# Patient Record
Sex: FEMALE | Race: WHITE | Employment: OTHER | URBAN - METROPOLITAN AREA
[De-identification: names, ages, dates, MRNs, and addresses within clinical notes are randomized per-mention and may not be internally consistent; named-entity substitution may affect disease eponyms.]

---

## 2024-02-16 ENCOUNTER — ANESTHESIA EVENT (OUTPATIENT)
Dept: OPERATING ROOM | Age: 79
End: 2024-02-16
Payer: MEDICARE

## 2024-02-19 LAB
NON-UH HIE APTT PATIENT: 28.3 SECONDS (ref 27–38)
NON-UH HIE INR: 1.1
NON-UH HIE PLATELET FUNCTION (ASPIRIN): 631 ARU
NON-UH HIE PROTIME PATIENT: 12.7 SECONDS (ref 9.8–12.8)

## 2024-02-29 RX ORDER — ALBUTEROL SULFATE 90 UG/1
1 AEROSOL, METERED RESPIRATORY (INHALATION) EVERY 6 HOURS PRN
COMMUNITY

## 2024-02-29 RX ORDER — OMEPRAZOLE 20 MG/1
40 CAPSULE, DELAYED RELEASE ORAL DAILY
COMMUNITY

## 2024-02-29 RX ORDER — LISINOPRIL 20 MG/1
20 TABLET ORAL DAILY
COMMUNITY

## 2024-02-29 RX ORDER — TIZANIDINE 4 MG/1
4 TABLET ORAL 3 TIMES DAILY PRN
COMMUNITY
End: 2024-02-29

## 2024-02-29 RX ORDER — SPIRONOLACTONE 25 MG/1
25 TABLET ORAL DAILY
COMMUNITY

## 2024-02-29 RX ORDER — ALBUTEROL SULFATE 1.25 MG/3ML
1 SOLUTION RESPIRATORY (INHALATION) EVERY 6 HOURS PRN
COMMUNITY

## 2024-02-29 RX ORDER — FAMOTIDINE 40 MG/1
40 TABLET, FILM COATED ORAL EVERY 12 HOURS
COMMUNITY

## 2024-02-29 RX ORDER — BACLOFEN 10 MG/1
10 TABLET ORAL 3 TIMES DAILY
COMMUNITY

## 2024-02-29 RX ORDER — OMEPRAZOLE 20 MG/1
40 CAPSULE, DELAYED RELEASE ORAL DAILY
COMMUNITY
End: 2024-02-29

## 2024-02-29 RX ORDER — ALENDRONATE SODIUM 70 MG/1
70 TABLET ORAL
COMMUNITY
End: 2024-02-29

## 2024-02-29 RX ORDER — SUCRALFATE 1 G/1
1 TABLET ORAL 2 TIMES DAILY
COMMUNITY

## 2024-02-29 RX ORDER — CLONIDINE HYDROCHLORIDE 0.1 MG/1
0.1 TABLET ORAL 2 TIMES DAILY
COMMUNITY
End: 2024-02-29

## 2024-02-29 RX ORDER — FLUTICASONE FUROATE, UMECLIDINIUM BROMIDE AND VILANTEROL TRIFENATATE 100; 62.5; 25 UG/1; UG/1; UG/1
1 POWDER RESPIRATORY (INHALATION) DAILY
COMMUNITY

## 2024-02-29 RX ORDER — CELECOXIB 200 MG/1
200 CAPSULE ORAL DAILY
COMMUNITY
End: 2024-02-29

## 2024-02-29 RX ORDER — BISOPROLOL FUMARATE 10 MG/1
10 TABLET, FILM COATED ORAL DAILY
COMMUNITY

## 2024-02-29 RX ORDER — ERGOCALCIFEROL 1.25 MG/1
50000 CAPSULE ORAL WEEKLY
COMMUNITY
End: 2024-02-29

## 2024-02-29 RX ORDER — HYDROXYCHLOROQUINE SULFATE 200 MG/1
200 TABLET, FILM COATED ORAL EVERY 12 HOURS
COMMUNITY
End: 2024-02-29

## 2024-02-29 RX ORDER — PREGABALIN 75 MG/1
75 CAPSULE ORAL 3 TIMES DAILY
COMMUNITY

## 2024-02-29 RX ORDER — PREDNISONE 10 MG/1
5 TABLET ORAL DAILY
COMMUNITY

## 2024-02-29 NOTE — PROGRESS NOTES
Phone PAT completed, pt given pre-op instructions (per orange sheet) and instructed to take inhalers and bisoprolol AM of OR with SOW, pt verbalized understanding. T/S and EKG ordered for AM of OR. Aware of 0545 arrival time. Electronically signed by Apryl Carlos RN on 2/29/2024 at 3:24 PM

## 2024-03-01 ENCOUNTER — HOSPITAL ENCOUNTER (INPATIENT)
Age: 79
LOS: 4 days | Discharge: HOME HEALTH | DRG: 453 | End: 2024-03-05
Attending: NEUROLOGICAL SURGERY | Admitting: NEUROLOGICAL SURGERY
Payer: MEDICARE

## 2024-03-01 ENCOUNTER — APPOINTMENT (OUTPATIENT)
Dept: GENERAL RADIOLOGY | Age: 79
DRG: 453 | End: 2024-03-01
Attending: NEUROLOGICAL SURGERY
Payer: MEDICARE

## 2024-03-01 ENCOUNTER — ANESTHESIA (OUTPATIENT)
Dept: OPERATING ROOM | Age: 79
End: 2024-03-01
Payer: MEDICARE

## 2024-03-01 DIAGNOSIS — M43.16 SPONDYLOLISTHESIS AT L4-L5 LEVEL: Primary | ICD-10-CM

## 2024-03-01 PROBLEM — S22.082G: Status: ACTIVE | Noted: 2024-03-01

## 2024-03-01 LAB
ABO + RH BLD: NORMAL
BLD GP AB SCN SERPL QL: NORMAL
EKG ATRIAL RATE: 67 BPM
EKG P AXIS: 48 DEGREES
EKG P-R INTERVAL: 174 MS
EKG Q-T INTERVAL: 378 MS
EKG QRS DURATION: 90 MS
EKG QTC CALCULATION (BAZETT): 399 MS
EKG R AXIS: -26 DEGREES
EKG T AXIS: 14 DEGREES
EKG VENTRICULAR RATE: 67 BPM

## 2024-03-01 PROCEDURE — 86850 RBC ANTIBODY SCREEN: CPT

## 2024-03-01 PROCEDURE — 6360000002 HC RX W HCPCS: Performed by: NEUROLOGICAL SURGERY

## 2024-03-01 PROCEDURE — 86901 BLOOD TYPING SEROLOGIC RH(D): CPT

## 2024-03-01 PROCEDURE — 86900 BLOOD TYPING SEROLOGIC ABO: CPT

## 2024-03-01 PROCEDURE — 6360000002 HC RX W HCPCS: Performed by: NURSE ANESTHETIST, CERTIFIED REGISTERED

## 2024-03-01 PROCEDURE — 2709999900 HC NON-CHARGEABLE SUPPLY: Performed by: NEUROLOGICAL SURGERY

## 2024-03-01 PROCEDURE — 2580000003 HC RX 258: Performed by: NEUROLOGICAL SURGERY

## 2024-03-01 PROCEDURE — 7100000000 HC PACU RECOVERY - FIRST 15 MIN: Performed by: NEUROLOGICAL SURGERY

## 2024-03-01 PROCEDURE — 76942 ECHO GUIDE FOR BIOPSY: CPT | Performed by: STUDENT IN AN ORGANIZED HEALTH CARE EDUCATION/TRAINING PROGRAM

## 2024-03-01 PROCEDURE — 00NY0ZZ RELEASE LUMBAR SPINAL CORD, OPEN APPROACH: ICD-10-PCS | Performed by: NEUROLOGICAL SURGERY

## 2024-03-01 PROCEDURE — 6370000000 HC RX 637 (ALT 250 FOR IP)

## 2024-03-01 PROCEDURE — 2500000003 HC RX 250 WO HCPCS: Performed by: NEUROLOGICAL SURGERY

## 2024-03-01 PROCEDURE — 6370000000 HC RX 637 (ALT 250 FOR IP): Performed by: NEUROLOGICAL SURGERY

## 2024-03-01 PROCEDURE — 0ST20ZZ RESECTION OF LUMBAR VERTEBRAL DISC, OPEN APPROACH: ICD-10-PCS | Performed by: NEUROLOGICAL SURGERY

## 2024-03-01 PROCEDURE — 93005 ELECTROCARDIOGRAM TRACING: CPT | Performed by: NEUROLOGICAL SURGERY

## 2024-03-01 PROCEDURE — 3700000001 HC ADD 15 MINUTES (ANESTHESIA): Performed by: NEUROLOGICAL SURGERY

## 2024-03-01 PROCEDURE — 2720000010 HC SURG SUPPLY STERILE: Performed by: NEUROLOGICAL SURGERY

## 2024-03-01 PROCEDURE — 7100000001 HC PACU RECOVERY - ADDTL 15 MIN: Performed by: NEUROLOGICAL SURGERY

## 2024-03-01 PROCEDURE — C1821 INTERSPINOUS IMPLANT: HCPCS | Performed by: NEUROLOGICAL SURGERY

## 2024-03-01 PROCEDURE — 3600000014 HC SURGERY LEVEL 4 ADDTL 15MIN: Performed by: NEUROLOGICAL SURGERY

## 2024-03-01 PROCEDURE — 0SG0071 FUSION OF LUMBAR VERTEBRAL JOINT WITH AUTOLOGOUS TISSUE SUBSTITUTE, POSTERIOR APPROACH, POSTERIOR COLUMN, OPEN APPROACH: ICD-10-PCS | Performed by: NEUROLOGICAL SURGERY

## 2024-03-01 PROCEDURE — 0SG00AJ FUSION OF LUMBAR VERTEBRAL JOINT WITH INTERBODY FUSION DEVICE, POSTERIOR APPROACH, ANTERIOR COLUMN, OPEN APPROACH: ICD-10-PCS | Performed by: NEUROLOGICAL SURGERY

## 2024-03-01 PROCEDURE — A4217 STERILE WATER/SALINE, 500 ML: HCPCS | Performed by: NEUROLOGICAL SURGERY

## 2024-03-01 PROCEDURE — 1210000000 HC MED SURG R&B

## 2024-03-01 PROCEDURE — 2500000003 HC RX 250 WO HCPCS: Performed by: NURSE ANESTHETIST, CERTIFIED REGISTERED

## 2024-03-01 PROCEDURE — 3700000000 HC ANESTHESIA ATTENDED CARE: Performed by: NEUROLOGICAL SURGERY

## 2024-03-01 PROCEDURE — 2580000003 HC RX 258: Performed by: NURSE ANESTHETIST, CERTIFIED REGISTERED

## 2024-03-01 PROCEDURE — 6360000002 HC RX W HCPCS: Performed by: STUDENT IN AN ORGANIZED HEALTH CARE EDUCATION/TRAINING PROGRAM

## 2024-03-01 PROCEDURE — 2500000003 HC RX 250 WO HCPCS: Performed by: STUDENT IN AN ORGANIZED HEALTH CARE EDUCATION/TRAINING PROGRAM

## 2024-03-01 PROCEDURE — C1713 ANCHOR/SCREW BN/BN,TIS/BN: HCPCS | Performed by: NEUROLOGICAL SURGERY

## 2024-03-01 PROCEDURE — 3600000004 HC SURGERY LEVEL 4 BASE: Performed by: NEUROLOGICAL SURGERY

## 2024-03-01 DEVICE — GRAFT HUM TISS L 10ML BONE MTRX CELLULAR OSTEOCEL PRO: Type: IMPLANTABLE DEVICE | Site: SPINE LUMBAR | Status: FUNCTIONAL

## 2024-03-01 DEVICE — TRANSITION 8.5MM MIS HA POLYAXIAL PEDICLE SCREW, 45MM
Type: IMPLANTABLE DEVICE | Site: SPINE LUMBAR | Status: FUNCTIONAL
Brand: TRANSITION

## 2024-03-01 DEVICE — 5.5MM CURVED ROD, CONE TIP, 55MM
Type: IMPLANTABLE DEVICE | Site: SPINE LUMBAR | Status: FUNCTIONAL
Brand: REVERE

## 2024-03-01 DEVICE — CALIBER SPACER 10 X 22MM, 8-12MM
Type: IMPLANTABLE DEVICE | Site: SPINE LUMBAR | Status: FUNCTIONAL
Brand: CALIBER

## 2024-03-01 DEVICE — REVOLVE LOCKING CAP
Type: IMPLANTABLE DEVICE | Site: SPINE LUMBAR | Status: FUNCTIONAL
Brand: REVOLVE

## 2024-03-01 RX ORDER — SODIUM CHLORIDE 9 MG/ML
INJECTION, SOLUTION INTRAVENOUS PRN
Status: DISCONTINUED | OUTPATIENT
Start: 2024-03-01 | End: 2024-03-01 | Stop reason: HOSPADM

## 2024-03-01 RX ORDER — GINSENG 100 MG
CAPSULE ORAL PRN
Status: DISCONTINUED | OUTPATIENT
Start: 2024-03-01 | End: 2024-03-01 | Stop reason: ALTCHOICE

## 2024-03-01 RX ORDER — METOCLOPRAMIDE HYDROCHLORIDE 5 MG/ML
10 INJECTION INTRAMUSCULAR; INTRAVENOUS
Status: DISCONTINUED | OUTPATIENT
Start: 2024-03-01 | End: 2024-03-01 | Stop reason: HOSPADM

## 2024-03-01 RX ORDER — SODIUM CHLORIDE 0.9 % (FLUSH) 0.9 %
5-40 SYRINGE (ML) INJECTION PRN
Status: DISCONTINUED | OUTPATIENT
Start: 2024-03-01 | End: 2024-03-01 | Stop reason: HOSPADM

## 2024-03-01 RX ORDER — SODIUM CHLORIDE 0.9 % (FLUSH) 0.9 %
5-40 SYRINGE (ML) INJECTION PRN
Status: DISCONTINUED | OUTPATIENT
Start: 2024-03-01 | End: 2024-03-06 | Stop reason: HOSPADM

## 2024-03-01 RX ORDER — MEPERIDINE HYDROCHLORIDE 25 MG/ML
12.5 INJECTION INTRAMUSCULAR; INTRAVENOUS; SUBCUTANEOUS
Status: DISCONTINUED | OUTPATIENT
Start: 2024-03-01 | End: 2024-03-01 | Stop reason: HOSPADM

## 2024-03-01 RX ORDER — SODIUM CHLORIDE, SODIUM LACTATE, POTASSIUM CHLORIDE, CALCIUM CHLORIDE 600; 310; 30; 20 MG/100ML; MG/100ML; MG/100ML; MG/100ML
INJECTION, SOLUTION INTRAVENOUS CONTINUOUS PRN
Status: DISCONTINUED | OUTPATIENT
Start: 2024-03-01 | End: 2024-03-01 | Stop reason: SDUPTHER

## 2024-03-01 RX ORDER — ONDANSETRON 2 MG/ML
INJECTION INTRAMUSCULAR; INTRAVENOUS PRN
Status: DISCONTINUED | OUTPATIENT
Start: 2024-03-01 | End: 2024-03-01 | Stop reason: SDUPTHER

## 2024-03-01 RX ORDER — DIPHENHYDRAMINE HYDROCHLORIDE 50 MG/ML
12.5 INJECTION INTRAMUSCULAR; INTRAVENOUS
Status: DISCONTINUED | OUTPATIENT
Start: 2024-03-01 | End: 2024-03-01 | Stop reason: HOSPADM

## 2024-03-01 RX ORDER — LISINOPRIL 20 MG/1
20 TABLET ORAL DAILY
Status: DISCONTINUED | OUTPATIENT
Start: 2024-03-02 | End: 2024-03-06 | Stop reason: HOSPADM

## 2024-03-01 RX ORDER — MAGNESIUM HYDROXIDE 1200 MG/15ML
LIQUID ORAL CONTINUOUS PRN
Status: DISCONTINUED | OUTPATIENT
Start: 2024-03-01 | End: 2024-03-01 | Stop reason: HOSPADM

## 2024-03-01 RX ORDER — PROPOFOL 10 MG/ML
INJECTION, EMULSION INTRAVENOUS PRN
Status: DISCONTINUED | OUTPATIENT
Start: 2024-03-01 | End: 2024-03-01 | Stop reason: SDUPTHER

## 2024-03-01 RX ORDER — FENTANYL CITRATE 0.05 MG/ML
50 INJECTION, SOLUTION INTRAMUSCULAR; INTRAVENOUS EVERY 10 MIN PRN
Status: DISCONTINUED | OUTPATIENT
Start: 2024-03-01 | End: 2024-03-01 | Stop reason: HOSPADM

## 2024-03-01 RX ORDER — DEXMEDETOMIDINE HYDROCHLORIDE 100 UG/ML
INJECTION, SOLUTION INTRAVENOUS PRN
Status: DISCONTINUED | OUTPATIENT
Start: 2024-03-01 | End: 2024-03-01 | Stop reason: SDUPTHER

## 2024-03-01 RX ORDER — SODIUM CHLORIDE 0.9 % (FLUSH) 0.9 %
5-40 SYRINGE (ML) INJECTION EVERY 12 HOURS SCHEDULED
Status: DISCONTINUED | OUTPATIENT
Start: 2024-03-01 | End: 2024-03-01 | Stop reason: HOSPADM

## 2024-03-01 RX ORDER — SODIUM CHLORIDE 0.9 % (FLUSH) 0.9 %
5-40 SYRINGE (ML) INJECTION EVERY 12 HOURS SCHEDULED
Status: DISCONTINUED | OUTPATIENT
Start: 2024-03-01 | End: 2024-03-06 | Stop reason: HOSPADM

## 2024-03-01 RX ORDER — ONDANSETRON 2 MG/ML
4 INJECTION INTRAMUSCULAR; INTRAVENOUS
Status: DISCONTINUED | OUTPATIENT
Start: 2024-03-01 | End: 2024-03-01 | Stop reason: HOSPADM

## 2024-03-01 RX ORDER — NALOXONE HYDROCHLORIDE 0.4 MG/ML
INJECTION, SOLUTION INTRAMUSCULAR; INTRAVENOUS; SUBCUTANEOUS PRN
Status: DISCONTINUED | OUTPATIENT
Start: 2024-03-01 | End: 2024-03-02

## 2024-03-01 RX ORDER — METOPROLOL SUCCINATE 100 MG/1
100 TABLET, EXTENDED RELEASE ORAL DAILY
Status: DISCONTINUED | OUTPATIENT
Start: 2024-03-02 | End: 2024-03-06 | Stop reason: HOSPADM

## 2024-03-01 RX ORDER — DEXAMETHASONE SODIUM PHOSPHATE 10 MG/ML
INJECTION INTRAMUSCULAR; INTRAVENOUS PRN
Status: DISCONTINUED | OUTPATIENT
Start: 2024-03-01 | End: 2024-03-01 | Stop reason: SDUPTHER

## 2024-03-01 RX ORDER — LIDOCAINE HYDROCHLORIDE 10 MG/ML
INJECTION, SOLUTION EPIDURAL; INFILTRATION; INTRACAUDAL; PERINEURAL PRN
Status: DISCONTINUED | OUTPATIENT
Start: 2024-03-01 | End: 2024-03-01 | Stop reason: SDUPTHER

## 2024-03-01 RX ORDER — SODIUM CHLORIDE AND POTASSIUM CHLORIDE 150; 900 MG/100ML; MG/100ML
INJECTION, SOLUTION INTRAVENOUS CONTINUOUS
Status: DISCONTINUED | OUTPATIENT
Start: 2024-03-01 | End: 2024-03-05

## 2024-03-01 RX ORDER — BISACODYL 10 MG
10 SUPPOSITORY, RECTAL RECTAL DAILY PRN
Status: DISCONTINUED | OUTPATIENT
Start: 2024-03-01 | End: 2024-03-06 | Stop reason: HOSPADM

## 2024-03-01 RX ORDER — SPIRONOLACTONE 25 MG/1
25 TABLET ORAL DAILY
Status: DISCONTINUED | OUTPATIENT
Start: 2024-03-02 | End: 2024-03-06 | Stop reason: HOSPADM

## 2024-03-01 RX ORDER — OXYCODONE HYDROCHLORIDE 5 MG/1
5 TABLET ORAL
Status: DISCONTINUED | OUTPATIENT
Start: 2024-03-01 | End: 2024-03-01 | Stop reason: HOSPADM

## 2024-03-01 RX ORDER — PANTOPRAZOLE SODIUM 40 MG/1
40 TABLET, DELAYED RELEASE ORAL
Status: DISCONTINUED | OUTPATIENT
Start: 2024-03-02 | End: 2024-03-06 | Stop reason: HOSPADM

## 2024-03-01 RX ORDER — FENTANYL CITRATE 50 UG/ML
INJECTION, SOLUTION INTRAMUSCULAR; INTRAVENOUS PRN
Status: DISCONTINUED | OUTPATIENT
Start: 2024-03-01 | End: 2024-03-01 | Stop reason: SDUPTHER

## 2024-03-01 RX ORDER — POLYETHYLENE GLYCOL 3350 17 G/17G
17 POWDER, FOR SOLUTION ORAL DAILY PRN
Status: DISCONTINUED | OUTPATIENT
Start: 2024-03-01 | End: 2024-03-02

## 2024-03-01 RX ORDER — OXYCODONE HYDROCHLORIDE AND ACETAMINOPHEN 5; 325 MG/1; MG/1
1 TABLET ORAL EVERY 8 HOURS PRN
Qty: 21 TABLET | Refills: 0 | Status: SHIPPED | OUTPATIENT
Start: 2024-03-01 | End: 2024-03-08

## 2024-03-01 RX ORDER — EPHEDRINE SULFATE/0.9% NACL/PF 50 MG/5 ML
SYRINGE (ML) INTRAVENOUS PRN
Status: DISCONTINUED | OUTPATIENT
Start: 2024-03-01 | End: 2024-03-01 | Stop reason: SDUPTHER

## 2024-03-01 RX ORDER — SODIUM CHLORIDE 9 MG/ML
INJECTION, SOLUTION INTRAVENOUS PRN
Status: DISCONTINUED | OUTPATIENT
Start: 2024-03-01 | End: 2024-03-06 | Stop reason: HOSPADM

## 2024-03-01 RX ORDER — ROPIVACAINE HYDROCHLORIDE 5 MG/ML
INJECTION, SOLUTION EPIDURAL; INFILTRATION; PERINEURAL
Status: COMPLETED | OUTPATIENT
Start: 2024-03-01 | End: 2024-03-01

## 2024-03-01 RX ORDER — LIDOCAINE HYDROCHLORIDE 10 MG/ML
1 INJECTION, SOLUTION EPIDURAL; INFILTRATION; INTRACAUDAL; PERINEURAL
Status: DISCONTINUED | OUTPATIENT
Start: 2024-03-01 | End: 2024-03-01 | Stop reason: HOSPADM

## 2024-03-01 RX ORDER — PROCHLORPERAZINE MALEATE 10 MG
5 TABLET ORAL EVERY 6 HOURS PRN
Status: DISCONTINUED | OUTPATIENT
Start: 2024-03-01 | End: 2024-03-06 | Stop reason: HOSPADM

## 2024-03-01 RX ORDER — MIDAZOLAM HYDROCHLORIDE 1 MG/ML
INJECTION INTRAMUSCULAR; INTRAVENOUS
Status: COMPLETED | OUTPATIENT
Start: 2024-03-01 | End: 2024-03-01

## 2024-03-01 RX ORDER — SENNA AND DOCUSATE SODIUM 50; 8.6 MG/1; MG/1
1 TABLET, FILM COATED ORAL 2 TIMES DAILY
Status: DISCONTINUED | OUTPATIENT
Start: 2024-03-01 | End: 2024-03-06 | Stop reason: HOSPADM

## 2024-03-01 RX ORDER — VANCOMYCIN HYDROCHLORIDE 1 G/20ML
INJECTION, POWDER, LYOPHILIZED, FOR SOLUTION INTRAVENOUS PRN
Status: DISCONTINUED | OUTPATIENT
Start: 2024-03-01 | End: 2024-03-01 | Stop reason: ALTCHOICE

## 2024-03-01 RX ORDER — ACETAMINOPHEN 325 MG/1
650 TABLET ORAL EVERY 6 HOURS
Status: DISCONTINUED | OUTPATIENT
Start: 2024-03-01 | End: 2024-03-05

## 2024-03-01 RX ORDER — SUCCINYLCHOLINE/SOD CL,ISO/PF 100 MG/5ML
SYRINGE (ML) INTRAVENOUS PRN
Status: DISCONTINUED | OUTPATIENT
Start: 2024-03-01 | End: 2024-03-01 | Stop reason: SDUPTHER

## 2024-03-01 RX ORDER — ENOXAPARIN SODIUM 100 MG/ML
30 INJECTION SUBCUTANEOUS DAILY
Status: DISCONTINUED | OUTPATIENT
Start: 2024-03-02 | End: 2024-03-06 | Stop reason: HOSPADM

## 2024-03-01 RX ORDER — ONDANSETRON 2 MG/ML
4 INJECTION INTRAMUSCULAR; INTRAVENOUS EVERY 6 HOURS PRN
Status: DISCONTINUED | OUTPATIENT
Start: 2024-03-01 | End: 2024-03-06 | Stop reason: HOSPADM

## 2024-03-01 RX ORDER — MAGNESIUM HYDROXIDE 1200 MG/15ML
LIQUID ORAL CONTINUOUS PRN
Status: COMPLETED | OUTPATIENT
Start: 2024-03-01 | End: 2024-03-01

## 2024-03-01 RX ORDER — SODIUM CHLORIDE, SODIUM LACTATE, POTASSIUM CHLORIDE, CALCIUM CHLORIDE 600; 310; 30; 20 MG/100ML; MG/100ML; MG/100ML; MG/100ML
INJECTION, SOLUTION INTRAVENOUS CONTINUOUS
Status: DISCONTINUED | OUTPATIENT
Start: 2024-03-01 | End: 2024-03-01 | Stop reason: HOSPADM

## 2024-03-01 RX ORDER — KETAMINE HYDROCHLORIDE 50 MG/ML
50 INJECTION, SOLUTION INTRAMUSCULAR; INTRAVENOUS ONCE
Status: COMPLETED | OUTPATIENT
Start: 2024-03-01 | End: 2024-03-01

## 2024-03-01 RX ORDER — OXYCODONE HYDROCHLORIDE 5 MG/1
5 TABLET ORAL EVERY 4 HOURS PRN
Status: DISCONTINUED | OUTPATIENT
Start: 2024-03-01 | End: 2024-03-04

## 2024-03-01 RX ADMIN — ROPIVACAINE HYDROCHLORIDE 30 ML: 5 INJECTION, SOLUTION EPIDURAL; INFILTRATION; PERINEURAL at 07:04

## 2024-03-01 RX ADMIN — Medication 10 MG: at 10:27

## 2024-03-01 RX ADMIN — DEXAMETHASONE SODIUM PHOSPHATE 10 MG: 10 INJECTION INTRAMUSCULAR; INTRAVENOUS at 08:34

## 2024-03-01 RX ADMIN — ONDANSETRON 4 MG: 2 INJECTION INTRAMUSCULAR; INTRAVENOUS at 20:49

## 2024-03-01 RX ADMIN — Medication 100 MG: at 07:50

## 2024-03-01 RX ADMIN — FENTANYL CITRATE 50 MCG: 50 INJECTION, SOLUTION INTRAMUSCULAR; INTRAVENOUS at 07:47

## 2024-03-01 RX ADMIN — ONDANSETRON 4 MG: 2 INJECTION INTRAMUSCULAR; INTRAVENOUS at 14:54

## 2024-03-01 RX ADMIN — MIDAZOLAM HYDROCHLORIDE 2 MG: 1 INJECTION, SOLUTION INTRAMUSCULAR; INTRAVENOUS at 07:04

## 2024-03-01 RX ADMIN — Medication 20 MG: at 08:34

## 2024-03-01 RX ADMIN — DEXMEDETOMIDINE HCL 10 MCG: 100 INJECTION INTRAVENOUS at 08:45

## 2024-03-01 RX ADMIN — LIDOCAINE HYDROCHLORIDE 50 MG: 10 INJECTION, SOLUTION EPIDURAL; INFILTRATION; INTRACAUDAL; PERINEURAL at 07:50

## 2024-03-01 RX ADMIN — FENTANYL CITRATE 50 MCG: 50 INJECTION, SOLUTION INTRAMUSCULAR; INTRAVENOUS at 08:03

## 2024-03-01 RX ADMIN — SODIUM CHLORIDE: 900 INJECTION, SOLUTION INTRAVENOUS at 13:05

## 2024-03-01 RX ADMIN — CEFAZOLIN 2000 MG: 2 INJECTION, POWDER, FOR SOLUTION INTRAMUSCULAR; INTRAVENOUS at 11:46

## 2024-03-01 RX ADMIN — SODIUM CHLORIDE, POTASSIUM CHLORIDE, SODIUM LACTATE AND CALCIUM CHLORIDE: 600; 310; 30; 20 INJECTION, SOLUTION INTRAVENOUS at 09:28

## 2024-03-01 RX ADMIN — DEXMEDETOMIDINE HCL 10 MCG: 100 INJECTION INTRAVENOUS at 07:44

## 2024-03-01 RX ADMIN — SODIUM CHLORIDE, PRESERVATIVE FREE 10 ML: 5 INJECTION INTRAVENOUS at 20:53

## 2024-03-01 RX ADMIN — KETAMINE HYDROCHLORIDE 50 MG: 50 INJECTION INTRAMUSCULAR; INTRAVENOUS at 11:02

## 2024-03-01 RX ADMIN — POTASSIUM CHLORIDE AND SODIUM CHLORIDE: 900; 150 INJECTION, SOLUTION INTRAVENOUS at 15:50

## 2024-03-01 RX ADMIN — CEFAZOLIN 2000 MG: 2 INJECTION, POWDER, FOR SOLUTION INTRAMUSCULAR; INTRAVENOUS at 07:54

## 2024-03-01 RX ADMIN — PROPOFOL 130 MG: 10 INJECTION, EMULSION INTRAVENOUS at 07:50

## 2024-03-01 RX ADMIN — Medication 20 MG: at 08:36

## 2024-03-01 RX ADMIN — ONDANSETRON 4 MG: 2 INJECTION INTRAMUSCULAR; INTRAVENOUS at 08:34

## 2024-03-01 RX ADMIN — DEXMEDETOMIDINE HCL 20 MCG: 100 INJECTION INTRAVENOUS at 11:33

## 2024-03-01 RX ADMIN — SODIUM CHLORIDE, POTASSIUM CHLORIDE, SODIUM LACTATE AND CALCIUM CHLORIDE: 600; 310; 30; 20 INJECTION, SOLUTION INTRAVENOUS at 07:47

## 2024-03-01 ASSESSMENT — PAIN SCALES - GENERAL
PAINLEVEL_OUTOF10: 0
PAINLEVEL_OUTOF10: 0
PAINLEVEL_OUTOF10: 10
PAINLEVEL_OUTOF10: 0

## 2024-03-01 ASSESSMENT — PAIN DESCRIPTION - ORIENTATION: ORIENTATION: LOWER;MID

## 2024-03-01 ASSESSMENT — PAIN DESCRIPTION - LOCATION: LOCATION: BACK

## 2024-03-01 NOTE — OP NOTE
Operative Note      Patient: Lauren Elizondo  YOB: 1945  MRN: [id number]    Date of Procedure: 3/1/2024    Pre-Op Diagnosis Codes:     * Spondylolisthesis of lumbar region [M43.16]    Post-Op Diagnosis: Same       Procedure(s):  MINI OPEN NAVIGATED TRANSFORAMINAL LUMBAR INTERBODY FUSION  L4-5, PEDICLE SCREWS, RODS, INTERBODY ARTHRODESIS,    CAGE, SPINAL MONITORING, DECOMPRESSION LAMINECTOMY FACETECTOMY FORAMINOTOMY, MICRODISSECTION  AUTOGRAFT, ALLOGRAFT, FLUOROSCOPY, PREOPERATIVE STEROTACTIC PLANNING & WORK, AND INDICATED PROCEDURES    Surgeon(s):  Abhijit Goetz MD    Assistant:   Physician Assistant: Cassy Way PA-C    Anesthesia: General    Estimated Blood Loss (mL): less than 100     Complications: None    Specimens:   * No specimens in log *    Implants:  Implant Name Type Inv. Item Serial No.  Lot No. LRB No. Used Action   GRAFT HUM TISS L 10ML BONE MTRX CELLULAR OSTEOCEL PRO - [id number]  GRAFT HUM TISS L 10ML BONE MTRX CELLULAR OSTEOCEL PRO [id number] [employer]  N/A 1 Implanted   SCREW SPNL POLYAXL 8.5X45 MM PEDCL HA STRL TRANSITION MIS - [id number]  SCREW SPNL POLYAXL 8.5X45 MM PEDCL HA STRL TRANSITION MIS  [employer] [id number] N/A 1 Implanted   SCREW SPNL POLYAXL 8.5X45 MM PEDCL HA STRL TRANSITION MIS - [id number]  SCREW SPNL POLYAXL 8.5X45 MM PEDCL HA STRL TRANSITION MIS  [employer] [id number] N/A 2 Implanted   SCREW SPNL POLYAXL 8.5X45 MM PEDCL HA STRL TRANSITION MIS - [id number]  SCREW SPNL POLYAXL 8.5X45 MM PEDCL HA STRL TRANSITION MIS  [employer] [id number] N/A 1 Implanted         Drains:   Urinary Catheter 03/01/24 Mckenna-Temperature (Active)       Findings: spondylolisthesis l45 reduced intra-op spinal stenosis decompressed l45 central        Detailed Description of Procedure:   Given the inherent complexity of this surgery, a second surgeon or a surgically skilled physician assistant was necessary for the successful completion of this

## 2024-03-01 NOTE — ANESTHESIA POSTPROCEDURE EVALUATION
Department of Anesthesiology  Postprocedure Note    Patient: Lauren Elizondo  MRN: [id number]  YOB: 1945  Date of evaluation: 3/1/2024    Procedure Summary       Date: 03/01/24 Room / Location: [address]    Anesthesia Start: 0747 Anesthesia Stop: 1205    Procedure: MINI OPEN NAVIGATED TRANSFORAMINAL LUMBAR INTERBODY FUSION  L4-5, PEDICLE SCREWS, RODS, INTERBODY ARTHRODESIS,    CAGE, SPINAL MONITORING, DECOMPRESSION LAMINECTOMY FACETECTOMY FORAMINOTOMY, MICRODISSECTION  AUTOGRAFT, ALLOGRAFT, FLUOROSCOPY, PREOPERATIVE STEROTACTIC PLANNING & WORK, AND INDICATED PROCEDURES (Back) Diagnosis:       Spondylolisthesis of lumbar region      (Spondylolisthesis of lumbar region [M43.16])    Surgeons: Abhijit Goetz MD Responsible Provider: Berto Amezquita DO    Anesthesia Type: general, regional ASA Status: 3            Anesthesia Type: No value filed.    Andressa Phase I: Andressa Score: 10    Andressa Phase II:      Anesthesia Post Evaluation    Patient location during evaluation: PACU  Patient participation: waiting for patient participation  Level of consciousness: awake and responsive to light touch  Airway patency: patent  Nausea & Vomiting: no nausea and no vomiting  Cardiovascular status: blood pressure returned to baseline and hemodynamically stable  Respiratory status: acceptable  Hydration status: euvolemic  Pain management: adequate        No notable events documented.

## 2024-03-01 NOTE — ACP (ADVANCE CARE PLANNING)
Advance Care Planning   Healthcare Decision Maker:    Primary Decision Maker: Brendon Elizondo - [facility] - [phone]    Click here to complete Healthcare Decision Makers including selection of the Healthcare Decision Maker Relationship (ie \"Primary\").

## 2024-03-01 NOTE — ANESTHESIA PRE PROCEDURE
Department of Anesthesiology  Preprocedure Note       Name:  Lauren Elizondo   Age:  78 y.o.  :  1945                                          MRN:  [id number]         Date:  3/1/2024      Surgeon: Surgeon(s):  Abhijit Goetz MD    Procedure: Procedure(s):  MINI OPEN NAVIGATED TRANSFORAMINAL LUMBAR INTERBODY FUSION  L4-5, PEDICLE SCREWS, RODS, INTERBODY ARTHRODESIS,    CAGE, SPINAL MONITORING, DECOMPRESSION LAMINECTOMY FACETECTOMY FORAMINOTOMY, MICRODISSECTION  AUTOGRAFT, ALLOGRAFT, FLUOROSCOPY, PREOPERATIVE STEROTACTIC PLANNING & WORK, AND INDICATED PROCEDURES SCOPE LEFT SIDE, 1 C-ARM, O-ARM NAVIGATION STEALTH , MARIBEL POWDER, DEBORA GLOBUS SCREWS, RODS, CAGE, ISO BRACE ORDERED, VIACEL Debora Cancino confirmed & spinal monitoring conf# [id number]    Medications prior to admission:   Prior to Admission medications    Medication Sig Start Date End Date Taking? Authorizing Provider   pregabalin (LYRICA) 75 MG capsule Take 1 capsule by mouth 3 times daily.   Yes Jolly Cao MD   famotidine (PEPCID) 40 MG tablet Take 1 tablet by mouth every 12 hours   Yes Jolly Cao MD   albuterol sulfate HFA (VENTOLIN HFA) 108 (90 Base) MCG/ACT inhaler Inhale 1 puff into the lungs every 6 hours as needed for Wheezing   Yes ProviderJolly MD   spironolactone (ALDACTONE) 25 MG tablet Take 1 tablet by mouth daily   Yes Jolly Cao MD   albuterol (ACCUNEB) 1.25 MG/3ML nebulizer solution Inhale 3 mLs into the lungs every 6 hours as needed for Wheezing   Yes ProviderJolly MD   ipratropium (ATROVENT HFA) 17 MCG/ACT inhaler Inhale 2 puffs into the lungs 2 times daily   Yes Jolly Cao MD   bisoprolol (ZEBETA) 10 MG tablet Take 1 tablet by mouth daily   Yes Jolly Cao MD   fluticasone-umeclidin-vilant (TRELEGY ELLIPTA) 100-62.5-25 MCG/ACT AEPB inhaler Inhale 1 puff into the lungs daily   Yes Jolly Cao MD   lisinopril (PRINIVIL;ZESTRIL) 20 MG tablet Take 1 tablet by mouth

## 2024-03-01 NOTE — CARE COORDINATION
Case Management Assessment  Initial Evaluation    Date/Time of Evaluation: 3/1/2024 3:27 PM  Assessment Completed by: Nelly Gamboa RN    If patient is discharged prior to next notation, then this note serves as note for discharge by case management.    Patient Name: Lauren Elizondo                   YOB: 1945  Diagnosis: Spondylolisthesis of lumbar region [M43.16]  Spondylolisthesis at L4-L5 level [M43.16]  Unstable burst fracture of t11-T12 vertebra, subsequent encounter for fracture with delayed healing [S22.082G]                   Date / Time: 3/1/2024  5:48 AM    Patient Admission Status: Inpatient   Readmission Risk (Low < 19, Mod (19-27), High > 27): Readmission Risk Score: 7.5    Current PCP: Lyndsey Shepherd  PCP verified by CM? Yes    Chart Reviewed: Yes      History Provided by: Patient  Patient Orientation: Alert and Oriented, Person, Place, Situation, Self    Patient Cognition: Alert    Hospitalization in the last 30 days (Readmission):  No    If yes, Readmission Assessment in CM Navigator will be completed.    Advance Directives:      Code Status: Full Code   Patient's Primary Decision Maker is: Legal Next of Kin      Discharge Planning:    Patient lives with:   Type of Home:    Primary Care Giver: Self  Patient Support Systems include: Spouse/Significant Other   Current Financial resources:    Current community resources:    Current services prior to admission:              Current DME:              Type of Home Care services:       ADLS  Prior functional level: Independent in ADLs/IADLs  Current functional level: Independent in ADLs/IADLs    PT AM-PAC:   /24  OT AM-PAC:   /24    Family can provide assistance at DC: Yes  Would you like Case Management to discuss the discharge plan with any other family members/significant others, and if so, who? Yes (PT SPOUSE)  Plans to Return to Present Housing: Yes  Other Identified Issues/Barriers to RETURNING to current housing: NO  Potential

## 2024-03-01 NOTE — ANESTHESIA PROCEDURE NOTES
Peripheral Block    Patient location during procedure: OR  Reason for block: post-op pain management and at surgeon's request  Start time: 3/1/2024 7:04 AM  End time: 3/1/2024 7:14 AM  Staffing  Performed: anesthesiologist   Anesthesiologist: Berto Amezquita DO  Performed by: Berto Amezquita DO  Authorized by: Berto Amezquita DO    Preanesthetic Checklist  Completed: patient identified, IV checked, site marked, risks and benefits discussed, surgical/procedural consents, equipment checked, pre-op evaluation, timeout performed, anesthesia consent given, oxygen available and monitors applied/VS acknowledged  Peripheral Block   Patient position: supine  Prep: ChloraPrep  Provider prep: mask and sterile gloves  Patient monitoring: cardiac monitor, continuous pulse ox, frequent blood pressure checks and IV access  Block type: Erector spinae (Lumbar 4)  Laterality: bilateral  Injection technique: single-shot  Guidance: ultrasound guided  Local infiltration: ropivacaine  Infiltration strength: 0.5 %  Local infiltration: ropivacaine  Dose: 30 mL    Needle   Needle type: combined needle/nerve stimulator   Needle gauge: 22 G  Needle localization: anatomical landmarks and ultrasound guidance  Needle length: 10 cm  Assessment   Injection assessment: negative aspiration for heme, no paresthesia on injection and local visualized surrounding nerve on ultrasound  Paresthesia pain: immediately resolved  Slow fractionated injection: yes  Hemodynamics: stable    Additional Notes  Ultrasound image printed and saved in patient chart.    Sterile probe cover used    Ropivacaine 0.5% 30 ml + saline 20 ml    25 ml bilateral    Medications Administered  midazolam (VERSED) injection 2 mg/2mL - IntraVENous   2 mg - 3/1/2024 7:04:00 AM  ropivacaine (NAROPIN) injection 0.5% - Perineural   30 mL - 3/1/2024 7:04:00 AM

## 2024-03-01 NOTE — PROGRESS NOTES
Pt arrived to unit via bed. Vitals stable.  at bed side. O2 at 3 L via NC. Pt lethargic but able to open eyes when asked by this nurse. IV Dilaudid 1 mg/ml PCA pump bolus at 0.2 mg with 1 hour dose limit. SCDs in place. Mckenna patent draining clear yellow urine. In bed, alarm activated. Call light in reach. Electronically signed by Kari Hurt LPN on 3/1/2024 at 2:34 PM

## 2024-03-02 ENCOUNTER — APPOINTMENT (OUTPATIENT)
Dept: CT IMAGING | Age: 79
DRG: 453 | End: 2024-03-02
Attending: NEUROLOGICAL SURGERY
Payer: MEDICARE

## 2024-03-02 LAB
ANION GAP SERPL CALCULATED.3IONS-SCNC: 11 MEQ/L (ref 9–15)
BUN SERPL-MCNC: 14 MG/DL (ref 8–23)
CALCIUM SERPL-MCNC: 8.7 MG/DL (ref 8.5–9.9)
CHLORIDE SERPL-SCNC: 104 MEQ/L (ref 95–107)
CO2 SERPL-SCNC: 23 MEQ/L (ref 20–31)
CREAT SERPL-MCNC: 0.55 MG/DL (ref 0.5–0.9)
ERYTHROCYTE [DISTWIDTH] IN BLOOD BY AUTOMATED COUNT: 14.8 % (ref 11.5–14.5)
GLUCOSE SERPL-MCNC: 110 MG/DL (ref 70–99)
HCT VFR BLD AUTO: 34 % (ref 37–47)
HGB BLD-MCNC: 10.9 G/DL (ref 12–16)
MAGNESIUM SERPL-MCNC: 2 MG/DL (ref 1.7–2.4)
MCH RBC QN AUTO: 27.3 PG (ref 27–31.3)
MCHC RBC AUTO-ENTMCNC: 32.1 % (ref 33–37)
MCV RBC AUTO: 85 FL (ref 79.4–94.8)
PHOSPHATE SERPL-MCNC: 3.4 MG/DL (ref 2.3–4.8)
PLATELET # BLD AUTO: 247 K/UL (ref 130–400)
POTASSIUM SERPL-SCNC: 4.4 MEQ/L (ref 3.4–4.9)
RBC # BLD AUTO: 4 M/UL (ref 4.2–5.4)
SODIUM SERPL-SCNC: 138 MEQ/L (ref 135–144)
WBC # BLD AUTO: 15.1 K/UL (ref 4.8–10.8)

## 2024-03-02 PROCEDURE — 6370000000 HC RX 637 (ALT 250 FOR IP): Performed by: INTERNAL MEDICINE

## 2024-03-02 PROCEDURE — 6360000002 HC RX W HCPCS: Performed by: NEUROLOGICAL SURGERY

## 2024-03-02 PROCEDURE — 2700000000 HC OXYGEN THERAPY PER DAY

## 2024-03-02 PROCEDURE — 1210000000 HC MED SURG R&B

## 2024-03-02 PROCEDURE — 80048 BASIC METABOLIC PNL TOTAL CA: CPT

## 2024-03-02 PROCEDURE — 97166 OT EVAL MOD COMPLEX 45 MIN: CPT

## 2024-03-02 PROCEDURE — 36415 COLL VENOUS BLD VENIPUNCTURE: CPT

## 2024-03-02 PROCEDURE — 6370000000 HC RX 637 (ALT 250 FOR IP): Performed by: NEUROLOGICAL SURGERY

## 2024-03-02 PROCEDURE — 83735 ASSAY OF MAGNESIUM: CPT

## 2024-03-02 PROCEDURE — 97535 SELF CARE MNGMENT TRAINING: CPT

## 2024-03-02 PROCEDURE — 72131 CT LUMBAR SPINE W/O DYE: CPT

## 2024-03-02 PROCEDURE — 6370000000 HC RX 637 (ALT 250 FOR IP): Performed by: NURSE PRACTITIONER

## 2024-03-02 PROCEDURE — 94761 N-INVAS EAR/PLS OXIMETRY MLT: CPT

## 2024-03-02 PROCEDURE — 2580000003 HC RX 258: Performed by: NEUROLOGICAL SURGERY

## 2024-03-02 PROCEDURE — 84100 ASSAY OF PHOSPHORUS: CPT

## 2024-03-02 PROCEDURE — 97162 PT EVAL MOD COMPLEX 30 MIN: CPT

## 2024-03-02 PROCEDURE — 85027 COMPLETE CBC AUTOMATED: CPT

## 2024-03-02 PROCEDURE — 82330 ASSAY OF CALCIUM: CPT

## 2024-03-02 RX ORDER — POLYETHYLENE GLYCOL 3350 17 G/17G
17 POWDER, FOR SOLUTION ORAL ONCE
Status: COMPLETED | OUTPATIENT
Start: 2024-03-02 | End: 2024-03-02

## 2024-03-02 RX ADMIN — OXYCODONE 5 MG: 5 TABLET ORAL at 13:51

## 2024-03-02 RX ADMIN — HYDROMORPHONE HYDROCHLORIDE 0.5 MG: 1 INJECTION, SOLUTION INTRAMUSCULAR; INTRAVENOUS; SUBCUTANEOUS at 21:16

## 2024-03-02 RX ADMIN — ENOXAPARIN SODIUM 30 MG: 100 INJECTION SUBCUTANEOUS at 09:22

## 2024-03-02 RX ADMIN — SODIUM CHLORIDE, PRESERVATIVE FREE 10 ML: 5 INJECTION INTRAVENOUS at 09:23

## 2024-03-02 RX ADMIN — NALOXEGOL OXALATE 25 MG: 12.5 TABLET, FILM COATED ORAL at 13:44

## 2024-03-02 RX ADMIN — ACETAMINOPHEN 325MG 650 MG: 325 TABLET ORAL at 21:27

## 2024-03-02 RX ADMIN — DOCUSATE SODIUM 50 MG AND SENNOSIDES 8.6 MG 1 TABLET: 8.6; 5 TABLET, FILM COATED ORAL at 21:27

## 2024-03-02 RX ADMIN — ACETAMINOPHEN 325MG 650 MG: 325 TABLET ORAL at 09:21

## 2024-03-02 RX ADMIN — ONDANSETRON 4 MG: 2 INJECTION INTRAMUSCULAR; INTRAVENOUS at 05:39

## 2024-03-02 RX ADMIN — POTASSIUM CHLORIDE AND SODIUM CHLORIDE: 900; 150 INJECTION, SOLUTION INTRAVENOUS at 05:43

## 2024-03-02 RX ADMIN — SPIRONOLACTONE 25 MG: 25 TABLET ORAL at 09:21

## 2024-03-02 RX ADMIN — PROCHLORPERAZINE MALEATE 5 MG: 10 TABLET ORAL at 03:47

## 2024-03-02 RX ADMIN — HYDROMORPHONE HYDROCHLORIDE 0.5 MG: 1 INJECTION, SOLUTION INTRAMUSCULAR; INTRAVENOUS; SUBCUTANEOUS at 15:44

## 2024-03-02 RX ADMIN — PANTOPRAZOLE SODIUM 40 MG: 40 TABLET, DELAYED RELEASE ORAL at 05:39

## 2024-03-02 RX ADMIN — DOCUSATE SODIUM 50 MG AND SENNOSIDES 8.6 MG 1 TABLET: 8.6; 5 TABLET, FILM COATED ORAL at 09:21

## 2024-03-02 RX ADMIN — LISINOPRIL 20 MG: 20 TABLET ORAL at 09:21

## 2024-03-02 RX ADMIN — OXYCODONE 5 MG: 5 TABLET ORAL at 20:03

## 2024-03-02 RX ADMIN — POLYETHYLENE GLYCOL 3350 17 G: 17 POWDER, FOR SOLUTION ORAL at 11:35

## 2024-03-02 RX ADMIN — ACETAMINOPHEN 325MG 650 MG: 325 TABLET ORAL at 15:44

## 2024-03-02 RX ADMIN — MAGNESIUM CITRATE 296 ML: 1.75 LIQUID ORAL at 16:20

## 2024-03-02 RX ADMIN — METOPROLOL SUCCINATE 100 MG: 100 TABLET, EXTENDED RELEASE ORAL at 09:21

## 2024-03-02 RX ADMIN — ACETAMINOPHEN 325MG 650 MG: 325 TABLET ORAL at 03:08

## 2024-03-02 ASSESSMENT — PAIN DESCRIPTION - LOCATION
LOCATION: BACK

## 2024-03-02 ASSESSMENT — PAIN SCALES - GENERAL
PAINLEVEL_OUTOF10: 9
PAINLEVEL_OUTOF10: 8
PAINLEVEL_OUTOF10: 6
PAINLEVEL_OUTOF10: 9

## 2024-03-02 ASSESSMENT — PAIN DESCRIPTION - ORIENTATION
ORIENTATION: MID;LOWER
ORIENTATION: LOWER;MID
ORIENTATION: LOWER

## 2024-03-02 ASSESSMENT — PAIN DESCRIPTION - DESCRIPTORS
DESCRIPTORS: ACHING
DESCRIPTORS: ACHING

## 2024-03-02 ASSESSMENT — PAIN DESCRIPTION - PAIN TYPE: TYPE: ACUTE PAIN;SURGICAL PAIN

## 2024-03-02 NOTE — PLAN OF CARE
Problem: Discharge Planning  Goal: Discharge to home or other facility with appropriate resources  3/2/2024 1206 by Ann Thomas RN  Outcome: Progressing  3/1/2024 2350 by Joselito Coppola RN  Outcome: Progressing     Problem: Pain  Goal: Verbalizes/displays adequate comfort level or baseline comfort level  3/2/2024 1206 by Ann Thomas RN  Outcome: Progressing  Flowsheets (Taken 3/2/2024 0718)  Verbalizes/displays adequate comfort level or baseline comfort level:   Encourage patient to monitor pain and request assistance   Assess pain using appropriate pain scale   Administer analgesics based on type and severity of pain and evaluate response   Implement non-pharmacological measures as appropriate and evaluate response   Consider cultural and social influences on pain and pain management   Notify Licensed Independent Practitioner if interventions unsuccessful or patient reports new pain  3/1/2024 2350 by Joselito Coppola RN  Outcome: Progressing     Problem: Safety - Adult  Goal: Free from fall injury  3/2/2024 1206 by Ann Thomas RN  Outcome: Progressing  Flowsheets (Taken 3/2/2024 1205)  Free From Fall Injury:   Instruct family/caregiver on patient safety   Based on caregiver fall risk screen, instruct family/caregiver to ask for assistance with transferring infant if caregiver noted to have fall risk factors  3/1/2024 2350 by Joselito Coppola RN  Outcome: Progressing     Problem: Skin/Tissue Integrity  Goal: Absence of new skin breakdown  Description: 1.  Monitor for areas of redness and/or skin breakdown  2.  Assess vascular access sites hourly  3.  Every 4-6 hours minimum:  Change oxygen saturation probe site  4.  Every 4-6 hours:  If on nasal continuous positive airway pressure, respiratory therapy assess nares and determine need for appliance change or resting period.  3/2/2024 1206 by Ann Thomas RN  Outcome: Progressing  3/1/2024 2350 by Joselito Coppola RN  Outcome: Progressing     Problem: ABCDS Injury  Assessment  Goal: Absence of physical injury  3/2/2024 1206 by Ann Thomas RN  Outcome: Progressing  Flowsheets (Taken 3/2/2024 1205)  Absence of Physical Injury: Implement safety measures based on patient assessment  3/1/2024 1920 by Joselito Coppola, RN  Outcome: Progressing

## 2024-03-02 NOTE — CONSULTS
Consult Note            Date:3/1/2024        Patient Name:Lauren Elizondo     YOB: 1945     Age:78 y.o.    Reason for Consult: Medical management of hypertension    Chief Complaint   Low back pain    History Obtained From   patient, electronic medical record    History of Present Illness   Lauren Elizondo is a 78-year-old  female with significant past medical history of hypertension, hyperlipidemia, MI, COPD, asthma, arthritis, who was admitted for spondylolisthesis of lumbar region s/p L4-L5 decompression laminectomy and fusion under the service of Dr. Goetz.   hospitalist team was consulted for medical management of acute and chronic health conditions.  At the time of examination, patient with complaints of nausea and vomiting but denies chest pain, SOB, abdominal pain, dizziness, headache.     Past Medical History     Past Medical History:   Diagnosis Date    Arthritis     Asthma     COPD (chronic obstructive pulmonary disease) (HCC)     Hiatal hernia     Hyperlipidemia     Hypertension     MI (myocardial infarction) (HCC)       Past Surgical History     Past Surgical History:   Procedure Laterality Date    CATARACT EXTRACTION W/ INTRAOCULAR LENS IMPLANT Bilateral     CHOLECYSTECTOMY      COLONOSCOPY      ENDOSCOPY, COLON, DIAGNOSTIC      EYE SURGERY      FRACTURE SURGERY      HAND SURGERY Left     broken bone    HYSTERECTOMY (CERVIX STATUS UNKNOWN)      TONSILLECTOMY        Medications     Prior to Admission medications    Medication Sig Start Date End Date Taking? Authorizing Provider   oxyCODONE-acetaminophen (PERCOCET) 5-325 MG per tablet Take 1 tablet by mouth every 8 hours as needed for Pain for up to 7 days. Intended supply: 3 days. Take lowest dose possible to manage pain Max Daily Amount: 3 tablets 3/1/24 3/8/24 Yes Abhijit Goetz MD   pregabalin (LYRICA) 75 MG capsule Take 1 capsule by mouth 3 times daily.   Yes Jolly Cao MD   famotidine (PEPCID) 40 MG tablet Take 1

## 2024-03-02 NOTE — PROGRESS NOTES
See OT evaluation for all goals and OT POC. Electronically signed by Jg Hansen, OTR/L on 3/2/2024 at 3:32 PM

## 2024-03-02 NOTE — PROGRESS NOTES
Progress Note  Date:3/2/2024       Room:70W270-01  Patient Name:Lauren Elizondo     YOB: 1945     Age:78 y.o.        Subjective    Subjective:  Symptoms:  She reports malaise, cough and weakness.  No shortness of breath, chest pain, headache, chest pressure, anorexia, diarrhea or anxiety.    Diet:  No nausea or vomiting.       Review of Systems   Respiratory:  Positive for cough. Negative for shortness of breath.    Cardiovascular:  Negative for chest pain.   Gastrointestinal:  Negative for anorexia, diarrhea, nausea and vomiting.   Neurological:  Positive for weakness.     Objective         Vitals Last 24 Hours:  TEMPERATURE:  Temp  Av.3 °F (36.3 °C)  Min: 96.7 °F (35.9 °C)  Max: 97.7 °F (36.5 °C)  RESPIRATIONS RANGE: Resp  Av.4  Min: 10  Max: 29  PULSE OXIMETRY RANGE: SpO2  Av.7 %  Min: 94 %  Max: 100 %  PULSE RANGE: Pulse  Av.1  Min: 74  Max: 89  BLOOD PRESSURE RANGE: Systolic (24hrs), Av , Min:109 , Max:147   ; Diastolic (24hrs), Av, Min:53, Max:65    I/O (24Hr):    Intake/Output Summary (Last 24 hours) at 3/2/2024 1044  Last data filed at 3/2/2024 1011  Gross per 24 hour   Intake 1669.09 ml   Output 2250 ml   Net -580.91 ml     Objective:  General Appearance:  Comfortable, well-appearing and in no acute distress.    Vital signs: (most recent): Blood pressure 125/61, pulse 83, temperature 97.7 °F (36.5 °C), temperature source Oral, resp. rate 18, height 1.6 m (5' 3\"), weight 68.9 kg (152 lb), SpO2 95 %.    HEENT: Normal HEENT exam.    Lungs:  There are decreased breath sounds.    Heart: S1 normal and S2 normal.    Abdomen: Abdomen is soft.  Bowel sounds are normal.   There is no epigastric area or suprapubic area tenderness.     Pulses: Distal pulses are intact.    Neurological: Patient is alert.    Pupils:  Pupils are equal, round, and reactive to light.    Skin:  Dry.      Labs/Imaging/Diagnostics    Labs:  CBC:  Recent Labs     24  0510   WBC 15.1*   RBC  4.00*   HGB 10.9*   HCT 34.0*   MCV 85.0   RDW 14.8*        CHEMISTRIES:  Recent Labs     03/02/24  0510      K 4.4      CO2 23   BUN 14   CREATININE 0.55   GLUCOSE 110*   PHOS 3.4   MG 2.0     PT/INR:No results for input(s): \"PROTIME\", \"INR\" in the last 72 hours.  APTT:No results for input(s): \"APTT\" in the last 72 hours.  LIVER PROFILE:No results for input(s): \"AST\", \"ALT\", \"BILIDIR\", \"BILITOT\", \"ALKPHOS\" in the last 72 hours.    Imaging Last 24 Hours:  FLUORO FOR SURGICAL PROCEDURES    Result Date: 3/1/2024  EXAMINATION: SPOT FLUOROSCOPIC IMAGES 3/1/2024 6:43 am TECHNIQUE: Fluoroscopy was provided by the radiology department for procedure. Radiologist was not present during examination. FLUOROSCOPY DOSE AND TYPE: Radiation Exposure Index: Fluoroscopy time equals 1.7 minutes.  Total dose equals 112.98 mGy, COMPARISON: None HISTORY: ORDERING SYSTEM PROVIDED HISTORY: pain TECHNOLOGIST PROVIDED HISTORY: Reason for exam:->pain What reading provider will be dictating this exam?->CRC Intraprocedural imaging. FINDINGS: 3 adopt spot images were obtained.     Intraprocedural fluoroscopic spot images as above.  See separate procedure report for more information.     Assessment//Plan           Hospital Problems             Last Modified POA    * (Principal) Spondylolisthesis at L4-L5 level 3/1/2024 Yes    Unstable burst fracture of t11-T12 vertebra, subsequent encounter for fracture with delayed healing 3/1/2024 Yes   Encounter for post surgical care  Constipation  Copd/asthma  Assessment & Plan  3/2: add mirilax, c/w current care, c/w oxygen therapy,  at bedside, pain is controlled, on pain pump, will follow TCT 35 min  Electronically signed by Mauro Garcia MD on 3/2/24 at 10:44 AM EST

## 2024-03-02 NOTE — PROGRESS NOTES
[facility] OCCUPATIONAL THERAPY EVALUATION - ACUTE     NAME: Lauren Elizondo  : 1945 (78 y.o.)  MRN: [id number]  CODE STATUS: Full Code  Room: W270/W270-01    Date of Service: 3/2/2024    Patient Diagnosis(es): Spondylolisthesis of lumbar region [M43.16]  Spondylolisthesis at L4-L5 level [M43.16]  Unstable burst fracture of t11-T12 vertebra, subsequent encounter for fracture with delayed healing [S22.082G]   Patient Active Problem List    Diagnosis Date Noted    Spondylolisthesis at L4-L5 level 2024    Unstable burst fracture of t11-T12 vertebra, subsequent encounter for fracture with delayed healing 2024        Past Medical History:   Diagnosis Date    Arthritis     Asthma     COPD (chronic obstructive pulmonary disease) ([facility])     Hiatal hernia     Hyperlipidemia     Hypertension     MI (myocardial infarction) ([facility])      Past Surgical History:   Procedure Laterality Date    CATARACT EXTRACTION W/ INTRAOCULAR LENS IMPLANT Bilateral     CHOLECYSTECTOMY      COLONOSCOPY      ENDOSCOPY, COLON, DIAGNOSTIC      EYE SURGERY      FRACTURE SURGERY      HAND SURGERY Left     broken bone    HYSTERECTOMY (CERVIX STATUS UNKNOWN)      TONSILLECTOMY          Restrictions  Restrictions/Precautions: Fall Risk         Spinal Precautions:  (limit >10lbs lifting; avoid bending/twisting outside limits of pain.)    Safety Devices: Safety Devices  Type of Devices: All fall risk precautions in place     Patient's date of birth confirmed: Yes    General:  Chart Reviewed: Yes    Subjective          Pain at start of treatment: Yes: 8/10    Pain at end of treatment: Yes: 8/10    Location: lumbar spine   Description: sharp   Nursing notified: Yes  RN: Ann  Intervention: RN to administer pain medication     Prior Level of Function:  Social/Functional History  Lives With: Spouse  Type of Home: House  Home Layout: Two level, Bed/Bath upstairs (12 steps to second floor)  Home Access: Stairs to enter with rails  Entrance Stairs  - Number of Steps: 4  Entrance Stairs - Rails: Both  Bathroom Shower/Tub: Tub/Shower unit, Walk-in shower  Bathroom Toilet: Standard  Bathroom Equipment: Shower chair, Grab bars in shower, Hand-held shower  Home Equipment: Cane, Walker, rolling  ADL Assistance: Independent  Homemaking Assistance: Independent  Ambulation Assistance: Independent (no AD)  Transfer Assistance: Independent  Active : Yes  Occupation: Retired  Type of Occupation:     OBJECTIVE:     Orientation Status:  Orientation  Overall Orientation Status: Within Functional Limits    Observation:  Observation/Palpation  Observation: pt on RA, no acute distress noted    Cognition Status:  Cognition  Overall Cognitive Status: WFL    Perception Status:  Perception  Overall Perceptual Status: WFL    Vision and Hearing Status:  Vision  Vision Exceptions: Wears glasses at all times  Hearing  Hearing: Within functional limits        GROSS ASSESSMENT AROM/PROM:  AROM: Within functional limits       ROM:   LUE AROM (degrees)  LUE AROM : WFL  Left Hand AROM (degrees)  Left Hand AROM: WFL  RUE AROM (degrees)  RUE AROM : WFL  Right Hand AROM (degrees)  Right Hand AROM: WFL    UE STRENGTH:  Strength: Within functional limits (grossly assessed at least 4/5 BUE)    UE COORDINATION:  Coordination: Generally decreased, functional (mild tremor in B hands)    UE TONE:  Tone: Normal    UE SENSATION:  Sensation: Impaired (sometimes B feet tingle)    Hand Dominance:  Hand Dominance  Hand Dominance: Right    ADL Status:  ADL  Feeding: Independent  Grooming: Supervision  Grooming Skilled Clinical Factors: wash hands at sink  UE Bathing: Supervision  LE Bathing: Stand by assistance  UE Dressing: Supervision  LE Dressing: Minimal assistance  LE Dressing Skilled Clinical Factors: pt able to don/doff sock using figure 4 technique without report of increased pain, unable to simulate pants management  Toileting: Moderate assistance  Additional Comments: ADL's

## 2024-03-02 NOTE — CARE COORDINATION
MET WITH PATIENT TO DISCUSS DC PLAN WANTS [facility] REFERRAL SENT NEED ACCEPTANCE, HOME CARE ORDER IS IN

## 2024-03-02 NOTE — PROGRESS NOTES
Physical Therapy Med Surg Initial Assessment  Facility/Department: [address] ORTHO TELE  Room: [place] [id number]       NAME: Lauren Elizondo  : 1945 (78 y.o.)  MRN: [id number]  CODE STATUS: Full Code    Date of Service: 3/2/2024    Patient Diagnosis(es): Spondylolisthesis of lumbar region [M43.16]  Spondylolisthesis at L4-L5 level [M43.16]  Unstable burst fracture of t11-T12 vertebra, subsequent encounter for fracture with delayed healing [S22.552G]   No chief complaint on file.    Patient Active Problem List    Diagnosis Date Noted    Spondylolisthesis at L4-L5 level 2024    Unstable burst fracture of t11-T12 vertebra, subsequent encounter for fracture with delayed healing 2024        Past Medical History:   Diagnosis Date    Arthritis     Asthma     COPD (chronic obstructive pulmonary disease) ([facility])     Hiatal hernia     Hyperlipidemia     Hypertension     MI (myocardial infarction) ([facility])      Past Surgical History:   Procedure Laterality Date    CATARACT EXTRACTION W/ INTRAOCULAR LENS IMPLANT Bilateral     CHOLECYSTECTOMY      COLONOSCOPY      ENDOSCOPY, COLON, DIAGNOSTIC      EYE SURGERY      FRACTURE SURGERY      HAND SURGERY Left     broken bone    HYSTERECTOMY (CERVIX STATUS UNKNOWN)      TONSILLECTOMY         Chart Reviewed: Yes  Family / Caregiver Present: No  Diagnosis: Spondylolisthesis at L4-L5 level s/p lami/fusion 3/1/24  General Comment  Comments: Pt resting in bed - agreeable to PT evaluation    Restrictions:  Restrictions/Precautions: Fall Risk  Position Activity Restriction  Spinal Precautions:  (limit >10lbs lifting; avoid bending/twisting outside limits of pain.)     SUBJECTIVE:   Subjective: \"I'm supposed to get up.\"    Pain  Pain: 5/10 pre and post session pain in back. RN notified. Pt agreeable to PT evaluation.    Prior Level of Function:  Social/Functional History  Lives With: Spouse  Type of Home: House  Home Layout: Two level, Bed/Bath upstairs (12 steps to second floor)  Home

## 2024-03-02 NOTE — PROGRESS NOTES
03/01/2024    2320: Patient unable to tolerate SCDs saying she is unable to sleep with them on and requested to have them removed. RN educated patient on increased chance of developing a blood clot and patient verbalized understanding. RN educated patient while awake to pump ankles and flex quadriceps to help prevent blood clots.    Electronically signed by Joselito Coppola RN on 3/1/2024 at 11:56 PM

## 2024-03-02 NOTE — DISCHARGE INSTR - COC
Continuity of Care Form    Patient Name: Lauren Elizondo   :  1945  MRN:  [id number]    Admit date:  3/1/2024  Discharge date:  ***    Code Status Order: Full Code   Advance Directives:   Advance Care Flowsheet Documentation       Date/Time Healthcare Directive Type of Healthcare Directive Copy in Chart Healthcare Agent Appointed Healthcare Agent's Name Healthcare Agent's Phone Number    24 0615 No, patient does not have an advance directive for healthcare treatment -- -- -- -- --            Admitting Physician:  Abhijit Goetz MD  PCP: Lyndsey Shepherd    Discharging Nurse: ***  Discharging Hospital Unit/Room#: W270/W270-01  Discharging Unit Phone Number: ***    Emergency Contact:   Extended Emergency Contact Information  Primary Emergency Contact: Brendon Elizondo  Home Phone: [phone]  Relation: Spouse  Preferred language: English    Past Surgical History:  Past Surgical History:   Procedure Laterality Date    CATARACT EXTRACTION W/ INTRAOCULAR LENS IMPLANT Bilateral     CHOLECYSTECTOMY      COLONOSCOPY      ENDOSCOPY, COLON, DIAGNOSTIC      EYE SURGERY      FRACTURE SURGERY      HAND SURGERY Left     broken bone    HYSTERECTOMY (CERVIX STATUS UNKNOWN)      TONSILLECTOMY         Immunization History:   Immunization History   Administered Date(s) Administered    COVID-19, MODERNA Bivalent, (age 12y+), IM, 50 mcg/0.5 mL 2022       Active Problems:  Patient Active Problem List   Diagnosis Code    Spondylolisthesis at L4-L5 level M43.16    Unstable burst fracture of t11-T12 vertebra, subsequent encounter for fracture with delayed healing S22.082G       Isolation/Infection:   Isolation            No Isolation          Patient Infection Status       None to display            Nurse Assessment:  Last Vital Signs: /61   Pulse 83   Temp 97.7 °F (36.5 °C) (Oral)   Resp 18   Ht 1.6 m (5' 3\")   Wt 68.9 kg (152 lb)   SpO2 95%   BMI 26.93 kg/m²     Last documented pain score (0-10 scale): Pain  applicable)   Name:  Address:  Dialysis Schedule:  Phone:  Fax:    / signature: Electronically signed by Tiana Meredith RN on 3/2/24 at 9:25 AM EST    PHYSICIAN SECTION    Prognosis: {Prognosis:2838906671}    Condition at Discharge: { Patient Condition:225496443}    Rehab Potential (if transferring to Rehab): {Prognosis:1131446417}    Recommended Labs or Other Treatments After Discharge: ***    Physician Certification: I certify the above information and transfer of Lauren Elizondo  is necessary for the continuing treatment of the diagnosis listed and that she requires {Admit to Appropriate Level of Care:90541} for {GREATER/LESS:878423839} 30 days.     Update Admission H&P: {CHP DME Changes in HandP:041155039}    PHYSICIAN SIGNATURE:  {Esignature:210786496}

## 2024-03-03 PROCEDURE — 2580000003 HC RX 258: Performed by: NEUROLOGICAL SURGERY

## 2024-03-03 PROCEDURE — 97535 SELF CARE MNGMENT TRAINING: CPT

## 2024-03-03 PROCEDURE — 6360000002 HC RX W HCPCS: Performed by: NEUROLOGICAL SURGERY

## 2024-03-03 PROCEDURE — 6370000000 HC RX 637 (ALT 250 FOR IP): Performed by: NURSE PRACTITIONER

## 2024-03-03 PROCEDURE — 2700000000 HC OXYGEN THERAPY PER DAY

## 2024-03-03 PROCEDURE — 6370000000 HC RX 637 (ALT 250 FOR IP): Performed by: NEUROLOGICAL SURGERY

## 2024-03-03 PROCEDURE — 1210000000 HC MED SURG R&B

## 2024-03-03 PROCEDURE — 97116 GAIT TRAINING THERAPY: CPT

## 2024-03-03 RX ADMIN — OXYCODONE 5 MG: 5 TABLET ORAL at 04:04

## 2024-03-03 RX ADMIN — ACETAMINOPHEN 325MG 650 MG: 325 TABLET ORAL at 19:51

## 2024-03-03 RX ADMIN — ONDANSETRON 4 MG: 2 INJECTION INTRAMUSCULAR; INTRAVENOUS at 21:32

## 2024-03-03 RX ADMIN — SODIUM CHLORIDE, PRESERVATIVE FREE 10 ML: 5 INJECTION INTRAVENOUS at 19:51

## 2024-03-03 RX ADMIN — LISINOPRIL 20 MG: 20 TABLET ORAL at 08:40

## 2024-03-03 RX ADMIN — DOCUSATE SODIUM 50 MG AND SENNOSIDES 8.6 MG 1 TABLET: 8.6; 5 TABLET, FILM COATED ORAL at 08:39

## 2024-03-03 RX ADMIN — SODIUM CHLORIDE, PRESERVATIVE FREE 10 ML: 5 INJECTION INTRAVENOUS at 08:40

## 2024-03-03 RX ADMIN — HYDROMORPHONE HYDROCHLORIDE 0.5 MG: 1 INJECTION, SOLUTION INTRAMUSCULAR; INTRAVENOUS; SUBCUTANEOUS at 17:37

## 2024-03-03 RX ADMIN — ENOXAPARIN SODIUM 30 MG: 100 INJECTION SUBCUTANEOUS at 08:40

## 2024-03-03 RX ADMIN — OXYCODONE 5 MG: 5 TABLET ORAL at 23:48

## 2024-03-03 RX ADMIN — PANTOPRAZOLE SODIUM 40 MG: 40 TABLET, DELAYED RELEASE ORAL at 06:45

## 2024-03-03 RX ADMIN — ACETAMINOPHEN 325MG 650 MG: 325 TABLET ORAL at 03:28

## 2024-03-03 RX ADMIN — ACETAMINOPHEN 325MG 650 MG: 325 TABLET ORAL at 15:50

## 2024-03-03 RX ADMIN — OXYCODONE 5 MG: 5 TABLET ORAL at 08:40

## 2024-03-03 RX ADMIN — OXYCODONE 5 MG: 5 TABLET ORAL at 15:50

## 2024-03-03 RX ADMIN — OXYCODONE 5 MG: 5 TABLET ORAL at 00:08

## 2024-03-03 RX ADMIN — ONDANSETRON 4 MG: 2 INJECTION INTRAMUSCULAR; INTRAVENOUS at 03:28

## 2024-03-03 RX ADMIN — ONDANSETRON 4 MG: 2 INJECTION INTRAMUSCULAR; INTRAVENOUS at 09:20

## 2024-03-03 RX ADMIN — SPIRONOLACTONE 25 MG: 25 TABLET ORAL at 08:40

## 2024-03-03 RX ADMIN — OXYCODONE 5 MG: 5 TABLET ORAL at 19:51

## 2024-03-03 RX ADMIN — HYDROMORPHONE HYDROCHLORIDE 0.5 MG: 1 INJECTION, SOLUTION INTRAMUSCULAR; INTRAVENOUS; SUBCUTANEOUS at 02:43

## 2024-03-03 RX ADMIN — ACETAMINOPHEN 325MG 650 MG: 325 TABLET ORAL at 09:20

## 2024-03-03 RX ADMIN — HYDROMORPHONE HYDROCHLORIDE 0.5 MG: 1 INJECTION, SOLUTION INTRAMUSCULAR; INTRAVENOUS; SUBCUTANEOUS at 11:45

## 2024-03-03 RX ADMIN — METOPROLOL SUCCINATE 100 MG: 100 TABLET, EXTENDED RELEASE ORAL at 08:40

## 2024-03-03 RX ADMIN — HYDROMORPHONE HYDROCHLORIDE 0.5 MG: 1 INJECTION, SOLUTION INTRAMUSCULAR; INTRAVENOUS; SUBCUTANEOUS at 21:32

## 2024-03-03 RX ADMIN — PROCHLORPERAZINE MALEATE 5 MG: 10 TABLET ORAL at 23:54

## 2024-03-03 ASSESSMENT — PAIN SCALES - GENERAL
PAINLEVEL_OUTOF10: 10
PAINLEVEL_OUTOF10: 10
PAINLEVEL_OUTOF10: 8
PAINLEVEL_OUTOF10: 9
PAINLEVEL_OUTOF10: 10
PAINLEVEL_OUTOF10: 8
PAINLEVEL_OUTOF10: 8
PAINLEVEL_OUTOF10: 7
PAINLEVEL_OUTOF10: 8

## 2024-03-03 ASSESSMENT — PAIN DESCRIPTION - DESCRIPTORS
DESCRIPTORS: ACHING
DESCRIPTORS: ACHING;THROBBING
DESCRIPTORS: ACHING

## 2024-03-03 ASSESSMENT — PAIN DESCRIPTION - LOCATION
LOCATION: BACK
LOCATION: BACK
LOCATION: HIP;BACK
LOCATION: BACK
LOCATION: HIP
LOCATION: BACK

## 2024-03-03 ASSESSMENT — PAIN DESCRIPTION - ORIENTATION
ORIENTATION: MID;LOWER
ORIENTATION: LOWER;MID
ORIENTATION: LEFT;LOWER;MID
ORIENTATION: LOWER;MID
ORIENTATION: LEFT

## 2024-03-03 ASSESSMENT — PAIN DESCRIPTION - PAIN TYPE
TYPE: ACUTE PAIN;SURGICAL PAIN
TYPE: SURGICAL PAIN;ACUTE PAIN

## 2024-03-03 NOTE — PROGRESS NOTES
Pt medicated with Dilaudid for pain.  Pt ambulated to to bathroom and had large loose BM.  Dressing changed to back and cleansed with saline.  Scant amount of drainage noted.  Pt with steady gait with walker.  Able to move all extremities and denies any numbness or tingling to bilateral extremities.  Call light in reach. Bed alarm on.

## 2024-03-03 NOTE — PROGRESS NOTES
Physical Therapy Med Surg Daily Treatment Note  Facility/Department: [address] ORTHO TELE  Room: [place] [id number]       NAME: Lauren Elizondo  : 1945 (78 y.o.)  MRN: [id number]  CODE STATUS: Full Code    Date of Service: 3/3/2024    Patient Diagnosis(es): Spondylolisthesis of lumbar region [M43.16]  Spondylolisthesis at L4-L5 level [M43.16]  Unstable burst fracture of t11-T12 vertebra, subsequent encounter for fracture with delayed healing [S22.082G]   No chief complaint on file.    Patient Active Problem List    Diagnosis Date Noted    Spondylolisthesis at L4-L5 level 2024    Unstable burst fracture of t11-T12 vertebra, subsequent encounter for fracture with delayed healing 2024        Past Medical History:   Diagnosis Date    Arthritis     Asthma     COPD (chronic obstructive pulmonary disease) ([facility])     Hiatal hernia     Hyperlipidemia     Hypertension     MI (myocardial infarction) ([facility])      Past Surgical History:   Procedure Laterality Date    CATARACT EXTRACTION W/ INTRAOCULAR LENS IMPLANT Bilateral     CHOLECYSTECTOMY      COLONOSCOPY      ENDOSCOPY, COLON, DIAGNOSTIC      EYE SURGERY      FRACTURE SURGERY      HAND SURGERY Left     broken bone    HYSTERECTOMY (CERVIX STATUS UNKNOWN)      TONSILLECTOMY              Restrictions:  Restrictions/Precautions: Fall Risk  Position Activity Restriction  Spinal Precautions: spinal brace in patient's room    SUBJECTIVE: Patient supine, agreeable to treatment.      Pain   4/10 pre and 6/10 post session   Low back pain , RN aware    OBJECTIVE:        Bed mobility  Rolling to Left: Stand by assistance  Rolling to Right: Stand by assistance  Supine to Sit: Stand by assistance  Sit to Supine: Stand by assistance  Bed Mobility Comments: hob elevated, increased time and effort    Transfers  Sit to Stand: Stand by assistance  Stand to Sit: Stand by assistance  Bed to Chair: Stand by assistance  Comment: completed tf with vc for hand placement, no steadying assist

## 2024-03-03 NOTE — PROGRESS NOTES
Progress Note  Date:3/3/2024       Room:W270/W270-01  Patient Name:Lauern Elizondo     YOB: 1945     Age:78 y.o.        Subjective    Subjective:  Symptoms:  She reports malaise, cough and weakness.  No shortness of breath, chest pain, headache, chest pressure, anorexia, diarrhea or anxiety.    Diet:  No nausea or vomiting.       Review of Systems   Respiratory:  Positive for cough. Negative for shortness of breath.    Cardiovascular:  Negative for chest pain.   Gastrointestinal:  Negative for anorexia, diarrhea, nausea and vomiting.   Neurological:  Positive for weakness.     Objective         Vitals Last 24 Hours:  TEMPERATURE:  Temp  Av.9 °F (36.6 °C)  Min: 97.5 °F (36.4 °C)  Max: 98.3 °F (36.8 °C)  RESPIRATIONS RANGE: Resp  Av.3  Min: 16  Max: 24  PULSE OXIMETRY RANGE: SpO2  Av.8 %  Min: 93 %  Max: 97 %  PULSE RANGE: Pulse  Av.3  Min: 70  Max: 84  BLOOD PRESSURE RANGE: Systolic (24hrs), Av , Min:130 , Max:143   ; Diastolic (24hrs), Av, Min:60, Max:64    I/O (24Hr):    Intake/Output Summary (Last 24 hours) at 3/3/2024 1018  Last data filed at 3/2/2024 1200  Gross per 24 hour   Intake 720 ml   Output --   Net 720 ml       Objective:  General Appearance:  Comfortable, well-appearing and in no acute distress.    Vital signs: (most recent): Blood pressure 139/63, pulse 70, temperature 97.5 °F (36.4 °C), temperature source Oral, resp. rate 20, height 1.6 m (5' 3\"), weight 68.9 kg (152 lb), SpO2 96 %.    HEENT: Normal HEENT exam.    Lungs:  There are decreased breath sounds.    Heart: S1 normal and S2 normal.    Abdomen: Abdomen is soft.  Bowel sounds are normal.   There is no epigastric area or suprapubic area tenderness.     Pulses: Distal pulses are intact.    Neurological: Patient is alert.    Pupils:  Pupils are equal, round, and reactive to light.    Skin:  Dry.      Labs/Imaging/Diagnostics    Labs:  CBC:  Recent Labs     24  0510   WBC 15.1*   RBC 4.00*   HGB

## 2024-03-04 LAB
ALBUMIN SERPL-MCNC: 3.4 G/DL (ref 3.5–4.6)
ALP SERPL-CCNC: 140 U/L (ref 40–130)
ALT SERPL-CCNC: 46 U/L (ref 0–33)
ANION GAP SERPL CALCULATED.3IONS-SCNC: 10 MEQ/L (ref 9–15)
AST SERPL-CCNC: 37 U/L (ref 0–35)
BASE EXCESS MIXED: 6
BASOPHILS # BLD: 0 K/UL (ref 0–0.2)
BASOPHILS NFR BLD: 0.4 %
BILIRUB DIRECT SERPL-MCNC: 0.3 MG/DL (ref 0–0.4)
BILIRUB INDIRECT SERPL-MCNC: 0.9 MG/DL (ref 0–0.6)
BILIRUB SERPL-MCNC: 1.2 MG/DL (ref 0.2–0.7)
BUN SERPL-MCNC: 7 MG/DL (ref 8–23)
CA-I ADJ PH7.4 SERPL-SCNC: 1.25 MMOL/L (ref 1.09–1.3)
CA-I SERPL ISE-SCNC: 1.22 MMOL/L (ref 1.09–1.3)
CALCIUM IONIZED: 1.19 MMOL/L (ref 1.12–1.32)
CALCIUM SERPL-MCNC: 9.3 MG/DL (ref 8.5–9.9)
CHLORIDE SERPL-SCNC: 95 MEQ/L (ref 95–107)
CO2 SERPL-SCNC: 24 MEQ/L (ref 20–31)
CREAT SERPL-MCNC: 0.54 MG/DL (ref 0.5–0.9)
EOSINOPHIL # BLD: 0.1 K/UL (ref 0–0.7)
EOSINOPHIL NFR BLD: 0.8 %
ERYTHROCYTE [DISTWIDTH] IN BLOOD BY AUTOMATED COUNT: 14.6 % (ref 11.5–14.5)
GLUCOSE BLD-MCNC: 95 MG/DL (ref 70–99)
GLUCOSE SERPL-MCNC: 95 MG/DL (ref 70–99)
HCO3, MIXED: 29.7 MMOL/L
HCT VFR BLD AUTO: 35.8 % (ref 37–47)
HCT VFR BLD AUTO: 38 % (ref 36–48)
HGB BLD CALC-MCNC: 12.8 GM/DL (ref 12–16)
HGB BLD-MCNC: 11.8 G/DL (ref 12–16)
LACTATE: 1.43 MMOL/L (ref 0.4–2)
LYMPHOCYTES # BLD: 2.2 K/UL (ref 1–4.8)
LYMPHOCYTES NFR BLD: 19.7 %
MAGNESIUM SERPL-MCNC: 2.1 MG/DL (ref 1.7–2.4)
MCH RBC QN AUTO: 27.6 PG (ref 27–31.3)
MCHC RBC AUTO-ENTMCNC: 33 % (ref 33–37)
MCV RBC AUTO: 83.8 FL (ref 79.4–94.8)
MONOCYTES # BLD: 0.7 K/UL (ref 0.2–0.8)
MONOCYTES NFR BLD: 6 %
NEUTROPHILS # BLD: 8.1 K/UL (ref 1.4–6.5)
NEUTS SEG NFR BLD: 71.8 %
O2 SAT, MIXED: 79 %
PCO2 MIXED: 41.4 MM HG
PERFORMED ON: ABNORMAL
PH, MIXED: 7.46 (ref 7.35–7.45)
PLATELET # BLD AUTO: 310 K/UL (ref 130–400)
PO2 MIXED: 41 MMHG
POC CHLORIDE: 96 MEQ/L (ref 99–110)
POC CREATININE: 0.7 MG/DL (ref 0.6–1.2)
POC SAMPLE TYPE: ABNORMAL
POTASSIUM SERPL-SCNC: 4.6 MEQ/L (ref 3.5–5.1)
POTASSIUM SERPL-SCNC: 5.2 MEQ/L (ref 3.4–4.9)
PROT SERPL-MCNC: 6.7 G/DL (ref 6.3–8)
RBC # BLD AUTO: 4.27 M/UL (ref 4.2–5.4)
SODIUM BLD-SCNC: 133 MEQ/L (ref 136–145)
SODIUM SERPL-SCNC: 129 MEQ/L (ref 135–144)
TCO2 CALC MIXED: 31 MMOL/L
WBC # BLD AUTO: 11.3 K/UL (ref 4.8–10.8)

## 2024-03-04 PROCEDURE — 6360000002 HC RX W HCPCS: Performed by: NEUROLOGICAL SURGERY

## 2024-03-04 PROCEDURE — 6360000002 HC RX W HCPCS: Performed by: INTERNAL MEDICINE

## 2024-03-04 PROCEDURE — 36600 WITHDRAWAL OF ARTERIAL BLOOD: CPT

## 2024-03-04 PROCEDURE — 83735 ASSAY OF MAGNESIUM: CPT

## 2024-03-04 PROCEDURE — 6370000000 HC RX 637 (ALT 250 FOR IP): Performed by: NURSE PRACTITIONER

## 2024-03-04 PROCEDURE — 6370000000 HC RX 637 (ALT 250 FOR IP): Performed by: INTERNAL MEDICINE

## 2024-03-04 PROCEDURE — 81001 URINALYSIS AUTO W/SCOPE: CPT

## 2024-03-04 PROCEDURE — 6370000000 HC RX 637 (ALT 250 FOR IP): Performed by: NEUROLOGICAL SURGERY

## 2024-03-04 PROCEDURE — 97535 SELF CARE MNGMENT TRAINING: CPT

## 2024-03-04 PROCEDURE — 2580000003 HC RX 258: Performed by: NEUROLOGICAL SURGERY

## 2024-03-04 PROCEDURE — 80048 BASIC METABOLIC PNL TOTAL CA: CPT

## 2024-03-04 PROCEDURE — 97116 GAIT TRAINING THERAPY: CPT

## 2024-03-04 PROCEDURE — 36415 COLL VENOUS BLD VENIPUNCTURE: CPT

## 2024-03-04 PROCEDURE — 1210000000 HC MED SURG R&B

## 2024-03-04 PROCEDURE — 80076 HEPATIC FUNCTION PANEL: CPT

## 2024-03-04 PROCEDURE — 85025 COMPLETE CBC W/AUTO DIFF WBC: CPT

## 2024-03-04 RX ORDER — METOCLOPRAMIDE HYDROCHLORIDE 5 MG/ML
10 INJECTION INTRAMUSCULAR; INTRAVENOUS ONCE
Status: COMPLETED | OUTPATIENT
Start: 2024-03-04 | End: 2024-03-04

## 2024-03-04 RX ORDER — OXYCODONE HYDROCHLORIDE 5 MG/1
10 TABLET ORAL EVERY 4 HOURS PRN
Status: DISCONTINUED | OUTPATIENT
Start: 2024-03-04 | End: 2024-03-06 | Stop reason: HOSPADM

## 2024-03-04 RX ORDER — OXYCODONE HYDROCHLORIDE 5 MG/1
5 TABLET ORAL EVERY 4 HOURS PRN
Status: DISCONTINUED | OUTPATIENT
Start: 2024-03-04 | End: 2024-03-06 | Stop reason: HOSPADM

## 2024-03-04 RX ORDER — OXYBUTYNIN CHLORIDE 5 MG/1
5 TABLET, EXTENDED RELEASE ORAL NIGHTLY
Status: DISCONTINUED | OUTPATIENT
Start: 2024-03-04 | End: 2024-03-06 | Stop reason: HOSPADM

## 2024-03-04 RX ADMIN — HYDROMORPHONE HYDROCHLORIDE 0.5 MG: 1 INJECTION, SOLUTION INTRAMUSCULAR; INTRAVENOUS; SUBCUTANEOUS at 01:21

## 2024-03-04 RX ADMIN — OXYCODONE 10 MG: 5 TABLET ORAL at 22:28

## 2024-03-04 RX ADMIN — ACETAMINOPHEN 325MG 650 MG: 325 TABLET ORAL at 15:25

## 2024-03-04 RX ADMIN — BISACODYL 10 MG: 10 SUPPOSITORY RECTAL at 17:54

## 2024-03-04 RX ADMIN — DOCUSATE SODIUM 50 MG AND SENNOSIDES 8.6 MG 1 TABLET: 8.6; 5 TABLET, FILM COATED ORAL at 19:52

## 2024-03-04 RX ADMIN — SODIUM CHLORIDE, PRESERVATIVE FREE 10 ML: 5 INJECTION INTRAVENOUS at 08:32

## 2024-03-04 RX ADMIN — DOCUSATE SODIUM 50 MG AND SENNOSIDES 8.6 MG 1 TABLET: 8.6; 5 TABLET, FILM COATED ORAL at 08:31

## 2024-03-04 RX ADMIN — OXYCODONE 5 MG: 5 TABLET ORAL at 08:31

## 2024-03-04 RX ADMIN — PROCHLORPERAZINE MALEATE 5 MG: 10 TABLET ORAL at 10:12

## 2024-03-04 RX ADMIN — METOPROLOL SUCCINATE 100 MG: 100 TABLET, EXTENDED RELEASE ORAL at 08:31

## 2024-03-04 RX ADMIN — METOCLOPRAMIDE HYDROCHLORIDE 10 MG: 5 INJECTION INTRAMUSCULAR; INTRAVENOUS at 12:16

## 2024-03-04 RX ADMIN — HYDROMORPHONE HYDROCHLORIDE 0.5 MG: 1 INJECTION, SOLUTION INTRAMUSCULAR; INTRAVENOUS; SUBCUTANEOUS at 05:28

## 2024-03-04 RX ADMIN — ONDANSETRON 4 MG: 2 INJECTION INTRAMUSCULAR; INTRAVENOUS at 17:38

## 2024-03-04 RX ADMIN — OXYBUTYNIN CHLORIDE 5 MG: 5 TABLET, EXTENDED RELEASE ORAL at 19:52

## 2024-03-04 RX ADMIN — OXYCODONE 10 MG: 5 TABLET ORAL at 17:39

## 2024-03-04 RX ADMIN — ONDANSETRON 4 MG: 2 INJECTION INTRAMUSCULAR; INTRAVENOUS at 08:31

## 2024-03-04 RX ADMIN — OXYCODONE 5 MG: 5 TABLET ORAL at 03:38

## 2024-03-04 RX ADMIN — SPIRONOLACTONE 25 MG: 25 TABLET ORAL at 08:31

## 2024-03-04 RX ADMIN — ACETAMINOPHEN 325MG 650 MG: 325 TABLET ORAL at 19:52

## 2024-03-04 RX ADMIN — ACETAMINOPHEN 325MG 650 MG: 325 TABLET ORAL at 01:21

## 2024-03-04 RX ADMIN — LISINOPRIL 20 MG: 20 TABLET ORAL at 08:31

## 2024-03-04 RX ADMIN — ENOXAPARIN SODIUM 30 MG: 100 INJECTION SUBCUTANEOUS at 08:32

## 2024-03-04 RX ADMIN — OXYCODONE 5 MG: 5 TABLET ORAL at 13:12

## 2024-03-04 RX ADMIN — PANTOPRAZOLE SODIUM 40 MG: 40 TABLET, DELAYED RELEASE ORAL at 05:28

## 2024-03-04 RX ADMIN — SODIUM CHLORIDE, PRESERVATIVE FREE 10 ML: 5 INJECTION INTRAVENOUS at 19:53

## 2024-03-04 RX ADMIN — ACETAMINOPHEN 325MG 650 MG: 325 TABLET ORAL at 08:31

## 2024-03-04 ASSESSMENT — PAIN SCALES - GENERAL
PAINLEVEL_OUTOF10: 9
PAINLEVEL_OUTOF10: 8
PAINLEVEL_OUTOF10: 9
PAINLEVEL_OUTOF10: 8
PAINLEVEL_OUTOF10: 8
PAINLEVEL_OUTOF10: 7

## 2024-03-04 ASSESSMENT — PAIN DESCRIPTION - LOCATION: LOCATION: BACK

## 2024-03-04 NOTE — CARE COORDINATION
Met with pt and spouse at bedside. Confirmed that pt does in fact live in [place] therefore [facility] unable to accept. Referral faxed to [facility].   1358 Call from [facility] and they are not able to accept pt since no  PCP.   1400 Faxed referral to [facility].   1549 Call to Jazmyn with [facility] and confirmed referral was received, they are reviewing and will let us know if able to accept.   1558 Call from [facility] with [facility], they can accept the pt. Updated of likely dc on 3/5.

## 2024-03-04 NOTE — PROGRESS NOTES
Pod2 tlif l45 seen examined doing well d/w rn had bm ambulated w PT urinating, postop incis pain pt ot inc activ diet as jesus sw dispo possible dc am if pain controlled. Will follow

## 2024-03-04 NOTE — CARE COORDINATION
MET WITH PT AND SPOUSE AT BEDSIDE. DC PLAN REMAINS HOME W/[facility]. SPOKE WITH RACHEL AT [facility], PT ACCEPTED. PT DENIES FURTHER NEEDS

## 2024-03-04 NOTE — PROGRESS NOTES
Physical Therapy Med Surg Daily Treatment Note  Facility/Department: [address] ORTHO TELE  Room: [place] [id number]       NAME: Lauren Elizondo  : 1945 (78 y.o.)  MRN: [id number]  CODE STATUS: Full Code    Date of Service: 3/4/2024    Patient Diagnosis(es): Spondylolisthesis of lumbar region [M43.16]  Spondylolisthesis at L4-L5 level [M43.16]  Unstable burst fracture of t11-T12 vertebra, subsequent encounter for fracture with delayed healing [S22.082G]   No chief complaint on file.    Patient Active Problem List    Diagnosis Date Noted    Spondylolisthesis at L4-L5 level 2024    Unstable burst fracture of t11-T12 vertebra, subsequent encounter for fracture with delayed healing 2024        Past Medical History:   Diagnosis Date    Arthritis     Asthma     COPD (chronic obstructive pulmonary disease) ([facility])     Hiatal hernia     Hyperlipidemia     Hypertension     MI (myocardial infarction) ([facility])      Past Surgical History:   Procedure Laterality Date    CATARACT EXTRACTION W/ INTRAOCULAR LENS IMPLANT Bilateral     CHOLECYSTECTOMY      COLONOSCOPY      ENDOSCOPY, COLON, DIAGNOSTIC      EYE SURGERY      FRACTURE SURGERY      HAND SURGERY Left     broken bone    HYSTERECTOMY (CERVIX STATUS UNKNOWN)      TONSILLECTOMY         Chart Reviewed: Yes  Family / Caregiver Present: No  Diagnosis: Spondylolisthesis at L4-L5 level s/p lami/fusion 3/1/24  General Comment  Comments: Pt resting in bed - agreeable to PT evaluation    Restrictions:  Restrictions/Precautions: Fall Risk    SUBJECTIVE:   Subjective: Patient sitting up in chair. C/o nausea however is agreeable to tx.    Pain  6/10 low back pain. Patient medicated prior to tx.     OBJECTIVE:        Bed mobility  Rolling to Left: Stand by assistance  Rolling to Right: Stand by assistance  Sit to Supine: Stand by assistance  Bed Mobility Comments: HOB flat. Patient utilizes bed rail.    Transfers  Sit to Stand: Stand by assistance  Stand to Sit: Stand by  assistance  Bed to Chair: Stand by assistance  Comment: with ww. Requires verbal cues for hand placement to improve safety.    Ambulation  Surface: Level tile  Device: Rolling Walker  Assistance: Stand by assistance;Minimal assistance  Quality of Gait: antalgic gait, no LOB, flexed posture  Distance: 25 feet x2- distance limited by destination/ nausea  More Ambulation?: No    Stairs/Curb  Stairs?: No (attempted stair training however patient unable tolerate due to nausea)       ASSESSMENT   Body Structures, Functions, Activity Limitations Requiring Skilled Therapeutic Intervention: Decreased functional mobility ;Decreased ADL status;Decreased safe awareness;Decreased body mechanics;Decreased strength;Decreased endurance;Decreased balance;Increased pain;Decreased coordination;Decreased ROM  Assessment: Patient's functional mobility continues to be limited by pain/nausea. Attempted stair training however patient unable to tolerate. RN notified.  Requires PT Follow-Up: Yes     Discharge Recommendations:  Continue to assess pending progress, Patient would benefit from continued therapy after discharge         Goals  Long Term Goals  Long Term Goal 1: Pt to complete bed mobility with indep  Long Term Goal 2: Pt to complete transfers with indep  Long Term Goal 3: Pt to ambulate 50-150ft with LRD and indep  Long Term Goal 4: Pt to manage flight of steps with HR and SBA  Long Term Goal 5: Pt to complete HEP with indep    PLAN    General Plan: 1 time a day 3-6 times a week  Safety Devices  Type of Devices: All fall risk precautions in place, Call light within reach, Bed alarm in place, Left in bed     AMPAC (6 CLICK) BASIC MOBILITY  AM-PAC Inpatient Mobility Raw Score : 18     Therapy Time   Individual   Time In 0950   Time Out 1013   Minutes 23     Timed Code Treatment Minutes: 23 Minutes  Tr 13  Gt 10     Margo Ayoub PTA, 03/04/24 at 11:24 AM         Definitions for assistance levels  Independent = pt does not

## 2024-03-04 NOTE — PROGRESS NOTES
Lauren Elizondo is a 78 y.o. female patient.   1. Spondylolisthesis at L4-L5 level      Past Medical History:   Diagnosis Date    Arthritis     Asthma     COPD (chronic obstructive pulmonary disease) (HCC)     Hiatal hernia     Hyperlipidemia     Hypertension     MI (myocardial infarction) ([facility])      Past Surgical History Pertinent Negatives:   Procedure Date Noted    ABDOMEN SURGERY 02/29/2024    APPENDECTOMY 02/29/2024    BACK SURGERY 02/29/2024    BREAST SURGERY 02/29/2024    CARDIAC SURGERY 02/29/2024    COSMETIC SURGERY 02/29/2024    DILATATION, ESOPHAGUS 02/29/2024    HERNIA REPAIR 02/29/2024    JOINT REPLACEMENT 02/29/2024    KIDNEY TRANSPLANT 02/29/2024    PACEMAKER PLACEMENT 02/29/2024    SKIN BIOPSY 02/29/2024    VASCULAR SURGERY 02/29/2024     Scheduled Meds:   oxyBUTYnin  5 mg Oral Nightly    sodium chloride flush  5-40 mL IntraVENous 2 times per day    acetaminophen  650 mg Oral Q6H    sennosides-docusate sodium  1 tablet Oral BID    enoxaparin  30 mg SubCUTAneous Daily    metoprolol succinate  100 mg Oral Daily    lisinopril  20 mg Oral Daily    pantoprazole  40 mg Oral QAM AC    spironolactone  25 mg Oral Daily     Continuous Infusions:   sodium chloride      0.9% NaCl with KCl 20 mEq 62 mL/hr at 03/02/24 0543     PRN Meds:oxyCODONE **OR** oxyCODONE, sodium chloride flush, sodium chloride, bisacodyl, ondansetron, prochlorperazine    Allergies   Allergen Reactions    Zyrtec [Cetirizine] Hallucinations    Codeine Other (See Comments)     \"Makes me really loopy\"     Principal Problem:    Spondylolisthesis at L4-L5 level  Active Problems:    Unstable burst fracture of t11-T12 vertebra, subsequent encounter for fracture with delayed healing  Resolved Problems:    * No resolved hospital problems. *    Blood pressure 132/76, pulse 68, temperature 98.6 °F (37 °C), resp. rate 16, height 1.6 m (5' 3\"), weight 68.9 kg (152 lb), SpO2 98 %.    Subjectivepod3 tlif l45    Objectiveawake alert  in , frequent

## 2024-03-05 ENCOUNTER — HOSPITAL ENCOUNTER (INPATIENT)
Facility: HOSPITAL | Age: 79
LOS: 27 days | Discharge: NURSING, REHAB OR LONG-TERM CARE | DRG: 981 | End: 2024-04-01
Attending: NEUROLOGICAL SURGERY | Admitting: NEUROLOGICAL SURGERY
Payer: MEDICARE

## 2024-03-05 ENCOUNTER — APPOINTMENT (OUTPATIENT)
Dept: CT IMAGING | Age: 79
DRG: 453 | End: 2024-03-05
Attending: NEUROLOGICAL SURGERY
Payer: MEDICARE

## 2024-03-05 VITALS
TEMPERATURE: 99 F | OXYGEN SATURATION: 100 % | DIASTOLIC BLOOD PRESSURE: 75 MMHG | HEIGHT: 63 IN | BODY MASS INDEX: 27.5 KG/M2 | RESPIRATION RATE: 22 BRPM | HEART RATE: 99 BPM | WEIGHT: 155.2 LBS | SYSTOLIC BLOOD PRESSURE: 145 MMHG

## 2024-03-05 DIAGNOSIS — I34.89 OTHER NONRHEUMATIC MITRAL VALVE DISORDERS: ICD-10-CM

## 2024-03-05 DIAGNOSIS — F41.9 ANXIETY: ICD-10-CM

## 2024-03-05 DIAGNOSIS — G89.29 CHRONIC NECK PAIN: ICD-10-CM

## 2024-03-05 DIAGNOSIS — M54.41 CHRONIC MIDLINE LOW BACK PAIN WITH BILATERAL SCIATICA: ICD-10-CM

## 2024-03-05 DIAGNOSIS — G89.29 CHRONIC MIDLINE LOW BACK PAIN WITH BILATERAL SCIATICA: ICD-10-CM

## 2024-03-05 DIAGNOSIS — I38 ENDOCARDITIS, UNSPECIFIED CHRONICITY, UNSPECIFIED ENDOCARDITIS TYPE: ICD-10-CM

## 2024-03-05 DIAGNOSIS — I33.0 ACUTE AND SUBACUTE INFECTIVE ENDOCARDITIS (HHS-HCC): ICD-10-CM

## 2024-03-05 DIAGNOSIS — I79.8 OTHER DISORDERS OF ARTERIES, ARTERIOLES AND CAPILLARIES IN DISEASES CLASSIFIED ELSEWHERE (CMS-HCC): ICD-10-CM

## 2024-03-05 DIAGNOSIS — M54.2 CHRONIC NECK PAIN: ICD-10-CM

## 2024-03-05 DIAGNOSIS — R09.89 OTHER SPECIFIED SYMPTOMS AND SIGNS INVOLVING THE CIRCULATORY AND RESPIRATORY SYSTEMS: ICD-10-CM

## 2024-03-05 DIAGNOSIS — I34.0 NONRHEUMATIC MITRAL (VALVE) INSUFFICIENCY: ICD-10-CM

## 2024-03-05 DIAGNOSIS — M54.42 CHRONIC MIDLINE LOW BACK PAIN WITH BILATERAL SCIATICA: ICD-10-CM

## 2024-03-05 DIAGNOSIS — M51.9 LUMBAR DISC DISEASE: ICD-10-CM

## 2024-03-05 DIAGNOSIS — Z95.2 S/P MVR (MITRAL VALVE REPLACEMENT): ICD-10-CM

## 2024-03-05 DIAGNOSIS — N63.0: ICD-10-CM

## 2024-03-05 DIAGNOSIS — I48.0 EPISODIC ATRIAL FIBRILLATION (MULTI): ICD-10-CM

## 2024-03-05 DIAGNOSIS — I60.9 SAH (SUBARACHNOID HEMORRHAGE) (MULTI): Primary | ICD-10-CM

## 2024-03-05 DIAGNOSIS — B37.0 THRUSH, ORAL: ICD-10-CM

## 2024-03-05 DIAGNOSIS — I33.0 ACUTE BACTERIAL ENDOCARDITIS (HHS-HCC): ICD-10-CM

## 2024-03-05 PROBLEM — I67.848 VASOSPASM OF CEREBRAL ARTERY: Status: ACTIVE | Noted: 2024-03-05

## 2024-03-05 LAB
AMMONIA PLAS-SCNC: 12 UMOL/L (ref 11–51)
ANION GAP SERPL CALCULATED.3IONS-SCNC: 16 MEQ/L (ref 9–15)
BACTERIA URNS QL MICRO: NEGATIVE /HPF
BASE EXCESS ARTERIAL: 0 (ref -3–3)
BASOPHILS # BLD: 0 K/UL (ref 0–0.2)
BASOPHILS NFR BLD: 0.2 %
BILIRUB UR QL STRIP: NEGATIVE
BUN SERPL-MCNC: 9 MG/DL (ref 8–23)
CALCIUM IONIZED: 1.13 MMOL/L (ref 1.12–1.32)
CALCIUM SERPL-MCNC: 8.9 MG/DL (ref 8.5–9.9)
CHLORIDE SERPL-SCNC: 92 MEQ/L (ref 95–107)
CLARITY UR: CLEAR
CO2 SERPL-SCNC: 23 MEQ/L (ref 20–31)
COLOR UR: YELLOW
CREAT SERPL-MCNC: 0.6 MG/DL (ref 0.5–0.9)
EKG ATRIAL RATE: 75 BPM
EKG P AXIS: 55 DEGREES
EKG P-R INTERVAL: 172 MS
EKG Q-T INTERVAL: 384 MS
EKG QRS DURATION: 88 MS
EKG QTC CALCULATION (BAZETT): 428 MS
EKG R AXIS: -40 DEGREES
EKG T AXIS: 27 DEGREES
EKG VENTRICULAR RATE: 75 BPM
EOSINOPHIL # BLD: 0 K/UL (ref 0–0.7)
EOSINOPHIL NFR BLD: 0 %
ERYTHROCYTE [DISTWIDTH] IN BLOOD BY AUTOMATED COUNT: 14.7 % (ref 11.5–14.5)
GLUCOSE BLD-MCNC: 91 MG/DL (ref 70–99)
GLUCOSE SERPL-MCNC: 98 MG/DL (ref 70–99)
GLUCOSE UR STRIP-MCNC: NEGATIVE MG/DL
HCO3 ARTERIAL: 22.6 MMOL/L (ref 21–29)
HCT VFR BLD AUTO: 38.2 % (ref 37–47)
HCT VFR BLD AUTO: 39 % (ref 36–48)
HGB BLD CALC-MCNC: 13.3 GM/DL (ref 12–16)
HGB BLD-MCNC: 12.4 G/DL (ref 12–16)
HGB UR QL STRIP: NEGATIVE
INR PPP: 1.1
KETONES UR STRIP-MCNC: ABNORMAL MG/DL
LACTATE: 0.57 MMOL/L (ref 0.4–2)
LEUKOCYTE ESTERASE UR QL STRIP: ABNORMAL
LYMPHOCYTES # BLD: 1 K/UL (ref 1–4.8)
LYMPHOCYTES NFR BLD: 5.9 %
MCH RBC QN AUTO: 26.8 PG (ref 27–31.3)
MCHC RBC AUTO-ENTMCNC: 32.5 % (ref 33–37)
MCV RBC AUTO: 82.7 FL (ref 79.4–94.8)
MONOCYTES # BLD: 0.9 K/UL (ref 0.2–0.8)
MONOCYTES NFR BLD: 5.4 %
NEUTROPHILS # BLD: 14.8 K/UL (ref 1.4–6.5)
NEUTS SEG NFR BLD: 86.9 %
NITRITE UR QL STRIP: NEGATIVE
O2 SAT, ARTERIAL: 94 % (ref 93–100)
PCO2 ARTERIAL: 28 MM HG (ref 35–45)
PERFORMED ON: ABNORMAL
PH ARTERIAL: 7.51 (ref 7.35–7.45)
PH UR STRIP: 7.5 [PH] (ref 5–9)
PLATELET # BLD AUTO: 363 K/UL (ref 130–400)
PO2 ARTERIAL: 63 MM HG (ref 75–108)
POC CHLORIDE: 97 MEQ/L (ref 99–110)
POC CREATININE: 0.5 MG/DL (ref 0.6–1.2)
POC SAMPLE TYPE: ABNORMAL
POTASSIUM SERPL-SCNC: 4.2 MEQ/L (ref 3.5–5.1)
POTASSIUM SERPL-SCNC: 4.5 MEQ/L (ref 3.4–4.9)
PROT UR STRIP-MCNC: NEGATIVE MG/DL
PROTHROMBIN TIME: 14.9 SEC (ref 12.3–14.9)
RBC # BLD AUTO: 4.62 M/UL (ref 4.2–5.4)
RBC #/AREA URNS HPF: NORMAL /HPF (ref 0–2)
SODIUM BLD-SCNC: 129 MEQ/L (ref 136–145)
SODIUM SERPL-SCNC: 131 MEQ/L (ref 135–144)
SP GR UR STRIP: 1 (ref 1–1.03)
TCO2 ARTERIAL: 24 MMOL/L (ref 21–32)
TSH REFLEX: 0.38 UIU/ML (ref 0.44–3.86)
URINE REFLEX TO CULTURE: ABNORMAL
UROBILINOGEN UR STRIP-ACNC: 0.2 E.U./DL
WBC # BLD AUTO: 17 K/UL (ref 4.8–10.8)
WBC #/AREA URNS HPF: NORMAL /HPF (ref 0–5)

## 2024-03-05 PROCEDURE — 2580000003 HC RX 258: Performed by: INTERNAL MEDICINE

## 2024-03-05 PROCEDURE — 2580000003 HC RX 258: Performed by: PSYCHIATRY & NEUROLOGY

## 2024-03-05 PROCEDURE — 82435 ASSAY OF BLOOD CHLORIDE: CPT

## 2024-03-05 PROCEDURE — 82565 ASSAY OF CREATININE: CPT

## 2024-03-05 PROCEDURE — 99291 CRITICAL CARE FIRST HOUR: CPT | Performed by: INTERNAL MEDICINE

## 2024-03-05 PROCEDURE — 2580000003 HC RX 258: Performed by: NEUROLOGICAL SURGERY

## 2024-03-05 PROCEDURE — 36415 COLL VENOUS BLD VENIPUNCTURE: CPT

## 2024-03-05 PROCEDURE — 51701 INSERT BLADDER CATHETER: CPT

## 2024-03-05 PROCEDURE — 85025 COMPLETE CBC W/AUTO DIFF WBC: CPT

## 2024-03-05 PROCEDURE — APPSS45 APP SPLIT SHARED TIME 31-45 MINUTES: Performed by: NURSE PRACTITIONER

## 2024-03-05 PROCEDURE — 6360000002 HC RX W HCPCS: Performed by: NEUROLOGICAL SURGERY

## 2024-03-05 PROCEDURE — 82803 BLOOD GASES ANY COMBINATION: CPT

## 2024-03-05 PROCEDURE — 85610 PROTHROMBIN TIME: CPT

## 2024-03-05 PROCEDURE — 99222 1ST HOSP IP/OBS MODERATE 55: CPT | Performed by: PSYCHIATRY & NEUROLOGY

## 2024-03-05 PROCEDURE — 84132 ASSAY OF SERUM POTASSIUM: CPT

## 2024-03-05 PROCEDURE — 84443 ASSAY THYROID STIM HORMONE: CPT

## 2024-03-05 PROCEDURE — 83605 ASSAY OF LACTIC ACID: CPT

## 2024-03-05 PROCEDURE — 6370000000 HC RX 637 (ALT 250 FOR IP): Performed by: INTERNAL MEDICINE

## 2024-03-05 PROCEDURE — 84295 ASSAY OF SERUM SODIUM: CPT

## 2024-03-05 PROCEDURE — 36600 WITHDRAWAL OF ARTERIAL BLOOD: CPT

## 2024-03-05 PROCEDURE — 82746 ASSAY OF FOLIC ACID SERUM: CPT

## 2024-03-05 PROCEDURE — 82140 ASSAY OF AMMONIA: CPT

## 2024-03-05 PROCEDURE — 70496 CT ANGIOGRAPHY HEAD: CPT

## 2024-03-05 PROCEDURE — 2020000001 HC ICU ROOM DAILY

## 2024-03-05 PROCEDURE — 85014 HEMATOCRIT: CPT

## 2024-03-05 PROCEDURE — 6360000004 HC RX CONTRAST MEDICATION: Performed by: INTERNAL MEDICINE

## 2024-03-05 PROCEDURE — 82330 ASSAY OF CALCIUM: CPT

## 2024-03-05 PROCEDURE — 80048 BASIC METABOLIC PNL TOTAL CA: CPT

## 2024-03-05 PROCEDURE — 82607 VITAMIN B-12: CPT

## 2024-03-05 PROCEDURE — 99291 CRITICAL CARE FIRST HOUR: CPT | Performed by: REGISTERED NURSE

## 2024-03-05 RX ORDER — AMOXICILLIN 250 MG
2 CAPSULE ORAL 2 TIMES DAILY
Status: DISCONTINUED | OUTPATIENT
Start: 2024-03-05 | End: 2024-03-22

## 2024-03-05 RX ORDER — HYDRALAZINE HYDROCHLORIDE 20 MG/ML
10 INJECTION INTRAMUSCULAR; INTRAVENOUS
Status: DISCONTINUED | OUTPATIENT
Start: 2024-03-05 | End: 2024-03-08

## 2024-03-05 RX ORDER — ACETAMINOPHEN 325 MG/1
650 TABLET ORAL EVERY 6 HOURS PRN
Status: DISCONTINUED | OUTPATIENT
Start: 2024-03-05 | End: 2024-03-06 | Stop reason: HOSPADM

## 2024-03-05 RX ORDER — SODIUM CHLORIDE 9 MG/ML
INJECTION, SOLUTION INTRAVENOUS CONTINUOUS
Status: DISCONTINUED | OUTPATIENT
Start: 2024-03-05 | End: 2024-03-06 | Stop reason: HOSPADM

## 2024-03-05 RX ORDER — LEVETIRACETAM 5 MG/ML
500 INJECTION INTRAVASCULAR EVERY 12 HOURS
Status: DISCONTINUED | OUTPATIENT
Start: 2024-03-05 | End: 2024-03-06

## 2024-03-05 RX ORDER — NICARDIPINE HYDROCHLORIDE 0.2 MG/ML
1-15 INJECTION INTRAVENOUS CONTINUOUS PRN
Status: DISCONTINUED | OUTPATIENT
Start: 2024-03-05 | End: 2024-03-08

## 2024-03-05 RX ORDER — HEPARIN SODIUM 5000 [USP'U]/ML
5000 INJECTION, SOLUTION INTRAVENOUS; SUBCUTANEOUS EVERY 8 HOURS
Status: DISCONTINUED | OUTPATIENT
Start: 2024-03-07 | End: 2024-03-11

## 2024-03-05 RX ORDER — ACETAMINOPHEN 650 MG/1
650 SUPPOSITORY RECTAL EVERY 6 HOURS PRN
Status: DISCONTINUED | OUTPATIENT
Start: 2024-03-05 | End: 2024-03-06 | Stop reason: HOSPADM

## 2024-03-05 RX ORDER — DEXAMETHASONE SODIUM PHOSPHATE 4 MG/ML
2 INJECTION, SOLUTION INTRA-ARTICULAR; INTRALESIONAL; INTRAMUSCULAR; INTRAVENOUS; SOFT TISSUE EVERY 8 HOURS SCHEDULED
Status: DISCONTINUED | OUTPATIENT
Start: 2024-03-05 | End: 2024-03-06

## 2024-03-05 RX ORDER — NIMODIPINE 30 MG/1
60 CAPSULE, LIQUID FILLED ORAL EVERY 4 HOURS
Status: DISCONTINUED | OUTPATIENT
Start: 2024-03-05 | End: 2024-03-06

## 2024-03-05 RX ORDER — NIMODIPINE 30 MG/1
60 CAPSULE, LIQUID FILLED ORAL
Status: DISCONTINUED | OUTPATIENT
Start: 2024-03-05 | End: 2024-03-06 | Stop reason: HOSPADM

## 2024-03-05 RX ORDER — LABETALOL HYDROCHLORIDE 5 MG/ML
10 INJECTION, SOLUTION INTRAVENOUS EVERY 10 MIN PRN
Status: DISCONTINUED | OUTPATIENT
Start: 2024-03-05 | End: 2024-03-08

## 2024-03-05 RX ADMIN — IOPAMIDOL 75 ML: 755 INJECTION, SOLUTION INTRAVENOUS at 10:09

## 2024-03-05 RX ADMIN — SODIUM CHLORIDE: 9 INJECTION, SOLUTION INTRAVENOUS at 15:54

## 2024-03-05 RX ADMIN — ACETAMINOPHEN 650 MG: 650 SUPPOSITORY RECTAL at 15:54

## 2024-03-05 RX ADMIN — SODIUM CHLORIDE, PRESERVATIVE FREE 10 ML: 5 INJECTION INTRAVENOUS at 10:28

## 2024-03-05 RX ADMIN — SODIUM CHLORIDE: 9 INJECTION, SOLUTION INTRAVENOUS at 12:15

## 2024-03-05 RX ADMIN — ENOXAPARIN SODIUM 30 MG: 100 INJECTION SUBCUTANEOUS at 10:27

## 2024-03-05 ASSESSMENT — ENCOUNTER SYMPTOMS
WHEEZING: 0
COUGH: 0
TROUBLE SWALLOWING: 0
DIARRHEA: 1
COLOR CHANGE: 0
VOMITING: 0

## 2024-03-05 ASSESSMENT — PAIN SCALES - GENERAL
PAINLEVEL_OUTOF10: 0
PAINLEVEL_OUTOF10: 0 - NO PAIN
PAINLEVEL_OUTOF10: 0

## 2024-03-05 ASSESSMENT — PAIN - FUNCTIONAL ASSESSMENT: PAIN_FUNCTIONAL_ASSESSMENT: 0-10

## 2024-03-05 NOTE — PROGRESS NOTES
15:40pm Received from [facility] via bed t CCU 3. Not following commands. Responds to painful stimuli. Temp 38.9 via rectal probe. Skin check with Laura RN. No breakdown noted. Tatoos.  Back surgery sites without bleeding or drainage. [place]. Ns @ 125 via peripheral line  1555pm Sahra HENSON in to see patient. Orders.    16:15pm Dr Larry in to see patient and   16:22pm Dr Devlin in and updated on status. Spoke with  @ bedside  16:30pm Dr Devlin in and informed  of patient to be [place]  17;20pm Dr Garcia updated on patient status  18:45pm DR Goetz messaged to check status of  transfer

## 2024-03-05 NOTE — FLOWSHEET NOTE
Spoke with Dr. Goetz on telephone and notified of patients increased nausea and pain. Reviewed MAR and pain medications     Response: Discontinued Dilaudid and increased Oxycodone to be able to take 5 or 10 mg every 4 hours as needed.

## 2024-03-05 NOTE — CONSULTS
Inpatient consult to Critical Care  Consult performed by: Moises Larry MD  Consult ordered by: Mauro Garcia MD          Admit Date: 3/1/2024    PCP:  Lyndsey Shepherd         CHIEF COMPLAINT / HPI:                The patient is a 78 y.o. female with significant past medical history of asthma, COPD, coronary artery disease, hypertension and hyperlipidemia who was transferred to the ICU due to encephalopathy.  Initially admitted due to back pain status post L4-L5 decompression laminectomy and fusion 4 days ago.  Had pain and nausea postoperatively and was getting Dilaudid.  He was becoming more somnolent and confused and a bit impulsive initially and mental status continues to decline.  Stat CT head was ordered and she was transferred to the ICU.  He was still able to protect airways withdraws to pain.  She was found to have a fever of 102.  Stat CT head was reviewed by neurology.  Neurologist believes she has a subarachnoid blood in the right insular region with evidence of vasospasm.  She was started on Nimotop Saphris in ICU.            Past Medical History:      Diagnosis Date    Arthritis     Asthma     COPD (chronic obstructive pulmonary disease) (HCC)     Hiatal hernia     Hyperlipidemia     Hypertension     MI (myocardial infarction) (HCC)         Past Surgical History:        Procedure Laterality Date    CATARACT EXTRACTION W/ INTRAOCULAR LENS IMPLANT Bilateral     CHOLECYSTECTOMY      COLONOSCOPY      ENDOSCOPY, COLON, DIAGNOSTIC      EYE SURGERY      FRACTURE SURGERY      HAND SURGERY Left     broken bone    HYSTERECTOMY (CERVIX STATUS UNKNOWN)      TONSILLECTOMY         Current Medications:     niMODipine  60 mg Oral 6 times per day    oxyBUTYnin  5 mg Oral Nightly    sodium chloride flush  5-40 mL IntraVENous 2 times per day    sennosides-docusate sodium  1 tablet Oral BID    [Held by provider] enoxaparin  30 mg SubCUTAneous Daily    [Held by provider] metoprolol succinate  100 mg Oral Daily    [Held

## 2024-03-05 NOTE — FLOWSHEET NOTE
Patient having consistent nausea. Taken compazine and zofran per MAR. No emesis.     Perfect serve message sent to Dr. Garcia to see if anything additional can be given?     Response: One time dose of reglan ordered.

## 2024-03-05 NOTE — PROGRESS NOTES
Progress Note  Date:3/5/2024       [facility]  Patient Name:Lauren Elizondo     YOB: 1945     Age:78 y.o.        Subjective    ROS: 12 ROS could not be obtained due to acuity of medical conditon  Objective         Vitals Last 24 Hours:  TEMPERATURE:  Temp  Av.2 °F (37.3 °C)  Min: 97.7 °F (36.5 °C)  Max: 101.9 °F (38.8 °C)  RESPIRATIONS RANGE: Resp  Av  Min: 16  Max: 20  PULSE OXIMETRY RANGE: SpO2  Av.4 %  Min: 96 %  Max: 99 %  PULSE RANGE: Pulse  Av.8  Min: 64  Max: 89  BLOOD PRESSURE RANGE: Systolic (24hrs), Av , Min:130 , Max:148   ; Diastolic (24hrs), Av, Min:50, Max:76    I/O (24Hr):    Intake/Output Summary (Last 24 hours) at 3/5/2024 1215  Last data filed at 3/5/2024 1028  Gross per 24 hour   Intake 5 ml   Output --   Net 5 ml       Objective:  General Appearance:  Comfortable, well-appearing and in no acute distress.    Vital signs: (most recent): Blood pressure (!) 130/50, pulse 89, temperature (!) 101.9 °F (38.8 °C), temperature source Axillary, resp. rate 20, height 1.6 m (5' 3\"), weight 68.9 kg (152 lb), SpO2 96 %.    HEENT: Normal HEENT exam.    Lungs:  There are decreased breath sounds.    Heart: S1 normal and S2 normal.    Abdomen: Abdomen is soft.  Bowel sounds are normal.   There is no epigastric area or suprapubic area tenderness.     Pulses: Distal pulses are intact.    Neurological: Patient is alert.    Pupils:  Pupils are equal, round, and reactive to light.    Skin:  Dry.      Labs/Imaging/Diagnostics    Labs:  CBC:  Recent Labs     24  2155 24  2205 24  1113   WBC 11.3*  --  17.0*   RBC 4.27  --  4.62   HGB 11.8* 12.8 12.4   HCT 35.8*  --  38.2   MCV 83.8  --  82.7   RDW 14.6*  --  14.7*     --  363       CHEMISTRIES:  Recent Labs     245 24  1113   *  --  131*   K 5.2*  --  4.5   CL 95  --  92*   CO2 24  --  23   BUN 7*  --  9   CREATININE 0.54 0.7 0.60   GLUCOSE 95  --  98   MG 2.1

## 2024-03-05 NOTE — CARE COORDINATION
0800 Spoke with Eileen from [facility] and updated of possible dc today.   1205 Call to [facility] to update of no discharge today.

## 2024-03-05 NOTE — Clinical Note
Diagnostic cerebral angiogram performed. Access via R femoral artery. R femoral closure with 5fr Mynx. Hemostasis achieved. No visible bleeding or hematoma at R groin site. Gauze and tegaderm applied. R leg immobilizer applied. Patient received 2mg versed, 50mcg fentanyl IVP for sedation during case. Mynx deployment @1630 and 3 hour ambulation @1930. Neurovascular flowsheet started at 1630 and handed off to JAVAN Brower RN. Bedside repor t given to JAVAN Brower RN. VSS throughout procedure.

## 2024-03-05 NOTE — CONSULTS
[facility] Consult Note  Name: Lauren Elizondo  Age: 78 y.o.  Gender: female  CodeStatus: Full Code  Allergies: Zyrtec [Cetirizine]  Codeine    Chief Complaint:No chief complaint on file.    Primary Care Provider: Lyndsey Shepherd  InpatientTreatment Team: Treatment Team: Attending Provider: Abhijit Goetz MD; Surgeon: Abhijit Goetz MD; Consulting Physician: Mauro Garcia MD; Patient Care Tech: Jrerica Clarke; Utilization Reviewer: Yari Byrnes RN; : Nelly Gamboa RN; Utilization Reviewer: Yari Zambrano RN; Respiratory Therapist (Day): Lucila Zheng RCP; Registered Nurse: Iraida Kim RN; Consulting Physician: Jakob Devlin MD; Consulting Physician: Abhijit Goetz MD  Admission Date: 3/1/2024      HPI   Consulting provider: Dr Mauro Garcia for change in MS  Pt seen and examined for neurology consult.  Patient is a 78-year-old female with past medical history of CAD with MI, hypertension, hyperlipidemia, COPD who was admitted to [facility] on 3/1/2024 for spondylolisthesis of lumbar lesion and status post L4-L5 decompression laminectomy and fusion on 3/1/2024.   Postoperatively patient has had significant pain and nausea.  Has been getting IV Dilaudid several times daily as well as oral oxycodone multiple times daily.  Current laboratory testing remarkable for alk phos of 140, ALT 46, AST 37, total bili of 1.2, sodium 129, potassium 5.2, WBCs 11.3, hemoglobin 11.8.  Urinalysis negative for UTI.  CTA of the head was ordered by attending provider and pending.    Patient confused and somnolent.  Has difficulty following directions.  Impulsive.   is at bedside.   is very concerned regarding patient's current mental status changes and states he thinks that she got \"too much pain medicine\".  He reports that at baseline she is normally alert and oriented x 4 and cares for herself.  No previous history of dementia, CVA, TIA, seizures, TBI.  Neuroexam is limited as  found for this or any previous visit.  No results found for this or any previous visit.  No results found for this or any previous visit.      Carotid duplex: No results found for this or any previous visit.  No results found for this or any previous visit.  No results found for this or any previous visit.      Echo No results found for this or any previous visit.            Assessment/Plan:  Patient is a 78-year-old female with past medical history of CAD with MI, hypertension, hyperlipidemia, COPD who was admitted to [facility] on 3/1/2024 for spondylolisthesis of lumbar lesion and status post L4-L5 decompression laminectomy and fusion on 3/1/2024.   Postoperatively patient has had significant pain and nausea.  Has been getting IV Dilaudid several times daily as well as oral oxycodone multiple times daily.  Current laboratory testing remarkable for alk phos of 140, ALT 46, AST 37, total bili of 1.2, sodium 129, potassium 5.2, WBCs 11.3, hemoglobin 11.8.  Urinalysis negative for UTI.  CTA of the head was ordered by attending provider and pending.    CTA of the head obtained that showed hyperdense material in the right insula and operculum consistent with small amount of acute subdural hemorrhage.  CTA negative for significant stenosis or occlusion of arteries.  Lovenox has been placed on hold.  Neurosurgery has been consulted.  Will need to consider low cerebral spinal fluid pressure/intracranial hypotension with CSF leak as cause of SDH in the setting of recent lumbar spinal surgery.  Will await neurosurgery's evaluation and recommendations.    Recommend significantly decreasing opioid use and correcting electrolyte abnormalities.  Will check TSH ammonia, B12, folate.  Will await neurosurgery recommendations.  Will require serial CT of the head.    Will follow.      Collaborating physicians: Dr Devlin      Agree with above patient is a 78-year-old female who presented for routine back surgery and did

## 2024-03-05 NOTE — PROGRESS NOTES
Progress Note  Date:3/5/2024       [facility]  Patient Name:Lauren Elizondo     YOB: 1945     Age:78 y.o.        Subjective    ROS: 12 ROS system is negative except for urge incontinence  Objective         Vitals Last 24 Hours:  TEMPERATURE:  Temp  Av.2 °F (37.3 °C)  Min: 97.7 °F (36.5 °C)  Max: 101.9 °F (38.8 °C)  RESPIRATIONS RANGE: Resp  Av  Min: 16  Max: 20  PULSE OXIMETRY RANGE: SpO2  Av.4 %  Min: 96 %  Max: 99 %  PULSE RANGE: Pulse  Av.8  Min: 64  Max: 89  BLOOD PRESSURE RANGE: Systolic (24hrs), Av , Min:130 , Max:148   ; Diastolic (24hrs), Av, Min:50, Max:76    I/O (24Hr):    Intake/Output Summary (Last 24 hours) at 3/5/2024 1213  Last data filed at 3/5/2024 1028  Gross per 24 hour   Intake 5 ml   Output --   Net 5 ml       Objective:  General Appearance:  Comfortable, well-appearing and in no acute distress.    Vital signs: (most recent): Blood pressure (!) 130/50, pulse 89, temperature (!) 101.9 °F (38.8 °C), temperature source Axillary, resp. rate 20, height 1.6 m (5' 3\"), weight 68.9 kg (152 lb), SpO2 96 %.    HEENT: Normal HEENT exam.    Lungs:  There are decreased breath sounds.    Heart: S1 normal and S2 normal.    Abdomen: Abdomen is soft.  Bowel sounds are normal.   There is no epigastric area or suprapubic area tenderness.     Pulses: Distal pulses are intact.    Neurological: Patient is alert.    Pupils:  Pupils are equal, round, and reactive to light.    Skin:  Dry.      Labs/Imaging/Diagnostics    Labs:  CBC:  Recent Labs     24  2155 24  2205 24  1113   WBC 11.3*  --  17.0*   RBC 4.27  --  4.62   HGB 11.8* 12.8 12.4   HCT 35.8*  --  38.2   MCV 83.8  --  82.7   RDW 14.6*  --  14.7*     --  363       CHEMISTRIES:  Recent Labs     24  1113   *  --  131*   K 5.2*  --  4.5   CL 95  --  92*   CO2 24  --  23   BUN 7*  --  9   CREATININE 0.54 0.7 0.60   GLUCOSE 95  --  98   MG 2.1  --   --

## 2024-03-05 NOTE — CONSULTS
[facility] Consult Note  Name: Lauren Elizondo  Age: 78 y.o.  Gender: female  CodeStatus: Full Code  Allergies: Zyrtec [Cetirizine]  Codeine    Chief Complaint:No chief complaint on file.    Primary Care Provider: Lyndsey Shepherd  InpatientTreatment Team: Treatment Team: Attending Provider: Abhijit Goetz MD; Surgeon: Abhijit Goetz MD; Consulting Physician: Mauro Garcia MD; Patient Care Tech: Jerrica Clarke; Utilization Reviewer: Yari Byrnes RN; : Nelly Gamboa RN; Utilization Reviewer: Yari Zambrano RN; Respiratory Therapist (Day): Lucila Zheng RCP; Registered Nurse: Iraida Kim RN; Consulting Physician: Jakob Devlin MD  Admission Date: 3/1/2024      HPI   Consulting provider: Dr Mauro Garcia for change in MS  Pt seen and examined for neurology consult.  Patient is a 78-year-old female with past medical history of CAD with MI, hypertension, hyperlipidemia, COPD who was admitted to [facility] on 3/1/2024 for spondylolisthesis of lumbar lesion and status post L4-L5 decompression laminectomy and fusion on 3/1/2024.   Postoperatively patient has had significant pain and nausea.  Has been getting IV Dilaudid several times daily as well as oral oxycodone multiple times daily.  Current laboratory testing remarkable for alk phos of 140, ALT 46, AST 37, total bili of 1.2, sodium 129, potassium 5.2, WBCs 11.3, hemoglobin 11.8.  Urinalysis negative for UTI.  CTA of the head was ordered by attending provider and pending.    Patient confused and somnolent.  Has difficulty following directions.  Impulsive.   is at bedside.   is very concerned regarding patient's current mental status changes and states he thinks that she got \"too much pain medicine\".  He reports that at baseline she is normally alert and oriented x 4 and cares for herself.  No previous history of dementia, CVA, TIA, seizures, TBI.  Neuroexam is limited as patient is unable to follow commands  flush, sodium chloride, bisacodyl, ondansetron, prochlorperazine    Labs:   Recent Labs     03/04/24 2155 03/04/24 2205   WBC 11.3*  --    HGB 11.8* 12.8   HCT 35.8*  --      --      Recent Labs     03/04/24 2155 03/04/24 2205   *  --    K 5.2*  --    CL 95  --    CO2 24  --    BUN 7*  --    CREATININE 0.54 0.7   CALCIUM 9.3  --      Recent Labs     03/04/24 2155   AST 37*   ALT 46*   BILIDIR 0.3   BILITOT 1.2*   ALKPHOS 140*     No results for input(s): \"INR\" in the last 72 hours.  No results for input(s): \"CKTOTAL\", \"TROPONINI\" in the last 72 hours.    Urinalysis:   Lab Results   Component Value Date/Time    NITRU Negative 03/04/2024 10:41 PM    WBCUA 3-5 03/04/2024 10:41 PM    BACTERIA Negative 03/04/2024 10:41 PM    RBCUA 0-2 03/04/2024 10:41 PM    BLOODU Negative 03/04/2024 10:41 PM    SPECGRAV 1.005 03/04/2024 10:41 PM    GLUCOSEU Negative 03/04/2024 10:41 PM       Radiology:   Most recent    EEG No valid procedures specified.    MRI of Brain No results found for this or any previous visit.  No results found for this or any previous visit.                            MRA of the Head and Neck: No results found for this or any previous visit.  No results found for this or any previous visit.  No results found for this or any previous visit.                            CT of the Head: No results found for this or any previous visit.  No results found for this or any previous visit.  No results found for this or any previous visit.      Carotid duplex: No results found for this or any previous visit.  No results found for this or any previous visit.  No results found for this or any previous visit.      Echo No results found for this or any previous visit.            Assessment/Plan:  Patient is a 78-year-old female with past medical history of CAD with MI, hypertension, hyperlipidemia, COPD who was admitted to [facility] on 3/1/2024 for spondylolisthesis of lumbar lesion and status post

## 2024-03-05 NOTE — FLOWSHEET NOTE
Perfect serve message sent to Dr. Garcia to notify of patients constant need to urinate every 5-10 minutes.   Bladder scan 151ml  Last BM 3/3/24.     Response: Ditropan ordered.     Dr. Goetz at bedside and update given on all events today with patient.   Dr. Goetz would like urinalysis ordered and for patient to use BSC as much as possible so she is not continuously getting out of bed.   Education given to patient and  at bedside.

## 2024-03-05 NOTE — PROGRESS NOTES
[facility]  Change in Status Communication Form      To the referring physician:    Due to an acute change in this patient's medical status, new Occupational Therapy Eval and Treatment orders are required. Pt has transferred to ICU for change in acute medical status. Please reorder when pt. is medically stable to resume OOB activity, as appropriate.    We thank you for your referral.     Regards,   [facility] OT Department.     Electronically signed by MERLENE Arnold/L on 3/5/24 at 3:48 PM EST

## 2024-03-06 ENCOUNTER — APPOINTMENT (OUTPATIENT)
Dept: RADIOLOGY | Facility: HOSPITAL | Age: 79
DRG: 981 | End: 2024-03-06
Payer: MEDICARE

## 2024-03-06 ENCOUNTER — APPOINTMENT (OUTPATIENT)
Dept: NEUROLOGY | Facility: HOSPITAL | Age: 79
DRG: 981 | End: 2024-03-06
Payer: MEDICARE

## 2024-03-06 LAB
ALBUMIN SERPL BCP-MCNC: 3.1 G/DL (ref 3.4–5)
ALBUMIN SERPL BCP-MCNC: 3.2 G/DL (ref 3.4–5)
ALBUMIN SERPL BCP-MCNC: 3.2 G/DL (ref 3.4–5)
ALBUMIN SERPL BCP-MCNC: 3.5 G/DL (ref 3.4–5)
ALP SERPL-CCNC: 121 U/L (ref 33–136)
ALT SERPL W P-5'-P-CCNC: 84 U/L (ref 7–45)
AMMONIA PLAS-SCNC: 37 UMOL/L (ref 16–53)
ANION GAP SERPL CALC-SCNC: 17 MMOL/L (ref 10–20)
ANION GAP SERPL CALC-SCNC: 19 MMOL/L (ref 10–20)
ANION GAP SERPL CALC-SCNC: 20 MMOL/L (ref 10–20)
APPEARANCE UR: CLEAR
APTT PPP: 20 SECONDS (ref 27–38)
APTT PPP: 29 SECONDS (ref 27–38)
AST SERPL W P-5'-P-CCNC: 68 U/L (ref 9–39)
BILIRUB DIRECT SERPL-MCNC: 0.3 MG/DL (ref 0–0.3)
BILIRUB SERPL-MCNC: 1.3 MG/DL (ref 0–1.2)
BILIRUB UR STRIP.AUTO-MCNC: NEGATIVE MG/DL
BNP SERPL-MCNC: 74 PG/ML (ref 0–99)
BUN SERPL-MCNC: 12 MG/DL (ref 6–23)
BUN SERPL-MCNC: 12 MG/DL (ref 6–23)
BUN SERPL-MCNC: 13 MG/DL (ref 6–23)
CALCIUM SERPL-MCNC: 8.5 MG/DL (ref 8.6–10.6)
CALCIUM SERPL-MCNC: 8.8 MG/DL (ref 8.6–10.6)
CALCIUM SERPL-MCNC: 9 MG/DL (ref 8.6–10.6)
CARDIAC TROPONIN I PNL SERPL HS: 17 NG/L (ref 0–34)
CHLORIDE SERPL-SCNC: 100 MMOL/L (ref 98–107)
CHLORIDE SERPL-SCNC: 100 MMOL/L (ref 98–107)
CHLORIDE SERPL-SCNC: 94 MMOL/L (ref 98–107)
CHOLEST SERPL-MCNC: 151 MG/DL (ref 0–199)
CHOLESTEROL/HDL RATIO: 4
CO2 SERPL-SCNC: 19 MMOL/L (ref 21–32)
CO2 SERPL-SCNC: 19 MMOL/L (ref 21–32)
CO2 SERPL-SCNC: 23 MMOL/L (ref 21–32)
COLOR UR: ABNORMAL
CREAT SERPL-MCNC: 0.45 MG/DL (ref 0.5–1.05)
CREAT SERPL-MCNC: 0.46 MG/DL (ref 0.5–1.05)
CREAT SERPL-MCNC: 0.55 MG/DL (ref 0.5–1.05)
EGFRCR SERPLBLD CKD-EPI 2021: >90 ML/MIN/1.73M*2
ERYTHROCYTE [DISTWIDTH] IN BLOOD BY AUTOMATED COUNT: 14.7 % (ref 11.5–14.5)
EST. AVERAGE GLUCOSE BLD GHB EST-MCNC: 111 MG/DL
FOLATE: 17.1 NG/ML (ref 4.8–24.2)
GLUCOSE BLD MANUAL STRIP-MCNC: 84 MG/DL (ref 74–99)
GLUCOSE SERPL-MCNC: 83 MG/DL (ref 74–99)
GLUCOSE SERPL-MCNC: 83 MG/DL (ref 74–99)
GLUCOSE SERPL-MCNC: 85 MG/DL (ref 74–99)
GLUCOSE UR STRIP.AUTO-MCNC: NORMAL MG/DL
HBA1C MFR BLD: 5.5 %
HCT VFR BLD AUTO: 35.6 % (ref 36–46)
HDLC SERPL-MCNC: 38 MG/DL
HGB BLD-MCNC: 11.3 G/DL (ref 12–16)
INR PPP: 1.2 (ref 0.9–1.1)
INR PPP: 1.3 (ref 0.9–1.1)
KETONES UR STRIP.AUTO-MCNC: ABNORMAL MG/DL
LDLC SERPL CALC-MCNC: 92 MG/DL
LEUKOCYTE ESTERASE UR QL STRIP.AUTO: NEGATIVE
MAGNESIUM SERPL-MCNC: 2.1 MG/DL (ref 1.6–2.4)
MCH RBC QN AUTO: 27.1 PG (ref 26–34)
MCHC RBC AUTO-ENTMCNC: 31.7 G/DL (ref 32–36)
MCV RBC AUTO: 85 FL (ref 80–100)
NITRITE UR QL STRIP.AUTO: NEGATIVE
NON HDL CHOLESTEROL: 113 MG/DL (ref 0–149)
NRBC BLD-RTO: 0.1 /100 WBCS (ref 0–0)
PH UR STRIP.AUTO: 6 [PH]
PHOSPHATE SERPL-MCNC: 3.4 MG/DL (ref 2.5–4.9)
PHOSPHATE SERPL-MCNC: 3.6 MG/DL (ref 2.5–4.9)
PHOSPHATE SERPL-MCNC: 3.7 MG/DL (ref 2.5–4.9)
PLATELET # BLD AUTO: 319 X10*3/UL (ref 150–450)
POTASSIUM SERPL-SCNC: 4 MMOL/L (ref 3.5–5.3)
POTASSIUM SERPL-SCNC: 4.7 MMOL/L (ref 3.5–5.3)
POTASSIUM SERPL-SCNC: 4.8 MMOL/L (ref 3.5–5.3)
PROT SERPL-MCNC: 6 G/DL (ref 6.4–8.2)
PROT UR STRIP.AUTO-MCNC: NEGATIVE MG/DL
PROTHROMBIN TIME: 13.9 SECONDS (ref 9.8–12.8)
PROTHROMBIN TIME: 14.4 SECONDS (ref 9.8–12.8)
RBC # BLD AUTO: 4.17 X10*6/UL (ref 4–5.2)
RBC # UR STRIP.AUTO: NEGATIVE /UL
SODIUM SERPL-SCNC: 130 MMOL/L (ref 136–145)
SODIUM SERPL-SCNC: 133 MMOL/L (ref 136–145)
SODIUM SERPL-SCNC: 134 MMOL/L (ref 136–145)
SP GR UR STRIP.AUTO: 1.03
T4 FREE SERPL-MCNC: 1.3 NG/DL (ref 0.78–1.48)
TRIGL SERPL-MCNC: 107 MG/DL (ref 0–149)
TSH SERPL-ACNC: 0.26 MIU/L (ref 0.44–3.98)
UROBILINOGEN UR STRIP.AUTO-MCNC: NORMAL MG/DL
VIT B12 SERPL-MCNC: 533 PG/ML (ref 211–911)
VITAMIN B-12: 587 PG/ML (ref 232–1245)
VLDL: 21 MG/DL (ref 0–40)
WBC # BLD AUTO: 16.6 X10*3/UL (ref 4.4–11.3)

## 2024-03-06 PROCEDURE — 82140 ASSAY OF AMMONIA: CPT | Performed by: STUDENT IN AN ORGANIZED HEALTH CARE EDUCATION/TRAINING PROGRAM

## 2024-03-06 PROCEDURE — 83880 ASSAY OF NATRIURETIC PEPTIDE: CPT | Performed by: STUDENT IN AN ORGANIZED HEALTH CARE EDUCATION/TRAINING PROGRAM

## 2024-03-06 PROCEDURE — 85027 COMPLETE CBC AUTOMATED: CPT | Performed by: STUDENT IN AN ORGANIZED HEALTH CARE EDUCATION/TRAINING PROGRAM

## 2024-03-06 PROCEDURE — 80069 RENAL FUNCTION PANEL: CPT | Mod: CCI,MUE | Performed by: STUDENT IN AN ORGANIZED HEALTH CARE EDUCATION/TRAINING PROGRAM

## 2024-03-06 PROCEDURE — 2500000004 HC RX 250 GENERAL PHARMACY W/ HCPCS (ALT 636 FOR OP/ED): Performed by: NURSE PRACTITIONER

## 2024-03-06 PROCEDURE — 82040 ASSAY OF SERUM ALBUMIN: CPT | Performed by: STUDENT IN AN ORGANIZED HEALTH CARE EDUCATION/TRAINING PROGRAM

## 2024-03-06 PROCEDURE — 2020000001 HC ICU ROOM DAILY

## 2024-03-06 PROCEDURE — 72148 MRI LUMBAR SPINE W/O DYE: CPT

## 2024-03-06 PROCEDURE — 80061 LIPID PANEL: CPT | Performed by: STUDENT IN AN ORGANIZED HEALTH CARE EDUCATION/TRAINING PROGRAM

## 2024-03-06 PROCEDURE — 2500000004 HC RX 250 GENERAL PHARMACY W/ HCPCS (ALT 636 FOR OP/ED): Performed by: STUDENT IN AN ORGANIZED HEALTH CARE EDUCATION/TRAINING PROGRAM

## 2024-03-06 PROCEDURE — 2500000004 HC RX 250 GENERAL PHARMACY W/ HCPCS (ALT 636 FOR OP/ED): Performed by: REGISTERED NURSE

## 2024-03-06 PROCEDURE — 51701 INSERT BLADDER CATHETER: CPT

## 2024-03-06 PROCEDURE — 82040 ASSAY OF SERUM ALBUMIN: CPT | Performed by: NURSE PRACTITIONER

## 2024-03-06 PROCEDURE — 70498 CT ANGIOGRAPHY NECK: CPT

## 2024-03-06 PROCEDURE — 70498 CT ANGIOGRAPHY NECK: CPT | Performed by: RADIOLOGY

## 2024-03-06 PROCEDURE — 36415 COLL VENOUS BLD VENIPUNCTURE: CPT | Performed by: STUDENT IN AN ORGANIZED HEALTH CARE EDUCATION/TRAINING PROGRAM

## 2024-03-06 PROCEDURE — 84484 ASSAY OF TROPONIN QUANT: CPT | Performed by: STUDENT IN AN ORGANIZED HEALTH CARE EDUCATION/TRAINING PROGRAM

## 2024-03-06 PROCEDURE — 82607 VITAMIN B-12: CPT | Performed by: STUDENT IN AN ORGANIZED HEALTH CARE EDUCATION/TRAINING PROGRAM

## 2024-03-06 PROCEDURE — 84100 ASSAY OF PHOSPHORUS: CPT | Performed by: STUDENT IN AN ORGANIZED HEALTH CARE EDUCATION/TRAINING PROGRAM

## 2024-03-06 PROCEDURE — 85610 PROTHROMBIN TIME: CPT | Performed by: STUDENT IN AN ORGANIZED HEALTH CARE EDUCATION/TRAINING PROGRAM

## 2024-03-06 PROCEDURE — 83036 HEMOGLOBIN GLYCOSYLATED A1C: CPT | Performed by: STUDENT IN AN ORGANIZED HEALTH CARE EDUCATION/TRAINING PROGRAM

## 2024-03-06 PROCEDURE — 99291 CRITICAL CARE FIRST HOUR: CPT | Performed by: NURSE PRACTITIONER

## 2024-03-06 PROCEDURE — 82947 ASSAY GLUCOSE BLOOD QUANT: CPT

## 2024-03-06 PROCEDURE — 70551 MRI BRAIN STEM W/O DYE: CPT | Performed by: RADIOLOGY

## 2024-03-06 PROCEDURE — 2500000004 HC RX 250 GENERAL PHARMACY W/ HCPCS (ALT 636 FOR OP/ED)

## 2024-03-06 PROCEDURE — 84207 ASSAY OF VITAMIN B-6: CPT | Performed by: STUDENT IN AN ORGANIZED HEALTH CARE EDUCATION/TRAINING PROGRAM

## 2024-03-06 PROCEDURE — 92526 ORAL FUNCTION THERAPY: CPT | Mod: GN

## 2024-03-06 PROCEDURE — 83735 ASSAY OF MAGNESIUM: CPT | Performed by: STUDENT IN AN ORGANIZED HEALTH CARE EDUCATION/TRAINING PROGRAM

## 2024-03-06 PROCEDURE — 84443 ASSAY THYROID STIM HORMONE: CPT | Performed by: STUDENT IN AN ORGANIZED HEALTH CARE EDUCATION/TRAINING PROGRAM

## 2024-03-06 PROCEDURE — 81003 URINALYSIS AUTO W/O SCOPE: CPT

## 2024-03-06 PROCEDURE — 70496 CT ANGIOGRAPHY HEAD: CPT | Performed by: RADIOLOGY

## 2024-03-06 PROCEDURE — 2550000001 HC RX 255 CONTRASTS: Performed by: NEUROLOGICAL SURGERY

## 2024-03-06 PROCEDURE — 92610 EVALUATE SWALLOWING FUNCTION: CPT | Mod: GN

## 2024-03-06 PROCEDURE — 70551 MRI BRAIN STEM W/O DYE: CPT

## 2024-03-06 PROCEDURE — 76937 US GUIDE VASCULAR ACCESS: CPT

## 2024-03-06 PROCEDURE — 84439 ASSAY OF FREE THYROXINE: CPT

## 2024-03-06 PROCEDURE — 72148 MRI LUMBAR SPINE W/O DYE: CPT | Performed by: RADIOLOGY

## 2024-03-06 RX ORDER — SODIUM CHLORIDE 9 MG/ML
75 INJECTION, SOLUTION INTRAVENOUS CONTINUOUS
Status: DISCONTINUED | OUTPATIENT
Start: 2024-03-06 | End: 2024-03-07

## 2024-03-06 RX ORDER — LORAZEPAM 2 MG/ML
INJECTION INTRAMUSCULAR
Status: DISPENSED
Start: 2024-03-06 | End: 2024-03-07

## 2024-03-06 RX ORDER — LORAZEPAM 2 MG/ML
1 INJECTION INTRAMUSCULAR ONCE
Status: COMPLETED | OUTPATIENT
Start: 2024-03-06 | End: 2024-03-06

## 2024-03-06 RX ORDER — HYDROMORPHONE HYDROCHLORIDE 1 MG/ML
0.2 INJECTION, SOLUTION INTRAMUSCULAR; INTRAVENOUS; SUBCUTANEOUS
Status: DISCONTINUED | OUTPATIENT
Start: 2024-03-06 | End: 2024-03-22

## 2024-03-06 RX ORDER — OXYCODONE HYDROCHLORIDE 5 MG/1
5 TABLET ORAL EVERY 6 HOURS PRN
Status: DISCONTINUED | OUTPATIENT
Start: 2024-03-06 | End: 2024-03-22

## 2024-03-06 RX ORDER — LIDOCAINE HYDROCHLORIDE 20 MG/ML
1 JELLY TOPICAL ONCE
Status: DISCONTINUED | OUTPATIENT
Start: 2024-03-06 | End: 2024-03-06

## 2024-03-06 RX ORDER — ACETAMINOPHEN 325 MG/1
650 TABLET ORAL EVERY 4 HOURS PRN
Status: DISCONTINUED | OUTPATIENT
Start: 2024-03-06 | End: 2024-03-22

## 2024-03-06 RX ORDER — LORAZEPAM 2 MG/ML
1 INJECTION INTRAMUSCULAR ONCE
Status: DISCONTINUED | OUTPATIENT
Start: 2024-03-06 | End: 2024-03-07

## 2024-03-06 RX ORDER — OXYCODONE HYDROCHLORIDE 5 MG/1
10 TABLET ORAL EVERY 6 HOURS PRN
Status: DISCONTINUED | OUTPATIENT
Start: 2024-03-06 | End: 2024-03-22

## 2024-03-06 RX ADMIN — SODIUM CHLORIDE 75 ML/HR: 9 INJECTION, SOLUTION INTRAVENOUS at 21:20

## 2024-03-06 RX ADMIN — IOHEXOL 80 ML: 350 INJECTION, SOLUTION INTRAVENOUS at 00:51

## 2024-03-06 RX ADMIN — DEXAMETHASONE SODIUM PHOSPHATE 2 MG: 4 INJECTION INTRA-ARTICULAR; INTRALESIONAL; INTRAMUSCULAR; INTRAVENOUS; SOFT TISSUE at 00:23

## 2024-03-06 RX ADMIN — LEVETIRACETAM 500 MG: 5 INJECTION INTRAVENOUS at 11:39

## 2024-03-06 RX ADMIN — SODIUM CHLORIDE 500 ML: 9 INJECTION, SOLUTION INTRAVENOUS at 16:00

## 2024-03-06 RX ADMIN — LEVETIRACETAM 500 MG: 5 INJECTION INTRAVENOUS at 00:19

## 2024-03-06 RX ADMIN — LORAZEPAM 1 MG: 2 INJECTION INTRAMUSCULAR; INTRAVENOUS at 13:12

## 2024-03-06 RX ADMIN — SODIUM CHLORIDE 1000 ML: 9 INJECTION, SOLUTION INTRAVENOUS at 03:36

## 2024-03-06 RX ADMIN — DEXAMETHASONE SODIUM PHOSPHATE 2 MG: 4 INJECTION INTRA-ARTICULAR; INTRALESIONAL; INTRAMUSCULAR; INTRAVENOUS; SOFT TISSUE at 05:26

## 2024-03-06 RX ADMIN — SODIUM CHLORIDE 75 ML/HR: 9 INJECTION, SOLUTION INTRAVENOUS at 01:46

## 2024-03-06 ASSESSMENT — ACTIVITIES OF DAILY LIVING (ADL)
WALKS IN HOME: UNABLE TO ASSESS
JUDGMENT_ADEQUATE_SAFELY_COMPLETE_DAILY_ACTIVITIES: UNABLE TO ASSESS
FEEDING YOURSELF: UNABLE TO ASSESS
BATHING: UNABLE TO ASSESS
TOILETING: UNABLE TO ASSESS
HEARING - LEFT EAR: UNABLE TO ASSESS
PATIENT'S MEMORY ADEQUATE TO SAFELY COMPLETE DAILY ACTIVITIES?: UNABLE TO ASSESS
DRESSING YOURSELF: UNABLE TO ASSESS
HEARING - RIGHT EAR: UNABLE TO ASSESS
GROOMING: UNABLE TO ASSESS
ADEQUATE_TO_COMPLETE_ADL: UNABLE TO ASSESS

## 2024-03-06 ASSESSMENT — PAIN SCALES - GENERAL
PAINLEVEL_OUTOF10: 0 - NO PAIN
PAINLEVEL_OUTOF10: 0 - NO PAIN

## 2024-03-06 ASSESSMENT — PAIN - FUNCTIONAL ASSESSMENT
PAIN_FUNCTIONAL_ASSESSMENT: 0-10
PAIN_FUNCTIONAL_ASSESSMENT: 0-10

## 2024-03-06 NOTE — H&P
History Of Present Illness  Fannie Watson is a 78 y.o. female presenting with h/o HTN, HLD, COPD, 3/1 s/p MIS L4-5 TLIF, p/w AMS, 3/5 CTH trace R sylvian SAH. Patient unable to provide further history.     Past Medical History  No past medical history on file.    Surgical History  No past surgical history on file.     Social History  She has no history on file for tobacco use, alcohol use, and drug use.    Family History  No family history on file.     Allergies  Patient has no allergy information on record.    Review of Systems   All other systems reviewed and are negative.       Physical Exam  HENT:      Head: Normocephalic.      Nose: Nose normal.      Mouth/Throat:      Mouth: Mucous membranes are moist.   Eyes:      Pupils: Pupils are equal, round, and reactive to light.   Musculoskeletal:         General: Normal range of motion.      Cervical back: Normal range of motion.   Skin:     General: Skin is warm.   Neurological:      Mental Status: She is alert.      Comments: EONS sustained  Ox3  FCx4 AG   Psychiatric:         Mood and Affect: Mood normal.          Last Recorded Vitals  There were no vitals taken for this visit.    Relevant Results        CT angio head w and wo IV contrast    Result Date: 3/5/2024  EXAMINATION: CTA OF THE HEAD WITHOUT AND WITH CONTRAST 3/5/2024 10:01 am TECHNIQUE: CTA of the head/brain was performed without and with the administration of intravenous contrast. Multiplanar reformatted images are provided for review. MIP images are provided for review. Automated exposure control, iterative reconstruction, and/or weight based adjustment of the mA/kV was utilized to reduce the radiation dose to as low as reasonably achievable. COMPARISON: None HISTORY: ORDERING SYSTEM PROVIDED HISTORY: r/o CVA TECHNOLOGIST PROVIDED HISTORY: Reason for exam:->r/o CVA Additional Contrast?->Radiologist Recommendation What reading provider will be dictating this exam?->CRC FINDINGS: CT HEAD:  BRAIN/VENTRICLES: There is hyperdense material in the arm region of the right operculum and the right insula consistent with acute subdural hemorrhage.  There is no significant mass effect. Grey-white differentiation is maintained.  No evidence of mass, mass effect or midline shift. No evidence of hydrocephalus. ORBITS: The visualized portion of the orbits demonstrate no acute abnormality. SINUSES:  The visualized paranasal sinuses and mastoid air cells demonstrate no acute abnormality. SOFT TISSUES/SKULL: No acute abnormality of the visualized skull or soft tissues. CTA HEAD: ANTERIOR CIRCULATION: No significant stenosis of the intracranial internal carotid, anterior cerebral, or middle cerebral arteries. No aneurysm. POSTERIOR CIRCULATION: No significant stenosis of the vertebral, basilar, or posterior cerebral arteries. No aneurysm. OTHER: No dural venous sinus thrombosis on this non-dedicated study.    1. There is hyperdense material in the right insula and operculum consistent with a small amount of acute subdural hemorrhage. 2. Unremarkable CTA of the head.    CT lumbar spine wo IV contrast    Result Date: 3/2/2024  EXAMINATION: CT OF THE LUMBAR SPINE WITHOUT CONTRAST  3/2/2024 TECHNIQUE: CT of the lumbar spine was performed without the administration of intravenous contrast. Multiplanar reformatted images are provided for review. Adjustment of mA and/or kV according to patient size was utilized.  Automated exposure control, iterative reconstruction, and/or weight based adjustment of the mA/kV was utilized to reduce the radiation dose to as low as reasonably achievable. COMPARISON: None HISTORY: ORDERING SYSTEM PROVIDED HISTORY: pain TECHNOLOGIST PROVIDED HISTORY: Reason for exam:->pain What reading provider will be dictating this exam?->CRC FINDINGS: BONES/ALIGNMENT: There findings of recent posterior metallic fusion at L4-5. Interbody disc spacer/fusion device at L4-5 appears to be in appropriate position.   There is evidence of a partial facetectomy on the left at L4.  No acute compression fractures detected.  No osseous narrowing of the canal. Significant streak artifact is caused by the hardware. DEGENERATIVE CHANGES: Minimal facet arthropathy at L3-4 bilaterally and L5-S1 on the right. SOFT TISSUES/RETROPERITONEUM: No paraspinal mass is seen.  No abnormal paraspinous or posterior soft tissue fluid collections.    1. Findings of recent posterior metallic fusion at L4-5.  No CT evidence of acute complication. 2. No acute compression fractures detected. 3. No osseous narrowing of the canal. 4. Minimal degenerative changes.    FLUORO FOR SURGICAL PROCEDURES    Result Date: 3/1/2024  EXAMINATION: SPOT FLUOROSCOPIC IMAGES 3/1/2024 6:43 am TECHNIQUE: Fluoroscopy was provided by the radiology department for procedure. Radiologist was not present during examination. FLUOROSCOPY DOSE AND TYPE: Radiation Exposure Index: Fluoroscopy time equals 1.7 minutes.  Total dose equals 112.98 mGy, COMPARISON: None HISTORY: ORDERING SYSTEM PROVIDED HISTORY: pain TECHNOLOGIST PROVIDED HISTORY: Reason for exam:->pain What reading provider will be dictating this exam?->CRC Intraprocedural imaging. FINDINGS: 3 adopt spot images were obtained.    Intraprocedural fluoroscopic spot images as above.  See separate procedure report for more information.        Assessment/Plan   Principal Problem:    SAH (subarachnoid hemorrhage) (CMS/HCC)      h/o HTN, HLD, COPD, 3/1 s/p MIS L4-5 TLIF, p/w AMS, 3/5 CTH trace R sylvian SAH    PLAN  NSU  SBP<140  CTA head and neck  Home meds  CAITYS           Darren Ely MD

## 2024-03-06 NOTE — PROGRESS NOTES
Subjective   78 y.o. female with PMH HTN, HLD, COPD. 3/1 s/p MIS L4-L5 TLIF. Admitted 3/5/2024 with SAH (subarachnoid hemorrhage) (CMS/HCC) after presenting with AMS, CTH trace R sylvian SAH. Patient admitted to NSU for further neurosurgical management.      Objective   Vitals 24 hour ranges:  Heart Rate:  [114]   Resp:  [23]   BP: (130)/(52)   SpO2:  [98 %]      Physical Exam:  NEURO:  Drowsy, eyes open to light sternal rub. AOx2-3  Grossly tracks  +FC x4 with persistence, AG   Posterior spinal surgical site c/d/i with sutures  CV:  RRR on telemetry, NSR  RESP:  Regular, unlabored  On RA  :  Incontinent   GI:  Abdomen NT/ND, soft  SKIN:  Intact    Medications  Scheduled: PRN: Continuous:   dexAMETHasone, 2 mg, intravenous, q8h HORTENSIA  [START ON 3/7/2024] heparin (porcine), 5,000 Units, subcutaneous, q8h  levETIRAcetam, 500 mg, intravenous, q12h  niMODipine, 60 mg, oral, q4h   Or  niMODipine, 60 mg, oral, q4h  sennosides-docusate sodium, 2 tablet, oral, BID     PRN medications: hydrALAZINE, labetaloL, niCARdipine niCARdipine, 1-15 mg/hr         Lab Results       Results from last 72 hours   Lab Units 03/06/24  0014   WBC AUTO x10*3/uL 16.6*   NRBC AUTO /100 WBCs 0.1*   RBC AUTO x10*6/uL 4.17   HEMOGLOBIN g/dL 11.3*   HEMATOCRIT % 35.6*   MCV fL 85   MCH pg 27.1   MCHC g/dL 31.7*   RDW % 14.7*   PLATELETS AUTO x10*3/uL 319      Results from last 72 hours   Lab Units 03/06/24  0014   WBC AUTO x10*3/uL 16.6*   NRBC AUTO /100 WBCs 0.1*   RBC AUTO x10*6/uL 4.17   HEMOGLOBIN g/dL 11.3*   HEMATOCRIT % 35.6*   MCV fL 85   MCH pg 27.1   MCHC g/dL 31.7*   RDW % 14.7*   PLATELETS AUTO x10*3/uL 319      Results from last 72 hours   Lab Units 03/06/24  0014   PROTIME seconds 14.4*   INR  1.3*   APTT seconds 29        Imaging Results  CT angio head w and wo IV contrast    Result Date: 3/5/2024  EXAMINATION: CTA OF THE HEAD WITHOUT AND WITH CONTRAST 3/5/2024 10:01 am TECHNIQUE: CTA of the head/brain was performed without and with  the administration of intravenous contrast. Multiplanar reformatted images are provided for review. MIP images are provided for review. Automated exposure control, iterative reconstruction, and/or weight based adjustment of the mA/kV was utilized to reduce the radiation dose to as low as reasonably achievable. COMPARISON: None HISTORY: ORDERING SYSTEM PROVIDED HISTORY: r/o CVA TECHNOLOGIST PROVIDED HISTORY: Reason for exam:->r/o CVA Additional Contrast?->Radiologist Recommendation What reading provider will be dictating this exam?->CRC FINDINGS: CT HEAD: BRAIN/VENTRICLES: There is hyperdense material in the arm region of the right operculum and the right insula consistent with acute subdural hemorrhage.  There is no significant mass effect. Grey-white differentiation is maintained.  No evidence of mass, mass effect or midline shift. No evidence of hydrocephalus. ORBITS: The visualized portion of the orbits demonstrate no acute abnormality. SINUSES:  The visualized paranasal sinuses and mastoid air cells demonstrate no acute abnormality. SOFT TISSUES/SKULL: No acute abnormality of the visualized skull or soft tissues. CTA HEAD: ANTERIOR CIRCULATION: No significant stenosis of the intracranial internal carotid, anterior cerebral, or middle cerebral arteries. No aneurysm. POSTERIOR CIRCULATION: No significant stenosis of the vertebral, basilar, or posterior cerebral arteries. No aneurysm. OTHER: No dural venous sinus thrombosis on this non-dedicated study.    1. There is hyperdense material in the right insula and operculum consistent with a small amount of acute subdural hemorrhage. 2. Unremarkable CTA of the head.    CT lumbar spine wo IV contrast    Result Date: 3/2/2024  EXAMINATION: CT OF THE LUMBAR SPINE WITHOUT CONTRAST  3/2/2024 TECHNIQUE: CT of the lumbar spine was performed without the administration of intravenous contrast. Multiplanar reformatted images are provided for review. Adjustment of mA and/or kV  according to patient size was utilized.  Automated exposure control, iterative reconstruction, and/or weight based adjustment of the mA/kV was utilized to reduce the radiation dose to as low as reasonably achievable. COMPARISON: None HISTORY: ORDERING SYSTEM PROVIDED HISTORY: pain TECHNOLOGIST PROVIDED HISTORY: Reason for exam:->pain What reading provider will be dictating this exam?->CRC FINDINGS: BONES/ALIGNMENT: There findings of recent posterior metallic fusion at L4-5. Interbody disc spacer/fusion device at L4-5 appears to be in appropriate position.  There is evidence of a partial facetectomy on the left at L4.  No acute compression fractures detected.  No osseous narrowing of the canal. Significant streak artifact is caused by the hardware. DEGENERATIVE CHANGES: Minimal facet arthropathy at L3-4 bilaterally and L5-S1 on the right. SOFT TISSUES/RETROPERITONEUM: No paraspinal mass is seen.  No abnormal paraspinous or posterior soft tissue fluid collections.    1. Findings of recent posterior metallic fusion at L4-5.  No CT evidence of acute complication. 2. No acute compression fractures detected. 3. No osseous narrowing of the canal. 4. Minimal degenerative changes.    FLUORO FOR SURGICAL PROCEDURES    Result Date: 3/1/2024  EXAMINATION: SPOT FLUOROSCOPIC IMAGES 3/1/2024 6:43 am TECHNIQUE: Fluoroscopy was provided by the radiology department for procedure. Radiologist was not present during examination. FLUOROSCOPY DOSE AND TYPE: Radiation Exposure Index: Fluoroscopy time equals 1.7 minutes.  Total dose equals 112.98 mGy, COMPARISON: None HISTORY: ORDERING SYSTEM PROVIDED HISTORY: pain TECHNOLOGIST PROVIDED HISTORY: Reason for exam:->pain What reading provider will be dictating this exam?->CRC Intraprocedural imaging. FINDINGS: 3 adopt spot images were obtained.    Intraprocedural fluoroscopic spot images as above.  See separate procedure report for more information.     Assessment/Plan   78 y.o. female with  PMH HTN, HLD, COPD. 3/1 s/p MIS L4-L5 TLIF. Admitted 3/5/2024 with SAH (subarachnoid hemorrhage) (CMS/HCC) after presenting with AMS, CTH trace R sylvian SAH. Patient admitted to NSU for further neurosurgical management.     NEURO:  #AMS, trace R sylvian SAH   #s/p MIS L4-L5 TLIF (3/1/24)  Assessment:  Neurologically:   Drowsy, eyes open to light sternal rub. AOx2-3  Grossly tracks  +FC x4 with persistence, AG   Posterior spinal surgical site c/d/i with sutures  Plan:  NSU  Q1H neuro checks  Dex 2q8  Keppra 500mg BID  Nimodipine 60q4  Pain: acetaminophen, oxycodone, hydromorphone PRN  Nausea: ondansetron PRN  PT/OT/SLP  rCTH for stability   CTA H/N w/wo    CARDIOVASCULAR:  #HTN, HLD  Assessment:  SR on tele   Plan:  Continue to monitor on telemetry  Goal SBP < 140 - Nicardipine, Hydralazine and Labetalol PRN  CROW in AM     RESPIRATORY:  #COPD  Assessment:  On RA  Plan:  Continuous pulse oximetry   O2 PRN to maintain SpO2 > 94%, wean as tolerated  Incentive spirometry while awake     RENAL/:  Assessment:  BUN/Cr at OSH: 9/0.60  Plan:  Monitor with daily RFP  Bladder scan and straight cath per ICU protocol     FEN/GI:  #Hyponatremia   Assessment:  Last BM: PTA   Na at OSH: 131  Plan:  Monitor and replace electrolytes per protocol  Diet: NPO  Bowel Regimen: Docusate-Senna    ENDOCRINE:  Assessment:  BG: <150  Plan:  Add Accuchecks & ISS Q6H if BG>150  Hypoglycemia protocol     HEMATOLOGY:  Assessment:  H/H: 12.4/38.2  PLT: 363  Plan:  Continue to monitor with daily CBC and Coag panel    INFECTIOUS DISEASE:  #Leukocytosis, likely reactive   Assessment:  WBC: 17.0  Afebrile   Plan:  Continue to monitor for s/sx of infection  Pan culture for temperature > 38.4 C    MUSCULOSKELETAL:  No acute issues    SKIN:  No acute issues  Turns and skin care per NSU protocol    ACCESS:  PIVs     PROPHYLAXIS:  DVT/VTE: SCDs, chem ppx on hold 2/2 SAH  GI: not indicated     RESTRAINTS:  Not indicated/Patient does not meet criteria for  flora León, APRN-CNP  Neuroscience Intensive Care       Total critical care time of 60 minutes, with > 50% of time spent in direct contact with patient/family for education, counseling and coordination of care.

## 2024-03-06 NOTE — SIGNIFICANT EVENT
"Preliminary EEG Report of baseline (first 25 minutes of EEG)    This vEEG is indicative of mild diffuse encephalopathy and evidence of a right hemispheric structural lesion. No seizures are  seen.      This EEG was read up until 1740 on 03/06/24.  This is only a preliminary read of the first 25 minutes (baseline) of the EEG.   Please see the full report in the EEG database and \"Media\" tab tomorrow.    When ready, to discontinue the vEEG, please place an order to \"discontinue continuous video EEG.\"     Sigrid Patel, DO  Epilepsy Fellow    "

## 2024-03-06 NOTE — PROGRESS NOTES
Shift Summary [id number] 1900: Pt lying in bed awake, eyes open, unable to follow commands, non-verbal, positive non-purposeful movement, responsive to painful stimuli. Sinus Tachycardia on cardiac monitor, NS IV infusing. Skin assessed with Denice RN, head-to-toe assessment completed. PO meds held due to pt's NPO status/ mental status.  remains at bedside.     1935: Pt accepted at , Dr. Leslie Rose will be accepting physician. Demographics faxed to , awaiting bed assignment.     1940: Dr. Goetz contacted, updated on pt status, informed that pt was accepted at  and will be transported to  Neuro ICU.     1950: Pt assigned to  Neuro ICU [place] [facility][id number]    1955: Nursing supervisor contacted. Supervisor advises to utilize ambulance company that will transfer pt to  the soonest.    2000: Physician's ambulance contacted, states they will not have a squad available to transport pt downtown until tomorrow morning.     2010: [facility] contacted, gave 45min ETA for available squad.     2105: Face-to-face pt report given to [facility] Paramedic.     2110:  called, pt report given to Melba BALLARD at  Neuro ICU.

## 2024-03-06 NOTE — PROGRESS NOTES
Communication Note    Patient Name: Fannie Watson  MRN: [id number]  Today's Date: 3/6/2024     Discipline: Physical Therapy      Missed Visit: Yes  Missed Visit Reason:  (PT evaluation received and reviewed, possible CSF leak pending MRI and further stability.  Will monitor and follow up as appropriate.)      03/06/24 at 8:56 AM   Tigre Gee, PT   Rehab Office: [phone]

## 2024-03-06 NOTE — PROGRESS NOTES
Speech-Language Pathology    Inpatient SLP Swallow Treatment     Patient Name: Fannie Watson  MRN: [id number]  Today's Date: 3/6/2024  Time Calculation  Start Time: 1510  Stop Time: 1527  Time Calculation (min): 17 min         Recommendations:   Puree Solids & Thin Liquids  Only when mentation appropriate (alert, following commands, talking)  1:1 Direct Feed Assist  Slow feed rate  Meds crushed in puree      SLP Assessment:   Swallow re-eval requested per team as pt reportedly more alert/participative. During session pt with improved participation, talking and following commands (still delayed). Noted oral deficits including significantly slow/prolonged bolus formation and lingual propulsion however no s/s aspiration observed. Recommend cautiously initiating diet of puree and thins when mentation appropriate (pt alert, following commands, and talking). Meds crushed in puree. Plan to continue to target readiness for advancement as clinical/mental status continues to improve.        Plan:  SLP Frequency: 1x per week  SLP Discharge Recommendations:  (Unclear, defer to later notes)  Discussed POC: Patient, Nursing  Discussed Risks/Benefits: Yes  Patient/Caregiver Agreeable: Yes  SLP - OK to Discharge: Yes    Goals:   Pt will tolerate least restrictive diet without s/s aspiration, respiratory compromise, or overt difficulty throughout admission.  Start: 03/06/24, End: 03/20/24     Subjective   Current Presentation: Pt upright, on room air. Alert and more participative earlier this date including more consistent basic command following and delayed verbal responses to questions. Oriented x2. Family at bedside. Pt became tearful throughout exam, asking to go home.     Objective       Therapeutic Swallow Therapy:   Pt consumed straw sips of water without anterior spillage and no s/s aspiration. Proceed to 3oz water assessment via straw sips which pt successfully completed without s/s aspiration, no change in vitals, and  maintaining clear vocal quality. Trials then presented of puree and minced-moist solids. Pt with significantly prolonged bolus manipulation/formation and oral phase with puree and minced-moist solid trials. Little-no oral residues observed given extra time. No s/s aspiration observed across all trials.         Inpatient Education:  Pt and family educated on result and recommendations. They indicated understanding

## 2024-03-06 NOTE — SIGNIFICANT EVENT
Attending Attestation:     Neuro - prior TLIF at OSH p/w right sylvian fissure blood (sx: agitation)   - MRI pending   Cardiac - SBP < 140   Respiratory - RA  Renal - no acute issues   GI - SLP NPO   Infectious - elevated WBC     Code status - full code      Gavin Covington MD    of Neurosurgery   [facility] Neuroscience ICU   Office: [phone]  Fax: [phone]

## 2024-03-06 NOTE — HOSPITAL COURSE
Fannie Watson is a 78 year old F with PMH significant for HTN, HLD, COPD, spondylolisthesis of lumbar lesion s/p L4-L5 decompression laminectomy and fusion 03/01/2024 who presented to [facility] to Neuro ICU on 3/5/2024 for subarachnoid hemorrhage management.      Patient initially presented to [facility] (OSH) with altered mental status, for which a CT was done and showed a trace R sylvian subarachnoid hemorrhage and transferred to [facility] Neuro ICU for further management. While in NSU at [facility], patient went into paroxysmal A fib with RVR. TTE done on 03/07 showed an anterior mobile mass on mitral valve leaflet with moderate mitral valve regurgitation. Follow up CROW showed a 2.5 cm mobile vegetation on the mitral valve with moderate mitral regurgitation. Blood cultures drawn on 03/07 were positive for E. Faecium, for which infectious disease and cardiac surgery was consulted.      She had an MRI of her spine on 03/14 that showed an enlarging fluid collection that was drained by IR due to concern for abscess as source of infection vs possible discitis/osteomyelitis in the spine post-operatively.     Of note, per chart review patient has had multiple presentations since December 2023 for various symptoms including chest pain, chest tightness, shortness of breath and worsening back pain. She also had some dysphagia and decreased appetite for upwards of a month, as well as a cough that . 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 She had a L4-L5 TLIF on 03/01 at OSH.    3/22/2024 OPERATION/PROCEDURE: Dr. Deangelo Chaudhari   1. Minimally invasive mitral valve replacement (31mm Epic bioprosthesis)  2. Exploration of the right femoral vein and artery for cannulation for cardiopulmonary bypass.     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   - Infectious Disease Following, appreciate recs:   ---Continue ampicillin 2 g IV  q 4  ---Continue ceftriaxone 2 g IV q 12  ---Both ABXs for 6 weeks from date of surgery, EOT 5/03/2024  ---Weekly CBC with diff and CMP as outpatient while on IV antibiotics and fax reports to Dr Cruz  ---ID will arrange for follow up as an outpatient   - 3/25: Right Upper Arm single lumen PICC placed   - monitor for s/s infection: currently afebrile without any s/s of infection present   ==========================  - Acute postop pulmonary insufficiency/Hx of COPD (no home O2): discharge to SNF with home spiriva and breo ellipta; on RA at discharge  --discharging patient on Nystatin for mild s/s of thrush d/t breathing treatments    - Hx of HTN; discharging patient to SNF with lisinopril 5mg and metoprolol tartrate 50mg BID   - Chronic back/neck pain/arthritis s/p TLIF of L4-L5 on 2/26/2024: OARRS reviewed; discharged to SNF with printed tramadol Rx   --continued home plaquenil, lyrica, and zanfkex   --NO NSAIDs for 3 months s/p cardiac surgery   ============================  - postop ileus resolved at discharge   - 2v CXR completed on 3/25/2024.  - Postop echo completed 3/27 and showed an EF 60-65%, MVR in place with 18mmHg peak gradient and 7.4mmHg mean gradient with mod elevated RV systolic pressure 48 mmHg   - Cardiac rehab referral was placed  - PT recs low intensity therapy  - Anticipate discharge to skilled nursing facility for IV antibiotic infusion     On day of discharge, vital signs were stable and no acute distress was  noted. All questions were answered. After VS and labs were reviewed it was determined the patient was stable for discharge.     Discharged to SNF on 24  ===========================  Hospital day of discharge management- spent >30 minutes coordinating the discharge and counseling/educating patient and family regarding discharge instructions.     Past Medical History:  - COPD (chronic obstructive pulmonary disease) (CMS/[facility])    - GERD (gastroesophageal reflux disease)    - Hypertension   - HLD   - Arthritis       Past Surgical History:  - CARDIAC CATHETERIZATION: Left Heart Cath;  Surgeon: David Hernandes MD;  Location: [place] [address] Cardiac Cath Lab (3/14/2024)  - L4 and L5 decompression laminectomy and fusion (3/01/2024)    Prescriptions Prior to Admission  - bisoprilil (zebeta) 10mg daily   - famotidine (pepcid) 40mg daily   - hydroxychloroquine (plaquenil) 200mg daily   - prednisone (deltasone) 5mg daily   - pregabalin (yrica) 75mg daily   - spironolactone (aldactone) 25mg daily   - tizanidine (zanaflex) 4mg PRN TID for back pain  - lisinopril 20mg daily   -albuterol 90mcg/actuation inhaler: 1 puf every 6 hours as needed for wheezing/SOB   - ipratropium (Atrovent) 17mcg/actuation inhaler: 2 puffs BID   - Trelegy Ellipta 100-62.5-25mcg blister with device: 1 puf daily     Allergy: Coconut, Codeine, and Penicillins    Social History  Tobacco Use  · Smoking status: Former      Types: Cigarettes      Quit date:       Years since quittin.2  Substance Use Topics  · Alcohol use: Never  · Drug use: Never     Family History  No family history on file.    Postop Imaging:   Transthoracic Echo (TTE) Complete 3/27/2024  PHYSICIAN INTERPRETATION:  Left Ventricle: The left ventricular systolic function is normal, with an estimated ejection fraction of 60-65%. The patient is in atrial fibrillation which may influence the estimate of left ventricular function and transvalvular flows. There are no regional wall  motion abnormalities. The left ventricular cavity size is normal. Abnormal (paradoxical) septal motion consistent with post-operative status. Left ventricular diastolic filling was indeterminate.  Left Atrium: The left atrium is normal in size.  Right Ventricle: The right ventricle is normal in size. There is normal right ventricular global systolic function.  Right Atrium: The right atrium is normal in size.  Aortic Valve: The aortic valve is trileaflet. There is mild aortic valve cusp calcification. There is evidence of mildly elevated transaortic gradients consistent with sclerosis of the aortic valve.  The peak aortic velocity was obtained from the apical view. There is no evidence of aortic valve regurgitation. The peak instantaneous gradient of the aortic valve is 20.6 mmHg. The mean gradient of the aortic valve is 9.8 mmHg.  Mitral Valve: There is a prosthetic mitral valve present. There is no evidence of mitral valve regurgitation. There is a 31 mm Epic bioprosthetic MVR (3/22/24). The peak gradient is 18 mmHg and mean gradient is 7.4 mmHg at a HR of 83 bpm.  Tricuspid Valve: The tricuspid valve is structurally normal. There is mild tricuspid regurgitation. The Doppler estimated RVSP is moderately elevated at 48.2 mmHg.  Pulmonic Valve: The pulmonic valve is structurally normal. There is mild pulmonic valve regurgitation.  Pericardium: There is no pericardial effusion noted.  Aorta: The aortic root is normal. The aortic root appears normal in size and measures 2.90 cm. There is no dilatation of the ascending aorta.  Systemic Veins: The inferior vena cava appears to be of normal size. There is IVC inspiratory collapse greater than 50%.  In comparison to the previous echocardiogram(s): Compared with study from 3/22/2024, the prior study was an intraoperative CROW and the patient is now s/p MVR.     CONCLUSIONS:   1. Left ventricular systolic function is normal with a 60-65% estimated ejection fraction.   2.  Abnormal septal motion consistent with post-operative status.   3. The patient is in atrial fibrillation which may influence the estimate of left ventricular function and transvalvular flows.   4. There is a 31 mm Epic bioprosthetic MVR (3/22/24). The peak gradient is 18 mmHg and mean gradient is 7.4 mmHg at a HR of 83 bpm.   5. Moderately elevated right ventricular systolic pressure.   6. Aortic valve sclerosis.   7. Normal aortic root.    [id number] Wesly Rod MD  Electronically signed on 3/27/2024 at 7:14:23 PM  ** Final **    XR chest 2 Views 3/25/2024  CARDIOMEDIASTINAL SILHOUETTE:  Cardiomediastinal silhouette is stable in size and configuration.   LUNGS:  Bibasilar consolidations with minimal interval clearing. There are  diffuse increased interstitial markings. Blunting of the left  costophrenic angle  ABDOMEN:  No remarkable upper abdominal findings.   BONES:  No acute osseous changes.      IMPRESSION:  1. Mild interstitial edema. Minimal interval clearing bibasilar  consolidation/atelectasis. Small left pleural effusion    Signed by: Sarah Tse 3/25/2024 9:42 AM  Dictation workstation:   [id number]

## 2024-03-06 NOTE — CARE PLAN
The patient's goals for the shift include ELDA    The clinical goals for the shift include ELDA    Over the shift, the patient did not make progress toward the following goals. Barriers to progression include patient neurological status. Recommendations to address these barriers include continue plan of care.     No

## 2024-03-06 NOTE — PROGRESS NOTES
"Fannie Watson is a 78 y.o. female on day 1 of admission presenting with SAH (subarachnoid hemorrhage) (CMS/HCC).    Subjective     No acute events overnight.       Objective     Physical Exam    EONS (sustained)  Ox3  FCx4    Last Recorded Vitals  Blood pressure 127/58, pulse 87, temperature 37 °C (98.6 °F), temperature source Temporal, resp. rate 21, height 1.57 m (5' 1.81\"), weight 69.6 kg (153 lb 7 oz), SpO2 98 %.  Intake/Output last 3 Shifts:  I/O last 3 completed shifts:  In: 1417.5 (20.4 mL/kg) [I.V.:317.5 (4.6 mL/kg); IV Piggyback:1100]  Out: 1900 (27.3 mL/kg) [Urine:1900 (0.8 mL/kg/hr)]  Weight: 69.6 kg     Relevant Results              Assessment/Plan   Principal Problem:    SAH (subarachnoid hemorrhage) (CMS/HCC)    Patient is a 78 year old female with h/o HTN, HLD, COPD, 3/1 s/p MIS L4-5 TLIF, p/w AMS, 3/5 CTH trace R sylvian SAH, 3/6 CTA negative     Plan:    NSU  MRI Lspine to rule out CSF leak  PTOT  SCDS           Wilfrido Dukes MD      "

## 2024-03-06 NOTE — CARE PLAN
Interprofessional Rounds    Summary:  SAH, non-aneurysmal.  Failed swallow.  Plan for MRI.  Sleepy, oriented x2.  Family at bedside.    Participants: Advance Practice Provider, Ethicist, Physician, RN, and     Care Plan Reviewed with:  Interdisciplinary Team

## 2024-03-06 NOTE — PROGRESS NOTES
Communication Note    Patient Name: Fannie Watson  MRN: [id number]  Today's Date: 3/6/2024     Discipline: Occupational Therapy      Missed Visit: Yes  Missed Visit Reason:  (concern for CSF leak, planning on MRI. Will defer OT at this time.)      03/06/24 at 8:42 AM   Gina Alvarado OT   Rehab Office: [phone]

## 2024-03-06 NOTE — PROGRESS NOTES
Physical Therapy  Facility/Department: [facility] MED SURG IC03/IC03-01  Physical Therapy Discharge      NAME: Lauren Elizondo    : 1945 (78 y.o.)  MRN: [id number]    Account: [id number]  Gender: female      Patient has been discharged from acute care hospital. DC patient from current PT program.      Electronically signed by Yolie Youngblood PT on 3/6/24 at 8:29 AM EST

## 2024-03-06 NOTE — PROGRESS NOTES
Speech-Language Pathology        SLP Adult Inpatient Speech-Language Pathology Clinical Swallow Evaluation    Patient Name: Fannie Watson  MRN: [id number]  Today's Date: 3/6/2024   Time Calculation  Start Time: 0940  Stop Time: 1000  Time Calculation (min): 20 min       Recommendations:   NPO with alternative means nutrition/hydration  Frequent oral care  May allow ice chips x3-5/hour with nursing  Repeat eval as mentation/participation improves    Assessment:   Clinical swallow evaluation limited by AMS resulting in inability to follow instructions for tasks to rule-out aspiration. Pt did not demonstrate any s/s aspiration with ice chips and sips of water, however given slow/delayed responses to commands and limited participation, pt at increased risk for aspiration and choking; would also suspect poor level of intake given AMS, thus may consider alternative means nutrition/hydration at this time.     Plan:  SLP Plan: Skilled SLP  SLP Frequency: 1x per week  SLP Discharge Recommendations:  (Unclear, defer to later notes)  Discussed POC: Patient, Nursing  Discussed Risks/Benefits: Yes  Patient/Caregiver Agreeable: Yes  SLP - OK to Discharge: Yes      Goals:   Pt will tolerate least restrictive diet without s/s aspiration, respiratory compromise, or overt difficulty throughout admission.  Start: 03/06/24, End: 03/20/24      Subjective     History and Physical:    Patient is a 78 year old female with h/o HTN, HLD, COPD, 3/1 s/p MIS L4-5 TLIF, p/w AMS, 3/5 CTH trace R sylvian SAH, 3/6 CTA negative. Plan for MRI Lspine to rule out CSF leak.     Relevant SLP Hx:  None on file     Baseline Assessment:  Respiratory Status: Room air  History of Intubation: No  Behavior/Cognition: Alert (Minimal interaction, requires max cues for commands, significantly delayed responses via head nod/shake, no verbalizations)  Patient Positioning: Upright in Bed    Family reports independent at baseline with ADLs and eating/drinking, on  regular diet at home.     Objective     Oral/Motor Assessment:  Oral Hygiene: WFL  Dentition: Adequate/Natural  Oral Motor:  (Mildly reduced mandibular, labial, and lingual movement)  Vocal Quality:  (Unable to assess given absence of speech)  Intelligibility: Unable to assess      Clinical Observations:  Consistencies Trialed: Yes  Consistencies Trialed: Ice Chips, Thin (IDDSI Level 0) - Straw  3oz Water Protocol Utilized: Attempted x3    Given cues and extra time pt consumed x2 trials of ice chips, x3 tsp water, and multiple straw sips of water. Pt did not demonstrate any coughing/SOB however she had delayed responses to presentation of PO and could not follow commands to complete 3oz water test to rule-out aspiration.     Inpatient Education:  Pt educated on result and recommendations. Pt did not indicate understanding, but family did.

## 2024-03-06 NOTE — PROGRESS NOTES
D/ w rn and hospitalist and neurologist. Mental status change confusion had ct head right sylvian fissure subarachnoid hemorrhage. I reviewed the images. No aneurysm noted. Was transferred to icu. SAH possible vasospasm for transfer for higher level of care to tertiary care center. Moving all extremities, not speaking. S/p tlif pod4. Was ambulating yesterday.

## 2024-03-07 ENCOUNTER — APPOINTMENT (OUTPATIENT)
Dept: CARDIOLOGY | Facility: HOSPITAL | Age: 79
DRG: 981 | End: 2024-03-07
Payer: MEDICARE

## 2024-03-07 LAB
ALBUMIN SERPL BCP-MCNC: 3 G/DL (ref 3.4–5)
ANION GAP SERPL CALC-SCNC: 17 MMOL/L (ref 10–20)
AORTIC VALVE PEAK VELOCITY: 1.68 M/S
APTT PPP: 27 SECONDS (ref 27–38)
AV PEAK GRADIENT: 11.3 MMHG
AVA (PEAK VEL): 2.54 CM2
BUN SERPL-MCNC: 14 MG/DL (ref 6–23)
CA-I BLD-SCNC: 1.1 MMOL/L (ref 1.1–1.33)
CALCIUM SERPL-MCNC: 8.6 MG/DL (ref 8.6–10.6)
CARDIAC TROPONIN I PNL SERPL HS: 9 NG/L (ref 0–34)
CHLORIDE SERPL-SCNC: 101 MMOL/L (ref 98–107)
CO2 SERPL-SCNC: 18 MMOL/L (ref 21–32)
CREAT SERPL-MCNC: 0.46 MG/DL (ref 0.5–1.05)
EGFRCR SERPLBLD CKD-EPI 2021: >90 ML/MIN/1.73M*2
EJECTION FRACTION APICAL 4 CHAMBER: 74.3
EJECTION FRACTION: 74 %
ERYTHROCYTE [DISTWIDTH] IN BLOOD BY AUTOMATED COUNT: 14.8 % (ref 11.5–14.5)
GLUCOSE SERPL-MCNC: 89 MG/DL (ref 74–99)
HCT VFR BLD AUTO: 34.6 % (ref 36–46)
HGB BLD-MCNC: 10.8 G/DL (ref 12–16)
INR PPP: 1.3 (ref 0.9–1.1)
LEFT VENTRICULAR OUTFLOW TRACT DIAMETER: 2 CM
MAGNESIUM SERPL-MCNC: 2.09 MG/DL (ref 1.6–2.4)
MCH RBC QN AUTO: 27.3 PG (ref 26–34)
MCHC RBC AUTO-ENTMCNC: 31.2 G/DL (ref 32–36)
MCV RBC AUTO: 87 FL (ref 80–100)
MITRAL VALVE E/A RATIO: 0.69
NRBC BLD-RTO: 0 /100 WBCS (ref 0–0)
PHOSPHATE SERPL-MCNC: 2.8 MG/DL (ref 2.5–4.9)
PLATELET # BLD AUTO: 319 X10*3/UL (ref 150–450)
POTASSIUM SERPL-SCNC: 5.2 MMOL/L (ref 3.5–5.3)
PROTHROMBIN TIME: 14.2 SECONDS (ref 9.8–12.8)
RBC # BLD AUTO: 3.96 X10*6/UL (ref 4–5.2)
RIGHT VENTRICLE FREE WALL PEAK S': 15.1 CM/S
RIGHT VENTRICLE PEAK SYSTOLIC PRESSURE: 31.5 MMHG
SODIUM SERPL-SCNC: 131 MMOL/L (ref 136–145)
TRICUSPID ANNULAR PLANE SYSTOLIC EXCURSION: 3 CM
WBC # BLD AUTO: 13.3 X10*3/UL (ref 4.4–11.3)

## 2024-03-07 PROCEDURE — 95720 EEG PHY/QHP EA INCR W/VEEG: CPT | Performed by: STUDENT IN AN ORGANIZED HEALTH CARE EDUCATION/TRAINING PROGRAM

## 2024-03-07 PROCEDURE — 85027 COMPLETE CBC AUTOMATED: CPT | Performed by: NURSE PRACTITIONER

## 2024-03-07 PROCEDURE — 93005 ELECTROCARDIOGRAM TRACING: CPT

## 2024-03-07 PROCEDURE — 2500000001 HC RX 250 WO HCPCS SELF ADMINISTERED DRUGS (ALT 637 FOR MEDICARE OP)

## 2024-03-07 PROCEDURE — 97166 OT EVAL MOD COMPLEX 45 MIN: CPT | Mod: GO

## 2024-03-07 PROCEDURE — 2060000001 HC INTERMEDIATE ICU ROOM DAILY

## 2024-03-07 PROCEDURE — 97530 THERAPEUTIC ACTIVITIES: CPT | Mod: GO

## 2024-03-07 PROCEDURE — 51701 INSERT BLADDER CATHETER: CPT

## 2024-03-07 PROCEDURE — 2500000005 HC RX 250 GENERAL PHARMACY W/O HCPCS: Performed by: REGISTERED NURSE

## 2024-03-07 PROCEDURE — 80069 RENAL FUNCTION PANEL: CPT | Performed by: NURSE PRACTITIONER

## 2024-03-07 PROCEDURE — 93010 ELECTROCARDIOGRAM REPORT: CPT | Performed by: INTERNAL MEDICINE

## 2024-03-07 PROCEDURE — 97116 GAIT TRAINING THERAPY: CPT | Mod: GP | Performed by: STUDENT IN AN ORGANIZED HEALTH CARE EDUCATION/TRAINING PROGRAM

## 2024-03-07 PROCEDURE — 2500000004 HC RX 250 GENERAL PHARMACY W/ HCPCS (ALT 636 FOR OP/ED): Performed by: STUDENT IN AN ORGANIZED HEALTH CARE EDUCATION/TRAINING PROGRAM

## 2024-03-07 PROCEDURE — 83735 ASSAY OF MAGNESIUM: CPT | Performed by: NURSE PRACTITIONER

## 2024-03-07 PROCEDURE — 36415 COLL VENOUS BLD VENIPUNCTURE: CPT

## 2024-03-07 PROCEDURE — 87040 BLOOD CULTURE FOR BACTERIA: CPT

## 2024-03-07 PROCEDURE — 85610 PROTHROMBIN TIME: CPT | Performed by: NURSE PRACTITIONER

## 2024-03-07 PROCEDURE — 97161 PT EVAL LOW COMPLEX 20 MIN: CPT | Mod: GP | Performed by: STUDENT IN AN ORGANIZED HEALTH CARE EDUCATION/TRAINING PROGRAM

## 2024-03-07 PROCEDURE — 97535 SELF CARE MNGMENT TRAINING: CPT | Mod: GO

## 2024-03-07 PROCEDURE — 92526 ORAL FUNCTION THERAPY: CPT | Mod: GN

## 2024-03-07 PROCEDURE — 2500000001 HC RX 250 WO HCPCS SELF ADMINISTERED DRUGS (ALT 637 FOR MEDICARE OP): Performed by: STUDENT IN AN ORGANIZED HEALTH CARE EDUCATION/TRAINING PROGRAM

## 2024-03-07 PROCEDURE — 84484 ASSAY OF TROPONIN QUANT: CPT | Performed by: REGISTERED NURSE

## 2024-03-07 PROCEDURE — 93306 TTE W/DOPPLER COMPLETE: CPT | Performed by: INTERNAL MEDICINE

## 2024-03-07 PROCEDURE — 95716 VEEG EA 12-26HR CONT MNTR: CPT

## 2024-03-07 PROCEDURE — 82330 ASSAY OF CALCIUM: CPT | Performed by: NURSE PRACTITIONER

## 2024-03-07 PROCEDURE — 2020000001 HC ICU ROOM DAILY

## 2024-03-07 PROCEDURE — 2500000004 HC RX 250 GENERAL PHARMACY W/ HCPCS (ALT 636 FOR OP/ED): Performed by: REGISTERED NURSE

## 2024-03-07 PROCEDURE — 93306 TTE W/DOPPLER COMPLETE: CPT

## 2024-03-07 PROCEDURE — 2500000001 HC RX 250 WO HCPCS SELF ADMINISTERED DRUGS (ALT 637 FOR MEDICARE OP): Performed by: NURSE PRACTITIONER

## 2024-03-07 PROCEDURE — 36415 COLL VENOUS BLD VENIPUNCTURE: CPT | Performed by: NURSE PRACTITIONER

## 2024-03-07 PROCEDURE — 99223 1ST HOSP IP/OBS HIGH 75: CPT | Performed by: STUDENT IN AN ORGANIZED HEALTH CARE EDUCATION/TRAINING PROGRAM

## 2024-03-07 PROCEDURE — 2500000004 HC RX 250 GENERAL PHARMACY W/ HCPCS (ALT 636 FOR OP/ED): Mod: JZ

## 2024-03-07 PROCEDURE — 95700 EEG CONT REC W/VID EEG TECH: CPT

## 2024-03-07 RX ORDER — SODIUM CHLORIDE 9 MG/ML
100 INJECTION, SOLUTION INTRAVENOUS CONTINUOUS
Status: DISCONTINUED | OUTPATIENT
Start: 2024-03-08 | End: 2024-03-09

## 2024-03-07 RX ORDER — METOPROLOL TARTRATE 1 MG/ML
5 INJECTION, SOLUTION INTRAVENOUS ONCE
Status: COMPLETED | OUTPATIENT
Start: 2024-03-07 | End: 2024-03-07

## 2024-03-07 RX ORDER — VANCOMYCIN HYDROCHLORIDE 1 G/20ML
INJECTION, POWDER, LYOPHILIZED, FOR SOLUTION INTRAVENOUS DAILY PRN
Status: DISCONTINUED | OUTPATIENT
Start: 2024-03-07 | End: 2024-03-08

## 2024-03-07 RX ORDER — METOPROLOL TARTRATE 25 MG/1
12.5 TABLET, FILM COATED ORAL 2 TIMES DAILY
Status: DISCONTINUED | OUTPATIENT
Start: 2024-03-07 | End: 2024-03-22

## 2024-03-07 RX ORDER — CEFEPIME 1 G/50ML
2 INJECTION, SOLUTION INTRAVENOUS EVERY 8 HOURS
Status: DISCONTINUED | OUTPATIENT
Start: 2024-03-07 | End: 2024-03-08

## 2024-03-07 RX ADMIN — SENNOSIDES AND DOCUSATE SODIUM 2 TABLET: 8.6; 5 TABLET ORAL at 09:06

## 2024-03-07 RX ADMIN — SODIUM CHLORIDE 100 ML/HR: 9 INJECTION, SOLUTION INTRAVENOUS at 23:50

## 2024-03-07 RX ADMIN — CEFEPIME 2 G: 1 INJECTION, SOLUTION INTRAVENOUS at 20:00

## 2024-03-07 RX ADMIN — VANCOMYCIN HYDROCHLORIDE 1.25 G: 1.25 INJECTION, POWDER, LYOPHILIZED, FOR SOLUTION INTRAVENOUS at 23:50

## 2024-03-07 RX ADMIN — HEPARIN SODIUM 5000 UNITS: 5000 INJECTION INTRAVENOUS; SUBCUTANEOUS at 08:40

## 2024-03-07 RX ADMIN — METOPROLOL TARTRATE 12.5 MG: 25 TABLET, FILM COATED ORAL at 20:10

## 2024-03-07 RX ADMIN — HEPARIN SODIUM 5000 UNITS: 5000 INJECTION INTRAVENOUS; SUBCUTANEOUS at 01:15

## 2024-03-07 RX ADMIN — PERFLUTREN 10 ML OF DILUTION: 6.52 INJECTION, SUSPENSION INTRAVENOUS at 15:39

## 2024-03-07 RX ADMIN — HEPARIN SODIUM 5000 UNITS: 5000 INJECTION INTRAVENOUS; SUBCUTANEOUS at 15:39

## 2024-03-07 RX ADMIN — SODIUM CHLORIDE 500 ML: 9 INJECTION, SOLUTION INTRAVENOUS at 02:24

## 2024-03-07 RX ADMIN — HEPARIN SODIUM 5000 UNITS: 5000 INJECTION INTRAVENOUS; SUBCUTANEOUS at 23:50

## 2024-03-07 RX ADMIN — METOPROLOL TARTRATE 5 MG: 1 INJECTION, SOLUTION INTRAVENOUS at 03:08

## 2024-03-07 RX ADMIN — OXYCODONE HYDROCHLORIDE 5 MG: 5 TABLET ORAL at 20:10

## 2024-03-07 SDOH — ECONOMIC STABILITY: FOOD INSECURITY: WITHIN THE PAST 12 MONTHS, THE FOOD YOU BOUGHT JUST DIDN'T LAST AND YOU DIDN'T HAVE MONEY TO GET MORE.: NEVER TRUE

## 2024-03-07 SDOH — ECONOMIC STABILITY: HOUSING INSECURITY
IN THE LAST 12 MONTHS, WAS THERE A TIME WHEN YOU DID NOT HAVE A STEADY PLACE TO SLEEP OR SLEPT IN A SHELTER (INCLUDING NOW)?: NO

## 2024-03-07 SDOH — ECONOMIC STABILITY: TRANSPORTATION INSECURITY
IN THE PAST 12 MONTHS, HAS THE LACK OF TRANSPORTATION KEPT YOU FROM MEDICAL APPOINTMENTS OR FROM GETTING MEDICATIONS?: NO

## 2024-03-07 SDOH — ECONOMIC STABILITY: HOUSING INSECURITY: IN THE LAST 12 MONTHS, HOW MANY PLACES HAVE YOU LIVED?: 1

## 2024-03-07 SDOH — SOCIAL STABILITY: SOCIAL INSECURITY
WITHIN THE LAST YEAR, HAVE YOU BEEN KICKED, HIT, SLAPPED, OR OTHERWISE PHYSICALLY HURT BY YOUR PARTNER OR EX-PARTNER?: NO

## 2024-03-07 SDOH — SOCIAL STABILITY: SOCIAL INSECURITY
WITHIN THE LAST YEAR, HAVE TO BEEN RAPED OR FORCED TO HAVE ANY KIND OF SEXUAL ACTIVITY BY YOUR PARTNER OR EX-PARTNER?: NO

## 2024-03-07 SDOH — SOCIAL STABILITY: SOCIAL INSECURITY: WITHIN THE LAST YEAR, HAVE YOU BEEN HUMILIATED OR EMOTIONALLY ABUSED IN OTHER WAYS BY YOUR PARTNER OR EX-PARTNER?: NO

## 2024-03-07 SDOH — SOCIAL STABILITY: SOCIAL INSECURITY: WITHIN THE LAST YEAR, HAVE YOU BEEN AFRAID OF YOUR PARTNER OR EX-PARTNER?: NO

## 2024-03-07 SDOH — ECONOMIC STABILITY: INCOME INSECURITY: IN THE PAST 12 MONTHS, HAS THE ELECTRIC, GAS, OIL, OR WATER COMPANY THREATENED TO SHUT OFF SERVICE IN YOUR HOME?: NO

## 2024-03-07 SDOH — ECONOMIC STABILITY: TRANSPORTATION INSECURITY
IN THE PAST 12 MONTHS, HAS LACK OF TRANSPORTATION KEPT YOU FROM MEETINGS, WORK, OR FROM GETTING THINGS NEEDED FOR DAILY LIVING?: NO

## 2024-03-07 SDOH — ECONOMIC STABILITY: INCOME INSECURITY: IN THE LAST 12 MONTHS, WAS THERE A TIME WHEN YOU WERE NOT ABLE TO PAY THE MORTGAGE OR RENT ON TIME?: NO

## 2024-03-07 ASSESSMENT — PAIN - FUNCTIONAL ASSESSMENT
PAIN_FUNCTIONAL_ASSESSMENT: 0-10

## 2024-03-07 ASSESSMENT — COGNITIVE AND FUNCTIONAL STATUS - GENERAL
DRESSING REGULAR UPPER BODY CLOTHING: A LITTLE
MOBILITY SCORE: 17
MOVING TO AND FROM BED TO CHAIR: A LITTLE
WALKING IN HOSPITAL ROOM: A LITTLE
DAILY ACTIVITIY SCORE: 20
HELP NEEDED FOR BATHING: A LITTLE
TURNING FROM BACK TO SIDE WHILE IN FLAT BAD: A LITTLE
STANDING UP FROM CHAIR USING ARMS: A LITTLE
MOVING FROM LYING ON BACK TO SITTING ON SIDE OF FLAT BED WITH BEDRAILS: A LITTLE
TOILETING: A LITTLE
DRESSING REGULAR LOWER BODY CLOTHING: A LITTLE
CLIMB 3 TO 5 STEPS WITH RAILING: A LOT

## 2024-03-07 ASSESSMENT — PAIN SCALES - GENERAL
PAINLEVEL_OUTOF10: 0 - NO PAIN
PAINLEVEL_OUTOF10: 6
PAINLEVEL_OUTOF10: 0 - NO PAIN

## 2024-03-07 ASSESSMENT — ACTIVITIES OF DAILY LIVING (ADL)
HOME_MANAGEMENT_TIME_ENTRY: 15
ADL_ASSISTANCE: INDEPENDENT
BATHING_ASSISTANCE: MINIMAL

## 2024-03-07 NOTE — PROGRESS NOTES
"Fannie Watson is a 78 y.o. female on day 2 of admission presenting with SAH (subarachnoid hemorrhage) (CMS/HCC).    Subjective   NAEON       Objective     Physical Exam  A&Ox3  Face symmetric  Tongue midline  Follows command x4 5/5  SILT    Last Recorded Vitals  Blood pressure (!) 131/41, pulse (!) 145, temperature 36.2 °C (97.2 °F), temperature source Temporal, resp. rate 22, height 1.57 m (5' 1.81\"), weight 73.4 kg (161 lb 13.1 oz), SpO2 95 %.  Intake/Output last 3 Shifts:  I/O last 3 completed shifts:  In: 2667.5 (38.3 mL/kg) [I.V.:1067.5 (15.3 mL/kg); IV Piggyback:1600]  Out: 2775 (39.9 mL/kg) [Urine:2775 (1.1 mL/kg/hr)]  Weight: 69.6 kg     Relevant Results                         Assessment/Plan   Principal Problem:    SAH (subarachnoid hemorrhage) (CMS/HCC)    Patient is a 78 year old female with h/o HTN, HLD, COPD, 3/1 s/p MIS L4-5 TLIF, p/w AMS, 3/5 CTH trace R sylvian SAH    3/6 CTA negative, MRI w/ SAH otherwise negative, MRI L-spine neg for      Plan:     NSU  CTA On Monday  Home meds  SLP eval  PTOT  SCDS, SQH           Mara Coughlin MD      "

## 2024-03-07 NOTE — PROGRESS NOTES
Occupational Therapy    Evaluation/Treatment    Patient Name: Fannie Watson  MRN: [id number]  : 1945  Today's Date: 24  Time Calculation  Start Time: 1316  Stop Time: 1358  Time Calculation (min): 42 min       Assessment:  OT Assessment: Fannie is a 79yo female presenting with decreased ADLs, IADLs, transfers, cognition, functional activity tolerance and BUE strength. Pt would benefit from skilled OT Intervention during acute stay  Prognosis: Good  Barriers to Discharge: None  Evaluation/Treatment Tolerance: Patient tolerated treatment well  Medical Staff Made Aware: Yes  End of Session Communication: Bedside nurse  End of Session Patient Position: Up in chair, Alarm off, not on at start of session, Alarm off, caregiver present  OT Assessment Results: Decreased ADL status, Decreased upper extremity strength, Decreased safe judgment during ADL, Decreased cognition, Decreased endurance, Decreased functional mobility, Decreased gross motor control, Decreased IADLs  Prognosis: Good  Barriers to Discharge: None  Evaluation/Treatment Tolerance: Patient tolerated treatment well  Medical Staff Made Aware: Yes  Strengths: Ability to acquire knowledge, Access to adaptive/assistive products, Capable of completing ADLs semi/independent, Living arrangement secure, Support of Caregivers, Premorbid level of function  Barriers to Participation: Insight into problems, Housing layout  Plan:  Treatment Interventions: ADL retraining, Functional transfer training, UE strengthening/ROM, Cognitive reorientation, Patient/family training, Compensatory technique education, Equipment evaluation/education, Neuromuscular reeducation  OT Frequency: 3 times per week  OT Discharge Recommendations: Low intensity level of continued care  OT Recommended Transfer Status: Assist of 1  OT - OK to Discharge: Yes  Treatment Interventions: ADL retraining, Functional transfer training, UE strengthening/ROM, Cognitive reorientation,  "Patient/family training, Compensatory technique education, Equipment evaluation/education, Neuromuscular reeducation    Subjective   Current Problem:  1. SAH (subarachnoid hemorrhage) (CMS/HCC)  EEG    Transthoracic Echo (TTE) Complete    Transthoracic Echo (TTE) Complete      2. Episodic atrial fibrillation (CMS/HCC)  Transthoracic Echo (TTE) Complete    Transthoracic Echo (TTE) Complete        General:   OT Received On: 03/07/24  General  Reason for Referral: 3/1 s/p MIS L4-5 TLIF, p/w AMS, 3/5 CTH trace R sylvian SAH     3/6 CTA negative, MRI w/ SAH otherwise negative, MRI L-spine neg  Past Medical History Relevant to Rehab: HTN, HLD, COPD  Family/Caregiver Present: Yes  Caregiver Feedback: spouse entering room at end of session  Prior to Session Communication: Bedside nurse  Patient Position Received: Bed, 3 rail up, Alarm off, not on at start of session  Preferred Learning Style: auditory, verbal, visual  General Comment: Pt supine in bed upon entry to room. Pt on vEEG. Demos difficulty with occasional word finding and slurring of speech. Pt reports strong desire to go home.  Precautions:  Medical Precautions: Fall precautions, Spinal precautions  Precautions Comment: SBP < 140  Vital Signs:  Heart Rate: 95 (99 post)  Resp: 25 (20 post)  SpO2: 100 % (100 post)  BP: (!) 118/47 (116/40 post)  MAP (mmHg): 63  Pain:  Pain Assessment  Pain Assessment: 0-10  Pain Score: 0 - No pain    Objective   Cognition:  Overall Cognitive Status: Impaired  Orientation Level: Oriented X4  Processing Speed: Delayed  Cognition Test Scores  Cognition Tests: Cognition Test Performed  SBT Test Score: 16/28 (results correlate to \"impairment consistent with dementia\". Pt with x2 errors with counting backwards, x2 errors with months backwards and recalls only the city 4/5 errors for name and address. Pt reports corrent month, year and time.)  Confusion Assessment Method (CAM)  Acute Onset and Fluctuating Course (1A): Yes  Acute Onset and " Fluctuating Course (1B): No  Inattention (2): Yes  Disorganized Thinking (3): No  Rate Patient's Level of Consciousness (4): Alert (Normal), No  Delirium Present: No     Home Living:  Type of Home: House  Lives With: Spouse  Home Adaptive Equipment: Walker rolling or standard, Cane  Home Layout: Two level, Laundry in basement, Bed/bath upstairs, Stairs to alternate level with rails  Alternate Level Stairs-Rails: Right  Alternate Level Stairs-Number of Steps: 12  Home Access: Stairs to enter with rails  Entrance Stairs-Rails: Both  Entrance Stairs-Number of Steps: 4  Bathroom Shower/Tub: Tub/shower unit  Bathroom Toilet: Standard  Bathroom Equipment: Shower chair with back  Home Living Comments: laundry in basement, reports can stay on a couch on first floor  Prior Function:  Level of [place]: Independent with ADLs and functional transfers, Independent with homemaking with ambulation  ADL Assistance: Independent  Homemaking Assistance: Independent  Ambulatory Assistance: Independent  Vocational: Retired  Leisure: going to the [employer]  Hand Dominance: Right  Prior Function Comments: denies falls, drives. no AD use  IADL History:     ADL:  Eating Assistance: Independent  Grooming Assistance: Independent (anticipate)  Bathing Assistance: Minimal (anticipate)  UE Dressing Assistance: Minimal  LE Dressing Assistance:  (CGA)  Toileting Assistance with Device:  (CGA)  Activities of Daily Living:           UE Dressing  UE Dressing Level of Assistance: Minimum assistance  UE Dressing Where Assessed: Chair  UE Dressing Comments: doffing/donning new gown, assist with pulling off    LE Dressing  LE Dressing: Yes  Pants Level of Assistance: Contact guard  Sock Level of Assistance: Contact guard  LE Dressing Where Assessed: Chair  LE Dressing Comments: following education ashish figure 4 technique for LB dressing    Toileting  Toileting Level of Assistance: Minimum assistance  Where Assessed: Toilet  Toileting Comments: assist  for balance with felton care, assist with gown management  Activity Tolerance:  Endurance: Endurance does not limit participation in activity  Early Mobility/Exercise Safety Screen: Proceed with mobilization - No exclusion criteria met  Functional Standing Tolerance:     Bed Mobility/Transfers: Bed Mobility  Bed Mobility: Yes  Bed Mobility 1  Bed Mobility 1: Supine to sitting  Level of Assistance 1: Moderate assistance, Moderate verbal cues  Bed Mobility Comments 1: cues for log roll,assist for trunk and LE    Transfers  Transfer: Yes  Transfer 1  Transfer From 1: Bed to  Transfer to 1: Stand  Technique 1: Sit to stand  Transfer Device 1: Walker  Transfer Level of Assistance 1: Minimum assistance  Trials/Comments 1: cues for hand placement  Transfers 2  Transfer From 2: Stand to  Transfer to 2: Toilet  Technique 2: Stand to sit  Transfer Device 2: Walker  Transfer Level of Assistance 2: Contact guard  Transfers 3  Transfer From 3: Toilet to  Transfer to 3: Stand  Technique 3: Sit to stand  Transfer Device 3: Walker  Transfer Level of Assistance 3: Minimum assistance  Trials/Comments 3: cues for hand placement  Transfers 4  Transfer From 4: Stand to  Transfer to 4: Chair with arms  Technique 4: Stand to sit  Transfer Device 4: Walker  Transfer Level of Assistance 4: Contact guard         Therapy/Activity: Therapeutic Activity  Therapeutic Activity Performed: Yes  Therapeutic Activity 1: Education on spinal precautions, education on transfer hand placement and safety.  Therapeutic Activity 2: Bed mobility, functional transfers with in room. Education on therapy benefits and safety.    Vision:Vision - Basic Assessment  Current Vision: No visual deficits   and    Sensation:  Light Touch: No apparent deficits     Coordination:  Movements are Fluid and Coordinated: Yes  Rapid Alternating Movements: Intact  Finger to Target: Intact   Hand Function:     Extremities: RUE   RUE : Within Functional Limits  RUE AROM  (degrees)  RUE AROM Comment: WFL AROM  RUE Strength  RUE Overall Strength: Within Functional Limits - able to perform ADL tasks with strength (>4-/5), LUE   LUE: Within Functional Limits  LUE AROM (degrees)  LUE AROM Comment: LUE AROM  LUE Strength  LUE Overall Strength: Within Functional Limits - able to perform ADL tasks with strength (>4-/5), RLE   RLE : Within Functional Limits, and LLE   LLE : Within Functional Limits      Outcome Measures: [facility] Daily Activity  Putting on and taking off regular lower body clothing: A little  Bathing (including washing, rinsing, drying): A little  Putting on and taking off regular upper body clothing: A little  Toileting, which includes using toilet, bedpan or urinal: A little  Taking care of personal grooming such as brushing teeth: None  Eating Meals: None  Daily Activity - Total Score: 20        Education Documentation  Body Mechanics, taught by Sirena Lopez OT at 3/7/2024  3:37 PM.  Learner: Patient  Readiness: Acceptance  Method: Explanation  Response: Needs Reinforcement    Precautions, taught by Sirena Lopez OT at 3/7/2024  3:37 PM.  Learner: Patient  Readiness: Acceptance  Method: Explanation  Response: Needs Reinforcement    ADL Training, taught by Sirena Lopez OT at 3/7/2024  3:37 PM.  Learner: Patient  Readiness: Acceptance  Method: Explanation  Response: Needs Reinforcement    Education Comments  No comments found.        OP EDUCATION:       Goals:  Encounter Problems       Encounter Problems (Active)       ADLs       Patient with complete lower body dressing with independent level of assistance donning and doffing all LE clothes  with PRN adaptive equipment while edge of bed  (Progressing)       Start:  03/07/24    Expected End:  03/28/24            Patient will complete toileting including hygiene clothing management/hygiene with independent level of assistance and grab bars. (Progressing)       Start:  03/07/24    Expected End:  03/28/24                BALANCE       Pt will maintain dynamic standing balance during ADL task with contact guard assist level of assistance in order to demonstrate decreased risk of falling and improved postural control. (Progressing)       Start:  03/07/24    Expected End:  03/28/24               COGNITION/SAFETY       Patient will score WFL on standardized cognitive assessment with no cues and within reasonable time frame (Progressing)       Start:  03/07/24    Expected End:  03/28/24               TRANSFERS       Patient will perform bed mobility independent level of assistance in order to improve safety and independence with mobility (Progressing)       Start:  03/07/24    Expected End:  03/28/24            Patient will complete functional transfer to toilet with least restrictive device with stand by assist level of assistance. (Progressing)       Start:  03/07/24    Expected End:  03/28/24 03/07/24 at 3:40 PM - Sirena Lopez OT

## 2024-03-07 NOTE — PROGRESS NOTES
Speech-Language Pathology  Inpatient SLP Swallow Treatment     Patient Name: Fannie Watson  MRN: [id number]  Today's Date: 3/7/2024  Time Calculation  Start Time: 0950  Stop Time: 1010  Time Calculation (min): 20 min         Recommendations:   Regular Solids & Thin Liquids  Only when mentation appropriate (alert, following commands, talking)  Supervision with PO intake  Slow feed rate  Meds administered per pt preference    SLP Assessment:   Pt presenting with significant improvement in mentation and subsequently also with resolution of oral deficits. Rec pt resume baseline diet but have supervision to ensure appropriate mentation with all PO intake. SLP will follow-up x1 to ensure tolerance of diet given fluctuating mentation thus far during admission.        Plan:  SLP Frequency: 1x per week  SLP Discharge Recommendations:  (Unclear, defer to later notes)  Discussed POC: Patient, Nursing  Discussed Risks/Benefits: Yes  Patient/Caregiver Agreeable: Yes  SLP - OK to Discharge: Yes    Goals:   Pt will tolerate least restrictive diet without s/s aspiration, respiratory compromise, or overt difficulty throughout admission.  Start: 03/06/24, End: 03/20/24    PROGRESSING      Subjective   Current Presentation: Pt received upright and alert, oriented x4. More interactive this date, with improving affect. Family at bedside.         Objective       Therapeutic Swallow Therapy:     Pt self-fed trials of softened shoaib cracker x1 and regular shoaib cracker x6 with thin liquid wash between every couple bites. Noted timely/efficient mastication this date, significantly improved from previous date. Little-no oral residues post swallow. No s/s aspiration across trials, pt denied perceived difficulty.     Overviewed the kitchen menu with pt and she was able to identify items that would be appropriate for transitioning back to regular solids this date (I.e., slightly softer items). Educated pt and family on recommended safe swallow  guidelines and they indicated understanding.

## 2024-03-07 NOTE — PROGRESS NOTES
Physical Therapy    Physical Therapy Evaluation    Patient Name: Fannie Watson  MRN: [id number]  Today's Date: 3/7/2024   Time Calculation  Start Time: 1430  Stop Time: 1501  Time Calculation (min): 31 min    Assessment/Plan   PT Assessment  PT Assessment Results: Decreased strength, Impaired balance, Decreased endurance, Decreased mobility, Decreased coordination, Impaired judgement  Rehab Prognosis: Excellent  Barriers to Discharge: medical acuity  Evaluation/Treatment Tolerance: Patient tolerated treatment well  End of Session Communication: Bedside nurse  Assessment Comment: Pt. presents s/p SAH with MIS L4-5 TLIF on 3/1/24. Pt. required minAx1 for bed mobility and transfers and was able to ambulate with close SUP and a walker. Pt. will continue to benefit from low-intensity skilled PT services in order to incrase functional independence prior to D/C.  End of Session Patient Position: Bed, 3 rail up  IP OR SWING BED PT PLAN  Inpatient or Swing Bed: Inpatient  PT Plan  Treatment/Interventions: Bed mobility, Transfer training, Gait training, Stair training, Balance training, Neuromuscular re-education, Strengthening, Endurance training, Range of motion, Therapeutic exercise, Therapeutic activity, Home exercise program, Postural re-education  PT Plan: Skilled PT  PT Frequency: 3 times per week  PT Discharge Recommendations: Low intensity level of continued care  Equipment Recommended upon Discharge: Wheeled walker  PT Recommended Transfer Status: Assist x1, Assistive device, Stand by assist  PT - OK to Discharge: Yes      Subjective   General Visit Information:  General  Reason for Referral: AMS and R sylvian SAH  Past Medical History Relevant to Rehab: HTN, HLD, COPD, 3/1 s/p MIS L4-5 TLIF  Missed Visit: No  Family/Caregiver Present: Yes  Prior to Session Communication: Bedside nurse  Patient Position Received: Up in chair  General Comment: Pt. pleasant and agreeable to PT  Home Living:  Home Living  Type of Home:  House  Home Adaptive Equipment: Cane, Walker rolling or standard  Home Layout: Two level, Laundry in basement, Bed/bath upstairs, Able to live on main level with bedroom/bathroom  Home Access: Stairs to enter with rails  Entrance Stairs-Rails: Both  Entrance Stairs-Number of Steps: 4  Bathroom Shower/Tub: Tub/shower unit  Bathroom Equipment: Shower chair with back  Prior Level of Function:  Prior Function Per Pt/Caregiver Report  Level of [place]: Independent with ADLs and functional transfers, Independent with homemaking with ambulation  Receives Help From: Family  Vocational: Retired  Leisure: plays with cat  Hand Dominance: Right  Prior Function Comments: drives (+), falls (-)  Precautions:  Precautions  Medical Precautions: Fall precautions, Spinal precautions  Precautions Comment: SBP < 140  Vital Signs:  Vital Signs  Heart Rate: 97 (end of session: 94)  Heart Rate Source: Monitor  Resp: (!) 29 (29-35 throughout session)  SpO2: 97 % ()  BP: 130/50 (durin/50 end of session: 129/47)  MAP (mmHg): 66 (durin end of session: 66)  BP Location: Left arm  BP Method: Automatic  Patient Position: Lying    Objective   Pain:  Pain Assessment  Pain Assessment: 0-10  Pain Score: 0 - No pain  Cognition:  Cognition  Overall Cognitive Status: Impaired  Arousal/Alertness: Delayed responses to stimuli  Orientation Level: Oriented X4  Following Commands: Follows all commands and directions without difficulty  Insight: Mild  Impulsive: Mildly  Processing Speed: Delayed    General Assessments:     Activity Tolerance  Endurance: Endurance does not limit participation in activity  Early Mobility/Exercise Safety Screen: Proceed with mobilization - No exclusion criteria met    Sensation  Light Touch: No apparent deficits    Coordination  Movements are Fluid and Coordinated: Yes  Finger to Nose: Intact  Heel to Shin: Intact  Coordination Comment: Bradykinetic    Postural Control  Postural Control: Within Functional  Limits    Static Sitting Balance  Static Sitting-Balance Support: Bilateral upper extremity supported, Feet supported  Static Sitting-Level of Assistance: Distant supervision  Dynamic Sitting Balance  Dynamic Sitting-Balance Support: Bilateral upper extremity supported, Feet supported  Dynamic Sitting-Balance: Forward lean  Dynamic Sitting-Comments: distant SUP    Static Standing Balance  Static Standing-Balance Support: No upper extremity supported  Static Standing-Level of Assistance: Contact guard  Dynamic Standing Balance  Dynamic Standing-Balance Support: No upper extremity supported  Dynamic Standing-Balance: Turning  Dynamic Standing-Comments: CGA  Functional Assessments:  Bed Mobility  Bed Mobility: Yes  Bed Mobility 1  Bed Mobility 1: Sitting to supine  Level of Assistance 1: Minimum assistance, Minimal verbal cues    Transfers  Transfer: Yes  Transfer 1  Transfer From 1: Chair with arms to  Transfer to 1: Stand  Technique 1: Sit to stand  Transfer Level of Assistance 1: Minimum assistance (x1)  Trials/Comments 1: x1  Transfers 2  Transfer From 2: Stand to  Transfer to 2: Bed  Technique 2: Stand to sit  Transfer Device 2: Walker  Transfer Level of Assistance 2: Contact guard (x1)  Trials/Comments 2: x1    Ambulation/Gait Training  Ambulation/Gait Training Performed: Yes  Ambulation/Gait Training 1  Surface 1: Level tile  Device 1: Rolling walker  Assistance 1: Close supervision  Quality of Gait 1: Wide base of support, Diminished heel strike, Decreased step length, Forward flexed posture  Comments/Distance (ft) 1: 150 ft  Extremity/Trunk Assessments:  RUE   RUE : Within Functional Limits  LUE   LUE: Within Functional Limits  RLE   RLE : Exceptions to WFL  AROM RLE (degrees)  RLE AROM Comment: WFL  Strength RLE  R Hip Flexion: 4-/5  R Hip ABduction: 4+/5  R Hip ADduction: 4+/5  R Knee Flexion: 4/5  R Knee Extension: 4/5  R Ankle Dorsiflexion: 5/5  R Ankle Plantar Flexion: 5/5  LLE   LLE : Exceptions to  WFL  AROM LLE (degrees)  LLE AROM Comment: WFL  Strength LLE  L Hip Flexion: 4-/5  L Hip ABduction: 4+/5  L Hip ADduction: 4+/5  L Knee Flexion: 4/5  L Knee Extension: 4/5  L Ankle Dorsiflexion: 5/5  L Ankle Plantar Flexion: 5/5  Outcome Measures:  [facility] Basic Mobility  Turning from your back to your side while in a flat bed without using bedrails: A little  Moving from lying on your back to sitting on the side of a flat bed without using bedrails: A little  Moving to and from bed to chair (including a wheelchair): A little  Standing up from a chair using your arms (e.g. wheelchair or bedside chair): A little  To walk in hospital room: A little  Climbing 3-5 steps with railing: A lot  Basic Mobility - Total Score: 17    ICU Mobility Screen  Early Mobility/Exercise Safety Screen: Proceed with mobilization - No exclusion criteria met  E = Exercise and Early Mobility  Early Mobility/Exercise Safety Screen: Proceed with mobilization - No exclusion criteria met  Current Activity: Ambulating in [place]  Documentation of an Acceptable Level of Exercise/Mobilization Performed: Walk in [place]  Tinetti  Sitting Balance: Steady, safe  Arises: Able, uses arms to help  Attempts to Arise: Able to arise, one attempt  Immediate Standing Balance (First 5 Seconds): Steady without walker or other support  Standing Balance: Steady but wide stance, uses cane or other support  Nudged: Staggers, grabs, catches self  Eyes Closed: Steady  Turned 360 Degrees: Steadiness: Steady  Turned 360 Degrees: Continuity of Steps: Discontinuous steps  Sitting Down: Uses arms or not a smooth motion  Balance Score: 11  Initiation of Gait: No hesitancy  Step Height: R Swing Foot: Right foot complete clears floor  Step Length: R Swing Foot: Passes left stance foot  Step Height: L Swing Foot: Left foot complete clears floor  Step Length: L Swing Foot: Passes right stance foot  Step Symmetry: Right and left step appear equal  Step Continuity: Steps appear  continuous  Path: Mild/moderate deviation or uses walking aid  Trunk: No sway, no flexion, no use of arms, no walking aid  Walking Time: Heels almost touching while walking  Gait Score: 11  Total Score: 22    Encounter Problems       Encounter Problems (Active)       PT Problem       Pt. will score 24/28 on Tinetti balance test.        Start:  03/07/24            Patient will complete supine to sit and sit to supine Supervision        Start:  03/07/24            Patient will complete stand to sit and sit to stand with Supervision with wheeled walker.        Start:  03/07/24            Patient will ambulate >400' with Rolling Walker and Supervision        Start:  03/07/24            Patient will ascend and descend 4 steps with 2 UE Support and Supervision        Start:  03/07/24                   Education Documentation  Precautions, taught by LAVERN Leyva at 3/7/2024  3:30 PM.  Learner: Significant Other, Patient  Readiness: Acceptance  Method: Explanation  Response: Verbalizes Understanding, Demonstrated Understanding    Body Mechanics, taught by LAVERN Leyva at 3/7/2024  3:30 PM.  Learner: Significant Other, Patient  Readiness: Acceptance  Method: Explanation  Response: Verbalizes Understanding, Demonstrated Understanding    Mobility Training, taught by LAVERN Leyva at 3/7/2024  3:30 PM.  Learner: Significant Other, Patient  Readiness: Acceptance  Method: Explanation  Response: Verbalizes Understanding, Demonstrated Understanding    Education Comments  No comments found.

## 2024-03-07 NOTE — PROGRESS NOTES
Patient is discussed during interdisciplinary round today.     Plan per medical team : Pt is not medically ready to discharge currently, and failed swallow test today.   Planned disposition : TBD  Discharge destination : TBD  Payor : Medicare    ALEXX met with pt, pt's  Xander, and pt's son Gene at bedside to complete initial assessment for offering support and obtaining information for pt's discharge plan.  Pt is alert and oriented x 2 or 3. ( Pt is aware that she is in hospital, but didn't know which hospital she is in, and a bit confused with time and date.)  Pt and  participated in and complete initial assessment.   Pt lives in a house with her , and pt is independent with her ADLs per pt's . , Xander reported he is pt's medical POA and have completed paperwork when they did living will. During assessment, medical team visited pt, so SW and  agreed to discuss more later.   SW and PCN will continue to follow and assist with discharge plan.            03/07/24 1201   Discharge Planning   Living Arrangements Spouse/significant other   Support Systems Spouse/significant other;Children   Assistance Needed independent prior to this admission   Type of Residence Private residence

## 2024-03-07 NOTE — CONSULTS
Inpatient consult to Electrophysiology  Consult performed by: Rafi Cárdenas MD  Consult ordered by: Nissa Samaniego MD  Reason for consult: New Afib        History Of Present Illness:    Fannie Watson is a 78 y.o. woman with a PMH of hypertension, hyperlipidemia, COPD, spondylolisthesis of lumbar lesion and status post L4-L5 decompression laminectomy and fusion on 3/1/2024 (at [facility]) who was transferred from OSH with altered mentation and is found to have trace R sylvian SAH on CT head on presentation. EP is consulted for new Afib. Patient was in normal sinus rhythm when seen and appears to have been in Afib for <12 hours. This is reportedly patient's first episode of Afib in the context of recent surgical intervention. When seen, patient reports no chest pain, palpitations, SOB, dizziness/lightheadedness, fevers. Per chart review, patient has reportedly had MI in the past but appears to have been likely Takotsubo's cardiomyopathy. Etiology of her presenting SAH is unclear but it appears that a vasospastic event is considered.     Since presentation, patient's CBC has demonstrated leukocytosis and CMP shows mild transaminitis. Most recent ECG shows NSR.     Cardiac History  TTE (12/18/19 -[facility])  CONCLUSIONS:   - Exam indication: Routine surveillance (>1yr) of known cardiomyopathy without a change in clinical status   - The left ventricle is normal in size. Left ventricular systolic function is   normal. EF = 62 ± 5% (2D biplane) Grade I left ventricular diastolic dysfunction.   Normal global strain -23.0%.   - The right ventricle is normal in size. Right ventricular systolic function is   normal.   - There are no significant valvular abnormalities.   - Exam was compared with the prior  echocardiographic exam performed on   1/30/2019. No significant change.     [facility] (12/27/2018)  In summary:   1. Normal coronary arteries.   2. Mild  dilated  hypokinetic left ventricle with ejection fraction   estimated  at       40%. Consider stress cardiomyopathy/takotsubo variant.     PMH   As reported above    Social History  Used to smoke 2 pks/day but stopped smoking over 20 years ago. No significant alcohol consumption     Last Recorded Vitals:  Vitals:    03/07/24 1400 03/07/24 1430 03/07/24 1552 03/07/24 1600   BP: 114/62 130/50  (!) 120/46   BP Location:  Left arm     Patient Position:  Lying     Pulse: 100 97  89   Resp: (!) 28 (!) 29  (!) 27   Temp:   36.2 °C (97.2 °F)    TempSrc:       SpO2: 99% 97%  100%   Weight:       Height:           Last Labs:  CBC - 3/7/2024:  2:26 AM  13.3 10.8 319    34.6      CMP - 3/7/2024:  2:26 AM  8.6 6.0 68 --- 1.3   2.8 3.0 84 121      PTT - 3/7/2024:  2:26 AM  1.3   14.2 27     Troponin I, High Sensitivity   Date/Time Value Ref Range Status   03/07/2024 02:28 AM 9 0 - 34 ng/L Final   03/06/2024 12:14 AM 17 0 - 34 ng/L Final     BNP   Date/Time Value Ref Range Status   03/06/2024 12:14 AM 74 0 - 99 pg/mL Final     Hemoglobin A1C   Date/Time Value Ref Range Status   03/06/2024 12:14 AM 5.5 see below % Final     LDL Calculated   Date/Time Value Ref Range Status   03/06/2024 12:14 AM 92 <=99 mg/dL Final     Comment:                                 Near   Borderline      AGE      Desirable  Optimal    High     High     Very High     0-19 Y     0 - 109     ---    110-129   >/= 130     ----    20-24 Y     0 - 119     ---    120-159   >/= 160     ----      >24 Y     0 -  99   100-129  130-159   160-189     >/=190       VLDL   Date/Time Value Ref Range Status   03/06/2024 12:14 AM 21 0 - 40 mg/dL Final      Last I/O:  I/O last 3 completed shifts:  In: 3542.5 (48.3 mL/kg) [I.V.:1442.5 (19.7 mL/kg); IV Piggyback:2100]  Out: 3900 (53.1 mL/kg) [Urine:3900 (1.5 mL/kg/hr)]  Weight: 73.4 kg     Past Cardiology Tests (Last 3 Years):  EKG:  ECG 12 Lead 03/07/2024 (Preliminary)      Electrocardiogram, 12-lead PRN ACS symptoms 03/07/2024 (Preliminary)    Echo:  Transthoracic Echo (TTE) Complete  03/07/2024    Ejection Fractions:  EF   Date/Time Value Ref Range Status   03/07/2024 03:53 PM 74 %      Cath:  No results found for this or any previous visit from the past 1095 days.    Stress Test:  No results found for this or any previous visit from the past 1095 days.    Cardiac Imaging:  No results found for this or any previous visit from the past 1095 days.      Past Medical History:  She has a past medical history of COPD (chronic obstructive pulmonary disease) (CMS/HCC) and Hypertension.    Past Surgical History:  She has no past surgical history on file.      Social History:  She has no history on file for tobacco use, alcohol use, and drug use.    Family History:  No family history on file.     Allergies:  Coconut and Codeine    Inpatient Medications:  Scheduled medications   Medication Dose Route Frequency    heparin (porcine)  5,000 Units subcutaneous q8h    perflutren protein A microsphere  0.5 mL intravenous Once in imaging    sennosides-docusate sodium  2 tablet oral BID    sulfur hexafluoride microsphr  2 mL intravenous Once in imaging     PRN medications   Medication    acetaminophen    hydrALAZINE    HYDROmorphone    labetaloL    niCARdipine    oxyCODONE    oxyCODONE     Continuous Medications   Medication Dose Last Rate    niCARdipine  1-15 mg/hr      [START ON 3/8/2024] sodium chloride 0.9%  100 mL/hr       Outpatient Medications:  No current outpatient medications    Physical Exam:  Gen: eyes closed but appropriately responsive to commands  HEENT: normocephalic. Trachea midline  CV: RRR. Systolic murmur appreciated, no gallops, rubs  Pulm: clear to auscultation bilaterally. No coarse lung sounds  Abd: soft, non-distended. Normal active bowel sounds  Ext: warm and well-perfused  Psych: appropriate affect  Neuro: CN II-XII grossly intact     Assessment/Plan   Ms Watson is a 79 yo woman with a PMH of hypertension, hyperlipidemia, COPD, spondylolisthesis of lumbar lesion and status post L4-L5  decompression laminectomy and fusion on 3/1/2024 (at [facility]) who was transferred from [facility] with altered mentation and is found to have trace R sylvian SAH on CT head on presentation. EP is consulted for new Afib. Patient's CHADSVASc is 4 and thus anticoagulation would be recommended. In the context of her SAH of unclear etiology, it would most likely be prudent to hold off on anticoagulation at this time and to initiate when patient is more stable (and when etiology of her SAH has been better defined). Consideration for a potential Watchman device on the outpatient basis can also be considered odin if anticoagulating would be a challenge. Note that with patient getting a formal TTE this afternoon, she has a mobile echodensity on patient's posterior leaflet of her MV concerning for vegetation.    # SAH  # Afib, CHADSVASc 4  # Mitral valve posterior leaflet vegetation likely from recent instrumentation     Recommendations  -Please obtain blood cultures and consider ID consult for possible IE. May consider empirically starting Vanc/Zosyn  -If further assistance with ?endocarditis management is needed, can consult general cardiology   -Would hold off on AC for now and likely initiate on the outpatient basis when it's more safe to initiate   -Please give us an estimation of when AC can be safely started  -Please start Metop tartrate 12.5 mg BiD  -Will need follow-up with General Cardiology on discharge for Afib management   -Optimal lytes: K>4, Mag>2  -Please continue to monitor on tele    Thank you for the consult. Please page EP with any additional concerns. Will signoff at this time.    EP consult pager: [phone] (MON-FRI 7a-6p; SAT-SUN 7a-2p)  EP device nurse pager: [phone] (MON-FRI 7a-6p; SAT-SUN 7a-2p)  Gen Cards consult pager: [phone] (MON-FRI 7a-6p; SAT-SUN 7a-2p)  CICU fellow pager: [phone] (covers EP and General Cardiology Consults after hours)  Advanced Heart Failure Consult Pager: [phone] anytime  CICU  Fellow Pager: [phone] anytime  Endovascular /Limb Salvage Team Pager: [phone] for day coverage (8am-5pm)  Interventional Cardiology Fellow Pager: [phone] for night and weekend coverage  Structural Heart Team Pager: [phone] anytime  Night coverage: [id number]    Peripheral IV 03/03/24 20 G Proximal;Right;Anterior Forearm (Active)   Site Assessment Clean;Dry;Intact 03/07/24 0800   Dressing Type Transparent 03/07/24 0800   Line Status Flushed;Saline locked 03/07/24 0800   Dressing Status Clean;Dry;Occlusive 03/07/24 0800   Number of days: 4       Peripheral IV 03/07/24 20 G Left;Anterior Forearm (Active)   Site Assessment Clean;Dry;Intact 03/07/24 0800   Dressing Type Transparent 03/07/24 0800   Line Status Flushed;Saline locked 03/07/24 0800   Dressing Status Clean;Dry;Occlusive 03/07/24 0800   Number of days: 0       Code Status:  Full Code    I spent 60 minutes in the professional and overall care of this patient.        Rafi Cárdenas MD

## 2024-03-07 NOTE — CARE PLAN
The   Problem: Pain  Goal: My pain/discomfort is manageable  Outcome: Progressing     Problem: Safety  Goal: Patient will be injury free during hospitalization  Outcome: Progressing  Goal: I will remain free of falls  Outcome: Progressing     Problem: Daily Care  Goal: Daily care needs are met  Outcome: Progressing     Problem: Psychosocial Needs  Goal: Demonstrates ability to cope with hospitalization/illness  Outcome: Progressing     Problem: Discharge Barriers  Goal: My discharge needs are met  Outcome: Progressing

## 2024-03-07 NOTE — CONSULTS
"Vancomycin Dosing by Pharmacy- INITIAL    Fannie Watson is a 78 y.o. year old female who Pharmacy has been consulted for vancomycin dosing for endocarditis/endovascular infection. Based on the patient's indication and renal status this patient will be dosed based on a goal AUC of 500-600.     Renal function is currently stable.    Visit Vitals  BP (!) 120/46   Pulse 89   Temp 36.2 °C (97.2 °F)   Resp (!) 27        Lab Results   Component Value Date    CREATININE 0.46 (L) 03/07/2024    CREATININE 0.46 (L) 03/06/2024    CREATININE 0.45 (L) 03/06/2024    CREATININE 0.55 03/06/2024        Patient weight is No results found for: \"PTWEIGHT\"    No results found for: \"CULTURE\"     I/O last 3 completed shifts:  In: 3542.5 (48.3 mL/kg) [I.V.:1442.5 (19.7 mL/kg); IV Piggyback:2100]  Out: 3900 (53.1 mL/kg) [Urine:3900 (1.5 mL/kg/hr)]  Weight: 73.4 kg   [unfilled]    No results found for: \"PATIENTTEMP\"       Assessment/Plan     Patient will not be given a loading dose.  Will initiate vancomycin maintenance,  1250 mg every 12 hours.    This dosing regimen is predicted by [employer] to result in the following pharmacokinetic parameters:  Loading dose: N/A  Regimen: 1250 mg IV every 12 hours.  Start time: 18:02 on 03/07/2024  Exposure target: AUC24 (range)400-600 mg/L.hr   AUC24,ss: 547 mg/L.hr  Probability of AUC24 > 400: 81 %  Ctrough,ss: 16.3 mg/L  Probability of Ctrough,ss > 20: 34 %  Probability of nephrotoxicity (Lodise UNA 2009): 12 %      Follow-up level will be ordered on am labs at 3/8 unless clinically indicated sooner.  Will continue to monitor renal function daily while on vancomycin and order serum creatinine at least every 48 hours if not already ordered.  Follow for continued vancomycin needs, clinical response, and signs/symptoms of toxicity.       Itzel Arora RPh       "

## 2024-03-07 NOTE — CARE PLAN
The patient's goals for the shift include ELDA    The clinical goals for the shift include ELDA    Over the shift, the patient did not make progress toward the following goals. Barriers to progression include none. Recommendations to address these barriers include continue plan of care.

## 2024-03-08 ENCOUNTER — APPOINTMENT (OUTPATIENT)
Dept: CARDIOLOGY | Facility: HOSPITAL | Age: 79
DRG: 981 | End: 2024-03-08
Payer: MEDICARE

## 2024-03-08 ENCOUNTER — APPOINTMENT (OUTPATIENT)
Dept: RADIOLOGY | Facility: HOSPITAL | Age: 79
DRG: 981 | End: 2024-03-08
Payer: MEDICARE

## 2024-03-08 LAB
ALBUMIN SERPL BCP-MCNC: 2.8 G/DL (ref 3.4–5)
ALBUMIN SERPL BCP-MCNC: 3 G/DL (ref 3.4–5)
ANION GAP SERPL CALC-SCNC: 8 MMOL/L (ref 10–20)
ANION GAP SERPL CALC-SCNC: 9 MMOL/L (ref 10–20)
APTT PPP: 27 SECONDS (ref 27–38)
ATRIAL RATE: 170 BPM
ATRIAL RATE: 86 BPM
BUN SERPL-MCNC: 10 MG/DL (ref 6–23)
BUN SERPL-MCNC: 8 MG/DL (ref 6–23)
CA-I BLD-SCNC: 1.11 MMOL/L (ref 1.1–1.33)
CALCIUM SERPL-MCNC: 8.4 MG/DL (ref 8.6–10.6)
CALCIUM SERPL-MCNC: 8.5 MG/DL (ref 8.6–10.6)
CHLORIDE SERPL-SCNC: 101 MMOL/L (ref 98–107)
CHLORIDE SERPL-SCNC: 97 MMOL/L (ref 98–107)
CHLORIDE UR-SCNC: 135 MMOL/L
CHLORIDE/CREATININE (MMOL/G) IN URINE: 334 MMOL/G CREAT (ref 38–318)
CO2 SERPL-SCNC: 26 MMOL/L (ref 21–32)
CO2 SERPL-SCNC: 27 MMOL/L (ref 21–32)
CREAT SERPL-MCNC: 0.44 MG/DL (ref 0.5–1.05)
CREAT SERPL-MCNC: 0.47 MG/DL (ref 0.5–1.05)
CREAT UR-MCNC: 40.4 MG/DL (ref 20–320)
EGFRCR SERPLBLD CKD-EPI 2021: >90 ML/MIN/1.73M*2
EGFRCR SERPLBLD CKD-EPI 2021: >90 ML/MIN/1.73M*2
ERYTHROCYTE [DISTWIDTH] IN BLOOD BY AUTOMATED COUNT: 15 % (ref 11.5–14.5)
GLUCOSE SERPL-MCNC: 90 MG/DL (ref 74–99)
GLUCOSE SERPL-MCNC: 99 MG/DL (ref 74–99)
HCT VFR BLD AUTO: 30.7 % (ref 36–46)
HGB BLD-MCNC: 9.9 G/DL (ref 12–16)
HOLD SPECIMEN: NORMAL
INR PPP: 1.2 (ref 0.9–1.1)
MAGNESIUM SERPL-MCNC: 1.86 MG/DL (ref 1.6–2.4)
MCH RBC QN AUTO: 27.6 PG (ref 26–34)
MCHC RBC AUTO-ENTMCNC: 32.2 G/DL (ref 32–36)
MCV RBC AUTO: 86 FL (ref 80–100)
NRBC BLD-RTO: 0 /100 WBCS (ref 0–0)
OSMOLALITY UR: 418 MOSM/KG (ref 200–1200)
P AXIS: 61 DEGREES
P OFFSET: 204 MS
P ONSET: 153 MS
PHOSPHATE SERPL-MCNC: 2.1 MG/DL (ref 2.5–4.9)
PHOSPHATE SERPL-MCNC: 3.6 MG/DL (ref 2.5–4.9)
PLATELET # BLD AUTO: 304 X10*3/UL (ref 150–450)
POTASSIUM SERPL-SCNC: 3.8 MMOL/L (ref 3.5–5.3)
POTASSIUM SERPL-SCNC: 3.9 MMOL/L (ref 3.5–5.3)
POTASSIUM UR-SCNC: 14 MMOL/L
POTASSIUM/CREAT UR-RTO: 35 MMOL/G CREAT
PR INTERVAL: 148 MS
PROTHROMBIN TIME: 13 SECONDS (ref 9.8–12.8)
Q ONSET: 208 MS
Q ONSET: 227 MS
QRS COUNT: 14 BEATS
QRS COUNT: 25 BEATS
QRS DURATION: 84 MS
QRS DURATION: 88 MS
QT INTERVAL: 288 MS
QT INTERVAL: 366 MS
QTC CALCULATION(BAZETT): 437 MS
QTC CALCULATION(BAZETT): 450 MS
QTC FREDERICIA: 388 MS
QTC FREDERICIA: 412 MS
R AXIS: -42 DEGREES
R AXIS: -47 DEGREES
RBC # BLD AUTO: 3.59 X10*6/UL (ref 4–5.2)
SODIUM SERPL-SCNC: 129 MMOL/L (ref 136–145)
SODIUM SERPL-SCNC: 130 MMOL/L (ref 136–145)
SODIUM SERPL-SCNC: 131 MMOL/L (ref 136–145)
SODIUM UR-SCNC: 114 MMOL/L
SODIUM/CREAT UR-RTO: 282 MMOL/G CREAT
T AXIS: 16 DEGREES
T AXIS: 70 DEGREES
T OFFSET: 352 MS
T OFFSET: 410 MS
VANCOMYCIN SERPL-MCNC: 15.2 UG/ML (ref 5–20)
VENTRICULAR RATE: 147 BPM
VENTRICULAR RATE: 86 BPM
WBC # BLD AUTO: 9.9 X10*3/UL (ref 4.4–11.3)

## 2024-03-08 PROCEDURE — 99153 MOD SED SAME PHYS/QHP EA: CPT | Mod: BILATERAL PROCEDURE | Performed by: RADIOLOGY

## 2024-03-08 PROCEDURE — B3181ZZ FLUOROSCOPY OF BILATERAL INTERNAL CAROTID ARTERIES USING LOW OSMOLAR CONTRAST: ICD-10-PCS | Performed by: RADIOLOGY

## 2024-03-08 PROCEDURE — 99153 MOD SED SAME PHYS/QHP EA: CPT

## 2024-03-08 PROCEDURE — 93312 ECHO TRANSESOPHAGEAL: CPT | Performed by: INTERNAL MEDICINE

## 2024-03-08 PROCEDURE — 80202 ASSAY OF VANCOMYCIN: CPT

## 2024-03-08 PROCEDURE — 2500000005 HC RX 250 GENERAL PHARMACY W/O HCPCS

## 2024-03-08 PROCEDURE — 95720 EEG PHY/QHP EA INCR W/VEEG: CPT | Performed by: STUDENT IN AN ORGANIZED HEALTH CARE EDUCATION/TRAINING PROGRAM

## 2024-03-08 PROCEDURE — 80069 RENAL FUNCTION PANEL: CPT

## 2024-03-08 PROCEDURE — 99223 1ST HOSP IP/OBS HIGH 75: CPT | Performed by: PHYSICIAN ASSISTANT

## 2024-03-08 PROCEDURE — 2500000004 HC RX 250 GENERAL PHARMACY W/ HCPCS (ALT 636 FOR OP/ED)

## 2024-03-08 PROCEDURE — 2500000001 HC RX 250 WO HCPCS SELF ADMINISTERED DRUGS (ALT 637 FOR MEDICARE OP): Performed by: INTERNAL MEDICINE

## 2024-03-08 PROCEDURE — 36222 PLACE CATH CAROTID/INOM ART: CPT | Mod: 50 | Performed by: RADIOLOGY

## 2024-03-08 PROCEDURE — 95716 VEEG EA 12-26HR CONT MNTR: CPT

## 2024-03-08 PROCEDURE — 2500000001 HC RX 250 WO HCPCS SELF ADMINISTERED DRUGS (ALT 637 FOR MEDICARE OP): Performed by: NURSE PRACTITIONER

## 2024-03-08 PROCEDURE — 99152 MOD SED SAME PHYS/QHP 5/>YRS: CPT | Performed by: INTERNAL MEDICINE

## 2024-03-08 PROCEDURE — C1760 CLOSURE DEV, VASC: HCPCS

## 2024-03-08 PROCEDURE — 99222 1ST HOSP IP/OBS MODERATE 55: CPT | Performed by: STUDENT IN AN ORGANIZED HEALTH CARE EDUCATION/TRAINING PROGRAM

## 2024-03-08 PROCEDURE — 82570 ASSAY OF URINE CREATININE: CPT

## 2024-03-08 PROCEDURE — 84295 ASSAY OF SERUM SODIUM: CPT | Performed by: STUDENT IN AN ORGANIZED HEALTH CARE EDUCATION/TRAINING PROGRAM

## 2024-03-08 PROCEDURE — 85027 COMPLETE CBC AUTOMATED: CPT

## 2024-03-08 PROCEDURE — 99232 SBSQ HOSP IP/OBS MODERATE 35: CPT | Performed by: NEUROLOGICAL SURGERY

## 2024-03-08 PROCEDURE — 83735 ASSAY OF MAGNESIUM: CPT

## 2024-03-08 PROCEDURE — 99223 1ST HOSP IP/OBS HIGH 75: CPT | Performed by: STUDENT IN AN ORGANIZED HEALTH CARE EDUCATION/TRAINING PROGRAM

## 2024-03-08 PROCEDURE — 36226 PLACE CATH VERTEBRAL ART: CPT | Performed by: RADIOLOGY

## 2024-03-08 PROCEDURE — 83935 ASSAY OF URINE OSMOLALITY: CPT

## 2024-03-08 PROCEDURE — 2500000004 HC RX 250 GENERAL PHARMACY W/ HCPCS (ALT 636 FOR OP/ED): Performed by: STUDENT IN AN ORGANIZED HEALTH CARE EDUCATION/TRAINING PROGRAM

## 2024-03-08 PROCEDURE — 36415 COLL VENOUS BLD VENIPUNCTURE: CPT

## 2024-03-08 PROCEDURE — 2500000004 HC RX 250 GENERAL PHARMACY W/ HCPCS (ALT 636 FOR OP/ED): Mod: JZ

## 2024-03-08 PROCEDURE — 99152 MOD SED SAME PHYS/QHP 5/>YRS: CPT | Mod: BILATERAL PROCEDURE | Performed by: RADIOLOGY

## 2024-03-08 PROCEDURE — 2720000007 HC OR 272 NO HCPCS

## 2024-03-08 PROCEDURE — 93325 DOPPLER ECHO COLOR FLOW MAPG: CPT | Performed by: INTERNAL MEDICINE

## 2024-03-08 PROCEDURE — B31G1ZZ FLUOROSCOPY OF BILATERAL VERTEBRAL ARTERIES USING LOW OSMOLAR CONTRAST: ICD-10-PCS | Performed by: RADIOLOGY

## 2024-03-08 PROCEDURE — 36224 PLACE CATH CAROTD ART: CPT | Mod: BILATERAL PROCEDURE | Performed by: RADIOLOGY

## 2024-03-08 PROCEDURE — 85610 PROTHROMBIN TIME: CPT

## 2024-03-08 PROCEDURE — 36226 PLACE CATH VERTEBRAL ART: CPT | Mod: BILATERAL PROCEDURE | Performed by: RADIOLOGY

## 2024-03-08 PROCEDURE — 93320 DOPPLER ECHO COMPLETE: CPT

## 2024-03-08 PROCEDURE — 93320 DOPPLER ECHO COMPLETE: CPT | Performed by: INTERNAL MEDICINE

## 2024-03-08 PROCEDURE — 2060000001 HC INTERMEDIATE ICU ROOM DAILY

## 2024-03-08 PROCEDURE — 2500000004 HC RX 250 GENERAL PHARMACY W/ HCPCS (ALT 636 FOR OP/ED): Performed by: INTERNAL MEDICINE

## 2024-03-08 PROCEDURE — C1769 GUIDE WIRE: HCPCS

## 2024-03-08 PROCEDURE — 2500000001 HC RX 250 WO HCPCS SELF ADMINISTERED DRUGS (ALT 637 FOR MEDICARE OP)

## 2024-03-08 PROCEDURE — 2020000001 HC ICU ROOM DAILY

## 2024-03-08 PROCEDURE — 99152 MOD SED SAME PHYS/QHP 5/>YRS: CPT

## 2024-03-08 PROCEDURE — 99153 MOD SED SAME PHYS/QHP EA: CPT | Performed by: INTERNAL MEDICINE

## 2024-03-08 PROCEDURE — 80069 RENAL FUNCTION PANEL: CPT | Mod: MUE

## 2024-03-08 PROCEDURE — 2780000003 HC OR 278 NO HCPCS

## 2024-03-08 PROCEDURE — 82330 ASSAY OF CALCIUM: CPT

## 2024-03-08 PROCEDURE — 2500000004 HC RX 250 GENERAL PHARMACY W/ HCPCS (ALT 636 FOR OP/ED): Performed by: RADIOLOGY

## 2024-03-08 RX ORDER — MIDAZOLAM HYDROCHLORIDE 1 MG/ML
INJECTION INTRAMUSCULAR; INTRAVENOUS
Status: COMPLETED | OUTPATIENT
Start: 2024-03-08 | End: 2024-03-08

## 2024-03-08 RX ORDER — FENTANYL CITRATE 50 UG/ML
INJECTION, SOLUTION INTRAMUSCULAR; INTRAVENOUS AS NEEDED
Status: COMPLETED | OUTPATIENT
Start: 2024-03-08 | End: 2024-03-08

## 2024-03-08 RX ORDER — NICARDIPINE HYDROCHLORIDE 0.2 MG/ML
1-15 INJECTION INTRAVENOUS CONTINUOUS PRN
Status: DISCONTINUED | OUTPATIENT
Start: 2024-03-08 | End: 2024-03-11

## 2024-03-08 RX ORDER — MIDAZOLAM HYDROCHLORIDE 1 MG/ML
INJECTION INTRAMUSCULAR; INTRAVENOUS
Status: COMPLETED
Start: 2024-03-08 | End: 2024-03-08

## 2024-03-08 RX ORDER — FENTANYL CITRATE 50 UG/ML
INJECTION, SOLUTION INTRAMUSCULAR; INTRAVENOUS
Status: COMPLETED
Start: 2024-03-08 | End: 2024-03-08

## 2024-03-08 RX ORDER — FENTANYL CITRATE 50 UG/ML
INJECTION, SOLUTION INTRAMUSCULAR; INTRAVENOUS
Status: COMPLETED | OUTPATIENT
Start: 2024-03-08 | End: 2024-03-08

## 2024-03-08 RX ORDER — LIDOCAINE HYDROCHLORIDE 20 MG/ML
SOLUTION OROPHARYNGEAL AS NEEDED
Status: COMPLETED | OUTPATIENT
Start: 2024-03-08 | End: 2024-03-08

## 2024-03-08 RX ORDER — LABETALOL HYDROCHLORIDE 5 MG/ML
10 INJECTION, SOLUTION INTRAVENOUS EVERY 10 MIN PRN
Status: DISCONTINUED | OUTPATIENT
Start: 2024-03-08 | End: 2024-03-11

## 2024-03-08 RX ORDER — HYDRALAZINE HYDROCHLORIDE 20 MG/ML
10 INJECTION INTRAMUSCULAR; INTRAVENOUS
Status: DISCONTINUED | OUTPATIENT
Start: 2024-03-08 | End: 2024-03-11

## 2024-03-08 RX ORDER — MIDAZOLAM HYDROCHLORIDE 1 MG/ML
INJECTION INTRAMUSCULAR; INTRAVENOUS AS NEEDED
Status: COMPLETED | OUTPATIENT
Start: 2024-03-08 | End: 2024-03-08

## 2024-03-08 RX ADMIN — MIDAZOLAM HYDROCHLORIDE: 1 INJECTION, SOLUTION INTRAMUSCULAR; INTRAVENOUS at 09:30

## 2024-03-08 RX ADMIN — BENZOCAINE 4 SPRAY: 200 SPRAY DENTAL; ORAL; PERIODONTAL at 09:26

## 2024-03-08 RX ADMIN — MIDAZOLAM HYDROCHLORIDE 1 MG: 1 INJECTION, SOLUTION INTRAMUSCULAR; INTRAVENOUS at 09:32

## 2024-03-08 RX ADMIN — OXYCODONE HYDROCHLORIDE 5 MG: 5 TABLET ORAL at 21:43

## 2024-03-08 RX ADMIN — METOPROLOL TARTRATE 12.5 MG: 25 TABLET, FILM COATED ORAL at 20:11

## 2024-03-08 RX ADMIN — FENTANYL CITRATE 25 MCG: 50 INJECTION, SOLUTION INTRAMUSCULAR; INTRAVENOUS at 09:35

## 2024-03-08 RX ADMIN — FENTANYL CITRATE 25 MCG: 50 INJECTION, SOLUTION INTRAMUSCULAR; INTRAVENOUS at 09:38

## 2024-03-08 RX ADMIN — DAPTOMYCIN 700 MG: 500 INJECTION, POWDER, LYOPHILIZED, FOR SOLUTION INTRAVENOUS at 21:21

## 2024-03-08 RX ADMIN — FENTANYL CITRATE 25 MCG: 50 INJECTION, SOLUTION INTRAMUSCULAR; INTRAVENOUS at 15:42

## 2024-03-08 RX ADMIN — HEPARIN SODIUM 5000 UNITS: 5000 INJECTION INTRAVENOUS; SUBCUTANEOUS at 08:00

## 2024-03-08 RX ADMIN — MIDAZOLAM HYDROCHLORIDE 0.5 MG: 1 INJECTION, SOLUTION INTRAMUSCULAR; INTRAVENOUS at 09:35

## 2024-03-08 RX ADMIN — CEFEPIME 2 G: 1 INJECTION, SOLUTION INTRAVENOUS at 04:13

## 2024-03-08 RX ADMIN — FENTANYL CITRATE 25 MCG: 50 INJECTION, SOLUTION INTRAMUSCULAR; INTRAVENOUS at 09:47

## 2024-03-08 RX ADMIN — HEPARIN SODIUM 5000 UNITS: 5000 INJECTION INTRAVENOUS; SUBCUTANEOUS at 17:00

## 2024-03-08 RX ADMIN — OXYCODONE HYDROCHLORIDE 5 MG: 5 TABLET ORAL at 07:54

## 2024-03-08 RX ADMIN — MIDAZOLAM HYDROCHLORIDE 0.5 MG: 1 INJECTION, SOLUTION INTRAMUSCULAR; INTRAVENOUS at 09:38

## 2024-03-08 RX ADMIN — ACETAMINOPHEN 650 MG: 325 TABLET ORAL at 20:16

## 2024-03-08 RX ADMIN — FENTANYL CITRATE 25 MCG: 50 INJECTION, SOLUTION INTRAMUSCULAR; INTRAVENOUS at 09:32

## 2024-03-08 RX ADMIN — FENTANYL CITRATE: 50 INJECTION, SOLUTION INTRAMUSCULAR; INTRAVENOUS at 09:30

## 2024-03-08 RX ADMIN — CEFEPIME 2 G: 1 INJECTION, SOLUTION INTRAVENOUS at 13:14

## 2024-03-08 RX ADMIN — VANCOMYCIN HYDROCHLORIDE 1.25 G: 1.25 INJECTION, POWDER, LYOPHILIZED, FOR SOLUTION INTRAVENOUS at 14:20

## 2024-03-08 RX ADMIN — MIDAZOLAM HYDROCHLORIDE 1 MG: 1 INJECTION, SOLUTION INTRAMUSCULAR; INTRAVENOUS at 16:23

## 2024-03-08 RX ADMIN — FENTANYL CITRATE 25 MCG: 50 INJECTION, SOLUTION INTRAMUSCULAR; INTRAVENOUS at 16:23

## 2024-03-08 RX ADMIN — POTASSIUM PHOSPHATE, MONOBASIC POTASSIUM PHOSPHATE, DIBASIC 15 MMOL: 224; 236 INJECTION, SOLUTION, CONCENTRATE INTRAVENOUS at 10:42

## 2024-03-08 RX ADMIN — LIDOCAINE HYDROCHLORIDE 15 ML: 20 SOLUTION ORAL; TOPICAL at 09:26

## 2024-03-08 RX ADMIN — MIDAZOLAM HYDROCHLORIDE 1 MG: 1 INJECTION, SOLUTION INTRAMUSCULAR; INTRAVENOUS at 15:42

## 2024-03-08 ASSESSMENT — PAIN SCALES - GENERAL
PAINLEVEL_OUTOF10: 0 - NO PAIN
PAINLEVEL_OUTOF10: 6
PAINLEVEL_OUTOF10: 8
PAINLEVEL_OUTOF10: 0 - NO PAIN
PAINLEVEL_OUTOF10: 0 - NO PAIN
PAINLEVEL_OUTOF10: 3
PAINLEVEL_OUTOF10: 0 - NO PAIN

## 2024-03-08 ASSESSMENT — ENCOUNTER SYMPTOMS
GASTROINTESTINAL NEGATIVE: 1
CARDIOVASCULAR NEGATIVE: 1
MUSCULOSKELETAL NEGATIVE: 1
RESPIRATORY NEGATIVE: 1
NEUROLOGICAL NEGATIVE: 1
ENDOCRINE NEGATIVE: 1
EYES NEGATIVE: 1
FATIGUE: 1

## 2024-03-08 ASSESSMENT — PAIN - FUNCTIONAL ASSESSMENT
PAIN_FUNCTIONAL_ASSESSMENT: 0-10

## 2024-03-08 ASSESSMENT — COGNITIVE AND FUNCTIONAL STATUS - GENERAL
CLIMB 3 TO 5 STEPS WITH RAILING: A LOT
MOBILITY SCORE: 20
WALKING IN HOSPITAL ROOM: A LITTLE
HELP NEEDED FOR BATHING: A LITTLE
DAILY ACTIVITIY SCORE: 19
DRESSING REGULAR LOWER BODY CLOTHING: A LITTLE
STANDING UP FROM CHAIR USING ARMS: A LITTLE
TOILETING: TOTAL

## 2024-03-08 ASSESSMENT — PAIN DESCRIPTION - LOCATION
LOCATION: HEAD
LOCATION: HEAD

## 2024-03-08 NOTE — PROGRESS NOTES
Subjective   78 year-old female with past medical history of HTN, HLD, COPD, 3/1 s/p MIS L4-L5 TLIF. Admitted 3/5/2024 with SAH (subarachnoid hemorrhage) (CMS/HCC) after presenting with AMS, CTH trace right sylvian SAH. Admitted to NSU for further neurosurgical management.    Significant Events:  3/6: CTA negative, MRI with SAH otherwise negative, MRI lumbar spine negative   3/7: Atrial fibrillation with RVR, s/p IVF, s/p metoprolol, TTE EF 70-75%, MV mobile mass  3/8: CROW demonstrated large mitral valve mass, CT surgery consulted     Interval Events: No acute events overnight. Upgraded to NSU this morning for concern of endocarditis, plan to obtain CROW. Plan for angiogram today to rule out mycotic aneurysm. Will obtain MRI lumbar spine with/without IV contrast and CT face after angiogram to rule out infection.     Objective   Vitals 24 hour ranges:  Heart Rate:  []   Temp:  [35.9 °C (96.6 °F)-36.5 °C (97.7 °F)]   Resp:  [15-33]   BP: ()/(40-62)   Weight:  [72.4 kg (159 lb 9.8 oz)-73.4 kg (161 lb 13.1 oz)]   SpO2:  [94 %-100 %]      Physical Exam:  NEURO:  Alert, awake, AOx3, face symmetric, tongue midline  +FC x4, ART spontaneously, 5/5 strength, SILT  CV:  RRR on telemetry, NSR  RESP:  Regular, unlabored  NC  :  Voids  GI:  Abdomen NT/ND, soft  SKIN:  Lower back incision clean/dry/intact    Medications  Scheduled: PRN: Continuous:   cefepime, 2 g, intravenous, q8h  heparin (porcine), 5,000 Units, subcutaneous, q8h  metoprolol tartrate, 12.5 mg, oral, BID  perflutren protein A microsphere, 0.5 mL, intravenous, Once in imaging  sennosides-docusate sodium, 2 tablet, oral, BID  sulfur hexafluoride microsphr, 2 mL, intravenous, Once in imaging  vancomycin, 1,250 mg, intravenous, q12h     PRN medications: acetaminophen, hydrALAZINE, HYDROmorphone, labetaloL, niCARdipine, oxyCODONE, oxyCODONE, vancomycin niCARdipine, 1-15 mg/hr  sodium chloride 0.9%, 100 mL/hr, Last Rate: 100 mL/hr (03/07/24 9586)          Lab Results  Results from last 72 hours   Lab Units 03/08/24 0415 03/07/24 0226   GLUCOSE mg/dL 99 89   SODIUM mmol/L 131* 131*   POTASSIUM mmol/L 3.8 5.2   CHLORIDE mmol/L 101 101   CO2 mmol/L 26 18*   ANION GAP mmol/L 8* 17   BUN mg/dL 10 14   CREATININE mg/dL 0.44* 0.46*   EGFR mL/min/1.73m*2 >90 >90   CALCIUM mg/dL 8.4* 8.6   PHOSPHORUS mg/dL 2.1* 2.8   ALBUMIN g/dL 2.8* 3.0*   MAGNESIUM mg/dL 1.86 2.09   POCT CALCIUM IONIZED (MMOL/L) IN BLOOD mmol/L 1.11 1.10      Results from last 72 hours   Lab Units 03/08/24 0415 03/07/24 0226   WBC AUTO x10*3/uL 9.9 13.3*   NRBC AUTO /100 WBCs 0.0 0.0   RBC AUTO x10*6/uL 3.59* 3.96*   HEMOGLOBIN g/dL 9.9* 10.8*   HEMATOCRIT % 30.7* 34.6*   MCV fL 86 87   MCH pg 27.6 27.3   MCHC g/dL 32.2 31.2*   RDW % 15.0* 14.8*   PLATELETS AUTO x10*3/uL 304 319       Results from last 72 hours   Lab Units 03/08/24 0415 03/07/24 0226   PROTIME seconds 13.0* 14.2*   INR  1.2* 1.3*   APTT seconds 27 27        Imaging Results  CT angio head w and wo IV contrast    Result Date: 3/5/2024  EXAMINATION: CTA OF THE HEAD WITHOUT AND WITH CONTRAST 3/5/2024 10:01 am TECHNIQUE: CTA of the head/brain was performed without and with the administration of intravenous contrast. Multiplanar reformatted images are provided for review. MIP images are provided for review. Automated exposure control, iterative reconstruction, and/or weight based adjustment of the mA/kV was utilized to reduce the radiation dose to as low as reasonably achievable. COMPARISON: None HISTORY: ORDERING SYSTEM PROVIDED HISTORY: r/o CVA TECHNOLOGIST PROVIDED HISTORY: Reason for exam:->r/o CVA Additional Contrast?->Radiologist Recommendation What reading provider will be dictating this exam?->CRC FINDINGS: CT HEAD: BRAIN/VENTRICLES: There is hyperdense material in the arm region of the right operculum and the right insula consistent with acute subdural hemorrhage.  There is no significant mass effect. Grey-white  differentiation is maintained.  No evidence of mass, mass effect or midline shift. No evidence of hydrocephalus. ORBITS: The visualized portion of the orbits demonstrate no acute abnormality. SINUSES:  The visualized paranasal sinuses and mastoid air cells demonstrate no acute abnormality. SOFT TISSUES/SKULL: No acute abnormality of the visualized skull or soft tissues. CTA HEAD: ANTERIOR CIRCULATION: No significant stenosis of the intracranial internal carotid, anterior cerebral, or middle cerebral arteries. No aneurysm. POSTERIOR CIRCULATION: No significant stenosis of the vertebral, basilar, or posterior cerebral arteries. No aneurysm. OTHER: No dural venous sinus thrombosis on this non-dedicated study.    1. There is hyperdense material in the right insula and operculum consistent with a small amount of acute subdural hemorrhage. 2. Unremarkable CTA of the head.    CT lumbar spine wo IV contrast    Result Date: 3/2/2024  EXAMINATION: CT OF THE LUMBAR SPINE WITHOUT CONTRAST  3/2/2024 TECHNIQUE: CT of the lumbar spine was performed without the administration of intravenous contrast. Multiplanar reformatted images are provided for review. Adjustment of mA and/or kV according to patient size was utilized.  Automated exposure control, iterative reconstruction, and/or weight based adjustment of the mA/kV was utilized to reduce the radiation dose to as low as reasonably achievable. COMPARISON: None HISTORY: ORDERING SYSTEM PROVIDED HISTORY: pain TECHNOLOGIST PROVIDED HISTORY: Reason for exam:->pain What reading provider will be dictating this exam?->CRC FINDINGS: BONES/ALIGNMENT: There findings of recent posterior metallic fusion at L4-5. Interbody disc spacer/fusion device at L4-5 appears to be in appropriate position.  There is evidence of a partial facetectomy on the left at L4.  No acute compression fractures detected.  No osseous narrowing of the canal. Significant streak artifact is caused by the hardware.  DEGENERATIVE CHANGES: Minimal facet arthropathy at L3-4 bilaterally and L5-S1 on the right. SOFT TISSUES/RETROPERITONEUM: No paraspinal mass is seen.  No abnormal paraspinous or posterior soft tissue fluid collections.    1. Findings of recent posterior metallic fusion at L4-5.  No CT evidence of acute complication. 2. No acute compression fractures detected. 3. No osseous narrowing of the canal. 4. Minimal degenerative changes.    FLUORO FOR SURGICAL PROCEDURES    Result Date: 3/1/2024  EXAMINATION: SPOT FLUOROSCOPIC IMAGES 3/1/2024 6:43 am TECHNIQUE: Fluoroscopy was provided by the radiology department for procedure. Radiologist was not present during examination. FLUOROSCOPY DOSE AND TYPE: Radiation Exposure Index: Fluoroscopy time equals 1.7 minutes.  Total dose equals 112.98 mGy, COMPARISON: None HISTORY: ORDERING SYSTEM PROVIDED HISTORY: pain TECHNOLOGIST PROVIDED HISTORY: Reason for exam:->pain What reading provider will be dictating this exam?->CRC Intraprocedural imaging. FINDINGS: 3 adopt spot images were obtained.    Intraprocedural fluoroscopic spot images as above.  See separate procedure report for more information.     Assessment/Plan   78 y.o. female with PMH HTN, HLD, COPD. 3/1 s/p MIS L4-L5 TLIF. Admitted 3/5/2024 with SAH (subarachnoid hemorrhage) (CMS/HCC) after presenting with AMS, CTH trace R sylvian SAH. Patient admitted to NSU for further neurosurgical management.     NEURO:  #Right sylvian SAH, encephalopathy  #S/p MIS L4-L5 TLIF (3/1/24)  Assessment:  Neurologically: Alert, awake, AOx3, +FC x4, ART spontaneously, 5/5 strength, SILT  3/6: CTA negative for aneurysm, MRI with SAH otherwise negative, MRI lumbar spine negative   Plan:  NSU  Q1H neuro checks  Angiogram today to rule out mycotic aneurysm   MRI lumbar spine with/without IV contrast and CT face  Pain: acetaminophen, oxycodone, hydromorphone PRN  Nausea: none  PT/OT/SLP    CARDIOVASCULAR:  #HTN, HLD, atrial  fibrillation  Assessment:  Atrial fibrillation, rate controlled  Plan:  Continue to monitor on telemetry  CROW demonstrated large mitral valve mass, CT surgery consulted  Appreciate cardiology and EP recommendations  Hold off on AC for now and likely initiate on outpatient basis when it's more safe to initiate    Continue metoprolol tartrate 12.5mg BID  Follow-up with General Cardiology on discharge for atrial fibrillation management   Goal SBP < 140 - Nicardipine, Hydralazine, and Labetalol PRN    RESPIRATORY:  #COPD  Assessment:  NC  Plan:  Continuous pulse oximetry   O2 PRN to maintain SpO2 > 94%, wean as tolerated  Incentive spirometry while awake     RENAL/:  #No active issues  Assessment:  BUN/Cr at OSH: 9/0.60  Results from last 7 days   Lab Units 24   BUN mg/dL 10 14   CREATININE mg/dL 0.44* 0.46*   Plan:  Monitor with daily RFP  Bladder scan and straight cath per ICU protocol     FEN/GI:  #Hyponatremia (Acute on Chronic)  Assessment:  Last BM Date: 24  Na at OSH: 131, baseline 135  Results from last 7 days   Lab Units 24   SODIUM mmol/L 131* 131*   Plan:  Monitor and replace electrolytes per protocol, optimal lytes: K>4, Mag>2   Diet: NPO  IVF: NS @ 100 mL/hour  Bowel Regimen: Malika-Colace    ENDOCRINE:  #No active issues  Assessment:  BG: <150  Plan:  Add Accuchecks & ISS Q6H if BG>150  Hypoglycemia protocol     HEMATOLOGY:  #Mild anemia  Assessment:  Results from last 7 days   Lab Units 24   HEMOGLOBIN g/dL 9.9* 10.8*   HEMATOCRIT % 30.7* 34.6*   PLATELETS AUTO x10*3/uL 304 319   Plan:  Continue to monitor with daily CBC and Coag panel    INFECTIOUS DISEASE:  #Concern for endocarditis  Assessment:  Results from last 7 days   Lab Units 24   WBC AUTO x10*3/uL 9.9 13.3*   Temp (24hrs), Av.1 °C (97 °F), Min:35.9 °C (96.6 °F), Max:36.5 °C (97.7 °F)  Plan:  Continue to monitor for s/sx of  infection  ID consult for possible endocarditis, blood cultures gram positive cocci  Continue cefepime 2g Q8H and vancomycin 1.25g Q12H  Pan culture for temperature > 38.4 C    MUSCULOSKELETAL:  No acute issues    SKIN:  No acute issues  Turns and skin care per NSU protocol    ACCESS:  PIVs     PROPHYLAXIS:  DVT/VTE: SCDs, SQH  GI: not indicated     RESTRAINTS:  Not indicated/Patient does not meet criteria for restraints    Jim Ashraf, APRN-CNP  Neuroscience Intensive Care    Total critical care time of 60 minutes, with > 50% of time spent in direct contact with patient/family for education, counseling and coordination of care.

## 2024-03-08 NOTE — PROGRESS NOTES
"Fannie Watson is a 78 y.o. female on day 3 of admission presenting with SAH (subarachnoid hemorrhage) (CMS/HCC).    Subjective   No acute events overnight    Objective     Physical Exam  A&Ox3  Face symmetric  Tongue midline  Follows command x4 5/5  SILT    Last Recorded Vitals  Blood pressure 119/52, pulse 79, temperature 35.9 °C (96.6 °F), temperature source Temporal, resp. rate (!) 33, height 1.57 m (5' 1.81\"), weight 73.4 kg (161 lb 13.1 oz), SpO2 94 %.  Intake/Output last 3 Shifts:  I/O last 3 completed shifts:  In: 3242.5 (44.2 mL/kg) [P.O.:360; I.V.:1882.5 (25.6 mL/kg); IV Piggyback:1000]  Out: 2000 (27.2 mL/kg) [Urine:2000 (0.8 mL/kg/hr)]  Weight: 73.4 kg     Relevant Results    Assessment/Plan   Principal Problem:    SAH (subarachnoid hemorrhage) (CMS/HCC)    Patient is a 78 year old female with h/o HTN, HLD, COPD, 3/1 s/p MIS L4-5 TLIF, p/w AMS, 3/5 CTH trace R sylvian SAH    3/6 CTA negative, MRI w/ SAH otherwise negative, MRI L-spine neg for   3/7 afib w/ RVR, s/p IVF, s/p metop, TTE EF 70-75%, MV mobile mass     Plan:     [facility]U  CROW to eval for possible endocarditis  Appreciate cards and EP recs-started on metop, CROW  Angiogram today to rule out mycotic aneurysm  Appreicate ID recs  Vanc, cefepime  SLP eval  PTOT  SCDS, SQH      Nevaeh Carey MD      "

## 2024-03-08 NOTE — CONSULTS
Inpatient consult to Infectious Diseases  Consult performed by: Meek Juarez MD  Consult ordered by: Nissa Samaniego MD        Referred by Dr. Agustin Lane MD: No primary care provider on file.    Reason For Consult  Concern for endocarditis    History Of Present Illness  Fannie Watson is a 78 y.o. female presenting with h/o HTN, HLD, COPD, 3/1 s/p MIS L4-5 TLIF, p/w AMS, 3/5 CTH trace R sylvian SAH. ID consulted for concern for endocarditis.     Patient was a transfer from OSH. She was admitted there on 3/1/24. Had recent L4-L5 decompression laminectomy and fusion. Due to confusion CT head was completed and showed SAH. Transferred to  on 3/5 with concern for SAH.  Paitent was managed conservatively. CTA was negative, MRI negative, MRI L spine was negative. EP was consulted for new Afib. At that time echo was recommended. Patient did have echo due to the stroke and showed mitral valve mobile mass. Patient did get blood cultures that are GPC on gram stain. Cardiology recommending CROW.    Patient notes that she was not feeling poorly prior to the hospital. She did have fever in the hospital. Currently she feels slightly better    Past Medical History  She has a past medical history of COPD (chronic obstructive pulmonary disease) (CMS/HCC) and Hypertension.    Surgical History  She has no past surgical history on file.     Social History     Occupational History    Not on file   Tobacco Use    Smoking status: Not on file    Smokeless tobacco: Not on file   Substance and Sexual Activity    Alcohol use: Not on file    Drug use: Not on file    Sexual activity: Not on file     Travel History   Travel since 02/08/24    No documented travel since 02/08/24            Family History  No family history on file.  Allergies  Coconut and Codeine     Immunization History   Administered Date(s) Administered    Moderna COVID-19 vaccine, bivalent, blue cap/gray label *Check age/dose* 09/29/2022    Moderna SARS-CoV-2  Vaccination 03/02/2021, 03/30/2021, 11/20/2021, 04/19/2022     Medications  Home medications:  No medications prior to admission.     Current medications:  Scheduled medications  daptomycin, 10 mg/kg, intravenous, q24h HORTENSIA  heparin (porcine), 5,000 Units, subcutaneous, q8h  metoprolol tartrate, 12.5 mg, oral, BID  perflutren protein A microsphere, 0.5 mL, intravenous, Once in imaging  sennosides-docusate sodium, 2 tablet, oral, BID  sulfur hexafluoride microsphr, 2 mL, intravenous, Once in imaging      Continuous medications  niCARdipine, 1-15 mg/hr  sodium chloride 0.9%, 100 mL/hr, Last Rate: 100 mL/hr (03/07/24 2350)      PRN medications  PRN medications: acetaminophen, hydrALAZINE, HYDROmorphone, labetaloL, niCARdipine, oxyCODONE, oxyCODONE    Review of Systems     Review of systems otherwise negative    Objective  Range of Vitals (last 24 hours)  Heart Rate:  [69-99]   Temp:  [35.9 °C (96.6 °F)-36.5 °C (97.7 °F)]   Resp:  [11-33]   BP: ()/()   Weight:  [72.4 kg (159 lb 9.8 oz)]   SpO2:  [93 %-100 %]   Daily Weight  03/08/24 : 72.4 kg (159 lb 9.8 oz)    Body mass index is 29.37 kg/m².     Physical Exam   General: in no acute distress, able to answer questions  HEENT: no conjunctival injection. anicteric.  CVS: RRR. Normal S1 and S2. Audible murmer present  RESP: ctab no w/r/r, no increased wob  Abd: Soft and lax. ND.   Ext: No swelling of the LE b/l.   Neuro: Answers questions appropriately.  Integumentary: no obvious lesions   Rheumatologic: No joint swelling or edema    Relevant Results  Labs  Results from last 72 hours   Lab Units 03/08/24  0415 03/07/24  0226 03/06/24  0014   WBC AUTO x10*3/uL 9.9 13.3* 16.6*   HEMOGLOBIN g/dL 9.9* 10.8* 11.3*   HEMATOCRIT % 30.7* 34.6* 35.6*   PLATELETS AUTO x10*3/uL 304 319 319     Results from last 72 hours   Lab Units 03/08/24  1713 03/08/24  1423 03/08/24  0415 03/07/24  0226   SODIUM mmol/L 130* 129* 131* 131*   POTASSIUM mmol/L  --  3.9 3.8 5.2   CHLORIDE  "mmol/L  --  97* 101 101   CO2 mmol/L  --  27 26 18*   BUN mg/dL  --  8 10 14   CREATININE mg/dL  --  0.47* 0.44* 0.46*   GLUCOSE mg/dL  --  90 99 89   CALCIUM mg/dL  --  8.5* 8.4* 8.6   ANION GAP mmol/L  --  9* 8* 17   EGFR mL/min/1.73m*2  --  >90 >90 >90   PHOSPHORUS mg/dL  --  3.6 2.1* 2.8     Results from last 72 hours   Lab Units 03/08/24  1423 03/08/24  0415 03/07/24  0226 03/06/24  1547   ALK PHOS U/L  --   --   --  121   BILIRUBIN TOTAL mg/dL  --   --   --  1.3*   BILIRUBIN DIRECT mg/dL  --   --   --  0.3   PROTEIN TOTAL g/dL  --   --   --  6.0*   ALT U/L  --   --   --  84*   AST U/L  --   --   --  68*   ALBUMIN g/dL 3.0* 2.8* 3.0* 3.2*  3.2*     Estimated Creatinine Clearance: 94.5 mL/min (A) (by C-G formula based on SCr of 0.47 mg/dL (L)).  No results found for: \"CRP\", \"SEDRATE\"  No results found for: \"HIV1X2\", \"HIVCONF\", \"[id number]\"  No results found for: \"HEPCABINIT\", \"HEPCAB\", \"HCVPCRQUANT\"  Microbiology  Susceptibility data from last 90 days.  Collected Specimen Info Organism   03/07/24 Blood culture from Peripheral Venipuncture Enterococcus faecium   03/07/24 Blood culture from Peripheral Venipuncture Enterococcus faecium   03/07/24 Blood culture from Peripheral Venipuncture Enterococcus faecium     3/7 BC GPC in clusters    Imaging    CTA head and neck (3/5)  CT HEAD:  1. Small volume subarachnoid hemorrhage predominantly involving the  region of the right sylvian fissure with small components along the  adjacent right cerebral hemisphere.  2. No large territory ischemic infarction, mass effect, or midline  shift.      CTA HEAD:  1. No large branch vessel cut off or flow-limiting stenosis within  the visualized cervical or intracranial vasculature.  2. No discrete aneurysm identified with attention to the right MCA  region.  3. Mild scattered atherosclerosis.    TTE  CONCLUSIONS:   1. Left ventricular systolic function is hyperdynamic with a 70-75% estimated ejection fraction.   2. Spectral Doppler " shows an impaired relaxation pattern of left ventricular diastolic filling.   3. Anterior leaflet mobile mass, consider vegetation, consider CROW.   4. Mild to moderate mitral valve regurgitation.    CROW  CONCLUSIONS:   1. Left ventricular systolic function is normal with a 65-70% estimated ejection fraction.   2. There is an elongated (2.5 x 0.3 cm), filamentous mass that is likely a vegetation attached to the atrial side of the posterior leaflet. There is a resultant eccentric mitral regurgitation that is anteriorly directed. Using PISA, ERO: 0.34 cmï¿½ with a regurg volume of 48.62 ml/beat. Although difficult to see, there does not appear to be a perforation. There is severe blunting of all pulmonary vein inflow with early temporary reversal in the right inferior pulmonary vein.   3. Moderate to severe mitral valve regurgitation.   4. The pulmonary artery is not well visualized.     Assessment/Plan   Fannie Watson is a 78 y.o. female presenting with h/o HTN, HLD, COPD, 3/1 s/p MIS L4-5 TLIF, p/w AMS, 3/5 CTH trace R sylvian SAH. ID consulted for concern for endocarditis.     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 Patient likely with endocarditis. CROW showing mitral vegetation and regurgitation. The concern is with the SAH this may have been an embolic phenomenon    -Agree with evaluation to see if SAH is mycotic in nature and Mri evaluation of the lumbar site given recent surgery  -Agree with CT surgery evaluation  -Stop vanc and cefepime  -Start daptomycin 10 mg/kg every 24 hours given that enterococcus faecium is much more likely to be resistant    ID will continue to follow. If any questions regarding this patient please call Team pager.  Discussed with Dr. Reggie Juarez  Infectious disease, PGY V  Team B pager [phone]  General ID pager  [phone]

## 2024-03-08 NOTE — CONSULTS
"Reason for Consult: MV endocarditis     CARDIAC SURGERY CONSULT NOTE    HISTORY OF PRESENT ILLNESS  Fannie Watson is a 78 y.o. female presenting with h/o HTN, HLD, COPD, 3/1 s/p MIS L4-5 TLIF, p/w AMS, 3/5 CTH trace R sylvian SAH. 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      Patient was transferred from [facility] in [place]. She was admitted there on 3/1/24 for an elective L4-L5 decompression laminectomy and fusion. Due to confusion CT head was completed and showed SAH. She was then transferred to  on 3/5 with concern for SAH.     The patient has been managed conservatively for her SAH. CTA was negative, MRI negative, MRI L spine was negative. EP was consulted for new Afib. EP recommended an echo be done given the new afib. The TTE was concerning for a MV mass. 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     A CROW was obtained to better visualize the mass/vegetation and mitral valve. CROW showed a large mobile vegetation on the MV and mod/severe MR.     Patient and family state that prior to her planned surgery 3/1 she was fairly active, doing her own grocery shopping and ADLs. She lives with her .     Cardiac surgery consulted for surgical evaluation.       Subjective   Past Medical History:   Diagnosis Date    COPD (chronic obstructive pulmonary disease) (CMS/[facility])     Hypertension      History reviewed. No pertinent surgical history.     No family history on file.    Coconut and Codeine    Prior to Admission medications    Not on File       Review of Systems  Review of Systems   Constitutional:  Positive for fatigue.   HENT: Negative.     Eyes: Negative.    Respiratory: Negative.     Cardiovascular: Negative.    Gastrointestinal: Negative.    Endocrine: Negative.    Musculoskeletal: Negative.    Neurological: Negative.            Objective   /61   Pulse 84   Temp 35.9 °C (96.6 °F) (Temporal)   Resp 18   Ht 1.57 m (5' 1.81\")   Wt 72.4 kg (159 lb " 9.8 oz)   LMP  (LMP Unknown)   SpO2 99%   BMI 29.37 kg/m²   Pain Score: 8   Vitals:    03/08/24 0600   Weight: 72.4 kg (159 lb 9.8 oz)          Intake/Output Summary (Last 24 hours) at 3/8/2024 1242  Last data filed at 3/8/2024 0600  Gross per 24 hour   Intake 966.67 ml   Output --   Net 966.67 ml       Physical Exam  Physical Exam  Constitutional:       General: She is not in acute distress.     Appearance: She is not ill-appearing.   HENT:      Head: Normocephalic.      Mouth/Throat:      Mouth: Mucous membranes are moist.   Cardiovascular:      Rate and Rhythm: Normal rate and regular rhythm.      Heart sounds: Murmur heard.   Pulmonary:      Effort: Pulmonary effort is normal.      Breath sounds: Normal breath sounds.   Musculoskeletal:         General: Normal range of motion.      Cervical back: Neck supple.      Right lower leg: No edema.      Left lower leg: No edema.   Skin:     General: Skin is warm and dry.   Neurological:      General: No focal deficit present.      Mental Status: She is alert and oriented to person, place, and time.   Psychiatric:         Mood and Affect: Mood normal.         Behavior: Behavior normal.         Medications  Scheduled medications  cefepime, 2 g, intravenous, q8h  heparin (porcine), 5,000 Units, subcutaneous, q8h  metoprolol tartrate, 12.5 mg, oral, BID  perflutren protein A microsphere, 0.5 mL, intravenous, Once in imaging  potassium phosphate, 15 mmol, intravenous, Once  sennosides-docusate sodium, 2 tablet, oral, BID  sulfur hexafluoride microsphr, 2 mL, intravenous, Once in imaging  vancomycin, 1,250 mg, intravenous, q12h    Continuous medications  niCARdipine, 1-15 mg/hr  sodium chloride 0.9%, 100 mL/hr, Last Rate: 100 mL/hr (03/07/24 2350)    PRN medications  PRN medications: acetaminophen, benzocaine, fentaNYL, hydrALAZINE, HYDROmorphone, labetaloL, lidocaine, midazolam, niCARdipine, oxyCODONE, oxyCODONE, vancomycin    Labs  Results for orders placed or performed  during the hospital encounter of 03/05/24 (from the past 24 hour(s))   Transthoracic Echo (TTE) Complete   Result Value Ref Range    AV pk albaro 1.68 m/s    LVOT diam 2.00 cm    LV biplane EF 74 %    MV E/A ratio 0.69     Tricuspid annular plane systolic excursion 3.0 cm    RV free wall pk S' 15.10 cm/s    RVSP 31.5 mmHg    Aortic Valve Area by Continuity of Peak Velocity 2.54 cm2    AV pk grad 11.3 mmHg    LV A4C EF 74.3    Blood Culture    Specimen: Peripheral Venipuncture; Blood culture   Result Value Ref Range    Blood Culture       Identification and susceptibility testing to follow    Gram Stain Gram positive cocci, pairs and chains (AA)    Blood Culture    Specimen: Peripheral Venipuncture; Blood culture   Result Value Ref Range    Blood Culture       Identification and susceptibility testing to follow    Gram Stain Gram positive cocci, pairs and chains (AA)     Gram Stain Gram positive cocci, pairs and chains (AA)    Blood Culture    Specimen: Peripheral Venipuncture; Blood culture   Result Value Ref Range    Blood Culture       Identification and susceptibility testing to follow    Gram Stain Gram positive cocci, pairs and chains (AA)    Vancomycin   Result Value Ref Range    Vancomycin 15.2 5.0 - 20.0 ug/mL   Calcium, Ionized   Result Value Ref Range    POCT Calcium, Ionized 1.11 1.1 - 1.33 mmol/L   CBC   Result Value Ref Range    WBC 9.9 4.4 - 11.3 x10*3/uL    nRBC 0.0 0.0 - 0.0 /100 WBCs    RBC 3.59 (L) 4.00 - 5.20 x10*6/uL    Hemoglobin 9.9 (L) 12.0 - 16.0 g/dL    Hematocrit 30.7 (L) 36.0 - 46.0 %    MCV 86 80 - 100 fL    MCH 27.6 26.0 - 34.0 pg    MCHC 32.2 32.0 - 36.0 g/dL    RDW 15.0 (H) 11.5 - 14.5 %    Platelets 304 150 - 450 x10*3/uL   Coagulation Screen   Result Value Ref Range    Protime 13.0 (H) 9.8 - 12.8 seconds    INR 1.2 (H) 0.9 - 1.1    aPTT 27 27 - 38 seconds   Magnesium   Result Value Ref Range    Magnesium 1.86 1.60 - 2.40 mg/dL   Renal Function Panel   Result Value Ref Range    Glucose  99 74 - 99 mg/dL    Sodium 131 (L) 136 - 145 mmol/L    Potassium 3.8 3.5 - 5.3 mmol/L    Chloride 101 98 - 107 mmol/L    Bicarbonate 26 21 - 32 mmol/L    Anion Gap 8 (L) 10 - 20 mmol/L    Urea Nitrogen 10 6 - 23 mg/dL    Creatinine 0.44 (L) 0.50 - 1.05 mg/dL    eGFR >90 >60 mL/min/1.73m*2    Calcium 8.4 (L) 8.6 - 10.6 mg/dL    Phosphorus 2.1 (L) 2.5 - 4.9 mg/dL    Albumin 2.8 (L) 3.4 - 5.0 g/dL       Cardiac Testing  CROW 3/7/24  CONCLUSIONS:   1. Left ventricular systolic function is normal with a 65-70% estimated ejection fraction.   2. There is an elongated (2.5 x 0.3 cm), filamentous mass that is likely a vegetation attached to the atrial side of the posterior leaflet. There is a resultant eccentric mitral regurgitation that is anteriorly directed. Using PISA, ERO: 0.34 cmï¿½ with a regurg volume of 48.62 ml/beat. Although difficult to see, there does not appear to be a perforation. There is severe blunting of all pulmonary vein inflow with early temporary reversal in the right inferior pulmonary vein.   3. Moderate to severe mitral valve regurgitation.   4. The pulmonary artery is not well visualized.          ASSESSMENT & PLAN  Fannie Watson is a 78 y.o. female presenting with h/o HTN, HLD, COPD, 3/1 s/p MIS L4-5 TLIF, p/w AMS, 3/5 CTH trace R sylvian SAH. 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      Patient was transferred from [facility] in [place]. She was admitted there on 3/1/24 for an elective L4-L5 decompression laminectomy and fusion. Due to confusion CT head was completed and showed SAH. She was then transferred to  on 3/5 with concern for SAH.     The patient has been managed conservatively for her SAH. CTA was negative, MRI negative, MRI L spine was negative. EP was consulted for new Afib. EP recommended an echo be done given the new afib. The TTE was concerning for a MV mass. 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     A CROW was obtained to  better visualize the mass/vegetation and mitral valve. CROW showed a large mobile vegetation on the MV and mod/severe MR.     Patient and family state that prior to her planned surgery 3/1 she was fairly active, doing her own grocery shopping and ADLs. She lives with her .     Cardiac surgery consulted for surgical evaluation.       RECOMMENDATIONS  - Dr. Chaudhari aware of patient and will review imaging and data  - CROW does show a vegetation on the MV along with mod/severe MR  - However, patient with current SAH and not cleared for anticoagulation; will need neurology to weigh in on when patient can be safely anticoagulated.   - Please consult infectious disease; will need source control with IV antibiotics   - Will need CTA head/chest/abdomen/pelvis to r/o mycotic aneurysms, infection etc.   - Preop risk stratification studies/labs/UA/PFTs and vascular ultrasounds to be ordered when/if surgical plan decided   - If surgery is decided, patient would need a LHC prior to any cardiac surgery intervention to evaluate her coronary arteries   - Will need evaluation for dental carries with Panorex or CT facial bones; pending the results may need dental consult       When/if goes to surgery:  - Continue ASA, high-intensity statin, and BB (or document contraindications for use)  - No ACEi/ARBs in the pre-op period (at least 48 hours)  - Hold any SGLT2 inhibitors (Farxiga, Jardiance, etc.) for at least 3 days prior to cardiac surgery to prevent euglycemic DKA  - No antiplatelets other than ASA, no anticoagulants other than Heparin  - NPO after midnight, blood on hold/T&S, and preop scrubs only to be ordered once OR date is established      Will continue to follow along.  Thank you for the consultation.   Patient educated and all questions answered.  Please page the cardiac surgery consult pager [phone] with any questions or changes in patient condition.      Delma Cantu PA-C  Cardiac Surgery Consult [facility]  [facility]  Cardiac Surgery Consult Pager [phone]     3/8/2024  12:42 PM

## 2024-03-08 NOTE — CONSULTS
Vancomycin Dosing by Pharmacy- Cessation of Therapy    Consult to pharmacy for vancomycin dosing has been discontinued by the prescriber, pharmacy will sign off at this time.    Please call pharmacy if there are further questions or re-enter a consult if vancomycin is resumed.     Melchor Suh, AnshulD

## 2024-03-08 NOTE — PROGRESS NOTES
Communication Note    Patient Name: Fannie Watson  MRN: [id number]  Today's Date: 3/8/2024     Discipline: Occupational Therapy      Missed Visit: Yes  Missed Visit Reason:  (Pt off the floor at this time.)      03/08/24 at 3:31 PM   Gina Alvarado OT   Rehab Office: [phone]

## 2024-03-08 NOTE — CONSULTS
"Vancomycin Dosing by Pharmacy- FOLLOW UP    Fannie Watson is a 78 y.o. year old female who Pharmacy has been consulted for vancomycin dosing for endocarditis/endovascular infection. Based on the patient's indication and renal status this patient is being dosed based on a goal AUC of 500-600.     Renal function is currently stable.    Current vancomycin dose: 1250 mg given every 12 hours    Estimated vancomycin AUC on current dose: 586 mg/L.hr     Visit Vitals  BP (!) 103/40   Pulse 69   Temp 36 °C (96.8 °F) (Temporal)   Resp 22        Lab Results   Component Value Date    CREATININE 0.44 (L) 03/08/2024    CREATININE 0.46 (L) 03/07/2024    CREATININE 0.46 (L) 03/06/2024    CREATININE 0.45 (L) 03/06/2024        Patient weight is 72.4 kg    I/O last 3 completed shifts:  In: 2959.2 (40.9 mL/kg) [P.O.:360; I.V.:1749.2 (24.2 mL/kg); IV Piggyback:850]  Out: 1125 (15.5 mL/kg) [Urine:1125 (0.4 mL/kg/hr)]  Weight: 72.4 kg   [unfilled]    No results found for: \"PATIENTTEMP\"     Assessment/Plan    Within goal AUC range. Continue current vancomycin regimen.    This dosing regimen is predicted by [employer] to result in the following pharmacokinetic parameters:  Loading dose: N/A  Regimen: 1250 mg IV every 12 hours.  Start time: 11:50 on 03/08/2024  Exposure target: AUC24 (range)400-600 mg/L.hr   AUC24,ss: 586 mg/L.hr  Probability of AUC24 > 400: 95 %  Ctrough,ss: 16.2 mg/L  Probability of Ctrough,ss > 20: 27 %  Probability of nephrotoxicity (Lodise UNA 2009): 12 %    The next level will be obtained on 3/12 with AM labs. May be obtained sooner if clinically indicated.   Will continue to monitor renal function daily while on vancomycin and order serum creatinine at least every 48 hours if not already ordered.  Follow for continued vancomycin needs, clinical response, and signs/symptoms of toxicity.       Jeffrey Horton, PharmD           "

## 2024-03-08 NOTE — PRE-PROCEDURE NOTE
Detail Level: Zone Pre-Procedure H&P     Provider Assessment:  Diagnosis/Reason for Procedure: concern for mycotic aneurysms  Procedure: Diagnostic Cerebral Angiogram  Medications Reviewed:   yes   Prophylatic Antibiotics Needed:   no    Neuro status: A&Ox3, difficult with complex commands or questions. moving all extremities full strength   Mouth Opening OK: yes   Neck Flexibility OK: yes   Sedation Plan: moderate sedation   COVID-19 Risk Consent:  Surgeon has reviewed key risks related to the risk of nick COVID-19 and if they contract COVID-19 what the risks are.

## 2024-03-08 NOTE — CARE PLAN
The patient's goals for the shift include ELDA    The clinical goals for the shift include Pt's neuro exam will remain WNL    Over the shift, the patient did not make progress toward the following goals. Barriers to progression include none. Recommendations to address these barriers include continue plan of care.

## 2024-03-08 NOTE — PROCEDURES
Pre-Procedure Verification and Time Out:  Procedure Location: procedure area  HUDDLE - Pre-procedure Verification:  completed  TIME OUT - Final Verification:  completed immediately prior to procedure start  DEBRIEF: completed    Complications:  None; patient tolerated the procedure well.     Disposition: NSU  Condition: stable  Specimens Collected: No specimens collected    General Information:   Anesthesia/ sedation: Non-Anesthesia  Indication(s)/Pre - Procedure Diagnoses: SAH, concern for mycotic aneurysms  Post-Procedure Diagnosis: No evidence of mycotic aneurysms  Procedure Name: Diagnostic Cerebral Angiogram  Procedure performed by: Makayla  Assistant(s): Ronald  Estimated Blood Loss (mL): 15  Specimen: no  Informed Consent: consent obtained and in chart    Patient was prepped and draped in sterile fashion  Sterile prep: Chlorhexidine   Positioning: Supine  Medications given during procedure: 8ml topical lidocaine, 2mg versed, 50mcg lidocaine    Access: 5 Fr Sheath in R CFA  Closure: Mynx  Vessels Injected: R ICA, L vert, L ICA, R vert  Moderate Sedation Time: 45 mins  Findings: No evidence of mycotic aneurysm. Full report to follow in PACS dictation

## 2024-03-08 NOTE — H&P (VIEW-ONLY)
Reason For Consult  Moderate/Severe mitral regurgitation    History Of Present Illness  Fannie Watson is a 78 y.o. female with a PMH of hypertension, hyperlipidemia, COPD, spondylolisthesis of lumbar lesion and status post L4-L5 decompression laminectomy and fusion on 3/1/2024 (at [facility]) who was transferred from [facility] ([facility]) on 3/5/24 with altered mentation and trace R sylvian SAH on CT head for further management by neurosurgery in the NSU.     She has had new onset paroxysmal afib with RVR this admission. EP-cardiology was consulted and recommended TTE. TTE suggested a vegetation on the mitral valve so CROW was performed and showed 2.5 cm mobile vegetation with moderate to severe mitral regurgitation. MRI showed SAH in R sylvian fissue and and subacute infarcts in R parieto-temporal lobe. Blood cultures draw 3/7 are positive for E. faecium.      Per the patient, her family and chart review:  Prior to admission 3/1, patient has been in and out of the ED since Christmas for CP, chest tightness, SOB and worsening back pain.  - 1/2/24 ED visit she reported midsternal CP radiating to upper back with dysphagia x1 week and decreased apetite x1 month  - Given steroids for low back pain in early January with no improvement  - 1/19/24 Admitted to [facility] for low back pain and leukocytosis. CT showed DJD severe at L4-L5 with grade 2 anterolisthesis and associated discogenic disease causing moderate to severe central canal stenosis. Discharged 1/21/24 with steroid taper, increased Lyrica dose, pain meds and scheduled follow up with PT and orthopedics.     Patient also reports she had a scope of her esophagus to evaluate her chest pain, likely after the 1/2/24 ED visit. She had a cough that was not improving after a endoscope for her esophageal discomfort. She received a z-pack (azythromycin) to help with her COPD abotu 3 weeks before 3/1. She reports increasing edema and SOB about 2 weeks before the  "surgery. She was given  a short course of a \"water pill\" (possibly lasix) which improved the edema.   She had a syncopal episode also about 2 weeks before the surgery. It occurred in the early morning and she does not remember the event at all. Her  reports he went downstairs for 20-30 minutes to make coffee and when he came back up he tripped over her in the door of their bedroom. She had not urinated on herself or bit her tongue and was alert after he woke her.     Denies fevers chills nausea and vomiting prior to admission.     Cardiac History  TTE (12/18/19 -[facility])  CONCLUSIONS:   - Exam indication: Routine surveillance (>1yr) of known cardiomyopathy without a change in clinical status   - The left ventricle is normal in size. Left ventricular systolic function is   normal. EF = 62 ± 5% (2D biplane) Grade I left ventricular diastolic dysfunction.   Normal global strain -23.0%.   - The right ventricle is normal in size. Right ventricular systolic function is   normal.   - There are no significant valvular abnormalities.   - Exam was compared with the prior  echocardiographic exam performed on   1/30/2019. No significant change.      [facility] (12/27/2018)  In summary:   1. Non-occlusive CAD  2. Mild  dilated  hypokinetic left ventricle with ejection fraction estimated at 40%. Consider stress cardiomyopathy/takotsubo variant.     Outpatient regimen  - bisoprolol 10mg  - lisinopril 10 mg daily  - Spironolactone 25 mg stopped at 1/19/24 admission          Past Medical History  She has a past medical history of COPD (chronic obstructive pulmonary disease) (CMS/[facility]) and Hypertension.    Surgical History  She has no past surgical history on file.     Social History  She has no history on file for tobacco use, alcohol use, and drug use.    Family History  No family history on file.     Allergies  Coconut and Codeine    Review of Systems  Per HPI     Physical Exam  Physical Exam  Constitutional:       Appearance: She is " "ill-appearing.   HENT:      Head: Normocephalic and atraumatic.   Eyes:      Extraocular Movements: Extraocular movements intact.   Cardiovascular:      Rate and Rhythm: Normal rate and regular rhythm.      Pulses:           Radial pulses are 2+ on the right side and 2+ on the left side.        Dorsalis pedis pulses are 2+ on the right side and 2+ on the left side.      Heart sounds: Murmur heard.      Systolic murmur is present with a grade of 4/6.      No friction rub. No gallop.      Comments: Bilateral trace pretibial pitting edema  Mild JVD (about 9 cm at 45 degrees)    Pulmonary:      Effort: Pulmonary effort is normal.      Breath sounds: Normal air entry. Examination of the right-lower field reveals rales. Examination of the left-lower field reveals rales. Rales present. No wheezing or rhonchi.   Musculoskeletal:      Right lower leg: Pitting Edema present.      Left lower leg: Pitting Edema present.   Skin:     General: Skin is warm and dry.      Comments: In distal uppe and lower extremities   Neurological:      Mental Status: She is alert and oriented to person, place, and time.      Motor: Weakness present.      Comments: Pronator drift of LUE          Last Recorded Vitals  Blood pressure 135/61, pulse 84, temperature 35.9 °C (96.6 °F), temperature source Temporal, resp. rate 18, height 1.57 m (5' 1.81\"), weight 72.4 kg (159 lb 9.8 oz), SpO2 99 %.    Relevant Results    CROW 3/8/24  CONCLUSIONS:   1. Left ventricular systolic function is normal with a 65-70% estimated ejection fraction.   2. There is an elongated (2.5 x 0.3 cm), filamentous mass that is likely a vegetation attached to the atrial side of the posterior leaflet. There is a resultant eccentric mitral regurgitation that is anteriorly directed. Using PISA, ERO: 0.34 cm2 with a regurg volume of 48.62 ml/beat. Although difficult to see, there does not appear to be a perforation. There is severe blunting of all pulmonary vein inflow with early " temporary reversal in the right inferior pulmonary vein.   3. Moderate to severe mitral valve regurgitation.   4. The pulmonary artery is not well visualized.      TTE 3/7/24  CONCLUSIONS:   1. Left ventricular systolic function is hyperdynamic with a 70-75% estimated ejection fraction.   2. Spectral Doppler shows an impaired relaxation pattern of left ventricular diastolic filling.   3. Anterior leaflet mobile mass, consider vegetation, consider CROW.   4. Mild to moderate mitral valve regurgitation.      MRI brain 3/6/24  IMPRESSION:  1. Redemonstration of subarachnoid hemorrhage most pronounced in the right sylvian fissure and scattered right frontal and temporal sulci. There is also a small amount of intraventricular hemorrhage.  2. Suspect small areas of acute to early subacute ischemic injury in the right frontal lobe.  3. Nonspecific white matter changes most likely represent small-vessel ischemic disease in a patient of this age.      Blood Cx 3x drawn 3/7   Positive for E faecium     Assessment/Plan   Fannie Watson is a 78 y.o. female with a PMH of hypertension, hyperlipidemia, COPD, spondylolisthesis of lumbar lesion and status post L4-L5 decompression laminectomy and fusion on 3/1/2024 (at [facility]) who was transferred from [facility] ([facility]) on 3/5/24 with altered mentation and trace R sylvian SAH on CT head for further management by neurosurgery in the NSU. EP cardiology was initially consulted for new onset Afib with RVR which was managed with metoprolol. Patient has spontaneously converted to sinus rhythm on tele 3/8. However, workup led to a echo which ultimately demonstrated a large mitral valve regurgitation and moderate to severe MR by CROW.       #new onset Afib with RVR  #HTN  #Mitral valve vegetation  #moderate to severe Mitral regurgitation  Recommendations    - Continue beta blocker and anticoagulation per EP recs for new onset Afib with RVR. Follow-up additional EP  recs  - Consult ID for suspected endocarditis. Follow-up recs  - Consult CT surgery given indication for surgery which includes a large vegitiaion, severe valvular regurgitiaotn, and possible embolic phenomeneon.   - Patient will need left heart cath prior to open heart surgery.  - Consider consulting neurology for clearance prior to open heart surgery.  - Keep BP less than 120/80 mmHg, and keep patient euvolemic given severity of MR-> high risk of flash pulmonary edema.    Cardiology will follow      Thank you for interesting consult.  Patient was staffed with Dr. Mayer    Pager numbers:  General Cardiology Consult Pager: [phone] (weekday 7AM-6PM and weekend 7AM-2PM) and other: [phone]      Fercho Ordonez MS4

## 2024-03-08 NOTE — CONSULTS
Reason For Consult  Moderate/Severe mitral regurgitation    History Of Present Illness  Fannie Watson is a 78 y.o. female with a PMH of hypertension, hyperlipidemia, COPD, spondylolisthesis of lumbar lesion and status post L4-L5 decompression laminectomy and fusion on 3/1/2024 (at [facility]) who was transferred from [facility] ([facility]) on 3/5/24 with altered mentation and trace R sylvian SAH on CT head for further management by neurosurgery in the NSU.     She has had new onset paroxysmal afib with RVR this admission. EP-cardiology was consulted and recommended TTE. TTE suggested a vegetation on the mitral valve so CROW was performed and showed 2.5 cm mobile vegetation with moderate to severe mitral regurgitation. MRI showed SAH in R sylvian fissue and and subacute infarcts in R parieto-temporal lobe. Blood cultures draw 3/7 are positive for E. faecium.      Per the patient, her family and chart review:  Prior to admission 3/1, patient has been in and out of the ED since Christmas for CP, chest tightness, SOB and worsening back pain.  - 1/2/24 ED visit she reported midsternal CP radiating to upper back with dysphagia x1 week and decreased apetite x1 month  - Given steroids for low back pain in early January with no improvement  - 1/19/24 Admitted to [facility] for low back pain and leukocytosis. CT showed DJD severe at L4-L5 with grade 2 anterolisthesis and associated discogenic disease causing moderate to severe central canal stenosis. Discharged 1/21/24 with steroid taper, increased Lyrica dose, pain meds and scheduled follow up with PT and orthopedics.     Patient also reports she had a scope of her esophagus to evaluate her chest pain, likely after the 1/2/24 ED visit. She had a cough that was not improving after a endoscope for her esophageal discomfort. She received a z-pack (azythromycin) to help with her COPD abotu 3 weeks before 3/1. She reports increasing edema and SOB about 2 weeks before the  "surgery. She was given  a short course of a \"water pill\" (possibly lasix) which improved the edema.   She had a syncopal episode also about 2 weeks before the surgery. It occurred in the early morning and she does not remember the event at all. Her  reports he went downstairs for 20-30 minutes to make coffee and when he came back up he tripped over her in the door of their bedroom. She had not urinated on herself or bit her tongue and was alert after he woke her.     Denies fevers chills nausea and vomiting prior to admission.     Cardiac History  TTE (12/18/19 -[facility])  CONCLUSIONS:   - Exam indication: Routine surveillance (>1yr) of known cardiomyopathy without a change in clinical status   - The left ventricle is normal in size. Left ventricular systolic function is   normal. EF = 62 ± 5% (2D biplane) Grade I left ventricular diastolic dysfunction.   Normal global strain -23.0%.   - The right ventricle is normal in size. Right ventricular systolic function is   normal.   - There are no significant valvular abnormalities.   - Exam was compared with the prior  echocardiographic exam performed on   1/30/2019. No significant change.      [facility] (12/27/2018)  In summary:   1. Non-occlusive CAD  2. Mild  dilated  hypokinetic left ventricle with ejection fraction estimated at 40%. Consider stress cardiomyopathy/takotsubo variant.     Outpatient regimen  - bisoprolol 10mg  - lisinopril 10 mg daily  - Spironolactone 25 mg stopped at 1/19/24 admission          Past Medical History  She has a past medical history of COPD (chronic obstructive pulmonary disease) (CMS/[facility]) and Hypertension.    Surgical History  She has no past surgical history on file.     Social History  She has no history on file for tobacco use, alcohol use, and drug use.    Family History  No family history on file.     Allergies  Coconut and Codeine    Review of Systems  Per HPI     Physical Exam  Physical Exam  Constitutional:       Appearance: She is " "ill-appearing.   HENT:      Head: Normocephalic and atraumatic.   Eyes:      Extraocular Movements: Extraocular movements intact.   Cardiovascular:      Rate and Rhythm: Normal rate and regular rhythm.      Pulses:           Radial pulses are 2+ on the right side and 2+ on the left side.        Dorsalis pedis pulses are 2+ on the right side and 2+ on the left side.      Heart sounds: Murmur heard.      Systolic murmur is present with a grade of 4/6.      No friction rub. No gallop.      Comments: Bilateral trace pretibial pitting edema  Mild JVD (about 9 cm at 45 degrees)    Pulmonary:      Effort: Pulmonary effort is normal.      Breath sounds: Normal air entry. Examination of the right-lower field reveals rales. Examination of the left-lower field reveals rales. Rales present. No wheezing or rhonchi.   Musculoskeletal:      Right lower leg: Pitting Edema present.      Left lower leg: Pitting Edema present.   Skin:     General: Skin is warm and dry.      Comments: In distal uppe and lower extremities   Neurological:      Mental Status: She is alert and oriented to person, place, and time.      Motor: Weakness present.      Comments: Pronator drift of LUE          Last Recorded Vitals  Blood pressure 135/61, pulse 84, temperature 35.9 °C (96.6 °F), temperature source Temporal, resp. rate 18, height 1.57 m (5' 1.81\"), weight 72.4 kg (159 lb 9.8 oz), SpO2 99 %.    Relevant Results    CROW 3/8/24  CONCLUSIONS:   1. Left ventricular systolic function is normal with a 65-70% estimated ejection fraction.   2. There is an elongated (2.5 x 0.3 cm), filamentous mass that is likely a vegetation attached to the atrial side of the posterior leaflet. There is a resultant eccentric mitral regurgitation that is anteriorly directed. Using PISA, ERO: 0.34 cm2 with a regurg volume of 48.62 ml/beat. Although difficult to see, there does not appear to be a perforation. There is severe blunting of all pulmonary vein inflow with early " temporary reversal in the right inferior pulmonary vein.   3. Moderate to severe mitral valve regurgitation.   4. The pulmonary artery is not well visualized.      TTE 3/7/24  CONCLUSIONS:   1. Left ventricular systolic function is hyperdynamic with a 70-75% estimated ejection fraction.   2. Spectral Doppler shows an impaired relaxation pattern of left ventricular diastolic filling.   3. Anterior leaflet mobile mass, consider vegetation, consider CROW.   4. Mild to moderate mitral valve regurgitation.      MRI brain 3/6/24  IMPRESSION:  1. Redemonstration of subarachnoid hemorrhage most pronounced in the right sylvian fissure and scattered right frontal and temporal sulci. There is also a small amount of intraventricular hemorrhage.  2. Suspect small areas of acute to early subacute ischemic injury in the right frontal lobe.  3. Nonspecific white matter changes most likely represent small-vessel ischemic disease in a patient of this age.      Blood Cx 3x drawn 3/7   Positive for E faecium     Assessment/Plan   Fannie Watson is a 78 y.o. female with a PMH of hypertension, hyperlipidemia, COPD, spondylolisthesis of lumbar lesion and status post L4-L5 decompression laminectomy and fusion on 3/1/2024 (at [facility]) who was transferred from [facility] ([facility]) on 3/5/24 with altered mentation and trace R sylvian SAH on CT head for further management by neurosurgery in the NSU. EP cardiology was initially consulted for new onset Afib with RVR which was managed with metoprolol. Patient has spontaneously converted to sinus rhythm on tele 3/8. However, workup led to a echo which ultimately demonstrated a large mitral valve regurgitation and moderate to severe MR by CROW.       #new onset Afib with RVR  #HTN  #Mitral valve vegetation  #moderate to severe Mitral regurgitation  Recommendations    - Continue beta blocker and anticoagulation per EP recs for new onset Afib with RVR. Follow-up additional EP  recs  - Consult ID for suspected endocarditis. Follow-up recs  - Consult CT surgery given indication for surgery which includes a large vegitiaion, severe valvular regurgitiaotn, and possible embolic phenomeneon.   - Patient will need left heart cath prior to open heart surgery.  - Consider consulting neurology for clearance prior to open heart surgery.  - Keep BP less than 120/80 mmHg, and keep patient euvolemic given severity of MR-> high risk of flash pulmonary edema.    Cardiology will follow      Thank you for interesting consult.  Patient was staffed with Dr. Mayer    Pager numbers:  General Cardiology Consult Pager: [phone] (weekday 7AM-6PM and weekend 7AM-2PM) and other: [phone]      Fercho Ordonez MS4

## 2024-03-08 NOTE — SIGNIFICANT EVENT
Attending Attestation:     Neuro - prior TLIF at OSH p/w right sylvian fissure blood (sx: agitation)   - angio pending for r/o mycotic aneurysm   - MRI L-spine with contrast to rule out infection   Cardiac - SBP < 140   - EP/cardiology recs   Respiratory - RA  Renal - no acute issues   GI - NPO for procedures   Infectious   - positive blood culture: GPCs  - CROW pending   - CT face to rule out source  - vanc/cefe   - ID recs      Code status - full code      Gavin Covington MD    of Neurosurgery   [facility] [place]   [facility] Neuroscience ICU   Office: [phone]  Fax: [phone]

## 2024-03-09 ENCOUNTER — APPOINTMENT (OUTPATIENT)
Dept: RADIOLOGY | Facility: HOSPITAL | Age: 79
DRG: 981 | End: 2024-03-09
Payer: MEDICARE

## 2024-03-09 LAB
ALBUMIN SERPL BCP-MCNC: 2.9 G/DL (ref 3.4–5)
ANION GAP SERPL CALC-SCNC: 11 MMOL/L (ref 10–20)
APTT PPP: 27 SECONDS (ref 27–38)
BUN SERPL-MCNC: 6 MG/DL (ref 6–23)
CA-I BLD-SCNC: 1.16 MMOL/L (ref 1.1–1.33)
CALCIUM SERPL-MCNC: 8.3 MG/DL (ref 8.6–10.6)
CHLORIDE SERPL-SCNC: 97 MMOL/L (ref 98–107)
CO2 SERPL-SCNC: 27 MMOL/L (ref 21–32)
CREAT SERPL-MCNC: 0.39 MG/DL (ref 0.5–1.05)
EGFRCR SERPLBLD CKD-EPI 2021: >90 ML/MIN/1.73M*2
ERYTHROCYTE [DISTWIDTH] IN BLOOD BY AUTOMATED COUNT: 14.9 % (ref 11.5–14.5)
GLUCOSE SERPL-MCNC: 102 MG/DL (ref 74–99)
HCT VFR BLD AUTO: 30.8 % (ref 36–46)
HGB BLD-MCNC: 10.2 G/DL (ref 12–16)
INR PPP: 1.2 (ref 0.9–1.1)
MAGNESIUM SERPL-MCNC: 1.86 MG/DL (ref 1.6–2.4)
MCH RBC QN AUTO: 27.5 PG (ref 26–34)
MCHC RBC AUTO-ENTMCNC: 33.1 G/DL (ref 32–36)
MCV RBC AUTO: 83 FL (ref 80–100)
NRBC BLD-RTO: 0 /100 WBCS (ref 0–0)
OSMOLALITY SERPL: 267 MOSM/KG (ref 280–300)
PHOSPHATE SERPL-MCNC: 3.7 MG/DL (ref 2.5–4.9)
PLATELET # BLD AUTO: 318 X10*3/UL (ref 150–450)
POTASSIUM SERPL-SCNC: 3.8 MMOL/L (ref 3.5–5.3)
PROTHROMBIN TIME: 13.5 SECONDS (ref 9.8–12.8)
RBC # BLD AUTO: 3.71 X10*6/UL (ref 4–5.2)
SODIUM SERPL-SCNC: 131 MMOL/L (ref 136–145)
SODIUM SERPL-SCNC: 131 MMOL/L (ref 136–145)
WBC # BLD AUTO: 7.3 X10*3/UL (ref 4.4–11.3)

## 2024-03-09 PROCEDURE — 82330 ASSAY OF CALCIUM: CPT

## 2024-03-09 PROCEDURE — 70487 CT MAXILLOFACIAL W/DYE: CPT

## 2024-03-09 PROCEDURE — 87040 BLOOD CULTURE FOR BACTERIA: CPT | Performed by: STUDENT IN AN ORGANIZED HEALTH CARE EDUCATION/TRAINING PROGRAM

## 2024-03-09 PROCEDURE — 36415 COLL VENOUS BLD VENIPUNCTURE: CPT | Performed by: STUDENT IN AN ORGANIZED HEALTH CARE EDUCATION/TRAINING PROGRAM

## 2024-03-09 PROCEDURE — 80069 RENAL FUNCTION PANEL: CPT

## 2024-03-09 PROCEDURE — 2500000001 HC RX 250 WO HCPCS SELF ADMINISTERED DRUGS (ALT 637 FOR MEDICARE OP): Performed by: STUDENT IN AN ORGANIZED HEALTH CARE EDUCATION/TRAINING PROGRAM

## 2024-03-09 PROCEDURE — 2500000001 HC RX 250 WO HCPCS SELF ADMINISTERED DRUGS (ALT 637 FOR MEDICARE OP)

## 2024-03-09 PROCEDURE — 84295 ASSAY OF SERUM SODIUM: CPT | Performed by: STUDENT IN AN ORGANIZED HEALTH CARE EDUCATION/TRAINING PROGRAM

## 2024-03-09 PROCEDURE — 2020000001 HC ICU ROOM DAILY

## 2024-03-09 PROCEDURE — 70487 CT MAXILLOFACIAL W/DYE: CPT | Performed by: RADIOLOGY

## 2024-03-09 PROCEDURE — 99232 SBSQ HOSP IP/OBS MODERATE 35: CPT | Performed by: STUDENT IN AN ORGANIZED HEALTH CARE EDUCATION/TRAINING PROGRAM

## 2024-03-09 PROCEDURE — 2550000001 HC RX 255 CONTRASTS: Performed by: NEUROLOGICAL SURGERY

## 2024-03-09 PROCEDURE — 71260 CT THORAX DX C+: CPT | Performed by: STUDENT IN AN ORGANIZED HEALTH CARE EDUCATION/TRAINING PROGRAM

## 2024-03-09 PROCEDURE — 2500000004 HC RX 250 GENERAL PHARMACY W/ HCPCS (ALT 636 FOR OP/ED): Performed by: STUDENT IN AN ORGANIZED HEALTH CARE EDUCATION/TRAINING PROGRAM

## 2024-03-09 PROCEDURE — 85730 THROMBOPLASTIN TIME PARTIAL: CPT | Mod: 91

## 2024-03-09 PROCEDURE — 36415 COLL VENOUS BLD VENIPUNCTURE: CPT

## 2024-03-09 PROCEDURE — 85027 COMPLETE CBC AUTOMATED: CPT

## 2024-03-09 PROCEDURE — 83735 ASSAY OF MAGNESIUM: CPT

## 2024-03-09 PROCEDURE — 83930 ASSAY OF BLOOD OSMOLALITY: CPT | Performed by: STUDENT IN AN ORGANIZED HEALTH CARE EDUCATION/TRAINING PROGRAM

## 2024-03-09 PROCEDURE — 74177 CT ABD & PELVIS W/CONTRAST: CPT

## 2024-03-09 PROCEDURE — 2500000001 HC RX 250 WO HCPCS SELF ADMINISTERED DRUGS (ALT 637 FOR MEDICARE OP): Performed by: NURSE PRACTITIONER

## 2024-03-09 PROCEDURE — 74177 CT ABD & PELVIS W/CONTRAST: CPT | Performed by: STUDENT IN AN ORGANIZED HEALTH CARE EDUCATION/TRAINING PROGRAM

## 2024-03-09 PROCEDURE — 87040 BLOOD CULTURE FOR BACTERIA: CPT

## 2024-03-09 RX ADMIN — IOHEXOL 90 ML: 350 INJECTION, SOLUTION INTRAVENOUS at 11:51

## 2024-03-09 RX ADMIN — SENNOSIDES AND DOCUSATE SODIUM 2 TABLET: 8.6; 5 TABLET ORAL at 08:18

## 2024-03-09 RX ADMIN — OXYCODONE HYDROCHLORIDE 5 MG: 5 TABLET ORAL at 08:18

## 2024-03-09 RX ADMIN — DAPTOMYCIN 700 MG: 500 INJECTION, POWDER, LYOPHILIZED, FOR SOLUTION INTRAVENOUS at 20:56

## 2024-03-09 RX ADMIN — HEPARIN SODIUM 5000 UNITS: 5000 INJECTION INTRAVENOUS; SUBCUTANEOUS at 17:13

## 2024-03-09 RX ADMIN — METOPROLOL TARTRATE 12.5 MG: 25 TABLET, FILM COATED ORAL at 08:18

## 2024-03-09 RX ADMIN — METOPROLOL TARTRATE 12.5 MG: 25 TABLET, FILM COATED ORAL at 20:57

## 2024-03-09 RX ADMIN — HEPARIN SODIUM 5000 UNITS: 5000 INJECTION INTRAVENOUS; SUBCUTANEOUS at 00:42

## 2024-03-09 RX ADMIN — OXYCODONE HYDROCHLORIDE 5 MG: 5 TABLET ORAL at 17:24

## 2024-03-09 RX ADMIN — HEPARIN SODIUM 5000 UNITS: 5000 INJECTION INTRAVENOUS; SUBCUTANEOUS at 08:18

## 2024-03-09 ASSESSMENT — COGNITIVE AND FUNCTIONAL STATUS - GENERAL
TOILETING: TOTAL
STANDING UP FROM CHAIR USING ARMS: A LOT
TURNING FROM BACK TO SIDE WHILE IN FLAT BAD: A LITTLE
MOBILITY SCORE: 14
MOVING TO AND FROM BED TO CHAIR: A LITTLE
HELP NEEDED FOR BATHING: A LITTLE
DRESSING REGULAR LOWER BODY CLOTHING: A LOT
WALKING IN HOSPITAL ROOM: TOTAL
CLIMB 3 TO 5 STEPS WITH RAILING: TOTAL
DAILY ACTIVITIY SCORE: 18

## 2024-03-09 ASSESSMENT — PAIN DESCRIPTION - LOCATION: LOCATION: BACK

## 2024-03-09 ASSESSMENT — PAIN SCALES - GENERAL
PAINLEVEL_OUTOF10: 6
PAINLEVEL_OUTOF10: 5 - MODERATE PAIN
PAINLEVEL_OUTOF10: 0 - NO PAIN

## 2024-03-09 ASSESSMENT — PAIN - FUNCTIONAL ASSESSMENT
PAIN_FUNCTIONAL_ASSESSMENT: 0-10

## 2024-03-09 NOTE — PROGRESS NOTES
"Fannie Watson is a 78 y.o. female on day 4 of admission presenting with SAH (subarachnoid hemorrhage) (CMS/HCC).    Subjective   NAEON. Angio negative for signs of mycotic aneurysm. BCX positive and findings of large valvular vegetation on CROW. Cardiology and CT surgery onboard and recs are appreciated.       Objective     Physical Exam  A&Ox3  Face symmetric  Tongue midline  Follows command x4 5/5  SILT  Groin soft, dressing removed  Distal pulses intact    Last Recorded Vitals  Blood pressure 117/55, pulse 74, temperature 36 °C (96.8 °F), temperature source Temporal, resp. rate 20, height 1.57 m (5' 1.81\"), weight 65.4 kg (144 lb 2.9 oz), SpO2 95 %.  Intake/Output last 3 Shifts:  I/O last 3 completed shifts:  In: 2084.2 (28.8 mL/kg) [P.O.:360; I.V.:1374.2 (19 mL/kg); IV Piggyback:350]  Out: 1350 (18.6 mL/kg) [Urine:1350 (0.5 mL/kg/hr)]  Weight: 72.4 kg     Relevant Results                This patient has a urinary catheter   Reason for the urinary catheter remaining today? critically ill patient who need accurate urinary output measurements               Assessment/Plan   Principal Problem:    SAH (subarachnoid hemorrhage) (CMS/HCC)    Patient is a 78 year old female with h/o HTN, HLD, COPD, 3/1 s/p MIS L4-5 TLIF, p/w AMS, 3/5 CTH trace R sylvian SAH     3/6 CTA negative, MRI w/ SAH otherwise negative, MRI L-spine neg for   3/7 afib w/ RVR, s/p IVF, s/p metop, TTE EF 70-75%, MV mobile mass  3/8 TTE w/ large valcular vegetation, BCX positive for Enterococcus Faecium, angio negative for mycotic aneurysms    Plan:     NSU  CTH and CTA on Tuesday 3/12  Appreciate cards and EP recs  Appreciate cards recs- SBP <120, Left heart cath prior to any CT intervention  Appreciate CT surgery recs  Appreicate ID recs- Daptomycin  CT face and CT CAP for source identification  PTOT- low intensity  SCDS, SQH           Mara Coughlin MD      "

## 2024-03-09 NOTE — PROGRESS NOTES
Subjective   No acute overnight events. Transitioned to daptomycin for Enterococcus faecalis.     Interval Events:   3/6 CTA negative, MRI w/ SAH otherwise negative, MRI L-spine neg for   3/7 afib w/ RVR, s/p IVF, s/p metop, TTE EF 70-75%, MV mobile mass  3/8: IR angio negative for mycotic aneurysm     Objective   Vitals 24 hour ranges:  Heart Rate:  [71-98]   Temp:  [35.9 °C (96.6 °F)-36.6 °C (97.9 °F)]   Resp:  [11-26]   BP: (107-159)/()   Weight:  [65.4 kg (144 lb 2.9 oz)]   SpO2:  [90 %-100 %]    Hemodynamic parameters for last 24 hours:     Intake/Output for last 24 hours:    Intake/Output Summary (Last 24 hours) at 3/9/2024 0850  Last data filed at 3/9/2024 0700  Gross per 24 hour   Intake 2550 ml   Output 2850 ml   Net -300 ml      Vent settings:       Physical Exam:  NEURO:  Alert, oriented x4, follows commands  EOS, PERRL, EOMI  ART 5/5 strength, no drift, no ataxia. L sided neglect  CV:  RRR on telemetry, NSR  RESP:  Regular, unlabored  Oxygen: RA  :  catheter in place  GI:  Abdomen NT/ND, soft  SKIN:  Intact    Medications  Scheduled:  daptomycin, 10 mg/kg, intravenous, q24h HORTENSIA  heparin (porcine), 5,000 Units, subcutaneous, q8h  metoprolol tartrate, 12.5 mg, oral, BID  perflutren protein A microsphere, 0.5 mL, intravenous, Once in imaging  sennosides-docusate sodium, 2 tablet, oral, BID  sulfur hexafluoride microsphr, 2 mL, intravenous, Once in imaging    PRN:  PRN medications: acetaminophen, hydrALAZINE, HYDROmorphone, labetaloL, niCARdipine, oxyCODONE, oxyCODONE     Continuous:  niCARdipine, 1-15 mg/hr  sodium chloride 0.9%, 100 mL/hr, Last Rate: 100 mL/hr (03/09/24 0700)      Lab Results    Imaging Results  Transesophageal Echo (CROW)   Final Result      Transthoracic Echo (TTE) Complete   Final Result      MR brain wo IV contrast   Final Result   1. Redemonstration of subarachnoid hemorrhage most pronounced in the   right sylvian fissure and scattered right frontal and temporal sulci.   There  is also a small amount of intraventricular hemorrhage.   2. Suspect small areas of acute to early subacute ischemic injury in   the right frontal lobe.   3. Nonspecific white matter changes most likely represent   small-vessel ischemic disease in a patient of this age.             MACRO:   None        Signed by: Sera Casanova 3/6/2024 1:57 PM   Dictation workstation:   [id number]      MR lumbar spine wo IV contrast   Final Result   1. Postsurgical changes from left facetectomy/laminectomy, posterior   spinal fusion, and interbody spacer at L4-L5. Heterogeneous intensity   fluid within the hemilaminectomy bed measuring approximately 2.7 x   2.0 x 2.9 cm likely represents postoperative seroma/hematoma.   2. Degenerative changes of the lumbar spine with persistent moderate   spinal canal narrowing at L4-L5.        I personally reviewed the images/study and I agree with the findings   as stated by resident physician Dr. Jason Gardner.        MACRO:   None        Signed by: Lucia Ontiveros 3/6/2024 2:15 PM   Dictation workstation:   [id number]      CT angio head and neck w and wo IV contrast   Final Result   CT HEAD:   1. Small volume subarachnoid hemorrhage predominantly involving the   region of the right sylvian fissure with small components along the   adjacent right cerebral hemisphere.   2. No large territory ischemic infarction, mass effect, or midline   shift.        CTA HEAD:   1. No large branch vessel cut off or flow-limiting stenosis within   the visualized cervical or intracranial vasculature.   2. No discrete aneurysm identified with attention to the right MCA   region.   3. Mild scattered atherosclerosis.        I personally reviewed the images/study and I agree with the findings   as stated above by resident physician, Dr. Kaleb Olivas. The   study was interpreted at [facility] in [facility].        MACRO:   None        Signed by: Rafi Garcia 3/6/2024 9:18  AM   Dictation workstation:   [id number]      IR angiogram cerebral bilateral    (Results Pending)   CT chest abdomen pelvis w and wo IV contrast    (Results Pending)   CT facial bones w and wo IV contrast    (Results Pending)         Assessment/Plan   NEURO:  #trace R sylvian SAH   Assessment:  Neurologically: intact with no sensory or motor deficits; L sided neglect; intact CN II-XII  Plan:  NSU  Q1H neuro checks to monitor for septic thrombus to the brain  Pain: acetaminophen, oxycodone, hydromorphone PRN  Sedation: none  Nausea: ondansetron PRN  PT/OT/SLP    CARDIOVASCULAR:  #Paroxysmal Afib  #MV Vegetation  #Endocarditis  Assessment:  NSR on telemetry  No evidence of skin changes, fevers, or worsening CO  ECHO: TTE on 3/8 with normal EF 65-70% and 2.5 x 0.3 filamentous mass with mod to severe MR   Plan:  Continue to monitor on telemetry  Goal SBP < 120 - Nicardipine, Hydralazine and Labetalol PRN  Continue metoprolol 12.5 mg BID    RESPIRATORY:  #COPD  Assessment:  No acute issues or oxygen requirement  Plan:  Continuous pulse oximetry   O2 PRN to maintain SpO2 > 89%, wean as tolerated  Incentive spirometry while awake     RENAL/:  #No active issues  Assessment:  Results from last 72 hours   Lab Units 03/09/24  0039 03/08/24  1423   BUN mg/dL 6 8   CREATININE mg/dL 0.39* 0.47*       Plan:  Monitor with daily RFP    FEN/GI:  #No active issues  Assessment:  Last BM Date: 03/08/24    Plan:  Monitor and replace electrolytes per protocol  Diet: regular diet  Bowel Regimen: Docusate-Senna    ENDOCRINE:  #No active issues  Assessment:  Results from last 7 days   Lab Units 03/09/24  0039 03/08/24  1423 03/06/24  1136 03/06/24  0525   POCT GLUCOSE mg/dL  --   --   --  84   GLUCOSE mg/dL 102* 90   < >  --     < > = values in this interval not displayed.      Plan:  Accuchecks & ISS Q6H    HEMATOLOGY:  #No active issues  Assessment:  Results from last 7 days   Lab Units 03/09/24  0039 03/08/24  0415   HEMOGLOBIN g/dL  10.2* 9.9*   HEMATOCRIT % 30.8* 30.7*   PLATELETS AUTO x10*3/uL 318 304     Plan:  Continue to monitor with daily CBC and Coag panel    INFECTIOUS DISEASE:  #Endocarditis  #Enterococcus Faecium  Assessment:  Results from last 7 days   Lab Units 24  0039 24  0415   WBC AUTO x10*3/uL 7.3 9.9     Temp (24hrs), Av.2 °C (97.2 °F), Min:35.9 °C (96.6 °F), Max:36.6 °C (97.9 °F)  Appreciate ID consult; on daptomycin  Pan scan with CT face, CT C/A/P   CP and SOB workup initiated multiple times since 2023 (prior to current admission) and unclear time course or source of infection  Plan:  Continue to monitor for s/sx of infection  Pan culture for temperature > 38.4 C    MUSCULOSKELETAL:  #MIS L4-5 TLIF  Post-op pain   Pending evaluation with CT C/A/P to evaluate for post-op infection; of negative imaging, will consider MRI L spine    SKIN:  No acute issues  Turns and skin care per NSU protocol    ACCESS:  PIVs    PROPHYLAXIS:  DVT/VTE: SCDs, SQ heparin   GI: none    RESTRAINTS:  Not indicated/Patient does not meet criteria for restraints    Brielle Blanchard MD  Neuroscience Intensive Care       Plan of care to be discussed with neurocritical care attending    Attending Attestation:      Neuro - prior TLIF at OSH p/w right sylvian fissure blood (sx: agitation)   - angio negative   - CTH Monday for stability of hemorrhage prior to clearance for AC    - MRI L-spine with contrast to rule out infection   - q1 NC   Cardiac - SBP < 140   - EP/cardiology recs: possible intervention Monday (when cleared for AC)   - CROW mitral valve vegetation   Respiratory - RA  Renal - no acute issues (low Na chronically)  GI - regular diet   Infectious   - positive blood culture: GPCs (last positive 3/7)   - CT face, CT CAP to identify source  - daptomycin   - ID recs      Code status - full code       Gavin Covington MD    of Neurosurgery   [facility] [place]   [facility] Neuroscience  ICU   Office: [phone]  Fax: [phone]

## 2024-03-09 NOTE — CARE PLAN
Problem: Safety  Goal: Patient will be injury free during hospitalization  Outcome: Met  Goal: I will remain free of falls  Outcome: Met     Problem: Daily Care  Goal: Daily care needs are met  Outcome: Met     Problem: Psychosocial Needs  Goal: Demonstrates ability to cope with hospitalization/illness  Outcome: Met  Goal: Collaborate with me, my family, and caregiver to identify my specific goals  Outcome: Met     Problem: Discharge Barriers  Goal: My discharge needs are met  Outcome: Met     Problem: ICU Stroke  Goal: Achieve/maintain targeted sodium level throughout shift  Outcome: Met     Problem: Skin  Goal: Decreased wound size/increased tissue granulation at next dressing change  Outcome: Met  Goal: Participates in plan/prevention/treatment measures  Outcome: Met  Goal: Prevent/manage excess moisture  Outcome: Met  Goal: Prevent/minimize sheer/friction injuries  Outcome: Met  Goal: Promote/optimize nutrition  Outcome: Met  Goal: Promote skin healing  Outcome: Met   The patient's goals for the shift include ELDA    The clinical goals for the shift include Pt's neuro exam will remain WNL

## 2024-03-10 LAB
ABO GROUP (TYPE) IN BLOOD: NORMAL
ALBUMIN SERPL BCP-MCNC: 2.9 G/DL (ref 3.4–5)
ANION GAP SERPL CALC-SCNC: 11 MMOL/L (ref 10–20)
ANTIBODY SCREEN: NORMAL
APTT PPP: 26 SECONDS (ref 27–38)
BUN SERPL-MCNC: 5 MG/DL (ref 6–23)
CA-I BLD-SCNC: 1.04 MMOL/L (ref 1.1–1.33)
CALCIUM SERPL-MCNC: 8 MG/DL (ref 8.6–10.6)
CHLORIDE SERPL-SCNC: 97 MMOL/L (ref 98–107)
CO2 SERPL-SCNC: 27 MMOL/L (ref 21–32)
CREAT SERPL-MCNC: 0.38 MG/DL (ref 0.5–1.05)
EGFRCR SERPLBLD CKD-EPI 2021: >90 ML/MIN/1.73M*2
ERYTHROCYTE [DISTWIDTH] IN BLOOD BY AUTOMATED COUNT: 14.8 % (ref 11.5–14.5)
GLUCOSE SERPL-MCNC: 101 MG/DL (ref 74–99)
HCT VFR BLD AUTO: 31.3 % (ref 36–46)
HGB BLD-MCNC: 10.8 G/DL (ref 12–16)
INR PPP: 1.2 (ref 0.9–1.1)
MAGNESIUM SERPL-MCNC: 1.97 MG/DL (ref 1.6–2.4)
MCH RBC QN AUTO: 27.5 PG (ref 26–34)
MCHC RBC AUTO-ENTMCNC: 34.5 G/DL (ref 32–36)
MCV RBC AUTO: 80 FL (ref 80–100)
NRBC BLD-RTO: 0 /100 WBCS (ref 0–0)
PHOSPHATE SERPL-MCNC: 3.2 MG/DL (ref 2.5–4.9)
PLATELET # BLD AUTO: 361 X10*3/UL (ref 150–450)
POTASSIUM SERPL-SCNC: 5.1 MMOL/L (ref 3.5–5.3)
PROTHROMBIN TIME: 13.2 SECONDS (ref 9.8–12.8)
RBC # BLD AUTO: 3.93 X10*6/UL (ref 4–5.2)
RH FACTOR (ANTIGEN D): NORMAL
SODIUM SERPL-SCNC: 130 MMOL/L (ref 136–145)
SODIUM SERPL-SCNC: 130 MMOL/L (ref 136–145)
WBC # BLD AUTO: 8 X10*3/UL (ref 4.4–11.3)

## 2024-03-10 PROCEDURE — 99232 SBSQ HOSP IP/OBS MODERATE 35: CPT | Performed by: STUDENT IN AN ORGANIZED HEALTH CARE EDUCATION/TRAINING PROGRAM

## 2024-03-10 PROCEDURE — 86901 BLOOD TYPING SEROLOGIC RH(D): CPT

## 2024-03-10 PROCEDURE — 36415 COLL VENOUS BLD VENIPUNCTURE: CPT

## 2024-03-10 PROCEDURE — 2500000004 HC RX 250 GENERAL PHARMACY W/ HCPCS (ALT 636 FOR OP/ED): Performed by: STUDENT IN AN ORGANIZED HEALTH CARE EDUCATION/TRAINING PROGRAM

## 2024-03-10 PROCEDURE — 85610 PROTHROMBIN TIME: CPT

## 2024-03-10 PROCEDURE — 99291 CRITICAL CARE FIRST HOUR: CPT | Performed by: STUDENT IN AN ORGANIZED HEALTH CARE EDUCATION/TRAINING PROGRAM

## 2024-03-10 PROCEDURE — 82330 ASSAY OF CALCIUM: CPT

## 2024-03-10 PROCEDURE — 2500000004 HC RX 250 GENERAL PHARMACY W/ HCPCS (ALT 636 FOR OP/ED): Performed by: NURSE PRACTITIONER

## 2024-03-10 PROCEDURE — 87040 BLOOD CULTURE FOR BACTERIA: CPT

## 2024-03-10 PROCEDURE — 85027 COMPLETE CBC AUTOMATED: CPT

## 2024-03-10 PROCEDURE — 2500000001 HC RX 250 WO HCPCS SELF ADMINISTERED DRUGS (ALT 637 FOR MEDICARE OP): Performed by: NURSE PRACTITIONER

## 2024-03-10 PROCEDURE — 83735 ASSAY OF MAGNESIUM: CPT

## 2024-03-10 PROCEDURE — 2020000001 HC ICU ROOM DAILY

## 2024-03-10 PROCEDURE — 2500000001 HC RX 250 WO HCPCS SELF ADMINISTERED DRUGS (ALT 637 FOR MEDICARE OP)

## 2024-03-10 PROCEDURE — 84295 ASSAY OF SERUM SODIUM: CPT

## 2024-03-10 RX ADMIN — LABETALOL HYDROCHLORIDE 10 MG: 5 INJECTION, SOLUTION INTRAVENOUS at 03:14

## 2024-03-10 RX ADMIN — LABETALOL HYDROCHLORIDE 10 MG: 5 INJECTION, SOLUTION INTRAVENOUS at 19:40

## 2024-03-10 RX ADMIN — METOPROLOL TARTRATE 12.5 MG: 25 TABLET, FILM COATED ORAL at 08:10

## 2024-03-10 RX ADMIN — DAPTOMYCIN 700 MG: 500 INJECTION, POWDER, LYOPHILIZED, FOR SOLUTION INTRAVENOUS at 20:08

## 2024-03-10 RX ADMIN — HEPARIN SODIUM 5000 UNITS: 5000 INJECTION INTRAVENOUS; SUBCUTANEOUS at 00:54

## 2024-03-10 RX ADMIN — OXYCODONE HYDROCHLORIDE 5 MG: 5 TABLET ORAL at 08:10

## 2024-03-10 RX ADMIN — HYDROMORPHONE HYDROCHLORIDE 0.2 MG: 1 INJECTION, SOLUTION INTRAMUSCULAR; INTRAVENOUS; SUBCUTANEOUS at 14:06

## 2024-03-10 RX ADMIN — HEPARIN SODIUM 5000 UNITS: 5000 INJECTION INTRAVENOUS; SUBCUTANEOUS at 08:10

## 2024-03-10 RX ADMIN — OXYCODONE HYDROCHLORIDE 5 MG: 5 TABLET ORAL at 12:42

## 2024-03-10 RX ADMIN — LABETALOL HYDROCHLORIDE 10 MG: 5 INJECTION, SOLUTION INTRAVENOUS at 10:40

## 2024-03-10 RX ADMIN — HEPARIN SODIUM 5000 UNITS: 5000 INJECTION INTRAVENOUS; SUBCUTANEOUS at 15:31

## 2024-03-10 RX ADMIN — METOPROLOL TARTRATE 12.5 MG: 25 TABLET, FILM COATED ORAL at 20:08

## 2024-03-10 RX ADMIN — HYDRALAZINE HYDROCHLORIDE 10 MG: 20 INJECTION INTRAMUSCULAR; INTRAVENOUS at 15:31

## 2024-03-10 RX ADMIN — OXYCODONE HYDROCHLORIDE 5 MG: 5 TABLET ORAL at 01:14

## 2024-03-10 RX ADMIN — OXYCODONE HYDROCHLORIDE 5 MG: 5 TABLET ORAL at 20:19

## 2024-03-10 ASSESSMENT — PAIN - FUNCTIONAL ASSESSMENT
PAIN_FUNCTIONAL_ASSESSMENT: 0-10

## 2024-03-10 ASSESSMENT — PAIN SCALES - GENERAL
PAINLEVEL_OUTOF10: 8
PAINLEVEL_OUTOF10: 0 - NO PAIN
PAINLEVEL_OUTOF10: 6
PAINLEVEL_OUTOF10: 2
PAINLEVEL_OUTOF10: 6
PAINLEVEL_OUTOF10: 6
PAINLEVEL_OUTOF10: 1
PAINLEVEL_OUTOF10: 0 - NO PAIN
PAINLEVEL_OUTOF10: 4
PAINLEVEL_OUTOF10: 0 - NO PAIN
PAINLEVEL_OUTOF10: 0 - NO PAIN

## 2024-03-10 ASSESSMENT — PAIN DESCRIPTION - LOCATION
LOCATION: BACK
LOCATION: BACK
LOCATION: HEAD
LOCATION: BACK
LOCATION: BACK

## 2024-03-10 ASSESSMENT — COGNITIVE AND FUNCTIONAL STATUS - GENERAL
DAILY ACTIVITIY SCORE: 18
MOBILITY SCORE: 14
CLIMB 3 TO 5 STEPS WITH RAILING: TOTAL
STANDING UP FROM CHAIR USING ARMS: A LOT
DRESSING REGULAR LOWER BODY CLOTHING: A LOT
HELP NEEDED FOR BATHING: A LITTLE
TOILETING: TOTAL
TURNING FROM BACK TO SIDE WHILE IN FLAT BAD: A LITTLE
WALKING IN HOSPITAL ROOM: TOTAL
MOVING TO AND FROM BED TO CHAIR: A LITTLE

## 2024-03-10 NOTE — PROGRESS NOTES
"Fannie Watson is a 78 y.o. female on day 5 of admission presenting with SAH (subarachnoid hemorrhage) (CMS/HCC).    Subjective   NAEON       Objective     Physical Exam  A&Ox3  Face symmetric  Tongue midline  Follows command x4 5/5  SILT  Groin soft, dressing removed  Distal pulses intact    Last Recorded Vitals  Blood pressure (!) 112/48, pulse 71, temperature 36.1 °C (97 °F), temperature source Temporal, resp. rate 20, height 1.57 m (5' 1.81\"), weight 72.4 kg (159 lb 9.8 oz), SpO2 92 %.  Intake/Output last 3 Shifts:  I/O last 3 completed shifts:  In: 3380 (46.7 mL/kg) [P.O.:480; I.V.:2800 (38.7 mL/kg); IV Piggyback:100]  Out: 2705 (37.4 mL/kg) [Urine:2705 (1 mL/kg/hr)]  Weight: 72.4 kg     Relevant Results                This patient has a urinary catheter   Reason for the urinary catheter remaining today? critically ill patient who need accurate urinary output measurements               Assessment/Plan   Principal Problem:    SAH (subarachnoid hemorrhage) (CMS/HCC)    Patient is a 78 year old female with h/o HTN, HLD, COPD, 3/1 s/p MIS L4-5 TLIF, p/w AMS, 3/5 CTH trace R sylvian SAH     3/6 CTA negative, MRI w/ SAH otherwise negative, MRI L-spine neg for   3/7 afib w/ RVR, s/p IVF, s/p metop, TTE EF 70-75%, MV mobile mass  3/8 TTE w/ large valcular vegetation, BCX positive for Enterococcus Faecium, angio negative for mycotic aneurysms    Plan:     NSU  CTH on Monday 3/11-if CTH is stable, ok for anticoagulation and left heart cath and to transfer to cards service for valve surgery  Appreciate cards and EP recs  Appreciate cards recs- SBP <120, Left heart cath prior to any CT intervention  Appreciate CT surgery recs  Appreicate ID recs- Daptomycin  CT face and CT CAP for source identification  PTOT- low intensity  SCDS, SQH           Karen Estrada MD      "

## 2024-03-10 NOTE — CARE PLAN
Problem: General Stroke  Goal: Establish a mutual long term goal with patient by discharge  Outcome: Progressing  Goal: Demonstrate improvement in neurological exam throughout the shift  Outcome: Progressing  Goal: Maintain BP within ordered limits throughout shift  Outcome: Progressing  Goal: Participate in treatment (ie., meds, therapy) throughout shift  Outcome: Progressing  Goal: No symptoms of aspiration throughout shift  Outcome: Progressing  Goal: No symptoms of hemorrhage throughout shift  Outcome: Progressing  Goal: Tolerate enteral feeding throughout shift  Outcome: Progressing  Goal: Decreased nausea/vomiting throughout shift  Outcome: Progressing  Goal: Controlled blood glucose throughout shift  Outcome: Progressing  Goal: Out of bed three times today  Outcome: Progressing     Problem: ICU Stroke  Goal: Maintain ICP within ordered limits throughout shift  Outcome: Progressing  Goal: Tolerate EVD clamping trial throughout shift  Outcome: Progressing  Goal: Tolerate ventilator weaning trial during shift  Outcome: Progressing  Goal: Maintain patent airway throughout shift  Outcome: Progressing  Goal: Achieve/maintain targeted sodium level throughout shift  Outcome: Progressing   The patient's goals for the shift include pain control    The clinical goals for the shift include pt systolic BP will remain WNL per team goal

## 2024-03-10 NOTE — PROGRESS NOTES
Fannie Watson is a 78 y.o. female on day 5 of admission presenting with SAH (subarachnoid hemorrhage) (CMS/[facility]).    Subjective   Denies any fever or chills. Denies any nausea, emesis. She does have back pain that she mentioned    Review of Systems    Review of systems otherwise negative    Objective   Range of Vitals (last 24 hours)  Heart Rate:  [70-93]   Temp:  [36 °C (96.8 °F)-36.5 °C (97.7 °F)]   Resp:  [16-27]   BP: (110-134)/()   Weight:  [72.4 kg (159 lb 9.8 oz)]   SpO2:  [90 %-96 %]   Daily Weight  03/10/24 : 72.4 kg (159 lb 9.8 oz)    Body mass index is 29.37 kg/m².    Physical Exam  General: in no acute distress, able to answer questions  HEENT: no conjunctival injection. anicteric.  CVS: RRR. Normal S1 and S2. Audible murmer present  RESP: ctab no w/r/r, no increased wob  Abd: Soft and lax. ND.   Ext: No swelling of the LE b/l.   Neuro: Answers questions appropriately.  Integumentary: no obvious lesions   Rheumatologic: No joint swelling or edema    Back: Did have pain with palpation of back    Relevant Results  Labs  Results from last 72 hours   Lab Units 03/10/24  0111 03/09/24  0039 03/08/24  0415   WBC AUTO x10*3/uL 8.0 7.3 9.9   HEMOGLOBIN g/dL 10.8* 10.2* 9.9*   HEMATOCRIT % 31.3* 30.8* 30.7*   PLATELETS AUTO x10*3/uL 361 318 304     Results from last 72 hours   Lab Units 03/10/24  0111 03/09/24  0039 03/08/24  1713 03/08/24  1423   SODIUM mmol/L 130*  130* 131*  131* 130* 129*   POTASSIUM mmol/L 5.1 3.8  --  3.9   CHLORIDE mmol/L 97* 97*  --  97*   CO2 mmol/L 27 27  --  27   BUN mg/dL 5* 6  --  8   CREATININE mg/dL 0.38* 0.39*  --  0.47*   GLUCOSE mg/dL 101* 102*  --  90   CALCIUM mg/dL 8.0* 8.3*  --  8.5*   ANION GAP mmol/L 11 11  --  9*   EGFR mL/min/1.73m*2 >90 >90  --  >90   PHOSPHORUS mg/dL 3.2 3.7  --  3.6     Results from last 72 hours   Lab Units 03/10/24  0111 03/09/24  0039 03/08/24  1423   ALBUMIN g/dL 2.9* 2.9* 3.0*     Estimated Creatinine Clearance: 116.9 mL/min (A) (by C-G  "formula based on SCr of 0.38 mg/dL (L)).  No results found for: \"CRP\"  Microbiology  Susceptibility data from last 14 days.  Collected Specimen Info Organism   03/07/24 Blood culture from Peripheral Venipuncture Enterococcus faecium   03/07/24 Blood culture from Peripheral Venipuncture Enterococcus faecium   03/07/24 Blood culture from Peripheral Venipuncture Enterococcus faecium       Imaging    CTA head and neck (3/5)  CT HEAD:  1. Small volume subarachnoid hemorrhage predominantly involving the  region of the right sylvian fissure with small components along the  adjacent right cerebral hemisphere.  2. No large territory ischemic infarction, mass effect, or midline  shift.      CTA HEAD:  1. No large branch vessel cut off or flow-limiting stenosis within  the visualized cervical or intracranial vasculature.  2. No discrete aneurysm identified with attention to the right MCA  region.  3. Mild scattered atherosclerosis.    TTE  CONCLUSIONS:   1. Left ventricular systolic function is hyperdynamic with a 70-75% estimated ejection fraction.   2. Spectral Doppler shows an impaired relaxation pattern of left ventricular diastolic filling.   3. Anterior leaflet mobile mass, consider vegetation, consider CROW.   4. Mild to moderate mitral valve regurgitation.     CROW  CONCLUSIONS:   1. Left ventricular systolic function is normal with a 65-70% estimated ejection fraction.   2. There is an elongated (2.5 x 0.3 cm), filamentous mass that is likely a vegetation attached to the atrial side of the posterior leaflet. There is a resultant eccentric mitral regurgitation that is anteriorly directed. Using PISA, ERO: 0.34 cmï¿½ with a regurg volume of 48.62 ml/beat. Although difficult to see, there does not appear to be a perforation. There is severe blunting of all pulmonary vein inflow with early temporary reversal in the right inferior pulmonary vein.   3. Moderate to severe mitral valve regurgitation.   4. The pulmonary artery " is not well visualized.    CT CAPIMPRESSION:  1. No source of infection in the chest, abdomen, or pelvis.  2. Trace amount of layering debris in the trachea and mainstem  bronchi with bibasilar dependent subsegmental and left lower lobe  segmental atelectasis. No evidence of consolidative pneumonia.  3. A redemonstration of a loculated fluid collection in the left  paraspinal muscles at the L4 level extending toward the laminectomy  space measuring approximately 2.9 x 2.2 x 5.2 cm, the visualization  which is limited due to beam hardening artifact. Differential  diagnosis includes postop seroma/hematoma with or without  superimposed infection of the collection.  4. Several areas of ill-defined endplate erosions along the left  inferior L3 endplate and central superior L4 endplate. On MRI lumbar  spine 03/06/2024, there is loss of cortical definition in these  locations associated with hyperintense intradiscal signal in the  posterior L3-L4 intervertebral disc. Therefore, while degenerative  Modic changes are possible, consideration for discitis osteomyelitis  should remain in the differential diagnosis given no additional  source of infection the chest/abdomen/pelvis. Short-term follow-up  with contrast enhanced MRI may be of further diagnostic value should  patient's infection remain persistent/resistant to treatment.  5. 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  6. Colonic diverticulosis without evidence of acute diverticulitis.  7. 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 female presenting with h/o HTN, HLD, COPD, 3/1 s/p MIS L4-5 TLIF, p/w AMS, 3/5 CTH trace R sylvian SAH. ID consulted for concern for endocarditis.      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 Patient likely with endocarditis. CROW showing mitral vegetation and regurgitation. Angiography showed sah was not mycotic in nature. The surgery was very recent (on 3/1) which is typically too soon to cause this endocarditis and bacteremia. However a resistant VRE faecium was likely acquired in the hospital setting. This along with the back pain and CT imaging findings of fluid around the surgical site, necessitates further evaluation     -Team repeated blood cultures today  -Recommend to obtain MRI on the lumbar spine  -Cont daptomycin 10 mg/kg every 24 hours given that enterococcus faecium is much more likely to be resistant     ID will continue to follow. If any questions regarding this patient please call Team pager.  Discussed with Dr. Reggie Juarez  Infectious disease, PGY V  Team B pager [phone]  General ID pager [phone]

## 2024-03-10 NOTE — PROGRESS NOTES
Subjective   No acute overnight events. No fevers    Interval Events:   3/6 CTA negative, MRI w/ SAH otherwise negative, MRI L-spine neg for   3/7 afib w/ RVR, s/p IVF, s/p metop, TTE EF 70-75%, MV mobile mass  3/8: IR angio negative for mycotic aneurysm  3/9: CT facial bones with minimal patchy opacification of L posterior ethmoid. CT C/A/P with loculated fluid collection of the L paraspinal muscles (likely post-op changes with seroma vs hematoma)     Objective   Vitals 24 hour ranges:  Heart Rate:  [71-93]   Temp:  [36 °C (96.8 °F)-36.5 °C (97.7 °F)]   Resp:  [19-27]   BP: (103-136)/(48-80)   Weight:  [72.4 kg (159 lb 9.8 oz)]   SpO2:  [90 %-96 %]    Hemodynamic parameters for last 24 hours:     Intake/Output for last 24 hours:    Intake/Output Summary (Last 24 hours) at 3/10/2024 0844  Last data filed at 3/10/2024 0700  Gross per 24 hour   Intake 830 ml   Output 1330 ml   Net -500 ml      Vent settings:       Physical Exam:  NEURO:  Alert, oriented x4, follows commands  EOS, PERRL, EOMI  ART 5/5 strength, no drift, no ataxia  CV:  RRR on telemetry, NSR  RESP:  Regular, unlabored  Oxygen: RA  :  catheter in place  GI:  Abdomen NT/ND, soft  SKIN:  Intact    Medications  Scheduled:  daptomycin, 10 mg/kg, intravenous, q24h HORTENSIA  heparin (porcine), 5,000 Units, subcutaneous, q8h  metoprolol tartrate, 12.5 mg, oral, BID  perflutren protein A microsphere, 0.5 mL, intravenous, Once in imaging  sennosides-docusate sodium, 2 tablet, oral, BID  sulfur hexafluoride microsphr, 2 mL, intravenous, Once in imaging    PRN:  PRN medications: acetaminophen, hydrALAZINE, HYDROmorphone, labetaloL, niCARdipine, oxyCODONE, oxyCODONE     Continuous:  niCARdipine, 1-15 mg/hr      Lab Results      Imaging Results  CT chest abdomen pelvis w IV contrast   Final Result   1. No source of infection in the chest, abdomen, or pelvis.   2. Trace amount of layering debris in the trachea and mainstem   bronchi with bibasilar dependent subsegmental  and left lower lobe   segmental atelectasis. No evidence of consolidative pneumonia.   3. A redemonstration of a loculated fluid collection in the left   paraspinal muscles at the L4 level extending toward the laminectomy   space measuring approximately 2.9 x 2.2 x 5.2 cm, the visualization   which is limited due to beam hardening artifact. Differential   diagnosis includes postop seroma/hematoma with or without   superimposed infection of the collection.   4. Several areas of ill-defined endplate erosions along the left   inferior L3 endplate and central superior L4 endplate. On MRI lumbar   spine 03/06/2024, there is loss of cortical definition in these   locations associated with hyperintense intradiscal signal in the   posterior L3-L4 intervertebral disc. Therefore, while degenerative   Modic changes are possible, consideration for discitis osteomyelitis   should remain in the differential diagnosis given no additional   source of infection the chest/abdomen/pelvis. Short-term follow-up   with contrast enhanced MRI may be of further diagnostic value should   patient's infection remain persistent/resistant to treatment.   5. 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   6. Colonic diverticulosis without evidence of acute diverticulitis.   7. 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 Shaun Schwab M.D. This study was interpreted at   [facility], [place], [place].        MACRO:   None        Signed by: Duke Sadler 3/9/2024 9:25 PM   Dictation workstation:   [id number]      CT facial bones w IV contrast   Final Result   Minimal patchy opacification of the left posterior ethmoid. Paranasal   sinuses are otherwise clear. No drainable fluid collection was   identified in the region of the face.         Asymmetric decreased visualization of the right sylvian fissure on   limited examination of the intracranial compartment due to blood   products and/or infectious inflammatory changes-better evaluated on   MRI 03/06/2024.        MACRO:   None        Signed by: Jaime Szymanski 3/9/2024 1:13 PM   Dictation workstation:   [id number]      Transesophageal Echo (CROW)   Final Result      Transthoracic Echo (TTE) Complete   Final Result      MR brain wo IV contrast   Final Result   1. Redemonstration of subarachnoid hemorrhage most pronounced in the   right sylvian fissure and scattered right frontal and temporal sulci.   There is also a small amount of intraventricular hemorrhage.   2. Suspect small areas of acute to early subacute ischemic injury in   the right frontal lobe.   3. Nonspecific white matter changes most likely represent   small-vessel ischemic disease in a patient of this age.             MACRO:   None        Signed by: Sera Casanova 3/6/2024 1:57 PM   Dictation workstation:   [id number]      MR lumbar spine wo IV contrast   Final Result   1. Postsurgical changes from left facetectomy/laminectomy, posterior   spinal fusion, and interbody spacer at L4-L5. Heterogeneous intensity   fluid within the hemilaminectomy bed measuring approximately 2.7 x   2.0 x 2.9 cm likely represents postoperative seroma/hematoma.   2. Degenerative changes of the lumbar spine with persistent moderate   spinal canal narrowing at L4-L5.        I personally reviewed the images/study and I agree with the findings   as stated by resident physician Dr. Jason Gardner.        MACRO:   None        Signed by: Lucia Ontiveros 3/6/2024 2:15 PM   Dictation workstation:   [id number]      CT angio head and neck w and wo IV contrast   Final Result   CT HEAD:   1. Small volume subarachnoid hemorrhage predominantly involving the   region of the right sylvian fissure with small components along the   adjacent right cerebral hemisphere.   2. No large  territory ischemic infarction, mass effect, or midline   shift.        CTA HEAD:   1. No large branch vessel cut off or flow-limiting stenosis within   the visualized cervical or intracranial vasculature.   2. No discrete aneurysm identified with attention to the right MCA   region.   3. Mild scattered atherosclerosis.        I personally reviewed the images/study and I agree with the findings   as stated above by resident physician, Dr. Kaleb Olivas. The   study was interpreted at [facility] in [facility].        MACRO:   None        Signed by: Rafi Garcia 3/6/2024 9:18 AM   Dictation workstation:   [id number]      IR angiogram cerebral bilateral    (Results Pending)         Assessment/Plan   NEURO:  #trace R sylvian SAH  Assessment:  Neurologically: intact with no sensory or motor deficits; L sided neglect; intact CN II-XII  Plan:  NSU  Q1H neuro checks to monitor for septic thrombus to the brain  Repeat CT head on 3/11  Pain: acetaminophen, oxycodone, hydromorphone PRN  Sedation: none  Nausea: ondansetron PRN  PT/OT/SLP    CARDIOVASCULAR:  #Paroxysmal Afib  #MV Vegetation  #Endocarditis  Assessment:  NSR on telemetry  No evidence of skin changes, fevers, or worsening CO  ECHO: TTE on 3/8 with normal EF 65-70% and 2.5 x 0.3 filamentous mass with mod to severe MR   Plan:  Continue to monitor on telemetry  Goal SBP < 120 - Nicardipine, Hydralazine and Labetalol PRN  Continue metoprolol 12.5 mg BID    RESPIRATORY:  Expand All Collapse All       Subjective   No acute overnight events. Transitioned to daptomycin for Enterococcus faecalis.      Interval Events:   3/6 CTA negative, MRI w/ SAH otherwise negative, MRI L-spine neg for   3/7 afib w/ RVR, s/p IVF, s/p metop, TTE EF 70-75%, MV mobile mass  3/8: IR angio negative for mycotic aneurysm           Objective   Vitals 24 hour ranges:  Heart Rate:  [71-98]   Temp:  [35.9 °C (96.6 °F)-36.6 °C (97.9 °F)]   Resp:  [11-26]    BP: (107-159)/()   Weight:  [65.4 kg (144 lb 2.9 oz)]   SpO2:  [90 %-100 %]    Hemodynamic parameters for last 24 hours:  Intake/Output for last 24 hours:     Intake/Output Summary (Last 24 hours) at 3/9/2024 0850  Last data filed at 3/9/2024 0700      Gross per 24 hour   Intake 2550 ml   Output 2850 ml   Net -300 ml      Vent settings:     Physical Exam:  NEURO:  Alert, oriented x4, follows commands  EOS, PERRL, EOMI  ART 5/5 strength, no drift, no ataxia. L sided neglect  CV:  RRR on telemetry, NSR  RESP:  Regular, unlabored  Oxygen: RA  :  catheter in place  GI:  Abdomen NT/ND, soft  SKIN:  Intact     Medications  Scheduled:  daptomycin, 10 mg/kg, intravenous, q24h HORTENSIA  heparin (porcine), 5,000 Units, subcutaneous, q8h  metoprolol tartrate, 12.5 mg, oral, BID  perflutren protein A microsphere, 0.5 mL, intravenous, Once in imaging  sennosides-docusate sodium, 2 tablet, oral, BID  sulfur hexafluoride microsphr, 2 mL, intravenous, Once in imaging     PRN:  PRN medications: acetaminophen, hydrALAZINE, HYDROmorphone, labetaloL, niCARdipine, oxyCODONE, oxyCODONE      Continuous:  niCARdipine, 1-15 mg/hr  sodium chloride 0.9%, 100 mL/hr, Last Rate: 100 mL/hr (03/09/24 0700)        Lab Results     Imaging Results  Transesophageal Echo (CROW)   Final Result       Transthoracic Echo (TTE) Complete   Final Result       MR brain wo IV contrast   Final Result   1. Redemonstration of subarachnoid hemorrhage most pronounced in the   right sylvian fissure and scattered right frontal and temporal sulci.   There is also a small amount of intraventricular hemorrhage.   2. Suspect small areas of acute to early subacute ischemic injury in   the right frontal lobe.   3. Nonspecific white matter changes most likely represent   small-vessel ischemic disease in a patient of this age.             MACRO:   None        Signed by: Sera Casanova 3/6/2024 1:57 PM   Dictation workstation:   [id number]       MR lumbar spine wo IV  contrast   Final Result   1. Postsurgical changes from left facetectomy/laminectomy, posterior   spinal fusion, and interbody spacer at L4-L5. Heterogeneous intensity   fluid within the hemilaminectomy bed measuring approximately 2.7 x   2.0 x 2.9 cm likely represents postoperative seroma/hematoma.   2. Degenerative changes of the lumbar spine with persistent moderate   spinal canal narrowing at L4-L5.        I personally reviewed the images/study and I agree with the findings   as stated by resident physician Dr. Jason Gardner.        MACRO:   None        Signed by: Lucia Ontiveros 3/6/2024 2:15 PM   Dictation workstation:   [id number]       CT angio head and neck w and wo IV contrast   Final Result   CT HEAD:   1. Small volume subarachnoid hemorrhage predominantly involving the   region of the right sylvian fissure with small components along the   adjacent right cerebral hemisphere.   2. No large territory ischemic infarction, mass effect, or midline   shift.        CTA HEAD:   1. No large branch vessel cut off or flow-limiting stenosis within   the visualized cervical or intracranial vasculature.   2. No discrete aneurysm identified with attention to the right MCA   region.   3. Mild scattered atherosclerosis.        I personally reviewed the images/study and I agree with the findings   as stated above by resident physician, Dr. Kaleb Olivas. The   study was interpreted at [facility] in [facility].        MACRO:   None        Signed by: Rafi Garcia 3/6/2024 9:18 AM   Dictation workstation:   [id number]       IR angiogram cerebral bilateral    (Results Pending)   CT chest abdomen pelvis w and wo IV contrast    (Results Pending)   CT facial bones w and wo IV contrast    (Results Pending)               Assessment/Plan   NEURO:  #trace R sylvian SAH   Assessment:  Neurologically: intact with no sensory or motor deficits; L sided neglect; intact CN II-XII  Plan:  NSU  Q1H  neuro checks to monitor for septic thrombus to the brain  Pain: acetaminophen, oxycodone, hydromorphone PRN  Sedation: none  Nausea: ondansetron PRN  PT/OT/SLP     CARDIOVASCULAR:  #Paroxysmal Afib  #MV Vegetation  #Endocarditis  Assessment:  NSR on telemetry  No evidence of skin changes, fevers, or worsening CO  ECHO: TTE on 3/8 with normal EF 65-70% and 2.5 x 0.3 filamentous mass with mod to severe MR   Plan:  Continue to monitor on telemetry  Goal SBP < 120 - Nicardipine, Hydralazine and Labetalol PRN  Continue metoprolol 12.5 mg BID     RESPIRATORY:  #COPD  Assessment:  No acute issues or oxygen requirement  Plan:  Continuous pulse oximetry   O2 PRN to maintain SpO2 > 89%, wean as tolerated  Incentive spirometry while awake           RENAL/:  #No active issues  Assessment:  Results from last 72 hours   Lab Units 03/10/24  0111 03/09/24  0039   BUN mg/dL 5* 6   CREATININE mg/dL 0.38* 0.39*       Plan:  Monitor with daily RFP    FEN/GI:  Expand All Collapse All       Subjective   No acute overnight events. Transitioned to daptomycin for Enterococcus faecalis.      Interval Events:   3/6 CTA negative, MRI w/ SAH otherwise negative, MRI L-spine neg for   3/7 afib w/ RVR, s/p IVF, s/p metop, TTE EF 70-75%, MV mobile mass  3/8: IR angio negative for mycotic aneurysm           Objective   Vitals 24 hour ranges:  Heart Rate:  [71-98]   Temp:  [35.9 °C (96.6 °F)-36.6 °C (97.9 °F)]   Resp:  [11-26]   BP: (107-159)/()   Weight:  [65.4 kg (144 lb 2.9 oz)]   SpO2:  [90 %-100 %]    Hemodynamic parameters for last 24 hours:  Intake/Output for last 24 hours:     Intake/Output Summary (Last 24 hours) at 3/9/2024 0850  Last data filed at 3/9/2024 0700      Gross per 24 hour   Intake 2550 ml   Output 2850 ml   Net -300 ml      Vent settings:     Physical Exam:  NEURO:  Alert, oriented x4, follows commands  EOS, PERRL, EOMI  ART 5/5 strength, no drift, no ataxia. L sided neglect  CV:  RRR on telemetry, NSR  RESP:  Regular,  unlabored  Oxygen: RA  :  catheter in place  GI:  Abdomen NT/ND, soft  SKIN:  Intact     Medications  Scheduled:  daptomycin, 10 mg/kg, intravenous, q24h HORTENSIA  heparin (porcine), 5,000 Units, subcutaneous, q8h  metoprolol tartrate, 12.5 mg, oral, BID  perflutren protein A microsphere, 0.5 mL, intravenous, Once in imaging  sennosides-docusate sodium, 2 tablet, oral, BID  sulfur hexafluoride microsphr, 2 mL, intravenous, Once in imaging     PRN:  PRN medications: acetaminophen, hydrALAZINE, HYDROmorphone, labetaloL, niCARdipine, oxyCODONE, oxyCODONE      Continuous:  niCARdipine, 1-15 mg/hr  sodium chloride 0.9%, 100 mL/hr, Last Rate: 100 mL/hr (03/09/24 0700)        Lab Results     Imaging Results  Transesophageal Echo (CROW)   Final Result       Transthoracic Echo (TTE) Complete   Final Result       MR brain wo IV contrast   Final Result   1. Redemonstration of subarachnoid hemorrhage most pronounced in the   right sylvian fissure and scattered right frontal and temporal sulci.   There is also a small amount of intraventricular hemorrhage.   2. Suspect small areas of acute to early subacute ischemic injury in   the right frontal lobe.   3. Nonspecific white matter changes most likely represent   small-vessel ischemic disease in a patient of this age.             MACRO:   None        Signed by: Sera Casanova 3/6/2024 1:57 PM   Dictation workstation:   [id number]       MR lumbar spine wo IV contrast   Final Result   1. Postsurgical changes from left facetectomy/laminectomy, posterior   spinal fusion, and interbody spacer at L4-L5. Heterogeneous intensity   fluid within the hemilaminectomy bed measuring approximately 2.7 x   2.0 x 2.9 cm likely represents postoperative seroma/hematoma.   2. Degenerative changes of the lumbar spine with persistent moderate   spinal canal narrowing at L4-L5.        I personally reviewed the images/study and I agree with the findings   as stated by resident physician Dr. Gomez  Jj.        MACRO:   None        Signed by: Lucia Ontiveros 3/6/2024 2:15 PM   Dictation workstation:   [id number]       CT angio head and neck w and wo IV contrast   Final Result   CT HEAD:   1. Small volume subarachnoid hemorrhage predominantly involving the   region of the right sylvian fissure with small components along the   adjacent right cerebral hemisphere.   2. No large territory ischemic infarction, mass effect, or midline   shift.        CTA HEAD:   1. No large branch vessel cut off or flow-limiting stenosis within   the visualized cervical or intracranial vasculature.   2. No discrete aneurysm identified with attention to the right MCA   region.   3. Mild scattered atherosclerosis.        I personally reviewed the images/study and I agree with the findings   as stated above by resident physician, Dr. Kaleb Olivas. The   study was interpreted at [facility] in [facility].        MACRO:   None        Signed by: Rafi Garcia 3/6/2024 9:18 AM   Dictation workstation:   [id number]       IR angiogram cerebral bilateral    (Results Pending)   CT chest abdomen pelvis w and wo IV contrast    (Results Pending)   CT facial bones w and wo IV contrast    (Results Pending)               Assessment/Plan   NEURO:  #trace R sylvian SAH   Assessment:  Neurologically: intact with no sensory or motor deficits; L sided neglect; intact CN II-XII  Plan:  NSU  Q1H neuro checks to monitor for septic thrombus to the brain  Pain: acetaminophen, oxycodone, hydromorphone PRN  Sedation: none  Nausea: ondansetron PRN  PT/OT/SLP     CARDIOVASCULAR:  #Paroxysmal Afib  #MV Vegetation  #Endocarditis  Assessment:  NSR on telemetry  No evidence of skin changes, fevers, or worsening CO  ECHO: TTE on 3/8 with normal EF 65-70% and 2.5 x 0.3 filamentous mass with mod to severe MR   Plan:  Continue to monitor on telemetry  Goal SBP < 120 - Nicardipine, Hydralazine and Labetalol PRN  Continue  metoprolol 12.5 mg BID     RESPIRATORY:  #COPD  Assessment:  No acute issues or oxygen requirement  Plan:  Continuous pulse oximetry   O2 PRN to maintain SpO2 > 89%, wean as tolerated  Incentive spirometry while awake      RENAL/:  #No active issues  Assessment:        Results from last 72 hours   Lab Units 24  0039 24  1423   BUN mg/dL 6 8   CREATININE mg/dL 0.39* 0.47*       Plan:  Monitor with daily RFP     FEN/GI:  #No active issues  Assessment:  Last BM Date: 24  Plan:  Monitor and replace electrolytes per protocol  Diet: regular diet  Bowel Regimen: Docusate-Senna         ENDOCRINE:  #No active issues  Assessment:  Results from last 7 days   Lab Units 03/10/24  0111 24  0039 24  1136 24  0525   POCT GLUCOSE mg/dL  --   --   --  84   GLUCOSE mg/dL 101* 102*   < >  --     < > = values in this interval not displayed.      Plan:  Accuchecks & ISS Q6H    HEMATOLOGY:  #No active issues  Assessment:  Results from last 7 days   Lab Units 03/10/24  0111 24  0039   HEMOGLOBIN g/dL 10.8* 10.2*   HEMATOCRIT % 31.3* 30.8*   PLATELETS AUTO x10*3/uL 361 318     Plan:  Continue to monitor with daily CBC and Coag panel    INFECTIOUS DISEASE:  #Endocarditis  #Enterococcus Faecium  Assessment:  Results from last 7 days   Lab Units 03/10/24  0111 24  0039   WBC AUTO x10*3/uL 8.0 7.3     Temp (24hrs), Av.3 °C (97.3 °F), Min:36 °C (96.8 °F), Max:36.5 °C (97.7 °F)  Appreciate ID consult; on daptomycin  Pan scan with CT face, CT C/A/P   CP and SOB workup initiated multiple times since 2023 (prior to current admission) and unclear time course or source of infection  Plan:  Continue to monitor for s/sx of infection  Pan culture for temperature > 38.4 C    MUSCULOSKELETAL:  #MIS L4-5 TLIF  Post-op pain   Pending evaluation with CT C/A/P to evaluate for post-op infection; of negative imaging, will consider MRI L spine    SKIN:  No acute issues  Turns and skin care per NSU  protocol    ACCESS:  PIVs    PROPHYLAXIS:  DVT/VTE: SCDs, SQ heparin   GI: none    RESTRAINTS:  Not indicated/Patient does not meet criteria for restraints    Brielle Blanchard MD  Neuroscience Intensive Care       Plan of care to be discussed with neurocritical care attending      Attending Attestation:      Neuro - prior TLIF at OSH p/w right sylvian fissure blood (sx: agitation)   - angio negative   - CTH Monday 3/11 for stability of hemorrhage prior to clearance for AC    - q1 NC   Cardiac - SBP < 140   - EP/cardiology recs: possible intervention Monday (when cleared for AC)   - CROW mitral valve vegetation   Respiratory - RA  Renal - no acute issues (low Na chronically)  GI - regular diet   Infectious   - positive blood culture: GPCs (last positive 3/9) - new blood cultures pending 3/10   - CT face/ CT CAP no clear source of infection   - daptomycin   - ID recs      Code status - full code       Gavin Covington MD    of Neurosurgery   [facility] [place]   [facility]   Office: [phone]  Fax: [phone]

## 2024-03-10 NOTE — CARE PLAN
Problem: General Stroke  Goal: Establish a mutual long term goal with patient by discharge  Outcome: Progressing  Goal: Demonstrate improvement in neurological exam throughout the shift  Outcome: Progressing  Goal: Maintain BP within ordered limits throughout shift  Outcome: Progressing  Goal: Participate in treatment (ie., meds, therapy) throughout shift  Outcome: Progressing  Goal: No symptoms of aspiration throughout shift  Outcome: Progressing  Goal: No symptoms of hemorrhage throughout shift  Outcome: Progressing  Goal: Tolerate enteral feeding throughout shift  Outcome: Progressing  Goal: Decreased nausea/vomiting throughout shift  Outcome: Progressing  Goal: Controlled blood glucose throughout shift  Outcome: Progressing  Goal: Out of bed three times today  Outcome: Progressing   The patient's goals for the shift include ELDA    The clinical goals for the shift include Pt's neuro exam will remain WNL

## 2024-03-11 ENCOUNTER — APPOINTMENT (OUTPATIENT)
Dept: RADIOLOGY | Facility: HOSPITAL | Age: 79
DRG: 981 | End: 2024-03-11
Payer: MEDICARE

## 2024-03-11 LAB
ALBUMIN SERPL BCP-MCNC: 2.9 G/DL (ref 3.4–5)
ALBUMIN SERPL BCP-MCNC: 3 G/DL (ref 3.4–5)
ANION GAP SERPL CALC-SCNC: 12 MMOL/L (ref 10–20)
ANION GAP SERPL CALC-SCNC: 13 MMOL/L (ref 10–20)
APTT PPP: 32 SECONDS (ref 27–38)
BASOPHILS # BLD AUTO: 0.05 X10*3/UL (ref 0–0.1)
BASOPHILS # BLD AUTO: 0.06 X10*3/UL (ref 0–0.1)
BASOPHILS NFR BLD AUTO: 0.6 %
BASOPHILS NFR BLD AUTO: 0.7 %
BUN SERPL-MCNC: 7 MG/DL (ref 6–23)
BUN SERPL-MCNC: 7 MG/DL (ref 6–23)
CA-I BLD-SCNC: 1.18 MMOL/L (ref 1.1–1.33)
CALCIUM SERPL-MCNC: 8 MG/DL (ref 8.6–10.6)
CALCIUM SERPL-MCNC: 8.5 MG/DL (ref 8.6–10.6)
CHLORIDE SERPL-SCNC: 96 MMOL/L (ref 98–107)
CHLORIDE SERPL-SCNC: 98 MMOL/L (ref 98–107)
CO2 SERPL-SCNC: 26 MMOL/L (ref 21–32)
CO2 SERPL-SCNC: 28 MMOL/L (ref 21–32)
CREAT SERPL-MCNC: 0.38 MG/DL (ref 0.5–1.05)
CREAT SERPL-MCNC: 0.38 MG/DL (ref 0.5–1.05)
EGFRCR SERPLBLD CKD-EPI 2021: >90 ML/MIN/1.73M*2
EGFRCR SERPLBLD CKD-EPI 2021: >90 ML/MIN/1.73M*2
EOSINOPHIL # BLD AUTO: 0.11 X10*3/UL (ref 0–0.4)
EOSINOPHIL # BLD AUTO: 0.12 X10*3/UL (ref 0–0.4)
EOSINOPHIL NFR BLD AUTO: 1.3 %
EOSINOPHIL NFR BLD AUTO: 1.4 %
ERYTHROCYTE [DISTWIDTH] IN BLOOD BY AUTOMATED COUNT: 14.8 % (ref 11.5–14.5)
ERYTHROCYTE [DISTWIDTH] IN BLOOD BY AUTOMATED COUNT: 15.2 % (ref 11.5–14.5)
GLUCOSE SERPL-MCNC: 100 MG/DL (ref 74–99)
GLUCOSE SERPL-MCNC: 112 MG/DL (ref 74–99)
HCT VFR BLD AUTO: 31.5 % (ref 36–46)
HCT VFR BLD AUTO: 33.2 % (ref 36–46)
HGB BLD-MCNC: 10.6 G/DL (ref 12–16)
HGB BLD-MCNC: 10.8 G/DL (ref 12–16)
IMM GRANULOCYTES # BLD AUTO: 0.3 X10*3/UL (ref 0–0.5)
IMM GRANULOCYTES # BLD AUTO: 0.35 X10*3/UL (ref 0–0.5)
IMM GRANULOCYTES NFR BLD AUTO: 3.4 % (ref 0–0.9)
IMM GRANULOCYTES NFR BLD AUTO: 4.1 % (ref 0–0.9)
LYMPHOCYTES # BLD AUTO: 2 X10*3/UL (ref 0.8–3)
LYMPHOCYTES # BLD AUTO: 2.37 X10*3/UL (ref 0.8–3)
LYMPHOCYTES NFR BLD AUTO: 22.7 %
LYMPHOCYTES NFR BLD AUTO: 27.7 %
MAGNESIUM SERPL-MCNC: 1.91 MG/DL (ref 1.6–2.4)
MCH RBC QN AUTO: 27.2 PG (ref 26–34)
MCH RBC QN AUTO: 27.2 PG (ref 26–34)
MCHC RBC AUTO-ENTMCNC: 31.9 G/DL (ref 32–36)
MCHC RBC AUTO-ENTMCNC: 34.3 G/DL (ref 32–36)
MCV RBC AUTO: 79 FL (ref 80–100)
MCV RBC AUTO: 85 FL (ref 80–100)
MONOCYTES # BLD AUTO: 0.86 X10*3/UL (ref 0.05–0.8)
MONOCYTES # BLD AUTO: 0.95 X10*3/UL (ref 0.05–0.8)
MONOCYTES NFR BLD AUTO: 11.1 %
MONOCYTES NFR BLD AUTO: 9.8 %
NEUTROPHILS # BLD AUTO: 4.71 X10*3/UL (ref 1.6–5.5)
NEUTROPHILS # BLD AUTO: 5.49 X10*3/UL (ref 1.6–5.5)
NEUTROPHILS NFR BLD AUTO: 55.1 %
NEUTROPHILS NFR BLD AUTO: 62.1 %
NRBC BLD-RTO: 0 /100 WBCS (ref 0–0)
NRBC BLD-RTO: 0 /100 WBCS (ref 0–0)
PHOSPHATE SERPL-MCNC: 3.2 MG/DL (ref 2.5–4.9)
PHOSPHATE SERPL-MCNC: 3.8 MG/DL (ref 2.5–4.9)
PLATELET # BLD AUTO: 335 X10*3/UL (ref 150–450)
PLATELET # BLD AUTO: 371 X10*3/UL (ref 150–450)
POTASSIUM SERPL-SCNC: 4.3 MMOL/L (ref 3.5–5.3)
POTASSIUM SERPL-SCNC: 4.5 MMOL/L (ref 3.5–5.3)
PYRIDOXAL PHOS SERPL-SCNC: 20.3 NMOL/L (ref 20–125)
RBC # BLD AUTO: 3.9 X10*6/UL (ref 4–5.2)
RBC # BLD AUTO: 3.97 X10*6/UL (ref 4–5.2)
SODIUM SERPL-SCNC: 130 MMOL/L (ref 136–145)
SODIUM SERPL-SCNC: 134 MMOL/L (ref 136–145)
WBC # BLD AUTO: 8.6 X10*3/UL (ref 4.4–11.3)
WBC # BLD AUTO: 8.8 X10*3/UL (ref 4.4–11.3)

## 2024-03-11 PROCEDURE — 2500000001 HC RX 250 WO HCPCS SELF ADMINISTERED DRUGS (ALT 637 FOR MEDICARE OP)

## 2024-03-11 PROCEDURE — 2500000004 HC RX 250 GENERAL PHARMACY W/ HCPCS (ALT 636 FOR OP/ED)

## 2024-03-11 PROCEDURE — 36415 COLL VENOUS BLD VENIPUNCTURE: CPT | Performed by: STUDENT IN AN ORGANIZED HEALTH CARE EDUCATION/TRAINING PROGRAM

## 2024-03-11 PROCEDURE — 2500000001 HC RX 250 WO HCPCS SELF ADMINISTERED DRUGS (ALT 637 FOR MEDICARE OP): Performed by: NURSE PRACTITIONER

## 2024-03-11 PROCEDURE — 82330 ASSAY OF CALCIUM: CPT | Performed by: STUDENT IN AN ORGANIZED HEALTH CARE EDUCATION/TRAINING PROGRAM

## 2024-03-11 PROCEDURE — 85025 COMPLETE CBC W/AUTO DIFF WBC: CPT | Performed by: STUDENT IN AN ORGANIZED HEALTH CARE EDUCATION/TRAINING PROGRAM

## 2024-03-11 PROCEDURE — 80069 RENAL FUNCTION PANEL: CPT | Mod: MUE | Performed by: STUDENT IN AN ORGANIZED HEALTH CARE EDUCATION/TRAINING PROGRAM

## 2024-03-11 PROCEDURE — 70450 CT HEAD/BRAIN W/O DYE: CPT | Performed by: RADIOLOGY

## 2024-03-11 PROCEDURE — 99232 SBSQ HOSP IP/OBS MODERATE 35: CPT | Performed by: STUDENT IN AN ORGANIZED HEALTH CARE EDUCATION/TRAINING PROGRAM

## 2024-03-11 PROCEDURE — 99222 1ST HOSP IP/OBS MODERATE 55: CPT

## 2024-03-11 PROCEDURE — 70450 CT HEAD/BRAIN W/O DYE: CPT

## 2024-03-11 PROCEDURE — 83735 ASSAY OF MAGNESIUM: CPT | Performed by: STUDENT IN AN ORGANIZED HEALTH CARE EDUCATION/TRAINING PROGRAM

## 2024-03-11 PROCEDURE — 2500000004 HC RX 250 GENERAL PHARMACY W/ HCPCS (ALT 636 FOR OP/ED): Performed by: STUDENT IN AN ORGANIZED HEALTH CARE EDUCATION/TRAINING PROGRAM

## 2024-03-11 PROCEDURE — 2500000001 HC RX 250 WO HCPCS SELF ADMINISTERED DRUGS (ALT 637 FOR MEDICARE OP): Performed by: STUDENT IN AN ORGANIZED HEALTH CARE EDUCATION/TRAINING PROGRAM

## 2024-03-11 PROCEDURE — 1100000001 HC PRIVATE ROOM DAILY

## 2024-03-11 PROCEDURE — 85730 THROMBOPLASTIN TIME PARTIAL: CPT

## 2024-03-11 PROCEDURE — 87040 BLOOD CULTURE FOR BACTERIA: CPT

## 2024-03-11 RX ORDER — HEPARIN SODIUM 10000 [USP'U]/100ML
0-4000 INJECTION, SOLUTION INTRAVENOUS CONTINUOUS
Status: DISPENSED | OUTPATIENT
Start: 2024-03-11 | End: 2024-03-18

## 2024-03-11 RX ORDER — IPRATROPIUM BROMIDE AND ALBUTEROL SULFATE 2.5; .5 MG/3ML; MG/3ML
3 SOLUTION RESPIRATORY (INHALATION) EVERY 6 HOURS PRN
Status: DISCONTINUED | OUTPATIENT
Start: 2024-03-11 | End: 2024-03-22

## 2024-03-11 RX ORDER — HEPARIN SODIUM 5000 [USP'U]/ML
2000-4000 INJECTION, SOLUTION INTRAVENOUS; SUBCUTANEOUS EVERY 4 HOURS PRN
Status: DISCONTINUED | OUTPATIENT
Start: 2024-03-11 | End: 2024-03-22

## 2024-03-11 RX ORDER — CEFTRIAXONE 2 G/50ML
2 INJECTION, SOLUTION INTRAVENOUS EVERY 12 HOURS
Status: DISCONTINUED | OUTPATIENT
Start: 2024-03-11 | End: 2024-04-01 | Stop reason: HOSPADM

## 2024-03-11 RX ADMIN — SENNOSIDES AND DOCUSATE SODIUM 2 TABLET: 8.6; 5 TABLET ORAL at 21:13

## 2024-03-11 RX ADMIN — HEPARIN SODIUM 5000 UNITS: 5000 INJECTION INTRAVENOUS; SUBCUTANEOUS at 08:37

## 2024-03-11 RX ADMIN — OXYCODONE HYDROCHLORIDE 5 MG: 5 TABLET ORAL at 19:40

## 2024-03-11 RX ADMIN — METOPROLOL TARTRATE 12.5 MG: 25 TABLET, FILM COATED ORAL at 08:37

## 2024-03-11 RX ADMIN — HYDROMORPHONE HYDROCHLORIDE 0.2 MG: 1 INJECTION, SOLUTION INTRAMUSCULAR; INTRAVENOUS; SUBCUTANEOUS at 13:15

## 2024-03-11 RX ADMIN — HEPARIN SODIUM 5000 UNITS: 5000 INJECTION INTRAVENOUS; SUBCUTANEOUS at 00:26

## 2024-03-11 RX ADMIN — SENNOSIDES AND DOCUSATE SODIUM 2 TABLET: 8.6; 5 TABLET ORAL at 08:37

## 2024-03-11 RX ADMIN — OXYCODONE HYDROCHLORIDE 5 MG: 5 TABLET ORAL at 08:37

## 2024-03-11 RX ADMIN — HEPARIN SODIUM 5000 UNITS: 5000 INJECTION INTRAVENOUS; SUBCUTANEOUS at 16:56

## 2024-03-11 RX ADMIN — METOPROLOL TARTRATE 12.5 MG: 25 TABLET, FILM COATED ORAL at 21:13

## 2024-03-11 RX ADMIN — HEPARIN SODIUM 900 UNITS/HR: 10000 INJECTION, SOLUTION INTRAVENOUS at 20:20

## 2024-03-11 RX ADMIN — AMPICILLIN SODIUM 2 G: 2 INJECTION, POWDER, FOR SOLUTION INTRAMUSCULAR; INTRAVENOUS at 20:05

## 2024-03-11 RX ADMIN — SODIUM CHLORIDE 500 ML: 9 INJECTION, SOLUTION INTRAVENOUS at 02:09

## 2024-03-11 RX ADMIN — CEFTRIAXONE SODIUM 2 G: 2 INJECTION, SOLUTION INTRAVENOUS at 20:49

## 2024-03-11 ASSESSMENT — PAIN - FUNCTIONAL ASSESSMENT
PAIN_FUNCTIONAL_ASSESSMENT: 0-10

## 2024-03-11 ASSESSMENT — PAIN SCALES - GENERAL
PAINLEVEL_OUTOF10: 5 - MODERATE PAIN
PAINLEVEL_OUTOF10: 4
PAINLEVEL_OUTOF10: 5 - MODERATE PAIN
PAINLEVEL_OUTOF10: 2
PAINLEVEL_OUTOF10: 0 - NO PAIN
PAINLEVEL_OUTOF10: 0 - NO PAIN

## 2024-03-11 ASSESSMENT — COGNITIVE AND FUNCTIONAL STATUS - GENERAL
WALKING IN HOSPITAL ROOM: TOTAL
TURNING FROM BACK TO SIDE WHILE IN FLAT BAD: A LITTLE
CLIMB 3 TO 5 STEPS WITH RAILING: TOTAL
MOVING TO AND FROM BED TO CHAIR: A LITTLE
MOBILITY SCORE: 14
STANDING UP FROM CHAIR USING ARMS: A LOT

## 2024-03-11 NOTE — CARE PLAN
The patient's goals for the shift include pain management.  Problem: Fall/Injury  Goal: Not fall by end of shift  Outcome: Progressing  Goal: Be free from injury by end of the shift  Outcome: Progressing  Goal: Verbalize understanding of personal risk factors for fall in the hospital  Outcome: Progressing  Goal: Verbalize understanding of risk factor reduction measures to prevent injury from fall in the home  Outcome: Progressing  Goal: Use assistive devices by end of the shift  Outcome: Progressing  Goal: Pace activities to prevent fatigue by end of the shift  Outcome: Progressing     Problem: General Stroke  Goal: Establish a mutual long term goal with patient by discharge  Outcome: Progressing  Goal: Demonstrate improvement in neurological exam throughout the shift  Outcome: Progressing  Goal: Maintain BP within ordered limits throughout shift  Outcome: Progressing  Goal: Participate in treatment (ie., meds, therapy) throughout shift  Outcome: Progressing  Goal: No symptoms of aspiration throughout shift  Outcome: Progressing  Goal: No symptoms of hemorrhage throughout shift  Outcome: Progressing  Goal: Decreased nausea/vomiting throughout shift  Outcome: Progressing  Goal: Controlled blood glucose throughout shift  Outcome: Progressing  Goal: Out of bed three times today  Outcome: Progressing     Problem: Pain  Goal: Takes deep breaths with improved pain control throughout the shift  Outcome: Progressing  Goal: Turns in bed with improved pain control throughout the shift  Outcome: Progressing  Goal: Walks with improved pain control throughout the shift  Outcome: Progressing  Goal: Performs ADL's with improved pain control throughout shift  Outcome: Progressing  Goal: Participates in PT with improved pain control throughout the shift  Outcome: Progressing  Goal: Free from opioid side effects throughout the shift  Outcome: Progressing  Goal: Free from acute confusion related to pain meds throughout the  shift  Outcome: Progressing     Problem: Pain - Adult  Goal: Verbalizes/displays adequate comfort level or baseline comfort level  Outcome: Progressing     Problem: Safety - Adult  Goal: Free from fall injury  Outcome: Progressing     Problem: Discharge Planning  Goal: Discharge to home or other facility with appropriate resources  Outcome: Progressing     Problem: Chronic Conditions and Co-morbidities  Goal: Patient's chronic conditions and co-morbidity symptoms are monitored and maintained or improved  Outcome: Progressing       The clinical goals for the shift include pt systolic BP will remain WNL per team goal

## 2024-03-11 NOTE — SIGNIFICANT EVENT
Imaging reviewed showing resolving blood. Patient is cleared to undergo anticoagulation for procedures from a neurosurgical perspective. If patient has exam change please stop anticoagulation and obtain STAT CT Head. Neurosurgery will continue to follow .     Plan staffed with chief resident and attending

## 2024-03-11 NOTE — PROGRESS NOTES
"Fannie Watson is a 78 y.o. female on day 6 of admission presenting with SAH (subarachnoid hemorrhage) (CMS/[facility]).    Subjective     No acute events overnight.     Objective     Physical Exam    A&Ox3  FC x4 5/5  SILT  Groin soft    Last Recorded Vitals  Blood pressure 119/66, pulse 76, temperature 36.1 °C (97 °F), temperature source Temporal, resp. rate 21, height 1.57 m (5' 1.81\"), weight 73.6 kg (162 lb 4.1 oz), SpO2 99 %.  Intake/Output last 3 Shifts:  I/O last 3 completed shifts:  In: 1290 (17.8 mL/kg) [P.O.:840; I.V.:400 (5.5 mL/kg); IV Piggyback:50]  Out: 1965 (27.1 mL/kg) [Urine:1965 (0.8 mL/kg/hr)]  Weight: 72.4 kg     Relevant Results                This patient has a urinary catheter   Reason for the urinary catheter remaining today? critically ill patient who need accurate urinary output measurements               Assessment/Plan   Principal Problem:    SAH (subarachnoid hemorrhage) (CMS/HCC)    Patient is a 78 year old female with h/o HTN, HLD, COPD, 3/1 s/p MIS L4-5 TLIF, p/w AMS, 3/5 CTH trace R sylvian SAH     3/6 CTA negative, MRI w/ SAH otherwise negative, MRI L-spine neg for   3/7 afib w/ RVR, s/p IVF, s/p metop, TTE EF 70-75%, MV mobile mass  3/8 TTE w/ large valcular vegetation, BCX positive for Enterococcus Faecium, angio negative for mycotic aneurysms  3/9 CT face/ CAP negative    Plan:     [facility]  [facility] pending this morning-if CTH is stable, ok for anticoagulation and left heart cath and to transfer to cards service for valve surgery  Cards recs- SBP <120, Left heart cath prior to any CT intervention  CT surgery recs  EP recs  ID recs- Daptomycin; PICC 3/11  PTOT- low intensity  SCDS, SQH           Wilfrido Dukes MD      "

## 2024-03-11 NOTE — PROGRESS NOTES
Pending staffing with attending   Progress Note   [facility]  March 11, 2024   Patient: Fannie Watson    Medical Record: [id number]    Hospital course:   Fannie Watson is a 78 y.o. female with a PMH of hypertension, hyperlipidemia, COPD, spondylolisthesis of lumbar lesion and status post L4-L5 decompression laminectomy and fusion on 3/1/2024 (at [facility]) who was transferred from OSH ([facility]) on 3/5/24 with altered mentation and trace R sylvian SAH on CT head admitted to [facility] initially      She has had new onset paroxysmal afib with RVR this admission 3/7 . EP-cardiology was consulted and recommended TTE. TTE suggested a vegetation on the mitral valve so CROW was performed and showed 2.5 cm mobile vegetation with moderate to severe mitral regurgitation. Blood cultures draw 3/7 are positive for E. Faecium, started on (Cefepime 3/7-3/8, switched to daptomycin 3/8- )  Diagnostic Cerebral Angiogram on 3/08 showed No evidence of mycotic aneurysm. CT face/ CAP negative for other source of  infection, ( there is a splenic 1 cm infarct and suspicion of L3-L4 intervertebral disc. discitis osteomyelitis), repeat CTH on 3/11 showed improvement in SAH, CTS require clearance from NSG for Anticoagulation for Valve surgery. For possible LHC.     Per the patient, her family and chart review:   Prior to admission 3/1, patient has been in and out of the ED since Christmas for CP, chest tightness, SOB and worsening back pain.  - 1/2/24 ED visit she reported midsternal CP radiating to upper back with dysphagia x1 week and decreased apetite x1 month  - Given steroids for low back pain in early January with no improvement  - 1/19/24 Admitted to [facility] for low back pain and leukocytosis. CT showed DJD severe at L4-L5 with grade 2 anterolisthesis and associated discogenic disease causing moderate to severe central canal stenosis. Discharged 1/21/24 with steroid taper, increased  "Lyrica dose, pain meds and scheduled follow up with PT and orthopedics.      Patient also reports she had a scope of her esophagus to evaluate her chest pain, likely after the 1/2/24 ED visit. She had a cough that was not improving after a endoscope for her esophageal discomfort. She received a z-pack (azythromycin) to help with her COPD abotu 3 weeks before 3/1. She reports increasing edema and SOB about 2 weeks before the surgery. She was given  a short course of a \"water pill\" (possibly lasix) which improved the edema.   She had a syncopal episode also about 2 weeks before the surgery. It occurred in the early morning and she does not remember the event at all. Her  reports he went downstairs for 20-30 minutes to make coffee and when he came back up he tripped over her in the door of their bedroom. She had not urinated on herself or bit her tongue and was alert after he woke her.      Denies fevers chills nausea and vomiting prior to admission.     SUBJECTIVE     Seen Today She looks well, was NPO for CTH, transferred from [facility] to [facility].   The  and daughter by bedside, updated about current medical plan.     Active Medication:  daptomycin, 10 mg/kg, intravenous, q24h HORTENSIA  heparin (porcine), 5,000 Units, subcutaneous, q8h  metoprolol tartrate, 12.5 mg, oral, BID  sennosides-docusate sodium, 2 tablet, oral, BID      PRN medications: acetaminophen, HYDROmorphone, oxyCODONE, oxyCODONE    OBJECTIVE     Vitals:    Vitals:    03/11/24 0800 03/11/24 0900 03/11/24 1023 03/11/24 1249   BP: 107/52 120/60 116/71 128/72   BP Location:   Right arm Right arm   Patient Position:   Lying Lying   Pulse: 69 71 70 70   Resp: 21 20 20 20   Temp:   36.1 °C (97 °F) 36 °C (96.8 °F)   TempSrc:   Temporal Temporal   SpO2: 93% 93% 94%    Weight:       Height:         Failed to redirect to the Timeline version of the [employer] SmartLink.    Intake/Output Summary (Last 24 hours) at 3/11/2024 1323  Last data filed at 3/11/2024 " 0900  Gross per 24 hour   Intake 550 ml   Output 1070 ml   Net -520 ml     BM:    Physical Exam  Gen: well appearing, A+Ox3, in no acute distress, anxious   HEENT: PEERL, EOMI, sclera anicteric, no conjunctival injection, MMM, no oropharyngeal lesions, no LAD  Resp: CTAB, normal breath sounds, no wheezing/crackles/ rales  CV: RRR, normal S1/S2, systolic murmur no rubs/gallops, no JVD  GI: non-tender, non-distended, normal BSs in all 4 quadrants, no rebound or guarding.  Extremities/MSK: warm and well perfused, no edema bilateral  Neuro: A+Ox3, no focal deficits, no asterixis  Skin: warm and dry, no lesions, no rashes   Pressure ulcers: no  Back: paraspinal sutures in the lower back, no tenderness, hotness or discharge at the surgery site.     Labs:   Lab Results   Component Value Date    WBC 8.6 03/11/2024    HGB 10.8 (L) 03/11/2024    HCT 31.5 (L) 03/11/2024    MCV 79 (L) 03/11/2024     03/11/2024     Lab Results   Component Value Date    GLUCOSE 112 (H) 03/11/2024    CALCIUM 8.0 (L) 03/11/2024     (L) 03/11/2024    K 4.3 03/11/2024    CO2 26 03/11/2024    CL 96 (L) 03/11/2024    BUN 7 03/11/2024    CREATININE 0.38 (L) 03/11/2024     Lab Results   Component Value Date    ALT 84 (H) 03/06/2024    AST 68 (H) 03/06/2024    ALKPHOS 121 03/06/2024    BILITOT 1.3 (H) 03/06/2024     Lab Results   Component Value Date    INR 1.2 (H) 03/10/2024    INR 1.2 (H) 03/09/2024    INR 1.2 (H) 03/08/2024    PROTIME 13.2 (H) 03/10/2024    PROTIME 13.5 (H) 03/09/2024    PROTIME 13.0 (H) 03/08/2024     Cardiac History    CROW 3/8/24  CONCLUSIONS:   1. Left ventricular systolic function is normal with a 65-70% estimated ejection fraction.   2. There is an elongated (2.5 x 0.3 cm), filamentous mass that is likely a vegetation attached to the atrial side of the posterior leaflet. There is a resultant eccentric mitral regurgitation that is anteriorly directed. Using PISA, ERO: 0.34 cm2 with a regurg volume of 48.62 ml/beat.  Although difficult to see, there does not appear to be a perforation. There is severe blunting of all pulmonary vein inflow with early temporary reversal in the right inferior pulmonary vein.   3. Moderate to severe mitral valve regurgitation.   4. The pulmonary artery is not well visualized.     TTE 3/7/24  CONCLUSIONS:   1. Left ventricular systolic function is hyperdynamic with a 70-75% estimated ejection fraction.   2. Spectral Doppler shows an impaired relaxation pattern of left ventricular diastolic filling.   3. Anterior leaflet mobile mass, consider vegetation, consider CROW.   4. Mild to moderate mitral valve regurgitation.     TTE (12/18/19 -[facility])  CONCLUSIONS:   - Exam indication: Routine surveillance (>1yr) of known cardiomyopathy without a change in clinical status   - The left ventricle is normal in size. Left ventricular systolic function is   normal. EF = 62 ± 5% (2D biplane) Grade I left ventricular diastolic dysfunction.   Normal global strain -23.0%.   - The right ventricle is normal in size. Right ventricular systolic function is   normal.   - There are no significant valvular abnormalities.   - Exam was compared with the prior  echocardiographic exam performed on   1/30/2019. No significant change.      [facility] (12/27/2018)  In summary:   1. Non-occlusive CAD  2. Mild  dilated  hypokinetic left ventricle with ejection fraction estimated at 40%. Consider stress cardiomyopathy/takotsubo variant.      Outpatient regimen  - bisoprolol 10mg  - lisinopril 10 mg daily  - Spironolactone 25 mg stopped at 1/19/24 admission     Relevant Results      MRI brain 3/6/24  IMPRESSION:  1. Redemonstration of subarachnoid hemorrhage most pronounced in the right sylvian fissure and scattered right frontal and temporal sulci. There is also a small amount of intraventricular hemorrhage.  2. Suspect small areas of acute to early subacute ischemic injury in the right frontal lobe.  3. Nonspecific white matter changes most  likely represent small-vessel ischemic disease in a patient of this age.        Blood Cx 3x drawn 3/7, 3/8, 3/9, 3/10  Positive for E faecium    CT CAP IMPRESSION 3/09:    1. No source of infection in the chest, abdomen, or pelvis.  2. Trace amount of layering debris in the trachea and mainstem  bronchi with bibasilar dependent subsegmental and left lower lobe  segmental atelectasis. No evidence of consolidative pneumonia.  3. A redemonstration of a loculated fluid collection in the left  paraspinal muscles at the L4 level extending toward the laminectomy  space measuring approximately 2.9 x 2.2 x 5.2 cm, the visualization  which is limited due to beam hardening artifact. Differential  diagnosis includes postop seroma/hematoma with or without  superimposed infection of the collection.  4. Several areas of ill-defined endplate erosions along the left  inferior L3 endplate and central superior L4 endplate. On MRI lumbar  spine 03/06/2024, there is loss of cortical definition in these  locations associated with hyperintense intradiscal signal in the  posterior L3-L4 intervertebral disc. Therefore, while degenerative  Modic changes are possible, consideration for discitis osteomyelitis  should remain in the differential diagnosis given no additional  source of infection the chest/abdomen/pelvis. Short-term follow-up  with contrast enhanced MRI may be of further diagnostic value should  patient's infection remain persistent/resistant to treatment.  5. 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  6. Colonic diverticulosis without evidence of acute diverticulitis.  7. 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 female with a PMH of hypertension, hyperlipidemia, COPD, spondylolisthesis of lumbar lesion and status post L4-L5 decompression laminectomy and fusion on 3/1/2024 who was transferred from [facility]  ([facility]) on 3/5/24 with altered mentation and trace R sylvian SAH on CT head for further management by neurosurgery in the [facility].  New onset Afib with RVR which was managed with metoprolol. workup led to a echo which ultimately demonstrated a large MV vegetation and moderate to severe MR by CROW. 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 Transferred from NSU to cardiology service on 3/11.      #new onset Afib with RVR  #HTN  #Mitral valve vegetation  #moderate to severe Mitral regurgitation  3/7 afib w/ RVR, s/p IVF, s/p metop, TTE EF 70-75%, MV mobile mass     - C/W on Metoprolol 12.5 BID  - CTS are following, Dr. Chaudhari aware of patient, pending recs.   - as per NSU note, CTH done 3/11-Imaging reviewed showing resolving blood. Patient is cleared to undergo anticoagulation for procedures from a neurosurgical perspective. If patient has exam change please stop anticoagulation and obtain STAT CT Head.   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   - CT face/ CAP negative for other source of  infection, ( there is a splenic 1 cm infarct and suspicion of L3-L4 intervertebral disc. discitis osteomyelitis)    Blood Cx 3x drawn 3/7, 3/8, 3/9, 3/10  started on (Cefepime 3/7-3/8, switched to daptomycin 3/8- 3/11 ) currently on Ceftriaxone 2g q12h 3/11- ampicillin 3/11-  Positive for E faecium    - ID following, appreciate recs.   - ID Recommend to obtain MRI on the lumbar spine  - Cont daptomycin     # Back pain iso Recent surgical intervention on 3/1 with MIS L4-5 TLIF  On:   Tylenol 650 every 4 hours as needed  Oxycodone 5 mg every 6 hours as needed for moderate pain  Oxycodone 10 mg every 6 hours as needed for severe pain first-line  Hydromorphone 0.2  every 3 hours as needed for severe pain 2nd line    # COPD  Duo nebs at home    Antibiotics:: currently on  Ceftriaxone 2g q12h 3/11- ampicillin 3/11-  Dispo: PT/OT: -     F: Replete PRN  E: Keep Mg >2, phos >3  and K >4  N: cardiac diet  A: PIV  O2: On RA  DVT ppx: Heparin SQ  GI ppx: no indication  Code Status: Full code- confirmed 3/11  Contact: : Xander Watson, [phone]     Gilda Gar, PGY1 Internal Medicine    SIGNATURE: Gilda Gar MD PATIENT NAME: Fannie Watson   DATE: March 11, 2024 MRN: [id number]

## 2024-03-11 NOTE — PROGRESS NOTES
Fannie Watson is a 78 y.o. female on day 6 of admission presenting with SAH (subarachnoid hemorrhage) (CMS/[facility]).    Subjective   Denies any fever or chills. Denies any nausea, emesis. She does have back pain that she mentioned. She said that is stable from yesterday    Review of Systems    Review of systems otherwise negative    Objective   Range of Vitals (last 24 hours)  Heart Rate:  [69-80]   Temp:  [36 °C (96.8 °F)-36.5 °C (97.7 °F)]   Resp:  [9-23]   BP: (107-130)/(51-77)   Weight:  [73.6 kg (162 lb 4.1 oz)]   SpO2:  [93 %-99 %]   Daily Weight  03/11/24 : 73.6 kg (162 lb 4.1 oz)    Body mass index is 29.86 kg/m².    Physical Exam  General: in no acute distress, able to answer questions  HEENT: no conjunctival injection. anicteric.  CVS: RRR. Normal S1 and S2. Audible murmer present  RESP: ctab no w/r/r, no increased wob  Abd: Soft and lax. ND.   Ext: No swelling of the LE b/l.   Neuro: Answers questions appropriately.  Integumentary: no obvious lesions   Rheumatologic: No joint swelling or edema    Relevant Results  Labs  Results from last 72 hours   Lab Units 03/11/24  1130 03/11/24  0026 03/10/24  0111   WBC AUTO x10*3/uL 8.8 8.6 8.0   HEMOGLOBIN g/dL 10.6* 10.8* 10.8*   HEMATOCRIT % 33.2* 31.5* 31.3*   PLATELETS AUTO x10*3/uL 371 335 361   NEUTROS PCT AUTO % 62.1 55.1  --    LYMPHS PCT AUTO % 22.7 27.7  --    MONOS PCT AUTO % 9.8 11.1  --    EOS PCT AUTO % 1.4 1.3  --      Results from last 72 hours   Lab Units 03/11/24  1130 03/11/24  0026 03/10/24  0111   SODIUM mmol/L 134* 130* 130*  130*   POTASSIUM mmol/L 4.5 4.3 5.1   CHLORIDE mmol/L 98 96* 97*   CO2 mmol/L 28 26 27   BUN mg/dL 7 7 5*   CREATININE mg/dL 0.38* 0.38* 0.38*   GLUCOSE mg/dL 100* 112* 101*   CALCIUM mg/dL 8.5* 8.0* 8.0*   ANION GAP mmol/L 13 12 11   EGFR mL/min/1.73m*2 >90 >90 >90   PHOSPHORUS mg/dL 3.8 3.2 3.2     Results from last 72 hours   Lab Units 03/11/24  1130 03/11/24  0026 03/10/24  0111   ALBUMIN g/dL 3.0* 2.9* 2.9*  "    Estimated Creatinine Clearance: 117.9 mL/min (A) (by C-G formula based on SCr of 0.38 mg/dL (L)).  No results found for: \"CRP\"  Microbiology  Susceptibility data from last 14 days.  Collected Specimen Info Organism Ampicillin Gentamicin Synergy Penicillin Tetracycline Trimethoprim/Sulfamethoxazole Vancomycin   03/09/24 Blood culture from Peripheral Venipuncture Enterococcus faecium         03/07/24 Blood culture from Peripheral Venipuncture Enterococcus faecium S S S S R S   03/07/24 Blood culture from Peripheral Venipuncture Enterococcus faecium         03/07/24 Blood culture from Peripheral Venipuncture Enterococcus faecium             Imaging    CTA head and neck (3/5)  CT HEAD:  1. Small volume subarachnoid hemorrhage predominantly involving the  region of the right sylvian fissure with small components along the  adjacent right cerebral hemisphere.  2. No large territory ischemic infarction, mass effect, or midline  shift.      CTA HEAD:  1. No large branch vessel cut off or flow-limiting stenosis within  the visualized cervical or intracranial vasculature.  2. No discrete aneurysm identified with attention to the right MCA  region.  3. Mild scattered atherosclerosis.    TTE  CONCLUSIONS:   1. Left ventricular systolic function is hyperdynamic with a 70-75% estimated ejection fraction.   2. Spectral Doppler shows an impaired relaxation pattern of left ventricular diastolic filling.   3. Anterior leaflet mobile mass, consider vegetation, consider CROW.   4. Mild to moderate mitral valve regurgitation.     CROW  CONCLUSIONS:   1. Left ventricular systolic function is normal with a 65-70% estimated ejection fraction.   2. There is an elongated (2.5 x 0.3 cm), filamentous mass that is likely a vegetation attached to the atrial side of the posterior leaflet. There is a resultant eccentric mitral regurgitation that is anteriorly directed. Using PISA, ERO: 0.34 cmï¿½ with a regurg volume of 48.62 ml/beat. Although " difficult to see, there does not appear to be a perforation. There is severe blunting of all pulmonary vein inflow with early temporary reversal in the right inferior pulmonary vein.   3. Moderate to severe mitral valve regurgitation.   4. The pulmonary artery is not well visualized.    CT CAPIMPRESSION:  1. No source of infection in the chest, abdomen, or pelvis.  2. Trace amount of layering debris in the trachea and mainstem  bronchi with bibasilar dependent subsegmental and left lower lobe  segmental atelectasis. No evidence of consolidative pneumonia.  3. A redemonstration of a loculated fluid collection in the left  paraspinal muscles at the L4 level extending toward the laminectomy  space measuring approximately 2.9 x 2.2 x 5.2 cm, the visualization  which is limited due to beam hardening artifact. Differential  diagnosis includes postop seroma/hematoma with or without  superimposed infection of the collection.  4. Several areas of ill-defined endplate erosions along the left  inferior L3 endplate and central superior L4 endplate. On MRI lumbar  spine 03/06/2024, there is loss of cortical definition in these  locations associated with hyperintense intradiscal signal in the  posterior L3-L4 intervertebral disc. Therefore, while degenerative  Modic changes are possible, consideration for discitis osteomyelitis  should remain in the differential diagnosis given no additional  source of infection the chest/abdomen/pelvis. Short-term follow-up  with contrast enhanced MRI may be of further diagnostic value should  patient's infection remain persistent/resistant to treatment.  5. 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  6. Colonic diverticulosis without evidence of acute diverticulitis.  7. 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 female presenting with h/o HTN, HLD, COPD, 3/1 s/p MIS  L4-5 TLIF, p/w AMS, 3/5 CTH trace R sylvian SAH. ID consulted for concern for endocarditis.      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 Patient likely with endocarditis. CROW showing mitral vegetation and regurgitation. Angiography showed sah was not mycotic in nature. The surgery was very recent (on 3/1) which is typically too soon to cause this endocarditis and bacteremia. However a resistant VRE faecium was likely acquired in the hospital setting. This along with the back pain and CT imaging findings of fluid around the surgical site, necessitates further evaluation. MRI with fluid present in the back.      -Stop daptomycin  -Start ampicillin 2 q 4 hours  -Start ceftriaxone 2 g q 12 hours  -Repeat blood cultures until clearance     ID will continue to follow. If any questions regarding this patient please call Team pager.  Discussed with Dr. Reggie Juarez  Infectious disease, PGY V  Team B pager [phone]  General ID pager [phone]

## 2024-03-11 NOTE — CARE PLAN
Problem: General Stroke  Goal: Establish a mutual long term goal with patient by discharge  Outcome: Met  Goal: Demonstrate improvement in neurological exam throughout the shift  Outcome: Met  Goal: Maintain BP within ordered limits throughout shift  Outcome: Met  Goal: Participate in treatment (ie., meds, therapy) throughout shift  Outcome: Met  Goal: No symptoms of aspiration throughout shift  Outcome: Met  Goal: No symptoms of hemorrhage throughout shift  Outcome: Met  Goal: Tolerate enteral feeding throughout shift  Outcome: Met  Goal: Decreased nausea/vomiting throughout shift  Outcome: Met  Goal: Controlled blood glucose throughout shift  Outcome: Met  Goal: Out of bed three times today  Outcome: Met     Problem: ICU Stroke  Goal: Maintain ICP within ordered limits throughout shift  Outcome: Met  Goal: Tolerate EVD clamping trial throughout shift  Outcome: Met  Goal: Tolerate ventilator weaning trial during shift  Outcome: Met  Goal: Maintain patent airway throughout shift  Outcome: Met  Goal: Achieve/maintain targeted sodium level throughout shift  Outcome: Met   The patient's goals for the shift include ELDA    The clinical goals for the shift include pt systolic BP will remain WNL per team goal

## 2024-03-11 NOTE — NURSING NOTE
Awaiting orders to be reconciled in order to release orders.  Were not reconciled before tx. Jose notified. Dr. Núñez will notify Dr. Gar.

## 2024-03-11 NOTE — NURSING NOTE
Awaiting orders to be reconciled in order to release orders.  Were not reconciled before tx.  Jose rounding, will complete after rounds.

## 2024-03-12 LAB
ABO GROUP (TYPE) IN BLOOD: NORMAL
ALBUMIN SERPL BCP-MCNC: 3.2 G/DL (ref 3.4–5)
ALP SERPL-CCNC: 116 U/L (ref 33–136)
ALT SERPL W P-5'-P-CCNC: 23 U/L (ref 7–45)
ANION GAP SERPL CALC-SCNC: 15 MMOL/L (ref 10–20)
ANTIBODY SCREEN: NORMAL
AST SERPL W P-5'-P-CCNC: 17 U/L (ref 9–39)
BACTERIA BLD AEROBE CULT: ABNORMAL
BACTERIA BLD CULT: ABNORMAL
BASOPHILS # BLD AUTO: 0.06 X10*3/UL (ref 0–0.1)
BASOPHILS NFR BLD AUTO: 0.7 %
BILIRUB DIRECT SERPL-MCNC: 0.2 MG/DL (ref 0–0.3)
BILIRUB SERPL-MCNC: 0.7 MG/DL (ref 0–1.2)
BUN SERPL-MCNC: 7 MG/DL (ref 6–23)
CALCIUM SERPL-MCNC: 8.6 MG/DL (ref 8.6–10.6)
CHLORIDE SERPL-SCNC: 98 MMOL/L (ref 98–107)
CO2 SERPL-SCNC: 24 MMOL/L (ref 21–32)
CREAT SERPL-MCNC: 0.36 MG/DL (ref 0.5–1.05)
EGFRCR SERPLBLD CKD-EPI 2021: >90 ML/MIN/1.73M*2
EOSINOPHIL # BLD AUTO: 0.14 X10*3/UL (ref 0–0.4)
EOSINOPHIL NFR BLD AUTO: 1.6 %
ERYTHROCYTE [DISTWIDTH] IN BLOOD BY AUTOMATED COUNT: 15 % (ref 11.5–14.5)
GLUCOSE SERPL-MCNC: 118 MG/DL (ref 74–99)
GRAM STN SPEC: ABNORMAL
HCT VFR BLD AUTO: 33 % (ref 36–46)
HGB BLD-MCNC: 11 G/DL (ref 12–16)
IMM GRANULOCYTES # BLD AUTO: 0.33 X10*3/UL (ref 0–0.5)
IMM GRANULOCYTES NFR BLD AUTO: 3.7 % (ref 0–0.9)
LYMPHOCYTES # BLD AUTO: 2.49 X10*3/UL (ref 0.8–3)
LYMPHOCYTES NFR BLD AUTO: 28.2 %
MAGNESIUM SERPL-MCNC: 1.87 MG/DL (ref 1.6–2.4)
MCH RBC QN AUTO: 27.4 PG (ref 26–34)
MCHC RBC AUTO-ENTMCNC: 33.3 G/DL (ref 32–36)
MCV RBC AUTO: 82 FL (ref 80–100)
MONOCYTES # BLD AUTO: 0.74 X10*3/UL (ref 0.05–0.8)
MONOCYTES NFR BLD AUTO: 8.4 %
NEUTROPHILS # BLD AUTO: 5.06 X10*3/UL (ref 1.6–5.5)
NEUTROPHILS NFR BLD AUTO: 57.4 %
NRBC BLD-RTO: 0 /100 WBCS (ref 0–0)
PHOSPHATE SERPL-MCNC: 2.8 MG/DL (ref 2.5–4.9)
PLATELET # BLD AUTO: 374 X10*3/UL (ref 150–450)
POTASSIUM SERPL-SCNC: 4 MMOL/L (ref 3.5–5.3)
PROT SERPL-MCNC: 5.9 G/DL (ref 6.4–8.2)
RBC # BLD AUTO: 4.01 X10*6/UL (ref 4–5.2)
RH FACTOR (ANTIGEN D): NORMAL
SODIUM SERPL-SCNC: 133 MMOL/L (ref 136–145)
UFH PPP CHRO-ACNC: 0.4 IU/ML
UFH PPP CHRO-ACNC: 0.5 IU/ML
WBC # BLD AUTO: 8.8 X10*3/UL (ref 4.4–11.3)

## 2024-03-12 PROCEDURE — 2500000005 HC RX 250 GENERAL PHARMACY W/O HCPCS

## 2024-03-12 PROCEDURE — 87040 BLOOD CULTURE FOR BACTERIA: CPT

## 2024-03-12 PROCEDURE — 2500000001 HC RX 250 WO HCPCS SELF ADMINISTERED DRUGS (ALT 637 FOR MEDICARE OP)

## 2024-03-12 PROCEDURE — 1100000001 HC PRIVATE ROOM DAILY

## 2024-03-12 PROCEDURE — 2500000004 HC RX 250 GENERAL PHARMACY W/ HCPCS (ALT 636 FOR OP/ED)

## 2024-03-12 PROCEDURE — 2500000002 HC RX 250 W HCPCS SELF ADMINISTERED DRUGS (ALT 637 FOR MEDICARE OP, ALT 636 FOR OP/ED)

## 2024-03-12 PROCEDURE — 99232 SBSQ HOSP IP/OBS MODERATE 35: CPT | Performed by: PHYSICIAN ASSISTANT

## 2024-03-12 PROCEDURE — 36415 COLL VENOUS BLD VENIPUNCTURE: CPT

## 2024-03-12 PROCEDURE — 97116 GAIT TRAINING THERAPY: CPT | Mod: GP,CQ

## 2024-03-12 PROCEDURE — 2500000002 HC RX 250 W HCPCS SELF ADMINISTERED DRUGS (ALT 637 FOR MEDICARE OP, ALT 636 FOR OP/ED): Mod: MUE

## 2024-03-12 PROCEDURE — 99233 SBSQ HOSP IP/OBS HIGH 50: CPT

## 2024-03-12 PROCEDURE — 84075 ASSAY ALKALINE PHOSPHATASE: CPT

## 2024-03-12 PROCEDURE — 83735 ASSAY OF MAGNESIUM: CPT

## 2024-03-12 PROCEDURE — 84100 ASSAY OF PHOSPHORUS: CPT

## 2024-03-12 PROCEDURE — 97530 THERAPEUTIC ACTIVITIES: CPT | Mod: GP,CQ

## 2024-03-12 PROCEDURE — 86901 BLOOD TYPING SEROLOGIC RH(D): CPT

## 2024-03-12 PROCEDURE — 80048 BASIC METABOLIC PNL TOTAL CA: CPT

## 2024-03-12 PROCEDURE — 85025 COMPLETE CBC W/AUTO DIFF WBC: CPT

## 2024-03-12 PROCEDURE — 85520 HEPARIN ASSAY: CPT

## 2024-03-12 RX ORDER — GUAIFENESIN 600 MG/1
600 TABLET, EXTENDED RELEASE ORAL 2 TIMES DAILY PRN
Status: DISCONTINUED | OUTPATIENT
Start: 2024-03-12 | End: 2024-03-22

## 2024-03-12 RX ORDER — ONDANSETRON 4 MG/1
4 TABLET, FILM COATED ORAL ONCE
Status: COMPLETED | OUTPATIENT
Start: 2024-03-12 | End: 2024-03-12

## 2024-03-12 RX ORDER — BENZONATATE 100 MG/1
100 CAPSULE ORAL 3 TIMES DAILY PRN
Status: DISCONTINUED | OUTPATIENT
Start: 2024-03-12 | End: 2024-03-22

## 2024-03-12 RX ADMIN — SENNOSIDES AND DOCUSATE SODIUM 2 TABLET: 8.6; 5 TABLET ORAL at 20:15

## 2024-03-12 RX ADMIN — AMPICILLIN SODIUM 2 G: 2 INJECTION, POWDER, FOR SOLUTION INTRAMUSCULAR; INTRAVENOUS at 09:38

## 2024-03-12 RX ADMIN — OXYCODONE HYDROCHLORIDE 5 MG: 5 TABLET ORAL at 14:15

## 2024-03-12 RX ADMIN — BENZONATATE 100 MG: 100 CAPSULE ORAL at 17:26

## 2024-03-12 RX ADMIN — ONDANSETRON HYDROCHLORIDE 4 MG: 4 TABLET, FILM COATED ORAL at 09:42

## 2024-03-12 RX ADMIN — AMPICILLIN SODIUM 2 G: 2 INJECTION, POWDER, FOR SOLUTION INTRAMUSCULAR; INTRAVENOUS at 12:57

## 2024-03-12 RX ADMIN — OXYCODONE HYDROCHLORIDE 5 MG: 5 TABLET ORAL at 04:40

## 2024-03-12 RX ADMIN — METOPROLOL TARTRATE 12.5 MG: 25 TABLET, FILM COATED ORAL at 20:15

## 2024-03-12 RX ADMIN — IPRATROPIUM BROMIDE AND ALBUTEROL SULFATE 3 ML: .5; 3 SOLUTION RESPIRATORY (INHALATION) at 19:55

## 2024-03-12 RX ADMIN — GUAIFENESIN 600 MG: 600 TABLET ORAL at 17:27

## 2024-03-12 RX ADMIN — AMPICILLIN SODIUM 2 G: 2 INJECTION, POWDER, FOR SOLUTION INTRAMUSCULAR; INTRAVENOUS at 16:12

## 2024-03-12 RX ADMIN — SENNOSIDES AND DOCUSATE SODIUM 2 TABLET: 8.6; 5 TABLET ORAL at 08:30

## 2024-03-12 RX ADMIN — AMPICILLIN SODIUM 2 G: 2 INJECTION, POWDER, FOR SOLUTION INTRAMUSCULAR; INTRAVENOUS at 00:34

## 2024-03-12 RX ADMIN — METOPROLOL TARTRATE 12.5 MG: 25 TABLET, FILM COATED ORAL at 08:30

## 2024-03-12 RX ADMIN — IPRATROPIUM BROMIDE AND ALBUTEROL SULFATE 3 ML: .5; 3 SOLUTION RESPIRATORY (INHALATION) at 07:54

## 2024-03-12 RX ADMIN — OXYCODONE HYDROCHLORIDE 10 MG: 5 TABLET ORAL at 20:15

## 2024-03-12 RX ADMIN — AMPICILLIN SODIUM 2 G: 2 INJECTION, POWDER, FOR SOLUTION INTRAMUSCULAR; INTRAVENOUS at 04:15

## 2024-03-12 RX ADMIN — CEFTRIAXONE SODIUM 2 G: 2 INJECTION, SOLUTION INTRAVENOUS at 08:24

## 2024-03-12 RX ADMIN — AMPICILLIN SODIUM 2 G: 2 INJECTION, POWDER, FOR SOLUTION INTRAMUSCULAR; INTRAVENOUS at 20:16

## 2024-03-12 RX ADMIN — CEFTRIAXONE SODIUM 2 G: 2 INJECTION, SOLUTION INTRAVENOUS at 20:15

## 2024-03-12 ASSESSMENT — COGNITIVE AND FUNCTIONAL STATUS - GENERAL
STANDING UP FROM CHAIR USING ARMS: A LITTLE
WALKING IN HOSPITAL ROOM: A LITTLE
DRESSING REGULAR UPPER BODY CLOTHING: A LITTLE
DAILY ACTIVITIY SCORE: 19
STANDING UP FROM CHAIR USING ARMS: A LITTLE
WALKING IN HOSPITAL ROOM: A LITTLE
DAILY ACTIVITIY SCORE: 19
MOBILITY SCORE: 17
TURNING FROM BACK TO SIDE WHILE IN FLAT BAD: A LITTLE
TURNING FROM BACK TO SIDE WHILE IN FLAT BAD: A LITTLE
MOVING TO AND FROM BED TO CHAIR: A LITTLE
MOBILITY SCORE: 17
PERSONAL GROOMING: A LITTLE
DRESSING REGULAR LOWER BODY CLOTHING: A LITTLE
MOVING FROM LYING ON BACK TO SITTING ON SIDE OF FLAT BED WITH BEDRAILS: A LITTLE
HELP NEEDED FOR BATHING: A LITTLE
MOVING TO AND FROM BED TO CHAIR: A LITTLE
MOVING FROM LYING ON BACK TO SITTING ON SIDE OF FLAT BED WITH BEDRAILS: A LITTLE
MOBILITY SCORE: 17
PERSONAL GROOMING: A LITTLE
HELP NEEDED FOR BATHING: A LITTLE
MOVING TO AND FROM BED TO CHAIR: A LITTLE
TOILETING: A LITTLE
CLIMB 3 TO 5 STEPS WITH RAILING: A LOT
TURNING FROM BACK TO SIDE WHILE IN FLAT BAD: A LITTLE
DRESSING REGULAR UPPER BODY CLOTHING: A LITTLE
TOILETING: A LITTLE
CLIMB 3 TO 5 STEPS WITH RAILING: A LOT
MOVING FROM LYING ON BACK TO SITTING ON SIDE OF FLAT BED WITH BEDRAILS: A LITTLE
CLIMB 3 TO 5 STEPS WITH RAILING: A LOT
STANDING UP FROM CHAIR USING ARMS: A LITTLE
WALKING IN HOSPITAL ROOM: A LITTLE
DRESSING REGULAR LOWER BODY CLOTHING: A LITTLE

## 2024-03-12 ASSESSMENT — PAIN SCALES - GENERAL
PAINLEVEL_OUTOF10: 3
PAINLEVEL_OUTOF10: 5 - MODERATE PAIN
PAINLEVEL_OUTOF10: 6
PAINLEVEL_OUTOF10: 7

## 2024-03-12 ASSESSMENT — PAIN - FUNCTIONAL ASSESSMENT
PAIN_FUNCTIONAL_ASSESSMENT: 0-10

## 2024-03-12 ASSESSMENT — ACTIVITIES OF DAILY LIVING (ADL): LACK_OF_TRANSPORTATION: PATIENT DECLINED

## 2024-03-12 NOTE — CARE PLAN
The patient's goals for the shift include servin removal.    The clinical goals for the shift include safety.    Over the shift, the patient did not make progress toward the following goals. Barriers to progression include none. Recommendations to address these barriers include none  Problem: Fall/Injury  Goal: Not fall by end of shift  Outcome: Progressing  Goal: Be free from injury by end of the shift  Outcome: Progressing  Goal: Verbalize understanding of personal risk factors for fall in the hospital  Outcome: Progressing  Goal: Verbalize understanding of risk factor reduction measures to prevent injury from fall in the home  Outcome: Progressing  Goal: Use assistive devices by end of the shift  Outcome: Progressing  Goal: Pace activities to prevent fatigue by end of the shift  Outcome: Progressing     Problem: General Stroke  Goal: Establish a mutual long term goal with patient by discharge  Outcome: Progressing  Goal: Demonstrate improvement in neurological exam throughout the shift  Outcome: Progressing  Goal: Maintain BP within ordered limits throughout shift  Outcome: Progressing  Goal: Participate in treatment (ie., meds, therapy) throughout shift  Outcome: Progressing  Goal: No symptoms of aspiration throughout shift  Outcome: Progressing  Goal: No symptoms of hemorrhage throughout shift  Outcome: Progressing  Goal: Tolerate enteral feeding throughout shift  Outcome: Progressing  Goal: Decreased nausea/vomiting throughout shift  Outcome: Progressing  Goal: Controlled blood glucose throughout shift  Outcome: Progressing  Goal: Out of bed three times today  Outcome: Progressing     Problem: ICU Stroke  Goal: Maintain ICP within ordered limits throughout shift  Outcome: Progressing  Goal: Tolerate EVD clamping trial throughout shift  Outcome: Progressing  Goal: Tolerate ventilator weaning trial during shift  Outcome: Progressing  Goal: Maintain patent airway throughout shift  Outcome: Progressing  Goal:  Achieve/maintain targeted sodium level throughout shift  Outcome: Progressing     Problem: Pain  Goal: Takes deep breaths with improved pain control throughout the shift  Outcome: Progressing  Goal: Turns in bed with improved pain control throughout the shift  Outcome: Progressing  Goal: Walks with improved pain control throughout the shift  Outcome: Progressing  Goal: Performs ADL's with improved pain control throughout shift  Outcome: Progressing  Goal: Participates in PT with improved pain control throughout the shift  Outcome: Progressing  Goal: Free from opioid side effects throughout the shift  Outcome: Progressing  Goal: Free from acute confusion related to pain meds throughout the shift  Outcome: Progressing     Problem: Pain - Adult  Goal: Verbalizes/displays adequate comfort level or baseline comfort level  Outcome: Progressing     Problem: Safety - Adult  Goal: Free from fall injury  Outcome: Progressing     Problem: Discharge Planning  Goal: Discharge to home or other facility with appropriate resources  Outcome: Progressing     Problem: Chronic Conditions and Co-morbidities  Goal: Patient's chronic conditions and co-morbidity symptoms are monitored and maintained or improved  Outcome: Progressing   .

## 2024-03-12 NOTE — PROGRESS NOTES
"CARDIAC SURGERY CONSULT PROGRESS NOTE    SUBJECTIVE  Fannie Watson is a 78 y.o. female presenting with h/o HTN, HLD, COPD, 3/1 s/p MIS L4-5 TLIF, p/w AMS, 3/5 CTH trace R sylvian SAH. 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      Patient was transferred from [facility] in [place]. She was admitted there on 3/1/24 for an elective L4-L5 decompression laminectomy and fusion. Due to confusion CT head was completed and showed SAH. She was then transferred to  on 3/5 with concern for SAH.      The patient has been managed conservatively for her SAH. CTA was negative, MRI negative, MRI L spine was negative. EP was consulted for new Afib. EP recommended an echo be done given the new afib. The TTE was concerning for a MV mass. 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      A CROW was obtained to better visualize the mass/vegetation and mitral valve. CROW showed a large mobile vegetation on the MV and mod/severe MR.      Patient and family state that prior to her planned surgery 3/1 she was fairly active, doing her own grocery shopping and ADLs. She lives with her .      Cardiac surgery consulted for surgical evaluation.       Objective   /63 (BP Location: Left arm, Patient Position: Lying)   Pulse 75   Temp 37 °C (98.6 °F) (Temporal)   Resp 18   Ht 1.57 m (5' 1.81\")   Wt 73.6 kg (162 lb 4.1 oz)   LMP  (LMP Unknown)   SpO2 95%   BMI 29.86 kg/m²   Pain Score: 6   Vitals:    03/11/24 0000   Weight: 73.6 kg (162 lb 4.1 oz)          Intake/Output Summary (Last 24 hours) at 3/12/2024 1543  Last data filed at 3/12/2024 1508  Gross per 24 hour   Intake 364.95 ml   Output 651 ml   Net -286.05 ml       Physical Exam  Physical Exam  Constitutional:       Appearance: She is not ill-appearing or toxic-appearing.   HENT:      Head: Normocephalic.      Mouth/Throat:      Mouth: Mucous membranes are moist.   Cardiovascular:      Rate and Rhythm: Normal rate and regular " rhythm.      Heart sounds: Murmur heard.   Pulmonary:      Effort: Pulmonary effort is normal.      Breath sounds: Normal breath sounds.   Abdominal:      General: Bowel sounds are normal.      Palpations: Abdomen is soft.   Musculoskeletal:         General: Normal range of motion.      Cervical back: Neck supple.      Right lower leg: No edema.      Left lower leg: No edema.   Skin:     General: Skin is warm and dry.   Neurological:      General: No focal deficit present.      Mental Status: She is alert and oriented to person, place, and time.   Psychiatric:         Mood and Affect: Mood normal.         Behavior: Behavior normal.      Comments: Tearful on exam         Medications  Scheduled medications  ampicillin, 2 g, intravenous, q4h  cefTRIAXone, 2 g, intravenous, q12h  metoprolol tartrate, 12.5 mg, oral, BID  sennosides-docusate sodium, 2 tablet, oral, BID    Continuous medications  heparin, 0-4,000 Units/hr, Last Rate: 900 Units/hr (03/12/24 1013)    PRN medications  PRN medications: acetaminophen, heparin, HYDROmorphone, ipratropium-albuteroL, oxyCODONE, oxyCODONE    Labs  Results for orders placed or performed during the hospital encounter of 03/05/24 (from the past 24 hour(s))   aPTT - baseline   Result Value Ref Range    aPTT 32 27 - 38 seconds   Blood Culture    Specimen: Peripheral Venipuncture; Blood culture   Result Value Ref Range    Blood Culture Loaded on Instrument - Culture in progress    Heparin Assay, UFH   Result Value Ref Range    Heparin Unfractionated 0.4 See Comment Below for Therapeutic Ranges IU/mL   CBC and Auto Differential   Result Value Ref Range    WBC 8.8 4.4 - 11.3 x10*3/uL    nRBC 0.0 0.0 - 0.0 /100 WBCs    RBC 4.01 4.00 - 5.20 x10*6/uL    Hemoglobin 11.0 (L) 12.0 - 16.0 g/dL    Hematocrit 33.0 (L) 36.0 - 46.0 %    MCV 82 80 - 100 fL    MCH 27.4 26.0 - 34.0 pg    MCHC 33.3 32.0 - 36.0 g/dL    RDW 15.0 (H) 11.5 - 14.5 %    Platelets 374 150 - 450 x10*3/uL    Neutrophils % 57.4  40.0 - 80.0 %    Immature Granulocytes %, Automated 3.7 (H) 0.0 - 0.9 %    Lymphocytes % 28.2 13.0 - 44.0 %    Monocytes % 8.4 2.0 - 10.0 %    Eosinophils % 1.6 0.0 - 6.0 %    Basophils % 0.7 0.0 - 2.0 %    Neutrophils Absolute 5.06 1.60 - 5.50 x10*3/uL    Immature Granulocytes Absolute, Automated 0.33 0.00 - 0.50 x10*3/uL    Lymphocytes Absolute 2.49 0.80 - 3.00 x10*3/uL    Monocytes Absolute 0.74 0.05 - 0.80 x10*3/uL    Eosinophils Absolute 0.14 0.00 - 0.40 x10*3/uL    Basophils Absolute 0.06 0.00 - 0.10 x10*3/uL   Magnesium   Result Value Ref Range    Magnesium 1.87 1.60 - 2.40 mg/dL   Hepatic Function Panel   Result Value Ref Range    Albumin 3.2 (L) 3.4 - 5.0 g/dL    Bilirubin, Total 0.7 0.0 - 1.2 mg/dL    Bilirubin, Direct 0.2 0.0 - 0.3 mg/dL    Alkaline Phosphatase 116 33 - 136 U/L    ALT 23 7 - 45 U/L    AST 17 9 - 39 U/L    Total Protein 5.9 (L) 6.4 - 8.2 g/dL   Type And Screen   Result Value Ref Range    ABO TYPE O     Rh TYPE NEG     ANTIBODY SCREEN NEG    Phosphorus   Result Value Ref Range    Phosphorus 2.8 2.5 - 4.9 mg/dL   Basic Metabolic Panel   Result Value Ref Range    Glucose 118 (H) 74 - 99 mg/dL    Sodium 133 (L) 136 - 145 mmol/L    Potassium 4.0 3.5 - 5.3 mmol/L    Chloride 98 98 - 107 mmol/L    Bicarbonate 24 21 - 32 mmol/L    Anion Gap 15 10 - 20 mmol/L    Urea Nitrogen 7 6 - 23 mg/dL    Creatinine 0.36 (L) 0.50 - 1.05 mg/dL    eGFR >90 >60 mL/min/1.73m*2    Calcium 8.6 8.6 - 10.6 mg/dL   Heparin Assay, UFH   Result Value Ref Range    Heparin Unfractionated 0.5 See Comment Below for Therapeutic Ranges IU/mL   Blood Culture    Specimen: Peripheral Venipuncture; Blood culture   Result Value Ref Range    Blood Culture Loaded on Instrument - Culture in progress                ASSESSMENT & PLAN  Fannie Watson is a 78 y.o. female presenting with h/o HTN, HLD, COPD, 3/1 s/p MIS L4-5 TLIF, p/w AMS, 3/5 CTH trace R sylvian SAH. Cardiac surgery consulted for concern for endocarditis on the patient's  mitral valve.      Patient was transferred from [facility] in [place]. She was admitted there on 3/1/24 for an elective L4-L5 decompression laminectomy and fusion. Due to confusion CT head was completed and showed SAH. She was then transferred to  on 3/5 with concern for SAH.      The patient has been managed conservatively for her SAH. CTA was negative, MRI negative, MRI L spine was negative. EP was consulted for new Afib. EP recommended an echo be done given the new afib. The TTE was concerning for a MV mass. 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      A CROW was obtained to better visualize the mass/vegetation and mitral valve. CROW showed a large mobile vegetation on the MV and mod/severe MR.      Patient and family state that prior to her planned surgery 3/1 she was fairly active, doing her own grocery shopping and ADLs. She lives with her .      Cardiac surgery consulted for surgical evaluation.     RECOMMENDATIONS  - Dr. Chaudhari met with patient and reviewed imaging and data  - CROW does show a vegetation on the MV along with mod/severe MR  - However, patient with current SAH, and although tolerating heparin, would recommend waiting 1-2 weeks for surgery.   - Patient also needs source control of this infection. ID is questioning hardware in spine given CT abdomen/pelvis shows concerning of fluid collection in the spine and possible discitis osteomyelitis given no source of infection found. Will need that ruled out as possible source, especially before any valvular surgery.   - Please obtain a [facility]  - Preop risk stratification studies/labs/UA/PFTs and vascular ultrasounds to be ordered when/if surgical plan decided   - CT facial bones done and unremarkable         When/if goes to surgery:  - Continue ASA, high-intensity statin, and BB (or document contraindications for use)  - No ACEi/ARBs in the pre-op period (at least 48 hours)  - Hold any SGLT2 inhibitors (Farxiga, Jardiance, etc.) for at  least 3 days prior to cardiac surgery to prevent euglycemic DKA  - No antiplatelets other than ASA, no anticoagulants other than Heparin  - NPO after midnight, blood on hold/T&S, and preop scrubs only to be ordered once OR date is established        Will continue to follow along.  Thank you for the consultation.   Patient educated and all questions answered.  Please page the cardiac surgery consult pager [phone] with any questions or changes in patient condition.      Delma Cantu PA-C  Cardiac Surgery Consult [facility]  [facility]  Cardiac Surgery Consult Pager [phone]     3/12/2024  3:43 PM

## 2024-03-12 NOTE — PROGRESS NOTES
Physical Therapy    Physical Therapy Treatment    Patient Name: Fannie Watson  MRN: [id number]  Today's Date: 3/12/2024  Time Calculation  Start Time: 1042  Stop Time: 1107  Time Calculation (min): 25 min       Assessment/Plan   PT Assessment  End of Session Communication: Bedside nurse  Assessment Comment: .  End of Session Patient Position: Bed, 3 rail up     PT Plan  Treatment/Interventions: Bed mobility, Transfer training, Gait training, Stair training, Balance training, Neuromuscular re-education, Strengthening, Endurance training, Range of motion, Therapeutic exercise, Therapeutic activity, Home exercise program, Postural re-education  PT Plan: Skilled PT  PT Frequency: Daily  PT Discharge Recommendations: Low intensity level of continued care  Equipment Recommended upon Discharge: Wheeled walker  PT Recommended Transfer Status: Assist x1, Assistive device, Stand by assist  PT - OK to Discharge: Yes      General Visit Information:   PT  Visit  PT Received On: 03/12/24  General  Prior to Session Communication: Bedside nurse  Patient Position Received: Up in chair  General Comment: pt agreeable to session, pt's  present. pt tolerating 3 ambulation trials and reports decreases in pain with ambulation. pt declining sitting in chair at end of session secondary to feeling cold and wishing to use blankets.    Subjective   Precautions:  Precautions  Medical Precautions: Fall precautions, Spinal precautions  Vital Signs:       Objective   Pain:  Pain Assessment  Pain Assessment: 0-10  Pain Score: 6  Clinical Progression: Rapidly improving  Pain Interventions: Ambulation/increased activity  Cognition:  Cognition  Orientation Level: Oriented X4    Treatments:  Therapeutic Exercise  Therapeutic Exercise Performed:  (sitting AP, LAQ, and hip flexion 15 reps AROM)    Bed Mobility 1  Bed Mobility 1: Supine to sitting, Sitting to supine  Level of Assistance 1: Contact guard  Bed Mobility Comments 1: cues for log  roll    Ambulation/Gait Training 1  Surface 1: Level tile  Device 1: Rolling walker  Assistance 1: Close supervision  Quality of Gait 1: Wide base of support, Diminished heel strike, Decreased step length, Forward flexed posture  Comments/Distance (ft) 1: 2x75 ,cues for walker safety and posture  Transfer 1  Technique 1: Sit to stand, Stand to sit  Transfer Device 1: Walker  Transfer Level of Assistance 1: Contact guard  Trials/Comments 1: cues for hand placement and safe proximity prior to descending         Outcome Measures:  [facility] Basic Mobility  Turning from your back to your side while in a flat bed without using bedrails: A little  Moving from lying on your back to sitting on the side of a flat bed without using bedrails: A little  Moving to and from bed to chair (including a wheelchair): A little  Standing up from a chair using your arms (e.g. wheelchair or bedside chair): A little  To walk in hospital room: A little  Climbing 3-5 steps with railing: A lot  Basic Mobility - Total Score: 17    Education Documentation  Body Mechanics, taught by Nando Alves PTA at 3/12/2024 11:32 AM.  Learner: Patient  Readiness: Acceptance  Method: Explanation  Response: Verbalizes Understanding    Education Comments  No comments found.        OP EDUCATION:       Encounter Problems       Encounter Problems (Active)       PT Problem       Pt. will score 24/28 on Tinetti balance test.        Start:  03/07/24            Patient will complete supine to sit and sit to supine Supervision  (Progressing)       Start:  03/07/24            Patient will complete stand to sit and sit to stand with Supervision with wheeled walker.        Start:  03/07/24            Patient will ambulate >400' with Rolling Walker and Supervision  (Progressing)       Start:  03/07/24            Patient will ascend and descend 4 steps with 2 UE Support and Supervision  (Progressing)       Start:  03/07/24               Pain - Adult

## 2024-03-12 NOTE — PROGRESS NOTES
Progress Note   [facility]  March 12, 2024   Patient: Fannie Watson    Medical Record: [id number]      SUBJECTIVE     Seen Today mentions she feels her COPD is acting up, no cough, SOB or wheezing,  Given home Duonebs, improved Discomfort.   No other complaints. Back pain controlled.     Active Medication:  ampicillin, 2 g, intravenous, q4h  cefTRIAXone, 2 g, intravenous, q12h  metoprolol tartrate, 12.5 mg, oral, BID  sennosides-docusate sodium, 2 tablet, oral, BID    PRN medications: acetaminophen, benzocaine-menthol, benzonatate, guaiFENesin, heparin, HYDROmorphone, ipratropium-albuteroL, oxyCODONE, oxyCODONE    OBJECTIVE     Vitals:    Vitals:    03/12/24 0420 03/12/24 0748 03/12/24 1217 03/12/24 1704   BP: 133/59 133/70 135/63 145/69   BP Location: Left arm Left arm Left arm Left arm   Patient Position: Lying Lying Lying Lying   Pulse: 82 90 75 78   Resp: 21 18 18 18   Temp: 36.2 °C (97.2 °F) 37.1 °C (98.8 °F) 37 °C (98.6 °F) 36.2 °C (97.2 °F)   TempSrc: Temporal Temporal Temporal Temporal   SpO2: 95% 95% 95% 95%   Weight:       Height:         Failed to redirect to the Timeline version of the [employer] Smart[employer].    Intake/Output Summary (Last 24 hours) at 3/12/2024 1900  Last data filed at 3/12/2024 1704  Gross per 24 hour   Intake 604.95 ml   Output --   Net 604.95 ml     BM:    Physical Exam  Gen: well appearing, A+Ox3, in no acute distress, anxious   HEENT: PEERL, EOMI, sclera anicteric, no conjunctival injection, MMM.  Resp: CTAB, normal breath sounds, no wheezing/crackles/ rales  CV: RRR, normal S1/S2, systolic murmur no rubs/gallops, no JVD  GI: non-tender, non-distended, normal BSs in all 4 quadrants, no rebound or guarding.  Extremities/MSK: warm and well perfused, no edema bilateral  Neuro: A+Ox3, no focal deficits, no asterixis  Skin: warm and dry, no lesions, no rashes   Pressure ulcers: no  Back: paraspinal sutures in the lower back, no tenderness, hotness or discharge  at the surgery site.     Labs:   Lab Results   Component Value Date    WBC 8.8 03/12/2024    HGB 11.0 (L) 03/12/2024    HCT 33.0 (L) 03/12/2024    MCV 82 03/12/2024     03/12/2024     Lab Results   Component Value Date    GLUCOSE 118 (H) 03/12/2024    CALCIUM 8.6 03/12/2024     (L) 03/12/2024    K 4.0 03/12/2024    CO2 24 03/12/2024    CL 98 03/12/2024    BUN 7 03/12/2024    CREATININE 0.36 (L) 03/12/2024     Lab Results   Component Value Date    ALT 23 03/12/2024    AST 17 03/12/2024    ALKPHOS 116 03/12/2024    BILITOT 0.7 03/12/2024     Lab Results   Component Value Date    INR 1.2 (H) 03/10/2024    INR 1.2 (H) 03/09/2024    INR 1.2 (H) 03/08/2024    PROTIME 13.2 (H) 03/10/2024    PROTIME 13.5 (H) 03/09/2024    PROTIME 13.0 (H) 03/08/2024     Cardiac History    CROW 3/8/24  CONCLUSIONS:   1. Left ventricular systolic function is normal with a 65-70% estimated ejection fraction.   2. There is an elongated (2.5 x 0.3 cm), filamentous mass that is likely a vegetation attached to the atrial side of the posterior leaflet. There is a resultant eccentric mitral regurgitation that is anteriorly directed. Using PISA, ERO: 0.34 cm2 with a regurg volume of 48.62 ml/beat. Although difficult to see, there does not appear to be a perforation. There is severe blunting of all pulmonary vein inflow with early temporary reversal in the right inferior pulmonary vein.   3. Moderate to severe mitral valve regurgitation.   4. The pulmonary artery is not well visualized.     TTE 3/7/24  CONCLUSIONS:   1. Left ventricular systolic function is hyperdynamic with a 70-75% estimated ejection fraction.   2. Spectral Doppler shows an impaired relaxation pattern of left ventricular diastolic filling.   3. Anterior leaflet mobile mass, consider vegetation, consider CROW.   4. Mild to moderate mitral valve regurgitation.     TTE (12/18/19 -[facility])  CONCLUSIONS:   - Exam indication: Routine surveillance (>1yr) of known cardiomyopathy  without a change in clinical status   - The left ventricle is normal in size. Left ventricular systolic function is   normal. EF = 62 ± 5% (2D biplane) Grade I left ventricular diastolic dysfunction.   Normal global strain -23.0%.   - The right ventricle is normal in size. Right ventricular systolic function is   normal.   - There are no significant valvular abnormalities.   - Exam was compared with the prior  echocardiographic exam performed on   1/30/2019. No significant change.      [facility] (12/27/2018)  In summary:   1. Non-occlusive CAD  2. Mild  dilated  hypokinetic left ventricle with ejection fraction estimated at 40%. Consider stress cardiomyopathy/takotsubo variant.      Outpatient regimen  - bisoprolol 10mg  - lisinopril 10 mg daily  - Spironolactone 25 mg stopped at 1/19/24 admission     Relevant Results      MRI brain 3/6/24  IMPRESSION:  1. Redemonstration of subarachnoid hemorrhage most pronounced in the right sylvian fissure and scattered right frontal and temporal sulci. There is also a small amount of intraventricular hemorrhage.  2. Suspect small areas of acute to early subacute ischemic injury in the right frontal lobe.  3. Nonspecific white matter changes most likely represent small-vessel ischemic disease in a patient of this age.        Blood Cx 3x drawn 3/7, 3/8, 3/9, 3/10  Positive for E faecium    CT CAP IMPRESSION 3/09:    1. No source of infection in the chest, abdomen, or pelvis.  2. Trace amount of layering debris in the trachea and mainstem  bronchi with bibasilar dependent subsegmental and left lower lobe  segmental atelectasis. No evidence of consolidative pneumonia.  3. A redemonstration of a loculated fluid collection in the left  paraspinal muscles at the L4 level extending toward the laminectomy  space measuring approximately 2.9 x 2.2 x 5.2 cm, the visualization  which is limited due to beam hardening artifact. Differential  diagnosis includes postop seroma/hematoma with or  without  superimposed infection of the collection.  4. Several areas of ill-defined endplate erosions along the left  inferior L3 endplate and central superior L4 endplate. On MRI lumbar  spine 03/06/2024, there is loss of cortical definition in these  locations associated with hyperintense intradiscal signal in the  posterior L3-L4 intervertebral disc. Therefore, while degenerative  Modic changes are possible, consideration for discitis osteomyelitis  should remain in the differential diagnosis given no additional  source of infection the chest/abdomen/pelvis. Short-term follow-up  with contrast enhanced MRI may be of further diagnostic value should  patient's infection remain persistent/resistant to treatment.  5. 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  6. Colonic diverticulosis without evidence of acute diverticulitis.  7. 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 female with a PMH of hypertension, hyperlipidemia, COPD, spondylolisthesis of lumbar lesion and status post L4-L5 decompression laminectomy and fusion on 3/1/2024 who was transferred from [facility] ([facility] on 3/5/24 with altered mentation and trace R sylvian SAH on CT head for further management by neurosurgery in the [facility].  New onset Afib with RVR which was managed with metoprolol. workup led to a echo which ultimately demonstrated a large MV vegetation and moderate to severe MR by CROW. 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 Transferred from [facility] to cardiology service on 3/11.      #new onset Afib with RVR  #HTN  #Mitral valve vegetation  #moderate to severe Mitral regurgitation  3/7 afib w/ RVR, s/p IVF, s/p metop, TTE EF 70-75%, MV mobile mass     - C/W on Metoprolol 12.5 BID  - CTS are following, Dr. Chaudhari aware of patient, pending  recs. Recommend Spine surgery evaluation for suspicion of Discitis/ OM   - NPO for LHC on 3/13  - as per [facility] note, CTH done 3/11-Imaging reviewed showing resolving blood. Patient is cleared to undergo anticoagulation for procedures from a neurosurgical perspective. If patient has exam change please stop anticoagulation and obtain STAT CT Head.   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   - CT face/ CAP negative for other source of  infection, ( there is a splenic 1 cm infarct and suspicion of L3-L4 intervertebral disc. discitis osteomyelitis)    Blood Cx 3x drawn 3/7, 3/8, 3/9, 3/10  started on (Cefepime 3/7-3/8, switched to daptomycin 3/8- 3/11 ) currently on Ceftriaxone 2g q12h 3/11- ampicillin 3/11-  Positive for E faecium    - ID following, appreciate recs.   - C/W Ceftriaxone 2g q12h 3/11- ampicillin 3/11-  - continue daily cultures until negative.    # Back pain iso Recent surgical intervention on 3/1 with MIS L4-5 TLIF  On:   Tylenol 650 every 4 hours as needed  Oxycodone 5 mg every 6 hours as needed for moderate pain  Oxycodone 10 mg every 6 hours as needed for severe pain first-line  Hydromorphone 0.2 every 3 hours as needed for severe pain 2nd line    # COPD  Duo nebs at home    Antibiotics:: currently on  Ceftriaxone 2g q12h 3/11- ampicillin 3/11-  Dispo: PT/OT: -     F: Replete PRN  E: Keep Mg >2, phos >3  and K >4  N: cardiac diet  A: PIV  O2: On RA  DVT ppx: Heparin gtt  GI ppx: no indication  Code Status: Full code- confirmed 3/11  Contact: : Xander Watson, [phone]     Gilda Gar, PGY1 Internal Medicine    SIGNATURE: Gilda Gar MD PATIENT NAME: Fannie Watson   DATE: March 12, 2024 MRN: [id number]

## 2024-03-12 NOTE — PROGRESS NOTES
PLAN: NMR- anticipate end of week discharge with HHC and abx    PAYOR: Medicare     DISPO: HHC with home infusion

## 2024-03-12 NOTE — PROGRESS NOTES
"Fannie Watson is a 78 y.o. female on day 7 of admission presenting with SAH (subarachnoid hemorrhage) (CMS/HCC).    Subjective   No acute events, no complaints    Objective     Physical Exam    A&Ox3  FC x4 5/5  SILT  Groin soft    Last Recorded Vitals  Blood pressure 133/59, pulse 82, temperature 36.2 °C (97.2 °F), temperature source Temporal, resp. rate 21, height 1.57 m (5' 1.81\"), weight 73.6 kg (162 lb 4.1 oz), SpO2 95 %.  Intake/Output last 3 Shifts:  I/O last 3 completed shifts:  In: 910 (12.4 mL/kg) [P.O.:360; IV Piggyback:550]  Out: 2211 (30 mL/kg) [Urine:2211 (0.8 mL/kg/hr)]  Weight: 73.6 kg     Relevant Results    Assessment/Plan   Principal Problem:    SAH (subarachnoid hemorrhage) (CMS/HCC)    Patient is a 78 year old female with h/o HTN, HLD, COPD, 3/1 s/p MIS L4-5 TLIF, p/w AMS, 3/5 CTH trace R sylvian SAH     3/6 CTA negative, MRI w/ SAH otherwise negative, MRI L-spine neg for   3/7 afib w/ RVR, s/p IVF, s/p metop, TTE EF 70-75%, MV mobile mass  3/8 TTE w/ large valcular vegetation, BCX positive for Enterococcus Faecium, angio negative for mycotic aneurysms  3/9 CT face/ CAP negative    3/11 CTH stable, hep gtt started    Plan:     [facility] primary  Cards recs- SBP <120, Left heart cath prior to any CT intervention  Ok for /[facility]  CT surgery recs  EP recs  ID recs- ampicillin, CTX  PTOT- low intensity  SCDS, SQH      Nevaeh Carey MD      "

## 2024-03-13 ENCOUNTER — APPOINTMENT (OUTPATIENT)
Dept: RADIOLOGY | Facility: HOSPITAL | Age: 79
DRG: 981 | End: 2024-03-13
Payer: MEDICARE

## 2024-03-13 PROBLEM — I38 ENDOCARDITIS: Status: ACTIVE | Noted: 2024-03-05

## 2024-03-13 LAB
ALBUMIN SERPL BCP-MCNC: 3.2 G/DL (ref 3.4–5)
ANION GAP SERPL CALC-SCNC: 13 MMOL/L (ref 10–20)
APPEARANCE UR: CLEAR
BACTERIA BLD AEROBE CULT: ABNORMAL
BACTERIA BLD CULT: ABNORMAL
BACTERIA BLD CULT: NORMAL
BASOPHILS # BLD AUTO: 0.04 X10*3/UL (ref 0–0.1)
BASOPHILS NFR BLD AUTO: 0.5 %
BILIRUB UR STRIP.AUTO-MCNC: NEGATIVE MG/DL
BNP SERPL-MCNC: 18 PG/ML (ref 0–99)
BUN SERPL-MCNC: 6 MG/DL (ref 6–23)
CALCIUM SERPL-MCNC: 9.1 MG/DL (ref 8.6–10.6)
CHLORIDE SERPL-SCNC: 98 MMOL/L (ref 98–107)
CO2 SERPL-SCNC: 29 MMOL/L (ref 21–32)
COLOR UR: ABNORMAL
CREAT SERPL-MCNC: 0.51 MG/DL (ref 0.5–1.05)
EGFRCR SERPLBLD CKD-EPI 2021: >90 ML/MIN/1.73M*2
EOSINOPHIL # BLD AUTO: 0.18 X10*3/UL (ref 0–0.4)
EOSINOPHIL NFR BLD AUTO: 2.2 %
ERYTHROCYTE [DISTWIDTH] IN BLOOD BY AUTOMATED COUNT: 15.3 % (ref 11.5–14.5)
GLUCOSE SERPL-MCNC: 99 MG/DL (ref 74–99)
GLUCOSE UR STRIP.AUTO-MCNC: NORMAL MG/DL
GRAM STN SPEC: ABNORMAL
HCT VFR BLD AUTO: 34.1 % (ref 36–46)
HGB BLD-MCNC: 10.9 G/DL (ref 12–16)
IMM GRANULOCYTES # BLD AUTO: 0.32 X10*3/UL (ref 0–0.5)
IMM GRANULOCYTES NFR BLD AUTO: 3.9 % (ref 0–0.9)
KETONES UR STRIP.AUTO-MCNC: NEGATIVE MG/DL
LEUKOCYTE ESTERASE UR QL STRIP.AUTO: ABNORMAL
LYMPHOCYTES # BLD AUTO: 2.01 X10*3/UL (ref 0.8–3)
LYMPHOCYTES NFR BLD AUTO: 24.4 %
MAGNESIUM SERPL-MCNC: 1.96 MG/DL (ref 1.6–2.4)
MCH RBC QN AUTO: 27.3 PG (ref 26–34)
MCHC RBC AUTO-ENTMCNC: 32 G/DL (ref 32–36)
MCV RBC AUTO: 86 FL (ref 80–100)
MONOCYTES # BLD AUTO: 0.77 X10*3/UL (ref 0.05–0.8)
MONOCYTES NFR BLD AUTO: 9.3 %
MUCOUS THREADS #/AREA URNS AUTO: ABNORMAL /LPF
NEUTROPHILS # BLD AUTO: 4.93 X10*3/UL (ref 1.6–5.5)
NEUTROPHILS NFR BLD AUTO: 59.7 %
NITRITE UR QL STRIP.AUTO: NEGATIVE
NRBC BLD-RTO: 0 /100 WBCS (ref 0–0)
PH UR STRIP.AUTO: 7 [PH]
PHOSPHATE SERPL-MCNC: 4.2 MG/DL (ref 2.5–4.9)
PLATELET # BLD AUTO: 426 X10*3/UL (ref 150–450)
POTASSIUM SERPL-SCNC: 3.7 MMOL/L (ref 3.5–5.3)
PROT UR STRIP.AUTO-MCNC: ABNORMAL MG/DL
RBC # BLD AUTO: 3.99 X10*6/UL (ref 4–5.2)
RBC # UR STRIP.AUTO: NEGATIVE /UL
RBC #/AREA URNS AUTO: ABNORMAL /HPF
SODIUM SERPL-SCNC: 136 MMOL/L (ref 136–145)
SP GR UR STRIP.AUTO: 1.02
SQUAMOUS #/AREA URNS AUTO: ABNORMAL /HPF
T4 FREE SERPL-MCNC: 1.11 NG/DL (ref 0.78–1.48)
TSH SERPL-ACNC: 4.51 MIU/L (ref 0.44–3.98)
UFH PPP CHRO-ACNC: 0.3 IU/ML
UROBILINOGEN UR STRIP.AUTO-MCNC: NORMAL MG/DL
WBC # BLD AUTO: 8.3 X10*3/UL (ref 4.4–11.3)
WBC #/AREA URNS AUTO: ABNORMAL /HPF

## 2024-03-13 PROCEDURE — 83735 ASSAY OF MAGNESIUM: CPT

## 2024-03-13 PROCEDURE — 2500000001 HC RX 250 WO HCPCS SELF ADMINISTERED DRUGS (ALT 637 FOR MEDICARE OP)

## 2024-03-13 PROCEDURE — 80069 RENAL FUNCTION PANEL: CPT

## 2024-03-13 PROCEDURE — 84439 ASSAY OF FREE THYROXINE: CPT

## 2024-03-13 PROCEDURE — 97116 GAIT TRAINING THERAPY: CPT | Mod: GP,CQ

## 2024-03-13 PROCEDURE — 87081 CULTURE SCREEN ONLY: CPT | Performed by: PHYSICIAN ASSISTANT

## 2024-03-13 PROCEDURE — 1100000001 HC PRIVATE ROOM DAILY

## 2024-03-13 PROCEDURE — 99232 SBSQ HOSP IP/OBS MODERATE 35: CPT | Performed by: PHYSICIAN ASSISTANT

## 2024-03-13 PROCEDURE — 87086 URINE CULTURE/COLONY COUNT: CPT | Performed by: PHYSICIAN ASSISTANT

## 2024-03-13 PROCEDURE — 87040 BLOOD CULTURE FOR BACTERIA: CPT

## 2024-03-13 PROCEDURE — 2500000005 HC RX 250 GENERAL PHARMACY W/O HCPCS

## 2024-03-13 PROCEDURE — 2500000002 HC RX 250 W HCPCS SELF ADMINISTERED DRUGS (ALT 637 FOR MEDICARE OP, ALT 636 FOR OP/ED): Mod: MUE

## 2024-03-13 PROCEDURE — 2500000004 HC RX 250 GENERAL PHARMACY W/ HCPCS (ALT 636 FOR OP/ED)

## 2024-03-13 PROCEDURE — 83880 ASSAY OF NATRIURETIC PEPTIDE: CPT

## 2024-03-13 PROCEDURE — 94640 AIRWAY INHALATION TREATMENT: CPT

## 2024-03-13 PROCEDURE — 97530 THERAPEUTIC ACTIVITIES: CPT | Mod: GP,CQ

## 2024-03-13 PROCEDURE — 81001 URINALYSIS AUTO W/SCOPE: CPT | Performed by: PHYSICIAN ASSISTANT

## 2024-03-13 PROCEDURE — 84443 ASSAY THYROID STIM HORMONE: CPT

## 2024-03-13 PROCEDURE — 2500000002 HC RX 250 W HCPCS SELF ADMINISTERED DRUGS (ALT 637 FOR MEDICARE OP, ALT 636 FOR OP/ED)

## 2024-03-13 PROCEDURE — 71045 X-RAY EXAM CHEST 1 VIEW: CPT | Performed by: RADIOLOGY

## 2024-03-13 PROCEDURE — 85025 COMPLETE CBC W/AUTO DIFF WBC: CPT

## 2024-03-13 PROCEDURE — 36415 COLL VENOUS BLD VENIPUNCTURE: CPT

## 2024-03-13 PROCEDURE — 99232 SBSQ HOSP IP/OBS MODERATE 35: CPT

## 2024-03-13 PROCEDURE — 85520 HEPARIN ASSAY: CPT

## 2024-03-13 PROCEDURE — 71045 X-RAY EXAM CHEST 1 VIEW: CPT

## 2024-03-13 RX ORDER — ONDANSETRON HYDROCHLORIDE 2 MG/ML
4 INJECTION, SOLUTION INTRAVENOUS ONCE
Status: COMPLETED | OUTPATIENT
Start: 2024-03-13 | End: 2024-03-13

## 2024-03-13 RX ORDER — POLYETHYLENE GLYCOL 3350 17 G/17G
17 POWDER, FOR SOLUTION ORAL 2 TIMES DAILY
Status: DISCONTINUED | OUTPATIENT
Start: 2024-03-13 | End: 2024-03-22

## 2024-03-13 RX ORDER — MUPIROCIN 20 MG/G
OINTMENT TOPICAL 2 TIMES DAILY
Status: DISPENSED | OUTPATIENT
Start: 2024-03-13 | End: 2024-03-18

## 2024-03-13 RX ORDER — FUROSEMIDE 20 MG/1
20 TABLET ORAL ONCE
Status: COMPLETED | OUTPATIENT
Start: 2024-03-13 | End: 2024-03-13

## 2024-03-13 RX ORDER — ONDANSETRON 4 MG/1
4 TABLET, FILM COATED ORAL EVERY 8 HOURS PRN
Status: DISCONTINUED | OUTPATIENT
Start: 2024-03-13 | End: 2024-03-22

## 2024-03-13 RX ADMIN — POLYETHYLENE GLYCOL 3350 17 G: 17 POWDER, FOR SOLUTION ORAL at 20:36

## 2024-03-13 RX ADMIN — AMPICILLIN SODIUM 2 G: 2 INJECTION, POWDER, FOR SOLUTION INTRAMUSCULAR; INTRAVENOUS at 09:39

## 2024-03-13 RX ADMIN — ONDANSETRON HYDROCHLORIDE 4 MG: 4 TABLET, FILM COATED ORAL at 21:30

## 2024-03-13 RX ADMIN — AMPICILLIN SODIUM 2 G: 2 INJECTION, POWDER, FOR SOLUTION INTRAMUSCULAR; INTRAVENOUS at 13:32

## 2024-03-13 RX ADMIN — SENNOSIDES AND DOCUSATE SODIUM 2 TABLET: 8.6; 5 TABLET ORAL at 08:21

## 2024-03-13 RX ADMIN — POLYETHYLENE GLYCOL 3350 17 G: 17 POWDER, FOR SOLUTION ORAL at 17:40

## 2024-03-13 RX ADMIN — GUAIFENESIN 600 MG: 600 TABLET ORAL at 20:36

## 2024-03-13 RX ADMIN — OXYCODONE HYDROCHLORIDE 10 MG: 5 TABLET ORAL at 02:22

## 2024-03-13 RX ADMIN — AMPICILLIN SODIUM 2 G: 2 INJECTION, POWDER, FOR SOLUTION INTRAMUSCULAR; INTRAVENOUS at 00:22

## 2024-03-13 RX ADMIN — OXYCODONE HYDROCHLORIDE 10 MG: 5 TABLET ORAL at 20:36

## 2024-03-13 RX ADMIN — AMPICILLIN SODIUM 2 G: 2 INJECTION, POWDER, FOR SOLUTION INTRAMUSCULAR; INTRAVENOUS at 20:36

## 2024-03-13 RX ADMIN — AMPICILLIN SODIUM 2 G: 2 INJECTION, POWDER, FOR SOLUTION INTRAMUSCULAR; INTRAVENOUS at 05:26

## 2024-03-13 RX ADMIN — HEPARIN SODIUM 900 UNITS/HR: 10000 INJECTION, SOLUTION INTRAVENOUS at 00:22

## 2024-03-13 RX ADMIN — ONDANSETRON 4 MG: 2 INJECTION INTRAMUSCULAR; INTRAVENOUS at 22:57

## 2024-03-13 RX ADMIN — OXYCODONE HYDROCHLORIDE 5 MG: 5 TABLET ORAL at 11:40

## 2024-03-13 RX ADMIN — IPRATROPIUM BROMIDE AND ALBUTEROL SULFATE 3 ML: .5; 3 SOLUTION RESPIRATORY (INHALATION) at 11:25

## 2024-03-13 RX ADMIN — IPRATROPIUM BROMIDE AND ALBUTEROL SULFATE 3 ML: .5; 3 SOLUTION RESPIRATORY (INHALATION) at 19:57

## 2024-03-13 RX ADMIN — ONDANSETRON 4 MG: 2 INJECTION INTRAMUSCULAR; INTRAVENOUS at 17:34

## 2024-03-13 RX ADMIN — METOPROLOL TARTRATE 12.5 MG: 25 TABLET, FILM COATED ORAL at 20:36

## 2024-03-13 RX ADMIN — FUROSEMIDE 20 MG: 20 TABLET ORAL at 15:43

## 2024-03-13 RX ADMIN — CEFTRIAXONE SODIUM 2 G: 2 INJECTION, SOLUTION INTRAVENOUS at 08:21

## 2024-03-13 RX ADMIN — METOPROLOL TARTRATE 12.5 MG: 25 TABLET, FILM COATED ORAL at 08:21

## 2024-03-13 RX ADMIN — SENNOSIDES AND DOCUSATE SODIUM 2 TABLET: 8.6; 5 TABLET ORAL at 20:36

## 2024-03-13 RX ADMIN — AMPICILLIN SODIUM 2 G: 2 INJECTION, POWDER, FOR SOLUTION INTRAMUSCULAR; INTRAVENOUS at 17:06

## 2024-03-13 RX ADMIN — CEFTRIAXONE SODIUM 2 G: 2 INJECTION, SOLUTION INTRAVENOUS at 20:36

## 2024-03-13 ASSESSMENT — PAIN DESCRIPTION - DESCRIPTORS: DESCRIPTORS: ACHING;SORE

## 2024-03-13 ASSESSMENT — COGNITIVE AND FUNCTIONAL STATUS - GENERAL
CLIMB 3 TO 5 STEPS WITH RAILING: A LITTLE
TOILETING: A LITTLE
WALKING IN HOSPITAL ROOM: A LITTLE
MOVING FROM LYING ON BACK TO SITTING ON SIDE OF FLAT BED WITH BEDRAILS: A LITTLE
DRESSING REGULAR UPPER BODY CLOTHING: A LITTLE
MOVING FROM LYING ON BACK TO SITTING ON SIDE OF FLAT BED WITH BEDRAILS: A LITTLE
MOVING FROM LYING ON BACK TO SITTING ON SIDE OF FLAT BED WITH BEDRAILS: A LITTLE
PERSONAL GROOMING: A LITTLE
STANDING UP FROM CHAIR USING ARMS: A LITTLE
TURNING FROM BACK TO SIDE WHILE IN FLAT BAD: A LITTLE
WALKING IN HOSPITAL ROOM: A LITTLE
HELP NEEDED FOR BATHING: A LITTLE
MOBILITY SCORE: 18
STANDING UP FROM CHAIR USING ARMS: A LITTLE
TURNING FROM BACK TO SIDE WHILE IN FLAT BAD: A LITTLE
MOBILITY SCORE: 18
DRESSING REGULAR LOWER BODY CLOTHING: A LITTLE
DRESSING REGULAR UPPER BODY CLOTHING: A LITTLE
DAILY ACTIVITIY SCORE: 19
DRESSING REGULAR LOWER BODY CLOTHING: A LITTLE
DAILY ACTIVITIY SCORE: 19
MOBILITY SCORE: 17
TOILETING: A LITTLE
TURNING FROM BACK TO SIDE WHILE IN FLAT BAD: A LITTLE
MOVING TO AND FROM BED TO CHAIR: A LITTLE
WALKING IN HOSPITAL ROOM: A LITTLE
PERSONAL GROOMING: A LITTLE
MOVING TO AND FROM BED TO CHAIR: A LITTLE
CLIMB 3 TO 5 STEPS WITH RAILING: A LITTLE
MOVING TO AND FROM BED TO CHAIR: A LITTLE
STANDING UP FROM CHAIR USING ARMS: A LITTLE
HELP NEEDED FOR BATHING: A LITTLE
CLIMB 3 TO 5 STEPS WITH RAILING: A LOT

## 2024-03-13 ASSESSMENT — PAIN - FUNCTIONAL ASSESSMENT
PAIN_FUNCTIONAL_ASSESSMENT: 0-10

## 2024-03-13 ASSESSMENT — PAIN SCALES - GENERAL
PAINLEVEL_OUTOF10: 5 - MODERATE PAIN
PAINLEVEL_OUTOF10: 7
PAINLEVEL_OUTOF10: 0 - NO PAIN
PAINLEVEL_OUTOF10: 4
PAINLEVEL_OUTOF10: 0 - NO PAIN
PAINLEVEL_OUTOF10: 7

## 2024-03-13 ASSESSMENT — PAIN DESCRIPTION - LOCATION: LOCATION: BACK

## 2024-03-13 NOTE — PROGRESS NOTES
Progress Note   [facility]  March 13, 2024   Patient: Fannie Watson    Medical Record: [id number]      SUBJECTIVE     Seen Today mentions she feels her COPD is acting up, has intermittent cough, no fever, SOB or wheezing.  Given home Duonebs, improved Discomfort.   No other complaints. Back pain controlled.   She is NPO for [facility] today. And possible MRI spine w/ and without contrast    Active Medication:  ampicillin, 2 g, intravenous, q4h  cefTRIAXone, 2 g, intravenous, q12h  metoprolol tartrate, 12.5 mg, oral, BID  sennosides-docusate sodium, 2 tablet, oral, BID    PRN medications: acetaminophen, benzocaine-menthol, benzonatate, guaiFENesin, heparin, HYDROmorphone, ipratropium-albuteroL, oxyCODONE, oxyCODONE    OBJECTIVE     Vitals:    Vitals:    03/12/24 1950 03/13/24 0024 03/13/24 0412 03/13/24 0802   BP:  136/75 125/65 138/64   BP Location:  Left arm Left arm Left arm   Patient Position:  Lying Lying Lying   Pulse: 81 76 74 81   Resp: 18 17 18 17   Temp:  36 °C (96.8 °F) 36 °C (96.8 °F) 36.8 °C (98.2 °F)   TempSrc:  Temporal Temporal Temporal   SpO2:  93% 95% 92%   Weight:   67 kg (147 lb 11.3 oz)    Height:         Failed to redirect to the Timeline version of the [employer] Smart[employer].    Intake/Output Summary (Last 24 hours) at 3/13/2024 0854  Last data filed at 3/13/2024 0821  Gross per 24 hour   Intake 894.95 ml   Output --   Net 894.95 ml       Physical Exam  Gen: well appearing, A+Ox3, in no acute distress, anxious   HEENT: PEERL, EOMI, sclera anicteric, no conjunctival injection, MMM.  Resp: CTAB, normal breath sounds, faint crackles at lung bases, no wheezing.  CV: RRR, normal S1/S2, systolic murmur no rubs/gallops, no JVD  GI: non-tender, non-distended, normal BSs in all 4 quadrants, no rebound or guarding.  Extremities/MSK: warm and well perfused, trace edema brody extr.  Neuro: A+Ox3, no focal deficits, no asterixis  Skin: warm and dry, no lesions, no rashes   Pressure ulcers:  no  Back: paraspinal sutures in the lower back, no tenderness, hotness or discharge at the surgery site.     Labs:   Lab Results   Component Value Date    WBC 8.8 03/12/2024    HGB 11.0 (L) 03/12/2024    HCT 33.0 (L) 03/12/2024    MCV 82 03/12/2024     03/12/2024     Lab Results   Component Value Date    GLUCOSE 118 (H) 03/12/2024    CALCIUM 8.6 03/12/2024     (L) 03/12/2024    K 4.0 03/12/2024    CO2 24 03/12/2024    CL 98 03/12/2024    BUN 7 03/12/2024    CREATININE 0.36 (L) 03/12/2024     Lab Results   Component Value Date    ALT 23 03/12/2024    AST 17 03/12/2024    ALKPHOS 116 03/12/2024    BILITOT 0.7 03/12/2024     Lab Results   Component Value Date    INR 1.2 (H) 03/10/2024    INR 1.2 (H) 03/09/2024    INR 1.2 (H) 03/08/2024    PROTIME 13.2 (H) 03/10/2024    PROTIME 13.5 (H) 03/09/2024    PROTIME 13.0 (H) 03/08/2024     Cardiac History    CROW 3/8/24  CONCLUSIONS:   1. Left ventricular systolic function is normal with a 65-70% estimated ejection fraction.   2. There is an elongated (2.5 x 0.3 cm), filamentous mass that is likely a vegetation attached to the atrial side of the posterior leaflet. There is a resultant eccentric mitral regurgitation that is anteriorly directed. Using PISA, ERO: 0.34 cm2 with a regurg volume of 48.62 ml/beat. Although difficult to see, there does not appear to be a perforation. There is severe blunting of all pulmonary vein inflow with early temporary reversal in the right inferior pulmonary vein.   3. Moderate to severe mitral valve regurgitation.   4. The pulmonary artery is not well visualized.     TTE 3/7/24  CONCLUSIONS:   1. Left ventricular systolic function is hyperdynamic with a 70-75% estimated ejection fraction.   2. Spectral Doppler shows an impaired relaxation pattern of left ventricular diastolic filling.   3. Anterior leaflet mobile mass, consider vegetation, consider CROW.   4. Mild to moderate mitral valve regurgitation.     TTE (12/18/19  -[facility])  CONCLUSIONS:   - Exam indication: Routine surveillance (>1yr) of known cardiomyopathy without a change in clinical status   - The left ventricle is normal in size. Left ventricular systolic function is   normal. EF = 62 ± 5% (2D biplane) Grade I left ventricular diastolic dysfunction.   Normal global strain -23.0%.   - The right ventricle is normal in size. Right ventricular systolic function is   normal.   - There are no significant valvular abnormalities.   - Exam was compared with the prior  echocardiographic exam performed on   1/30/2019. No significant change.      [facility] (12/27/2018)  In summary:   1. Non-occlusive CAD  2. Mild  dilated  hypokinetic left ventricle with ejection fraction estimated at 40%. Consider stress cardiomyopathy/takotsubo variant.      Outpatient regimen  - bisoprolol 10mg  - lisinopril 10 mg daily  - Spironolactone 25 mg stopped at 1/19/24 admission     Relevant Results      MRI brain 3/6/24  IMPRESSION:  1. Redemonstration of subarachnoid hemorrhage most pronounced in the right sylvian fissure and scattered right frontal and temporal sulci. There is also a small amount of intraventricular hemorrhage.  2. Suspect small areas of acute to early subacute ischemic injury in the right frontal lobe.  3. Nonspecific white matter changes most likely represent small-vessel ischemic disease in a patient of this age.        Blood Cx 3x drawn 3/7, 3/8, 3/9, 3/10  Positive for E faecium    CT CAP IMPRESSION 3/09:    1. No source of infection in the chest, abdomen, or pelvis.  2. Trace amount of layering debris in the trachea and mainstem  bronchi with bibasilar dependent subsegmental and left lower lobe  segmental atelectasis. No evidence of consolidative pneumonia.  3. A redemonstration of a loculated fluid collection in the left  paraspinal muscles at the L4 level extending toward the laminectomy  space measuring approximately 2.9 x 2.2 x 5.2 cm, the visualization  which is limited due to  beam hardening artifact. Differential  diagnosis includes postop seroma/hematoma with or without  superimposed infection of the collection.  4. Several areas of ill-defined endplate erosions along the left  inferior L3 endplate and central superior L4 endplate. On MRI lumbar  spine 03/06/2024, there is loss of cortical definition in these  locations associated with hyperintense intradiscal signal in the  posterior L3-L4 intervertebral disc. Therefore, while degenerative  Modic changes are possible, consideration for discitis osteomyelitis  should remain in the differential diagnosis given no additional  source of infection the chest/abdomen/pelvis. Short-term follow-up  with contrast enhanced MRI may be of further diagnostic value should  patient's infection remain persistent/resistant to treatment.  5. 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  6. Colonic diverticulosis without evidence of acute diverticulitis.  7. 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 female with a PMH of hypertension, hyperlipidemia, COPD, spondylolisthesis of lumbar lesion and status post L4-L5 decompression laminectomy and fusion on 3/1/2024 who was transferred from [facility] on 3/5/24 with altered mentation and trace R sylvian SAH on CT head for further management by neurosurgery in the [facility].  New onset Afib with RVR which was managed with metoprolol. workup led to a echo which ultimately demonstrated a large MV vegetation and moderate to severe MR by CROW. 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 Transferred from [facility] to cardiology service on 3/11.      Updates 3/13:  - MRI Spine w/ and WO contrast pending.   - C today pending  - following ID plan, apprec recs  - C/W Ceftriaxone 2g q12h 3/11- ampicillin 3/11-  - CXR,  followed by lasix bolus if needed    #new onset Afib with RVR  #HTN  #Mitral valve vegetation  #moderate to severe Mitral regurgitation  3/7 afib w/ RVR, s/p IVF, s/p metop, TTE EF 70-75%, MV mobile mass     - C/W on Metoprolol 12.5 BID  - CTS are following, Dr. Chaudhari aware of patient, pending recs. Recommend Spine surgery evaluation for suspicion of Discitis/ OM   - NPO for [facility] on 3/13  - as per [facility] note, CTH done 3/11-Imaging reviewed showing resolving blood. Patient is cleared to undergo anticoagulation for procedures from a neurosurgical perspective. If patient has exam change please stop anticoagulation and obtain STAT CT Head.   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   - CT face/ CAP negative for other source of  infection, ( there is a splenic 1 cm infarct and suspicion of L3-L4 intervertebral disc. discitis osteomyelitis)    Blood Cx 3x drawn 3/7, 3/8, 3/9, 3/10  started on (Cefepime 3/7-3/8, switched to daptomycin 3/8- 3/11 ) currently on Ceftriaxone 2g q12h 3/11- ampicillin 3/11-  Positive for E faecium    - ID following, appreciate recs.   - C/W Ceftriaxone 2g q12h 3/11- ampicillin 3/11-  - continue daily cultures until negative.    # Back pain iso Recent surgical intervention on 3/1 with MIS L4-5 TLIF  On:   Tylenol 650 every 4 hours as needed  Oxycodone 5 mg every 6 hours as needed for moderate pain  Oxycodone 10 mg every 6 hours as needed for severe pain first-line  Hydromorphone 0.2 every 3 hours as needed for severe pain 2nd line    # COPD  Duo nebs at home    Antibiotics:: currently on  Ceftriaxone 2g q12h 3/11- ampicillin 3/11-  Dispo: PT/OT: Low intensity level of continued care , Wheeled walker     F: Replete PRN  E: Keep Mg >2, phos >3  and K >4  N: cardiac diet  A: PIV  O2: On RA  DVT ppx: heparin gtt  GI ppx:  no indication  Code Status: Full code- confirmed 3/11  Contact: : Xander Watson, [phone]     Gilda Gar, PGY1 Internal Medicine    SIGNATURE: Gilda Gar MD PATIENT NAME: Fannie Watson   DATE: March 13, 2024 MRN: [id number]

## 2024-03-13 NOTE — PROGRESS NOTES
"Fannie Watson is a 78 y.o. female on day 8 of admission presenting with SAH (subarachnoid hemorrhage) (CMS/HCC).    Subjective   No acute events, no complaints    Objective     Physical Exam    A&Ox3  FC x4 5/5  SILT    Last Recorded Vitals  Blood pressure 136/75, pulse 76, temperature 36 °C (96.8 °F), temperature source Temporal, resp. rate 17, height 1.57 m (5' 1.81\"), weight 73.6 kg (162 lb 4.1 oz), SpO2 93 %.  Intake/Output last 3 Shifts:  I/O last 3 completed shifts:  In: 605 (8.2 mL/kg) [P.O.:480; I.V.:125 (1.7 mL/kg)]  Out: 991 (13.5 mL/kg) [Urine:991 (0.4 mL/kg/hr)]  Weight: 73.6 kg     Relevant Results    Assessment/Plan   Principal Problem:    SAH (subarachnoid hemorrhage) (CMS/HCC)    Patient is a 78 year old female with h/o HTN, HLD, COPD, 3/1 s/p MIS L4-5 TLIF, p/w AMS, 3/5 CTH trace R sylvian SAH     3/6 CTA negative, MRI w/ SAH otherwise negative, MRI L-spine neg for   3/7 afib w/ RVR, s/p IVF, s/p metop, TTE EF 70-75%, MV mobile mass  3/8 TTE w/ large valcular vegetation, BCX positive for Enterococcus Faecium, angio negative for mycotic aneurysms  3/9 CT face/ CAP negative    3/11 CTH stable, hep gtt started    Plan:     [facility] primary  Ok for [facility]  ID recs- ampicillin, CTX  Patient with stable neuro exam and imaging on heparin drip. No acute neurosurgical intervention or additional neuroimaging needed at this time. Thank you for allowing us to participate in the care of this patient. Will sign off at this time. Please page [phone] with any questions or concerns.      Nevaeh Carey MD      "

## 2024-03-13 NOTE — CARE PLAN
Problem: Pain  Goal: Walks with improved pain control throughout the shift  Outcome: Progressing     Problem: Fall/Injury  Goal: Not fall by end of shift  Outcome: Progressing     Patient remained stable. Therapeutic on heparin drip. Afebrile this shift. Plan is for NPO at midnight for [facility] tomorrow.

## 2024-03-13 NOTE — PROGRESS NOTES
Physical Therapy    Physical Therapy Treatment    Patient Name: Fannie Watson  MRN: [id number]  Today's Date: 3/13/2024  Time Calculation  Start Time: 1457  Stop Time: 1521  Time Calculation (min): 24 min       Assessment/Plan   PT Assessment  End of Session Communication: Bedside nurse  Assessment Comment: .  End of Session Patient Position: Bed, 3 rail up     PT Plan  Treatment/Interventions: Bed mobility, Transfer training, Gait training, Stair training, Balance training, Neuromuscular re-education, Strengthening, Endurance training, Range of motion, Therapeutic exercise, Therapeutic activity, Home exercise program, Postural re-education  PT Plan: Skilled PT  PT Frequency: Daily  PT Discharge Recommendations: Low intensity level of continued care  Equipment Recommended upon Discharge: Wheeled walker  PT Recommended Transfer Status: Assist x1, Assistive device, Stand by assist  PT - OK to Discharge: Yes      General Visit Information:   PT  Visit  PT Received On: 03/13/24  General  Prior to Session Communication: Bedside nurse  Patient Position Received: Bed, 3 rail up  General Comment: pt agreeable to session, pt's  present. pt demonstrates incerased fatigue since prior sessoin secondary to NPO status. pt however able to ambulate previous distances and remains motivated for PT sessions    Subjective   Precautions:  Precautions  Medical Precautions: Fall precautions, Spinal precautions  Vital Signs:       Objective   Pain:  Pain Assessment  Pain Score: 0 - No pain  Cognition:  Cognition  Orientation Level: Oriented X4       Treatments:  Therapeutic Exercise  Therapeutic Exercise Performed: Yes  Therapeutic Exercise Activity 1: instructed pt in sitting AP, LAQ, and hip flexion x15 reps AROM    Bed Mobility  Bed Mobility: Yes  Bed Mobility 1  Bed Mobility 1: Supine to sitting, Sitting to supine  Level of Assistance 1: Contact guard  Bed Mobility Comments 1: cues for log roll    Ambulation/Gait Training  1  Surface 1: Level tile  Device 1: Rolling walker  Assistance 1: Close supervision  Quality of Gait 1: Wide base of support, Diminished heel strike, Decreased step length, Forward flexed posture  Comments/Distance (ft) 1: 2x80', cues for posture  Transfer 1  Technique 1: Sit to stand, Stand to sit  Transfer Device 1: Walker  Transfer Level of Assistance 1: Contact guard  Trials/Comments 1: cues for hand placmeent to inhibit placement on walker         Outcome Measures:  [facility] Basic Mobility  Turning from your back to your side while in a flat bed without using bedrails: A little  Moving from lying on your back to sitting on the side of a flat bed without using bedrails: A little  Moving to and from bed to chair (including a wheelchair): A little  Standing up from a chair using your arms (e.g. wheelchair or bedside chair): A little  To walk in hospital room: A little  Climbing 3-5 steps with railing: A little  Basic Mobility - Total Score: 18    Education Documentation  Mobility Training, taught by Nando Alves PTA at 3/13/2024  4:46 PM.  Learner: Patient  Readiness: Acceptance  Method: Explanation  Response: Verbalizes Understanding    Education Comments  No comments found.        OP EDUCATION:       Encounter Problems       Encounter Problems (Active)       PT Problem       Pt. will score 24/28 on Tinetti balance test.        Start:  03/07/24            Patient will complete supine to sit and sit to supine Supervision  (Progressing)       Start:  03/07/24            Patient will complete stand to sit and sit to stand with Supervision with wheeled walker.  (Progressing)       Start:  03/07/24            Patient will ambulate >400' with Rolling Walker and Supervision  (Progressing)       Start:  03/07/24            Patient will ascend and descend 4 steps with 2 UE Support and Supervision  (Progressing)       Start:  03/07/24               Pain - Adult

## 2024-03-13 NOTE — PROGRESS NOTES
Fannie Watson is a 78 y.o. female on day 8 of admission presenting with SAH (subarachnoid hemorrhage) (CMS/[facility]).    Subjective   Interval History: Patient is tired on being in the hospital. Patient does have some back pain. Denies any fever, chills, nausea, emesis, diarrhea.    Review of Systems    Review of systems otherwise negative    Objective   Range of Vitals (last 24 hours)  Heart Rate:  [73-81]   Temp:  [36 °C (96.8 °F)-36.8 °C (98.2 °F)]   Resp:  [17-18]   BP: (125-145)/(64-75)   Weight:  [67 kg (147 lb 11.3 oz)]   SpO2:  [92 %-95 %]   Daily Weight  03/13/24 : 67 kg (147 lb 11.3 oz)    Body mass index is 27.18 kg/m².    Physical Exam  General: in no acute distress, able to answer questions  HEENT: no conjunctival injection. anicteric.  CVS: RRR. Normal S1 and S2. Audible murmer present  RESP: ctab no w/r/r, no increased wob  Abd: Soft and lax. ND.   Ext: No swelling of the LE b/l.   Neuro: Answers questions appropriately.  Integumentary: no obvious lesions   Rheumatologic: No joint swelling or edema  Back: Left sided sutures appear to have opened    Relevant Results  Labs  Results from last 72 hours   Lab Units 03/13/24 0958 03/12/24 0453 03/11/24  1130   WBC AUTO x10*3/uL 8.3 8.8 8.8   HEMOGLOBIN g/dL 10.9* 11.0* 10.6*   HEMATOCRIT % 34.1* 33.0* 33.2*   PLATELETS AUTO x10*3/uL 426 374 371   NEUTROS PCT AUTO % 59.7 57.4 62.1   LYMPHS PCT AUTO % 24.4 28.2 22.7   MONOS PCT AUTO % 9.3 8.4 9.8   EOS PCT AUTO % 2.2 1.6 1.4     Results from last 72 hours   Lab Units 03/13/24 0958 03/12/24 0453 03/11/24  1130   SODIUM mmol/L 136 133* 134*   POTASSIUM mmol/L 3.7 4.0 4.5   CHLORIDE mmol/L 98 98 98   CO2 mmol/L 29 24 28   BUN mg/dL 6 7 7   CREATININE mg/dL 0.51 0.36* 0.38*   GLUCOSE mg/dL 99 118* 100*   CALCIUM mg/dL 9.1 8.6 8.5*   ANION GAP mmol/L 13 15 13   EGFR mL/min/1.73m*2 >90 >90 >90   PHOSPHORUS mg/dL 4.2 2.8 3.8     Results from last 72 hours   Lab Units 03/13/24  0958 03/12/24  0453 03/11/24  1130  "  ALK PHOS U/L  --  116  --    BILIRUBIN TOTAL mg/dL  --  0.7  --    BILIRUBIN DIRECT mg/dL  --  0.2  --    PROTEIN TOTAL g/dL  --  5.9*  --    ALT U/L  --  23  --    AST U/L  --  17  --    ALBUMIN g/dL 3.2* 3.2* 3.0*     Estimated Creatinine Clearance: 83.9 mL/min (by C-G formula based on SCr of 0.51 mg/dL).  No results found for: \"CRP\"  Microbiology  Susceptibility data from last 14 days.  Collected Specimen Info Organism Ampicillin Gentamicin Synergy Penicillin Tetracycline Trimethoprim/Sulfamethoxazole Vancomycin   03/10/24 Blood culture from Peripheral Venipuncture Enterococcus faecium         03/09/24 Blood culture from Peripheral Venipuncture Enterococcus faecium         03/09/24 Blood culture from Peripheral Venipuncture Enterococcus faecium         03/07/24 Blood culture from Peripheral Venipuncture Enterococcus faecium S S S S R S   03/07/24 Blood culture from Peripheral Venipuncture Enterococcus faecium         03/07/24 Blood culture from Peripheral Venipuncture Enterococcus faecium             Imaging    IMPRESSION:  1. Postsurgical changes from left facetectomy/laminectomy, posterior  spinal fusion, and interbody spacer at L4-L5. Heterogeneous intensity  fluid within the hemilaminectomy bed measuring approximately 2.7 x  2.0 x 2.9 cm likely represents postoperative seroma/hematoma.  2. Degenerative changes of the lumbar spine with persistent moderate  spinal canal narrowing at L4-L5.          Assessment/Plan     Fannie Watson is a 78 y.o. female presenting with h/o HTN, HLD, COPD, 3/1 s/p MIS L4-5 TLIF, p/w AMS, 3/5 CTH trace R sylvian SAH. ID consulted for concern for endocarditis.      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 Patient likely with endocarditis. CROW showing mitral vegetation " and regurgitation. Angiography showed sah was not mycotic in nature. The surgery was very recent (on 3/1) which is typically too soon to cause this endocarditis and bacteremia. However E faecium was likely acquired in the hospital setting. This along with the back pain and CT imaging findings of fluid around the surgical site, necessitates further evaluation. MRI with fluid present in the back.     On 3/13 patient noted to have neurosurgery sutures appeared to be broken or opened on exam. Given the MRI findings above and prolonged period of time before clearance of bacteremia, this is a concerning finding.    -Cont ampicillin 2 q 4 hours  -Cont ceftriaxone 2 g q 12 hours  -Sutures appeared to have broken or opened on the back surgical site     ID will continue to follow. If any questions regarding this patient please call Team pager.  Discussed with Dr. Reggie Juarez  Infectious disease, PGY V  Team B pager [phone]  General ID pager [phone]

## 2024-03-13 NOTE — PROGRESS NOTES
"CARDIAC SURGERY CONSULT PROGRESS NOTE    SUBJECTIVE  Fannie Watson is a 78 y.o. female presenting with h/o HTN, HLD, COPD, 3/1 s/p MIS L4-5 TLIF, p/w AMS, 3/5 CTH trace R sylvian SAH. 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      Patient was transferred from [facility] in [place]. She was admitted there on 3/1/24 for an elective L4-L5 decompression laminectomy and fusion. Due to confusion CT head was completed and showed SAH. She was then transferred to  on 3/5 with concern for SAH.      The patient has been managed conservatively for her SAH. CTA was negative, MRI negative, MRI L spine was negative. EP was consulted for new Afib. EP recommended an echo be done given the new afib. The TTE was concerning for a MV mass. 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      A CROW was obtained to better visualize the mass/vegetation and mitral valve. CROW showed a large mobile vegetation on the MV and mod/severe MR.      Patient and family state that prior to her planned surgery 3/1 she was fairly active, doing her own grocery shopping and ADLs. She lives with her .      Cardiac surgery consulted for surgical evaluation.       Objective   /69 (BP Location: Left arm, Patient Position: Lying)   Pulse 73   Temp 36.2 °C (97.2 °F) (Temporal)   Resp 17   Ht 1.57 m (5' 1.81\")   Wt 67 kg (147 lb 11.3 oz)   LMP  (LMP Unknown)   SpO2 92%   BMI 27.18 kg/m²   Pain Score: 4   Vitals:    03/13/24 0412   Weight: 67 kg (147 lb 11.3 oz)          Intake/Output Summary (Last 24 hours) at 3/13/2024 1342  Last data filed at 3/13/2024 1332  Gross per 24 hour   Intake 850 ml   Output --   Net 850 ml         Physical Exam  Physical Exam  Constitutional:       Appearance: She is not ill-appearing or toxic-appearing.   HENT:      Head: Normocephalic.      Mouth/Throat:      Mouth: Mucous membranes are moist.   Cardiovascular:      Rate and Rhythm: Normal rate and regular rhythm. "      Heart sounds: Murmur heard.   Pulmonary:      Effort: Pulmonary effort is normal.      Breath sounds: Normal breath sounds.   Abdominal:      General: Bowel sounds are normal.      Palpations: Abdomen is soft.   Musculoskeletal:         General: Normal range of motion.      Cervical back: Neck supple.      Right lower leg: No edema.      Left lower leg: No edema.   Skin:     General: Skin is warm and dry.   Neurological:      General: No focal deficit present.      Mental Status: She is alert and oriented to person, place, and time.   Psychiatric:         Mood and Affect: Mood normal.         Behavior: Behavior normal.      Comments: Tearful on exam         Medications  Scheduled medications  ampicillin, 2 g, intravenous, q4h  cefTRIAXone, 2 g, intravenous, q12h  furosemide, 20 mg, oral, Once  metoprolol tartrate, 12.5 mg, oral, BID  sennosides-docusate sodium, 2 tablet, oral, BID    Continuous medications  heparin, 0-4,000 Units/hr, Last Rate: 900 Units/hr (03/13/24 0800)    PRN medications  PRN medications: acetaminophen, benzocaine-menthol, benzonatate, guaiFENesin, heparin, HYDROmorphone, ipratropium-albuteroL, oxyCODONE, oxyCODONE    Labs  Results for orders placed or performed during the hospital encounter of 03/05/24 (from the past 24 hour(s))   CBC and Auto Differential   Result Value Ref Range    WBC 8.3 4.4 - 11.3 x10*3/uL    nRBC 0.0 0.0 - 0.0 /100 WBCs    RBC 3.99 (L) 4.00 - 5.20 x10*6/uL    Hemoglobin 10.9 (L) 12.0 - 16.0 g/dL    Hematocrit 34.1 (L) 36.0 - 46.0 %    MCV 86 80 - 100 fL    MCH 27.3 26.0 - 34.0 pg    MCHC 32.0 32.0 - 36.0 g/dL    RDW 15.3 (H) 11.5 - 14.5 %    Platelets 426 150 - 450 x10*3/uL    Neutrophils % 59.7 40.0 - 80.0 %    Immature Granulocytes %, Automated 3.9 (H) 0.0 - 0.9 %    Lymphocytes % 24.4 13.0 - 44.0 %    Monocytes % 9.3 2.0 - 10.0 %    Eosinophils % 2.2 0.0 - 6.0 %    Basophils % 0.5 0.0 - 2.0 %    Neutrophils Absolute 4.93 1.60 - 5.50 x10*3/uL    Immature  Granulocytes Absolute, Automated 0.32 0.00 - 0.50 x10*3/uL    Lymphocytes Absolute 2.01 0.80 - 3.00 x10*3/uL    Monocytes Absolute 0.77 0.05 - 0.80 x10*3/uL    Eosinophils Absolute 0.18 0.00 - 0.40 x10*3/uL    Basophils Absolute 0.04 0.00 - 0.10 x10*3/uL   Magnesium   Result Value Ref Range    Magnesium 1.96 1.60 - 2.40 mg/dL   Renal Function Panel   Result Value Ref Range    Glucose 99 74 - 99 mg/dL    Sodium 136 136 - 145 mmol/L    Potassium 3.7 3.5 - 5.3 mmol/L    Chloride 98 98 - 107 mmol/L    Bicarbonate 29 21 - 32 mmol/L    Anion Gap 13 10 - 20 mmol/L    Urea Nitrogen 6 6 - 23 mg/dL    Creatinine 0.51 0.50 - 1.05 mg/dL    eGFR >90 >60 mL/min/1.73m*2    Calcium 9.1 8.6 - 10.6 mg/dL    Phosphorus 4.2 2.5 - 4.9 mg/dL    Albumin 3.2 (L) 3.4 - 5.0 g/dL   Blood Culture    Specimen: Peripheral Venipuncture; Blood culture   Result Value Ref Range    Blood Culture Loaded on Instrument - Culture in progress    Blood Culture    Specimen: Peripheral Venipuncture; Blood culture   Result Value Ref Range    Blood Culture Loaded on Instrument - Culture in progress    Heparin Assay, UFH   Result Value Ref Range    Heparin Unfractionated 0.3 See Comment Below for Therapeutic Ranges IU/mL   B-type natriuretic peptide   Result Value Ref Range    BNP 18 0 - 99 pg/mL               ASSESSMENT & PLAN  Fannie Watson is a 78 y.o. female presenting with h/o HTN, HLD, COPD, 3/1 s/p MIS L4-5 TLIF, p/w AMS, 3/5 CTH trace R sylvian SAH. 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      Patient was transferred from [facility] in [place]. She was admitted there on 3/1/24 for an elective L4-L5 decompression laminectomy and fusion. Due to confusion CT head was completed and showed SAH. She was then transferred to  on 3/5 with concern for SAH.      The patient has been managed conservatively for her SAH. CTA was negative, MRI negative, MRI L spine was negative. EP was consulted for new Afib. EP recommended  an echo be done given the new afib. The TTE was concerning for a MV mass. 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      A CROW was obtained to better visualize the mass/vegetation and mitral valve. CROW showed a large mobile vegetation on the MV and mod/severe MR.      Patient and family state that prior to her planned surgery 3/1 she was fairly active, doing her own grocery shopping and ADLs. She lives with her .      Cardiac surgery consulted for surgical evaluation.     RECOMMENDATIONS  - Dr. Chaudhari met with patient and reviewed imaging and data  - CROW does show a vegetation on the MV along with mod/severe MR  - However, patient with current SAH, and although tolerating heparin, would recommend waiting 1-2 weeks for surgery.   - Patient also needs source control of this infection. ID is questioning hardware in spine given CT abdomen/pelvis shows concerning of fluid collection in the spine and possible discitis osteomyelitis given no source of infection found. Will need that ruled out as possible source, especially before any valvular surgery.   - Please obtain a [facility] - planned for 3/13 - pending results   - Preop risk stratification studies/labs/UA/PFTs and vascular ultrasounds ordered and pending  - CT facial bones done and unremarkable         When/if goes to surgery:  - Continue ASA, high-intensity statin, and BB (or document contraindications for use)  - No ACEi/ARBs in the pre-op period (at least 48 hours)  - Hold any SGLT2 inhibitors (Farxiga, Jardiance, etc.) for at least 3 days prior to cardiac surgery to prevent euglycemic DKA  - No antiplatelets other than ASA, no anticoagulants other than Heparin  - NPO after midnight, blood on hold/T&S, and preop scrubs only to be ordered once OR date is established        Will continue to follow along.  Thank you for the consultation.   Patient educated and all questions answered.  Please page the cardiac surgery consult pager [phone] with any  questions or changes in patient condition.      Delma Cantu PA-C  Cardiac Surgery Consult [facility]  [facility]  Cardiac Surgery Consult Pager [phone]     3/13/2024  1:42 PM

## 2024-03-13 NOTE — CONSULTS
"Nutrition Initial Assessment:   Nutrition Assessment    Reason for Assessment: Admission nursing screening    Patient is a 78 y.o. female presenting with R SAH.   3/1 s/p MIS L4-5 TLIF, 3/5 found to have change in mental status and [place] [facility]   New onset Afib with RVR, HTN, Mitral valve vegetation, moderate to severe Mitral regurgitation, Enterococcus faecium bacteremia and native valve mitral endocarditis    PMHx  hypertension, hyperlipidemia, COPD, spondylolisthesis of lumbar lesion     Nutrition History:  Energy Intake: Fair 50-75 %  Food and Nutrient History: Pt upset about being NPO all day then having procedure rescheduled for tomorrow. She was drinking a supplement prior to [place] [facility] and would like to recieve them here.  Food Allergies/Intolerances:  None  GI Symptoms: None  Oral Problems: None       Anthropometrics:  Height: 157 cm (5' 1.81\")   Weight: 67 kg (147 lb 11.3 oz)   BMI (Calculated): 27.18  IBW/kg (Dietitian Calculated): 50 kg  Percent of IBW: 134 %       Weight History:      Weight         3/9/2024  0000 3/10/2024  0000 3/10/2024  0700 3/11/2024  0000 3/13/2024  0412    Weight: 65.4 kg (144 lb 2.9 oz) 72.4 kg (159 lb 9.8 oz) 72.4 kg (159 lb 9.8 oz) 73.6 kg (162 lb 4.1 oz) 67 kg (147 lb 11.3 oz)           5/2/23 - 72.6kg  1/19/24 - 66kg  Pt not able to recall usual weight at this time.     Nutrition Focused Physical Exam Findings:  defer: pt comfort and multiple visitors in room.     Nutrition Significant Labs:  CBC Trend:   Results from last 7 days   Lab Units 03/13/24  0958 03/12/24  0453 03/11/24  1130 03/11/24  0026   WBC AUTO x10*3/uL 8.3 8.8 8.8 8.6   RBC AUTO x10*6/uL 3.99* 4.01 3.90* 3.97*   HEMOGLOBIN g/dL 10.9* 11.0* 10.6* 10.8*   HEMATOCRIT % 34.1* 33.0* 33.2* 31.5*   MCV fL 86 82 85 79*   PLATELETS AUTO x10*3/uL 426 374 371 335    , BMP Trend:   Results from last 7 days   Lab Units 03/13/24  0958 03/12/24  0453 03/11/24  1130 03/11/24  0026   GLUCOSE mg/dL 99 118* 100* " 112*   CALCIUM mg/dL 9.1 8.6 8.5* 8.0*   SODIUM mmol/L 136 133* 134* 130*   POTASSIUM mmol/L 3.7 4.0 4.5 4.3   CO2 mmol/L 29 24 28 26   CHLORIDE mmol/L 98 98 98 96*   BUN mg/dL 6 7 7 7   CREATININE mg/dL 0.51 0.36* 0.38* 0.38*        Nutrition Specific Medications:  None    I/O:   Last BM Date: 03/09/24; Stool Appearance: Unable to assess (03/11/24 0400)    Dietary Orders (From admission, onward)       Start     Ordered    03/14/24 0001  NPO Diet; Effective midnight  Diet effective midnight         03/13/24 1538    03/13/24 1538  Adult diet Cardiac; 70 gm fat; 2 - 3 grams Sodium  Diet effective now        Question Answer Comment   Diet type Cardiac    Fat restriction: 70 gm fat    Sodium restriction: 2 - 3 grams Sodium        03/13/24 1537                     Estimated Needs:   Total Energy Estimated Needs (kCal): 1700 kCal  Method for Estimating Needs: 25kcal/kg ABW  Total Protein Estimated Needs (g): 70 g  Method for Estimating Needs: 1.4gm/kg IBW  Total Fluid Estimated Needs (mL):  (per team)        Nutrition Diagnosis   Malnutrition Diagnosis  Patient has Malnutrition Diagnosis: No    Nutrition Diagnosis  Patient has Nutrition Diagnosis: Yes  Diagnosis Status (1): New  Nutrition Diagnosis 1: Inadequate oral intake  Related to (1): decreased appetite/intake with acute illness  As Evidenced by (1): report of eating less than 50% of meals.       Nutrition Interventions/Recommendations         Nutrition Prescription:  Individualized Nutrition Prescription Provided for : Ensure Plus TID to provide 350 calories and 13 gm pro per serving. Liberalize diet to regular to encourage intake.        Nutrition Interventions:   Interventions: Meals and snacks, Medical food supplement  Goal: consume >50% of meals and ONS      Nutrition Education:   None at this time.        Nutrition Monitoring and Evaluation   Food/Nutrient Related History Monitoring  Monitoring and Evaluation Plan: Energy intake, Amount of food  Criteria: PO   intake adequate to meet needs    Body Composition/Growth/Weight History  Monitoring and Evaluation Plan: Weight  Criteria: Maintain stable weight    Biochemical Data, Medical Tests and Procedures  Monitoring and Evaluation Plan: Electrolyte/renal panel, Glucose/endocrine profile  Criteria: Labs WNL    Time Spent/Follow-up Reminder:   Time Spent (min): 60 minutes  Last Date of Nutrition Visit: 03/13/24  Nutrition Follow-Up Needed?: Dietitian to reassess per policy

## 2024-03-14 ENCOUNTER — APPOINTMENT (OUTPATIENT)
Dept: CARDIOLOGY | Facility: HOSPITAL | Age: 79
DRG: 981 | End: 2024-03-14
Payer: MEDICARE

## 2024-03-14 ENCOUNTER — APPOINTMENT (OUTPATIENT)
Dept: VASCULAR MEDICINE | Facility: HOSPITAL | Age: 79
DRG: 981 | End: 2024-03-14
Payer: MEDICARE

## 2024-03-14 ENCOUNTER — APPOINTMENT (OUTPATIENT)
Dept: RADIOLOGY | Facility: HOSPITAL | Age: 79
DRG: 981 | End: 2024-03-14
Payer: MEDICARE

## 2024-03-14 LAB
ALBUMIN SERPL BCP-MCNC: 3.1 G/DL (ref 3.4–5)
ANION GAP SERPL CALC-SCNC: 13 MMOL/L (ref 10–20)
BACTERIA BLD AEROBE CULT: ABNORMAL
BACTERIA BLD AEROBE CULT: ABNORMAL
BACTERIA BLD CULT: ABNORMAL
BACTERIA BLD CULT: ABNORMAL
BACTERIA UR CULT: NO GROWTH
BASOPHILS # BLD AUTO: 0.05 X10*3/UL (ref 0–0.1)
BASOPHILS NFR BLD AUTO: 0.5 %
BUN SERPL-MCNC: 5 MG/DL (ref 6–23)
CALCIUM SERPL-MCNC: 9 MG/DL (ref 8.6–10.6)
CHLORIDE SERPL-SCNC: 98 MMOL/L (ref 98–107)
CO2 SERPL-SCNC: 28 MMOL/L (ref 21–32)
CREAT SERPL-MCNC: 0.49 MG/DL (ref 0.5–1.05)
EGFRCR SERPLBLD CKD-EPI 2021: >90 ML/MIN/1.73M*2
EOSINOPHIL # BLD AUTO: 0.17 X10*3/UL (ref 0–0.4)
EOSINOPHIL NFR BLD AUTO: 1.5 %
ERYTHROCYTE [DISTWIDTH] IN BLOOD BY AUTOMATED COUNT: 15.2 % (ref 11.5–14.5)
GLUCOSE SERPL-MCNC: 108 MG/DL (ref 74–99)
GRAM STN SPEC: ABNORMAL
GRAM STN SPEC: ABNORMAL
HCT VFR BLD AUTO: 32.7 % (ref 36–46)
HGB BLD-MCNC: 10.1 G/DL (ref 12–16)
HOLD SPECIMEN: NORMAL
IMM GRANULOCYTES # BLD AUTO: 0.45 X10*3/UL (ref 0–0.5)
IMM GRANULOCYTES NFR BLD AUTO: 4.1 % (ref 0–0.9)
LYMPHOCYTES # BLD AUTO: 2.04 X10*3/UL (ref 0.8–3)
LYMPHOCYTES NFR BLD AUTO: 18.5 %
MAGNESIUM SERPL-MCNC: 1.75 MG/DL (ref 1.6–2.4)
MCH RBC QN AUTO: 26.8 PG (ref 26–34)
MCHC RBC AUTO-ENTMCNC: 30.9 G/DL (ref 32–36)
MCV RBC AUTO: 87 FL (ref 80–100)
MONOCYTES # BLD AUTO: 1.01 X10*3/UL (ref 0.05–0.8)
MONOCYTES NFR BLD AUTO: 9.2 %
NEUTROPHILS # BLD AUTO: 7.31 X10*3/UL (ref 1.6–5.5)
NEUTROPHILS NFR BLD AUTO: 66.2 %
NRBC BLD-RTO: 0 /100 WBCS (ref 0–0)
PHOSPHATE SERPL-MCNC: 3.6 MG/DL (ref 2.5–4.9)
PLATELET # BLD AUTO: 416 X10*3/UL (ref 150–450)
POTASSIUM SERPL-SCNC: 3.5 MMOL/L (ref 3.5–5.3)
RBC # BLD AUTO: 3.77 X10*6/UL (ref 4–5.2)
SODIUM SERPL-SCNC: 135 MMOL/L (ref 136–145)
TSH SERPL-ACNC: 3.58 MIU/L (ref 0.44–3.98)
UFH PPP CHRO-ACNC: 0.2 IU/ML
WBC # BLD AUTO: 11 X10*3/UL (ref 4.4–11.3)

## 2024-03-14 PROCEDURE — C1887 CATHETER, GUIDING: HCPCS | Performed by: INTERNAL MEDICINE

## 2024-03-14 PROCEDURE — 2720000007 HC OR 272 NO HCPCS: Performed by: INTERNAL MEDICINE

## 2024-03-14 PROCEDURE — 2500000001 HC RX 250 WO HCPCS SELF ADMINISTERED DRUGS (ALT 637 FOR MEDICARE OP)

## 2024-03-14 PROCEDURE — 99232 SBSQ HOSP IP/OBS MODERATE 35: CPT | Performed by: PHYSICIAN ASSISTANT

## 2024-03-14 PROCEDURE — 93458 L HRT ARTERY/VENTRICLE ANGIO: CPT | Performed by: INTERNAL MEDICINE

## 2024-03-14 PROCEDURE — 2550000001 HC RX 255 CONTRASTS: Performed by: INTERNAL MEDICINE

## 2024-03-14 PROCEDURE — 2780000003 HC OR 278 NO HCPCS: Performed by: INTERNAL MEDICINE

## 2024-03-14 PROCEDURE — 85520 HEPARIN ASSAY: CPT

## 2024-03-14 PROCEDURE — 2500000001 HC RX 250 WO HCPCS SELF ADMINISTERED DRUGS (ALT 637 FOR MEDICARE OP): Performed by: PHYSICIAN ASSISTANT

## 2024-03-14 PROCEDURE — 2500000005 HC RX 250 GENERAL PHARMACY W/O HCPCS

## 2024-03-14 PROCEDURE — C1760 CLOSURE DEV, VASC: HCPCS | Performed by: INTERNAL MEDICINE

## 2024-03-14 PROCEDURE — 2500000004 HC RX 250 GENERAL PHARMACY W/ HCPCS (ALT 636 FOR OP/ED)

## 2024-03-14 PROCEDURE — 36415 COLL VENOUS BLD VENIPUNCTURE: CPT

## 2024-03-14 PROCEDURE — 94640 AIRWAY INHALATION TREATMENT: CPT

## 2024-03-14 PROCEDURE — 93005 ELECTROCARDIOGRAM TRACING: CPT

## 2024-03-14 PROCEDURE — A9575 INJ GADOTERATE MEGLUMI 0.1ML: HCPCS | Performed by: INTERNAL MEDICINE

## 2024-03-14 PROCEDURE — 2500000002 HC RX 250 W HCPCS SELF ADMINISTERED DRUGS (ALT 637 FOR MEDICARE OP, ALT 636 FOR OP/ED): Mod: MUE

## 2024-03-14 PROCEDURE — 2500000004 HC RX 250 GENERAL PHARMACY W/ HCPCS (ALT 636 FOR OP/ED): Performed by: INTERNAL MEDICINE

## 2024-03-14 PROCEDURE — B2111ZZ FLUOROSCOPY OF MULTIPLE CORONARY ARTERIES USING LOW OSMOLAR CONTRAST: ICD-10-PCS | Performed by: INTERNAL MEDICINE

## 2024-03-14 PROCEDURE — 2500000005 HC RX 250 GENERAL PHARMACY W/O HCPCS: Performed by: INTERNAL MEDICINE

## 2024-03-14 PROCEDURE — 83735 ASSAY OF MAGNESIUM: CPT

## 2024-03-14 PROCEDURE — 1200000002 HC GENERAL ROOM WITH TELEMETRY DAILY

## 2024-03-14 PROCEDURE — 99232 SBSQ HOSP IP/OBS MODERATE 35: CPT

## 2024-03-14 PROCEDURE — 72158 MRI LUMBAR SPINE W/O & W/DYE: CPT

## 2024-03-14 PROCEDURE — 82947 ASSAY GLUCOSE BLOOD QUANT: CPT

## 2024-03-14 PROCEDURE — 85025 COMPLETE CBC W/AUTO DIFF WBC: CPT

## 2024-03-14 PROCEDURE — C1894 INTRO/SHEATH, NON-LASER: HCPCS | Performed by: INTERNAL MEDICINE

## 2024-03-14 PROCEDURE — 4A023N7 MEASUREMENT OF CARDIAC SAMPLING AND PRESSURE, LEFT HEART, PERCUTANEOUS APPROACH: ICD-10-PCS | Performed by: INTERNAL MEDICINE

## 2024-03-14 PROCEDURE — 99152 MOD SED SAME PHYS/QHP 5/>YRS: CPT | Performed by: INTERNAL MEDICINE

## 2024-03-14 PROCEDURE — 84443 ASSAY THYROID STIM HORMONE: CPT

## 2024-03-14 PROCEDURE — 72158 MRI LUMBAR SPINE W/O & W/DYE: CPT | Performed by: RADIOLOGY

## 2024-03-14 PROCEDURE — 2500000002 HC RX 250 W HCPCS SELF ADMINISTERED DRUGS (ALT 637 FOR MEDICARE OP, ALT 636 FOR OP/ED)

## 2024-03-14 RX ORDER — POTASSIUM CHLORIDE 14.9 MG/ML
20 INJECTION INTRAVENOUS ONCE
Status: DISCONTINUED | OUTPATIENT
Start: 2024-03-14 | End: 2024-03-14

## 2024-03-14 RX ORDER — MAGNESIUM SULFATE HEPTAHYDRATE 40 MG/ML
2 INJECTION, SOLUTION INTRAVENOUS ONCE
Status: COMPLETED | OUTPATIENT
Start: 2024-03-14 | End: 2024-03-14

## 2024-03-14 RX ORDER — LIDOCAINE HYDROCHLORIDE 20 MG/ML
INJECTION, SOLUTION INFILTRATION; PERINEURAL AS NEEDED
Status: DISCONTINUED | OUTPATIENT
Start: 2024-03-14 | End: 2024-03-14 | Stop reason: HOSPADM

## 2024-03-14 RX ORDER — GADOTERATE MEGLUMINE 376.9 MG/ML
13 INJECTION INTRAVENOUS
Status: COMPLETED | OUTPATIENT
Start: 2024-03-14 | End: 2024-03-14

## 2024-03-14 RX ORDER — FENTANYL CITRATE 50 UG/ML
INJECTION, SOLUTION INTRAMUSCULAR; INTRAVENOUS AS NEEDED
Status: DISCONTINUED | OUTPATIENT
Start: 2024-03-14 | End: 2024-03-14 | Stop reason: HOSPADM

## 2024-03-14 RX ORDER — VERAPAMIL HYDROCHLORIDE 2.5 MG/ML
INJECTION, SOLUTION INTRAVENOUS AS NEEDED
Status: DISCONTINUED | OUTPATIENT
Start: 2024-03-14 | End: 2024-03-14 | Stop reason: HOSPADM

## 2024-03-14 RX ORDER — MIDAZOLAM HYDROCHLORIDE 1 MG/ML
INJECTION, SOLUTION INTRAMUSCULAR; INTRAVENOUS AS NEEDED
Status: DISCONTINUED | OUTPATIENT
Start: 2024-03-14 | End: 2024-03-14 | Stop reason: HOSPADM

## 2024-03-14 RX ORDER — FUROSEMIDE 20 MG/1
20 TABLET ORAL ONCE
Status: COMPLETED | OUTPATIENT
Start: 2024-03-14 | End: 2024-03-14

## 2024-03-14 RX ORDER — BISACODYL 10 MG/1
10 SUPPOSITORY RECTAL ONCE
Status: COMPLETED | OUTPATIENT
Start: 2024-03-14 | End: 2024-03-14

## 2024-03-14 RX ORDER — POTASSIUM CHLORIDE 20 MEQ/1
20 TABLET, EXTENDED RELEASE ORAL ONCE
Status: COMPLETED | OUTPATIENT
Start: 2024-03-14 | End: 2024-03-14

## 2024-03-14 RX ORDER — SODIUM CHLORIDE 9 MG/ML
75 INJECTION, SOLUTION INTRAVENOUS CONTINUOUS
Status: CANCELLED | OUTPATIENT
Start: 2024-03-14 | End: 2024-03-15

## 2024-03-14 RX ADMIN — AMPICILLIN SODIUM 2 G: 2 INJECTION, POWDER, FOR SOLUTION INTRAMUSCULAR; INTRAVENOUS at 09:26

## 2024-03-14 RX ADMIN — ONDANSETRON HYDROCHLORIDE 4 MG: 4 TABLET, FILM COATED ORAL at 12:51

## 2024-03-14 RX ADMIN — FUROSEMIDE 20 MG: 20 TABLET ORAL at 10:37

## 2024-03-14 RX ADMIN — SENNOSIDES AND DOCUSATE SODIUM 2 TABLET: 8.6; 5 TABLET ORAL at 09:26

## 2024-03-14 RX ADMIN — BISACODYL 10 MG: 10 SUPPOSITORY RECTAL at 09:37

## 2024-03-14 RX ADMIN — AMPICILLIN SODIUM 2 G: 2 INJECTION, POWDER, FOR SOLUTION INTRAMUSCULAR; INTRAVENOUS at 22:57

## 2024-03-14 RX ADMIN — AMPICILLIN SODIUM 2 G: 2 INJECTION, POWDER, FOR SOLUTION INTRAMUSCULAR; INTRAVENOUS at 00:50

## 2024-03-14 RX ADMIN — AMPICILLIN SODIUM 2 G: 2 INJECTION, POWDER, FOR SOLUTION INTRAMUSCULAR; INTRAVENOUS at 16:41

## 2024-03-14 RX ADMIN — CEFTRIAXONE SODIUM 2 G: 2 INJECTION, SOLUTION INTRAVENOUS at 09:26

## 2024-03-14 RX ADMIN — METOPROLOL TARTRATE 12.5 MG: 25 TABLET, FILM COATED ORAL at 20:37

## 2024-03-14 RX ADMIN — METOPROLOL TARTRATE 12.5 MG: 25 TABLET, FILM COATED ORAL at 09:26

## 2024-03-14 RX ADMIN — IPRATROPIUM BROMIDE AND ALBUTEROL SULFATE 3 ML: .5; 3 SOLUTION RESPIRATORY (INHALATION) at 08:01

## 2024-03-14 RX ADMIN — MAGNESIUM SULFATE HEPTAHYDRATE 2 G: 40 INJECTION, SOLUTION INTRAVENOUS at 12:43

## 2024-03-14 RX ADMIN — CEFTRIAXONE SODIUM 2 G: 2 INJECTION, SOLUTION INTRAVENOUS at 20:38

## 2024-03-14 RX ADMIN — MUPIROCIN: 20 OINTMENT TOPICAL at 09:26

## 2024-03-14 RX ADMIN — HEPARIN SODIUM 900 UNITS/HR: 10000 INJECTION, SOLUTION INTRAVENOUS at 05:45

## 2024-03-14 RX ADMIN — OXYCODONE HYDROCHLORIDE 10 MG: 5 TABLET ORAL at 03:02

## 2024-03-14 RX ADMIN — POTASSIUM CHLORIDE 20 MEQ: 1500 TABLET, EXTENDED RELEASE ORAL at 12:43

## 2024-03-14 RX ADMIN — POLYETHYLENE GLYCOL 3350 17 G: 17 POWDER, FOR SOLUTION ORAL at 09:26

## 2024-03-14 RX ADMIN — AMPICILLIN SODIUM 2 G: 2 INJECTION, POWDER, FOR SOLUTION INTRAMUSCULAR; INTRAVENOUS at 05:40

## 2024-03-14 RX ADMIN — GADOTERATE MEGLUMINE 13 ML: 376.9 INJECTION INTRAVENOUS at 23:03

## 2024-03-14 RX ADMIN — AMPICILLIN SODIUM 2 G: 2 INJECTION, POWDER, FOR SOLUTION INTRAMUSCULAR; INTRAVENOUS at 12:42

## 2024-03-14 RX ADMIN — ONDANSETRON HYDROCHLORIDE 4 MG: 4 TABLET, FILM COATED ORAL at 20:38

## 2024-03-14 RX ADMIN — OXYCODONE HYDROCHLORIDE 5 MG: 5 TABLET ORAL at 20:38

## 2024-03-14 ASSESSMENT — COGNITIVE AND FUNCTIONAL STATUS - GENERAL
DRESSING REGULAR LOWER BODY CLOTHING: A LITTLE
WALKING IN HOSPITAL ROOM: A LITTLE
PERSONAL GROOMING: A LITTLE
MOVING FROM LYING ON BACK TO SITTING ON SIDE OF FLAT BED WITH BEDRAILS: A LITTLE
STANDING UP FROM CHAIR USING ARMS: A LITTLE
MOVING TO AND FROM BED TO CHAIR: A LITTLE
STANDING UP FROM CHAIR USING ARMS: A LITTLE
DRESSING REGULAR UPPER BODY CLOTHING: A LITTLE
TOILETING: A LITTLE
WALKING IN HOSPITAL ROOM: A LITTLE
HELP NEEDED FOR BATHING: A LITTLE
TURNING FROM BACK TO SIDE WHILE IN FLAT BAD: A LITTLE
MOVING FROM LYING ON BACK TO SITTING ON SIDE OF FLAT BED WITH BEDRAILS: A LITTLE
DAILY ACTIVITIY SCORE: 19
PERSONAL GROOMING: A LITTLE
DAILY ACTIVITIY SCORE: 19
CLIMB 3 TO 5 STEPS WITH RAILING: A LITTLE
TOILETING: A LITTLE
TURNING FROM BACK TO SIDE WHILE IN FLAT BAD: A LITTLE
DRESSING REGULAR LOWER BODY CLOTHING: A LITTLE
MOBILITY SCORE: 18
MOBILITY SCORE: 18
CLIMB 3 TO 5 STEPS WITH RAILING: A LITTLE
MOVING TO AND FROM BED TO CHAIR: A LITTLE
DRESSING REGULAR UPPER BODY CLOTHING: A LITTLE
HELP NEEDED FOR BATHING: A LITTLE

## 2024-03-14 ASSESSMENT — PAIN - FUNCTIONAL ASSESSMENT
PAIN_FUNCTIONAL_ASSESSMENT: 0-10
PAIN_FUNCTIONAL_ASSESSMENT: 0-10

## 2024-03-14 ASSESSMENT — PAIN SCALES - GENERAL
PAINLEVEL_OUTOF10: 7
PAINLEVEL_OUTOF10: 0 - NO PAIN

## 2024-03-14 NOTE — SIGNIFICANT EVENT
Neurosurgery re-engaged as L incision suture broken and unraveling. Patient approximately 2 weeks out from surgery with healing wounds, sutures therefore removed. Both incisions well approximated, although noted small superficial dehiscence on upper L incision. Recommend wound care consult. No intervention indicated at this time. Please page [phone] if any leakage, poor healing, dehiscence occurs.

## 2024-03-14 NOTE — CARE PLAN
Problem: Fall/Injury  Goal: Be free from injury by end of the shift  Outcome: Progressing     Problem: Pain  Goal: Performs ADL's with improved pain control throughout shift  Outcome: Progressing     Problem: Safety - Adult  Goal: Free from fall injury  Outcome: Progressing     Patient remained stable. Had LHC done. Continue with IV antibiotics. Plan for MRI.

## 2024-03-14 NOTE — PROGRESS NOTES
Progress Note   [facility]  March 14, 2024   Patient: Fannie Watson    Medical Record: [id number]      SUBJECTIVE     Seen Today mentions she feels her Shortness of breath is better after receiving lasix yesterday. She is constipated ( not response on Miralax and senna BID)    Spine surgery sutures removed by NSG team.   MRI spine and LHC postponed to today. She is NPO for procedures  No other complaints. Back pain controlled.     Active Medication:  ampicillin, 2 g, intravenous, q4h  bisacodyl, 10 mg, rectal, Once  cefTRIAXone, 2 g, intravenous, q12h  metoprolol tartrate, 12.5 mg, oral, BID  mupirocin, , Topical, BID  polyethylene glycol, 17 g, oral, BID  sennosides-docusate sodium, 2 tablet, oral, BID    PRN medications: acetaminophen, benzocaine-menthol, benzonatate, guaiFENesin, heparin, HYDROmorphone, ipratropium-albuteroL, ondansetron, oxyCODONE, oxyCODONE    OBJECTIVE     Vitals:    Vitals:    03/14/24 0255 03/14/24 0300 03/14/24 0339 03/14/24 0759   BP:  137/70 117/63 132/72   BP Location:  Left arm Left arm    Patient Position:  Lying Lying    Pulse:  75 64 79   Resp:  21 18 18   Temp:  35.9 °C (96.6 °F) 36.7 °C (98.1 °F) 36 °C (96.8 °F)   TempSrc:  Temporal Temporal    SpO2:  93% 95% 96%   Weight: 67.2 kg (148 lb 2.4 oz)      Height:         Failed to redirect to the Timeline version of the [employer] SmartLink.    Intake/Output Summary (Last 24 hours) at 3/14/2024 0842  Last data filed at 3/13/2024 1906  Gross per 24 hour   Intake 1260 ml   Output 101 ml   Net 1159 ml       Physical Exam  Gen: well appearing, A+Ox3, in no acute distress, anxious   HEENT: PEERL, EOMI, sclera anicteric, no conjunctival injection, MMM.  Resp: CTAB, normal breath sounds, faint crackles at lung bases, no wheezing.  CV: RRR, normal S1/S2, systolic murmur no rubs/gallops, no JVD  GI: non-tender, non-distended, no rebound or guarding.  Extremities/MSK: warm and well perfused, trace edema brody  extr.  Neuro: A+Ox3, no focal deficits, no asterixis  Skin: warm and dry, no lesions, no rashes   Pressure ulcers: no  Back: paraspinal sutures removed, no tenderness, hotness or discharge at the surgery site.     Labs:   Lab Results   Component Value Date    WBC 8.3 03/13/2024    HGB 10.9 (L) 03/13/2024    HCT 34.1 (L) 03/13/2024    MCV 86 03/13/2024     03/13/2024     Lab Results   Component Value Date    GLUCOSE 99 03/13/2024    CALCIUM 9.1 03/13/2024     03/13/2024    K 3.7 03/13/2024    CO2 29 03/13/2024    CL 98 03/13/2024    BUN 6 03/13/2024    CREATININE 0.51 03/13/2024     Lab Results   Component Value Date    ALT 23 03/12/2024    AST 17 03/12/2024    ALKPHOS 116 03/12/2024    BILITOT 0.7 03/12/2024     Lab Results   Component Value Date    INR 1.2 (H) 03/10/2024    INR 1.2 (H) 03/09/2024    INR 1.2 (H) 03/08/2024    PROTIME 13.2 (H) 03/10/2024    PROTIME 13.5 (H) 03/09/2024    PROTIME 13.0 (H) 03/08/2024     Cardiac History    CROW 3/8/24  CONCLUSIONS:   1. Left ventricular systolic function is normal with a 65-70% estimated ejection fraction.   2. There is an elongated (2.5 x 0.3 cm), filamentous mass that is likely a vegetation attached to the atrial side of the posterior leaflet. There is a resultant eccentric mitral regurgitation that is anteriorly directed. Using PISA, ERO: 0.34 cm2 with a regurg volume of 48.62 ml/beat. Although difficult to see, there does not appear to be a perforation. There is severe blunting of all pulmonary vein inflow with early temporary reversal in the right inferior pulmonary vein.   3. Moderate to severe mitral valve regurgitation.   4. The pulmonary artery is not well visualized.     TTE 3/7/24  CONCLUSIONS:   1. Left ventricular systolic function is hyperdynamic with a 70-75% estimated ejection fraction.   2. Spectral Doppler shows an impaired relaxation pattern of left ventricular diastolic filling.   3. Anterior leaflet mobile mass, consider vegetation,  consider CROW.   4. Mild to moderate mitral valve regurgitation.     TTE (12/18/19 -[facility])  CONCLUSIONS:   - Exam indication: Routine surveillance (>1yr) of known cardiomyopathy without a change in clinical status   - The left ventricle is normal in size. Left ventricular systolic function is   normal. EF = 62 ± 5% (2D biplane) Grade I left ventricular diastolic dysfunction.   Normal global strain -23.0%.   - The right ventricle is normal in size. Right ventricular systolic function is   normal.   - There are no significant valvular abnormalities.   - Exam was compared with the prior  echocardiographic exam performed on   1/30/2019. No significant change.      [facility] (12/27/2018)  In summary:   1. Non-occlusive CAD  2. Mild  dilated  hypokinetic left ventricle with ejection fraction estimated at 40%. Consider stress cardiomyopathy/takotsubo variant.      Outpatient regimen  - bisoprolol 10mg  - lisinopril 10 mg daily  - Spironolactone 25 mg stopped at 1/19/24 admission     Relevant Results      MRI brain 3/6/24  IMPRESSION:  1. Redemonstration of subarachnoid hemorrhage most pronounced in the right sylvian fissure and scattered right frontal and temporal sulci. There is also a small amount of intraventricular hemorrhage.  2. Suspect small areas of acute to early subacute ischemic injury in the right frontal lobe.  3. Nonspecific white matter changes most likely represent small-vessel ischemic disease in a patient of this age.        Blood Cx 3x drawn 3/7, 3/8, 3/9, 3/10  Positive for E faecium    CT CAP IMPRESSION 3/09:    1. No source of infection in the chest, abdomen, or pelvis.  2. Trace amount of layering debris in the trachea and mainstem  bronchi with bibasilar dependent subsegmental and left lower lobe  segmental atelectasis. No evidence of consolidative pneumonia.  3. A redemonstration of a loculated fluid collection in the left  paraspinal muscles at the L4 level extending toward the laminectomy  space  measuring approximately 2.9 x 2.2 x 5.2 cm, the visualization  which is limited due to beam hardening artifact. Differential  diagnosis includes postop seroma/hematoma with or without  superimposed infection of the collection.  4. Several areas of ill-defined endplate erosions along the left  inferior L3 endplate and central superior L4 endplate. On MRI lumbar  spine 03/06/2024, there is loss of cortical definition in these  locations associated with hyperintense intradiscal signal in the  posterior L3-L4 intervertebral disc. Therefore, while degenerative  Modic changes are possible, consideration for discitis osteomyelitis  should remain in the differential diagnosis given no additional  source of infection the chest/abdomen/pelvis. Short-term follow-up  with contrast enhanced MRI may be of further diagnostic value should  patient's infection remain persistent/resistant to treatment.  5. 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  6. Colonic diverticulosis without evidence of acute diverticulitis.  7. 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 female with a PMH of hypertension, hyperlipidemia, COPD, spondylolisthesis of lumbar lesion and status post L4-L5 decompression laminectomy and fusion on 3/1/2024 who was transferred from OSH ([facility]) on 3/5/24 with altered mentation and trace R sylvian SAH on CT head for further management by neurosurgery in the [facility].  New onset Afib with RVR which was managed with metoprolol. workup led to a echo which ultimately demonstrated a large MV vegetation and moderate to severe MR by CROW. 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 Transferred from [facility] to cardiology service on 3/11.      Updates 3/14:  - MRI Spine w/ and WO contrast pending.   - [facility] today pending  -  following ID plan, apprec recs  - C/W Ceftriaxone 2g q12h 3/11- ampicillin 3/11-  - Give lasix 20 mg PO, potassium and mag repleted    - Mirelax, senna BID and suppository for constipation     #new onset Afib with RVR  #HTN  #Mitral valve vegetation  #moderate to severe Mitral regurgitation  3/7 afib w/ RVR, s/p IVF, s/p metop, TTE EF 70-75%, MV mobile mass     - C/W on Metoprolol 12.5 BID  - CTS are following, Dr. Chaudhari aware of patient, pending recs. Recommend Spine surgery evaluation for suspicion of Discitis/ OM   - NPO for LHC on 3/13  - as per [facility]U note, CTH done 3/11-Imaging reviewed showing resolving blood. Patient is cleared to undergo anticoagulation for procedures from a neurosurgical perspective. If patient has exam change please stop anticoagulation and obtain STAT CT Head.   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   - CT face/ CAP negative for other source of  infection, ( there is a splenic 1 cm infarct and suspicion of L3-L4 intervertebral disc. discitis osteomyelitis)    Blood Cx 3x drawn 3/7, 3/8, 3/9, 3/10  started on (Cefepime 3/7-3/8, switched to daptomycin 3/8- 3/11 ) currently on Ceftriaxone 2g q12h 3/11- ampicillin 3/11-  Positive for E faecium    - ID following, appreciate recs.   - C/W Ceftriaxone 2g q12h 3/11- ampicillin 3/11-  - continue daily cultures until negative.    # Back pain iso Recent surgical intervention on 3/1 with MIS L4-5 TLIF  On:   Tylenol 650 every 4 hours as needed  Oxycodone 5 mg every 6 hours as needed for moderate pain  Oxycodone 10 mg every 6 hours as needed for severe pain first-line  Hydromorphone 0.2 every 3 hours as needed for severe pain 2nd line    # COPD  Duo nebs at home    Antibiotics:: currently on  Ceftriaxone 2g q12h 3/11- ampicillin 3/11-  Dispo: PT/OT: Low intensity level  of continued care , Wheeled walker     F: Replete PRN  E: Keep Mg >2, phos >3  and K >4  N: cardiac diet  A: PIV  O2: On RA  DVT ppx: heparin gtt  GI ppx: no indication  Code Status: Full code- confirmed 3/11  Contact: : Xander Watson, [phone]     Gilda Gar, PGY1 Internal Medicine    SIGNATURE: Gilda Gar MD PATIENT NAME: Fannie Watson   DATE: March 14, 2024 MRN: [id number]

## 2024-03-14 NOTE — PROGRESS NOTES
Speech-Language Pathology             Therapy Communication Note     Patient Name: Fannie Watson  MRN:  [id number]  Today's Date:  03/14/24  Time: 0757      SLP to sign-off. Swallow re-eval completed 3/7 with findings of functional oropharyngeal swallow and recs for regular texture diet. Pt now out of ICU, clinically without any concerns for oropharyngeal dysphagia, thus SLP services no longer warranted. Please reconsult if new concerns arise.

## 2024-03-14 NOTE — PROGRESS NOTES
"CARDIAC SURGERY CONSULT PROGRESS NOTE    SUBJECTIVE  Fannie Watson is a 78 y.o. female presenting with h/o HTN, HLD, COPD, 3/1 s/p MIS L4-5 TLIF, p/w AMS, 3/5 CTH trace R sylvian SAH. 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      Patient was transferred from [facility] in [place]. She was admitted there on 3/1/24 for an elective L4-L5 decompression laminectomy and fusion. Due to confusion CT head was completed and showed SAH. She was then transferred to  on 3/5 with concern for SAH.      The patient has been managed conservatively for her SAH. CTA was negative, MRI negative, MRI L spine was negative. EP was consulted for new Afib. EP recommended an echo be done given the new afib. The TTE was concerning for a MV mass. 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      A CROW was obtained to better visualize the mass/vegetation and mitral valve. CROW showed a large mobile vegetation on the MV and mod/severe MR.      Patient and family state that prior to her planned surgery 3/1 she was fairly active, doing her own grocery shopping and ADLs. She lives with her .      Cardiac surgery consulted for surgical evaluation.       Objective   /71 (Patient Position: Lying)   Pulse 79   Temp 36.1 °C (96.9 °F)   Resp 18   Ht 1.57 m (5' 1.81\")   Wt 67.2 kg (148 lb 2.4 oz)   LMP  (LMP Unknown)   SpO2 93%   BMI 27.26 kg/m²   Pain Score: 0 - No pain   Vitals:    03/14/24 0255   Weight: 67.2 kg (148 lb 2.4 oz)          Intake/Output Summary (Last 24 hours) at 3/14/2024 1332  Last data filed at 3/14/2024 1243  Gross per 24 hour   Intake 1360 ml   Output 1 ml   Net 1359 ml         Physical Exam  Physical Exam  Constitutional:       Appearance: She is not ill-appearing or toxic-appearing.   HENT:      Head: Normocephalic.      Mouth/Throat:      Mouth: Mucous membranes are moist.   Cardiovascular:      Rate and Rhythm: Normal rate and regular rhythm.      Heart sounds: " Murmur heard.   Pulmonary:      Effort: Pulmonary effort is normal.      Breath sounds: Normal breath sounds.   Abdominal:      General: Bowel sounds are normal.      Palpations: Abdomen is soft.   Musculoskeletal:         General: Normal range of motion.      Cervical back: Neck supple.      Right lower leg: No edema.      Left lower leg: No edema.   Skin:     General: Skin is warm and dry.   Neurological:      General: No focal deficit present.      Mental Status: She is alert and oriented to person, place, and time.   Psychiatric:         Mood and Affect: Mood normal.         Behavior: Behavior normal.      Comments: Tearful on exam         Medications  Scheduled medications  ampicillin, 2 g, intravenous, q4h  cefTRIAXone, 2 g, intravenous, q12h  magnesium sulfate, 2 g, intravenous, Once  metoprolol tartrate, 12.5 mg, oral, BID  mupirocin, , Topical, BID  polyethylene glycol, 17 g, oral, BID  sennosides-docusate sodium, 2 tablet, oral, BID    Continuous medications  heparin, 0-4,000 Units/hr, Last Rate: 1,100 Units/hr (03/14/24 [facility])    PRN medications  PRN medications: acetaminophen, benzocaine-menthol, benzonatate, guaiFENesin, heparin, HYDROmorphone, ipratropium-albuteroL, ondansetron, oxyCODONE, oxyCODONE    Labs  Results for orders placed or performed during the hospital encounter of 03/05/24 (from the past 24 hour(s))   Urinalysis with Reflex Culture and Microscopic   Result Value Ref Range    Color, Urine Light-Yellow Light-Yellow, Yellow, Dark-Yellow    Appearance, Urine Clear Clear    Specific Gravity, Urine 1.019 1.005 - 1.035    pH, Urine 7.0 5.0, 5.5, 6.0, 6.5, 7.0, 7.5, 8.0    Protein, Urine 10 (TRACE) NEGATIVE, 10 (TRACE), 20 (TRACE) mg/dL    Glucose, Urine Normal Normal mg/dL    Blood, Urine NEGATIVE NEGATIVE    Ketones, Urine NEGATIVE NEGATIVE mg/dL    Bilirubin, Urine NEGATIVE NEGATIVE    Urobilinogen, Urine Normal Normal mg/dL    Nitrite, Urine NEGATIVE NEGATIVE    Leukocyte Esterase, Urine 500  Eric/µL (A) NEGATIVE   Extra Urine Gray Tube   Result Value Ref Range    Extra Tube Hold for add-ons.    Microscopic Only, Urine   Result Value Ref Range    WBC, Urine 11-20 (A) 1-5, NONE /HPF    RBC, Urine 6-10 (A) NONE, 1-2, 3-5 /HPF    Squamous Epithelial Cells, Urine 1-9 (SPARSE) Reference range not established. /HPF    Mucus, Urine FEW Reference range not established. /LPF   Heparin Assay, UFH   Result Value Ref Range    Heparin Unfractionated 0.2 See Comment Below for Therapeutic Ranges IU/mL   CBC and Auto Differential   Result Value Ref Range    WBC 11.0 4.4 - 11.3 x10*3/uL    nRBC 0.0 0.0 - 0.0 /100 WBCs    RBC 3.77 (L) 4.00 - 5.20 x10*6/uL    Hemoglobin 10.1 (L) 12.0 - 16.0 g/dL    Hematocrit 32.7 (L) 36.0 - 46.0 %    MCV 87 80 - 100 fL    MCH 26.8 26.0 - 34.0 pg    MCHC 30.9 (L) 32.0 - 36.0 g/dL    RDW 15.2 (H) 11.5 - 14.5 %    Platelets 416 150 - 450 x10*3/uL    Neutrophils % 66.2 40.0 - 80.0 %    Immature Granulocytes %, Automated 4.1 (H) 0.0 - 0.9 %    Lymphocytes % 18.5 13.0 - 44.0 %    Monocytes % 9.2 2.0 - 10.0 %    Eosinophils % 1.5 0.0 - 6.0 %    Basophils % 0.5 0.0 - 2.0 %    Neutrophils Absolute 7.31 (H) 1.60 - 5.50 x10*3/uL    Immature Granulocytes Absolute, Automated 0.45 0.00 - 0.50 x10*3/uL    Lymphocytes Absolute 2.04 0.80 - 3.00 x10*3/uL    Monocytes Absolute 1.01 (H) 0.05 - 0.80 x10*3/uL    Eosinophils Absolute 0.17 0.00 - 0.40 x10*3/uL    Basophils Absolute 0.05 0.00 - 0.10 x10*3/uL   Magnesium   Result Value Ref Range    Magnesium 1.75 1.60 - 2.40 mg/dL   Renal Function Panel   Result Value Ref Range    Glucose 108 (H) 74 - 99 mg/dL    Sodium 135 (L) 136 - 145 mmol/L    Potassium 3.5 3.5 - 5.3 mmol/L    Chloride 98 98 - 107 mmol/L    Bicarbonate 28 21 - 32 mmol/L    Anion Gap 13 10 - 20 mmol/L    Urea Nitrogen 5 (L) 6 - 23 mg/dL    Creatinine 0.49 (L) 0.50 - 1.05 mg/dL    eGFR >90 >60 mL/min/1.73m*2    Calcium 9.0 8.6 - 10.6 mg/dL    Phosphorus 3.6 2.5 - 4.9 mg/dL    Albumin 3.1 (L) 3.4  - 5.0 g/dL   TSH with reflex to Free T4 if abnormal   Result Value Ref Range    Thyroid Stimulating Hormone 3.58 0.44 - 3.98 mIU/L               ASSESSMENT & PLAN  Fannie Watson is a 78 y.o. female presenting with h/o HTN, HLD, COPD, 3/1 s/p MIS L4-5 TLIF, p/w AMS, 3/5 CTH trace R sylvian SAH. 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      Patient was transferred from [facility] in [place]. She was admitted there on 3/1/24 for an elective L4-L5 decompression laminectomy and fusion. Due to confusion CT head was completed and showed SAH. She was then transferred to  on 3/5 with concern for SAH.      The patient has been managed conservatively for her SAH. CTA was negative, MRI negative, MRI L spine was negative. EP was consulted for new Afib. EP recommended an echo be done given the new afib. The TTE was concerning for a MV mass. 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      A CROW was obtained to better visualize the mass/vegetation and mitral valve. CROW showed a large mobile vegetation on the MV and mod/severe MR.      Patient and family state that prior to her planned surgery 3/1 she was fairly active, doing her own grocery shopping and ADLs. She lives with her .      Cardiac surgery consulted for surgical evaluation.     RECOMMENDATIONS  - Dr. Chaudhari met with patient and reviewed imaging and data  - CROW does show a vegetation on the MV along with mod/severe MR  - However, patient with current SAH, and although tolerating heparin, would recommend waiting 1-2 weeks for surgery.   - Patient also needs source control of this infection. ID is questioning hardware in spine given CT abdomen/pelvis shows concerning of fluid collection in the spine and possible discitis osteomyelitis given no source of infection found. Will need that ruled out as possible source, especially before any valvular surgery. MRI spine is pending.   - Please obtain a [facility]   - Preop risk  stratification studies/labs/UA/PFTs and vascular ultrasounds ordered and pending  - CT facial bones done and unremarkable         When/if goes to surgery:  - Continue ASA, high-intensity statin, and BB (or document contraindications for use)  - No ACEi/ARBs in the pre-op period (at least 48 hours)  - Hold any SGLT2 inhibitors (Farxiga, Jardiance, etc.) for at least 3 days prior to cardiac surgery to prevent euglycemic DKA  - No antiplatelets other than ASA, no anticoagulants other than Heparin  - NPO after midnight, blood on hold/T&S, and preop scrubs only to be ordered once OR date is established        Will continue to follow along.  Thank you for the consultation.   Patient educated and all questions answered.  Please page the cardiac surgery consult pager [phone] with any questions or changes in patient condition.      Delma Cantu PA-C  Cardiac Surgery Consult [facility]  [facility]  Cardiac Surgery Consult Pager [phone]     3/14/2024  1:32 PM

## 2024-03-14 NOTE — CONSULTS
Wound Care Consult     Visit Date: 3/14/2024      Patient Name: Fannie Watson         MRN: [id number]           YOB: 1945     Reason for Consult: Incisions to lower back/flank        Wound History: Fannie Watson is a 78 y.o. female with a PMH of hypertension, hyperlipidemia, COPD, spondylolisthesis of lumbar lesion and status post L4-L5 decompression laminectomy and fusion on 3/1/2024. Sutures removed from bilateral incisions 3/14/24 AM. Very small area of dehiscence to left side incision.    Wound Assessment:  Wound 03/06/24 Incision Back Left;Lower;Medial (Active)   Wound Image   03/06/24 1042   Site Assessment Clean;Dry;Intact 03/14/24 1142   Malika-Wound Assessment Clean;Dry;Intact 03/14/24 1142   Shape linear 03/14/24 1142   Wound Length (cm) 3 cm 03/14/24 1142   Wound Width (cm) 0 cm 03/14/24 1142   Wound Surface Area (cm^2) 0 cm^2 03/14/24 1142   Wound Depth (cm) 0 cm 03/14/24 1142   Wound Volume (cm^3) 0 cm^3 03/14/24 1142   State of Healing Healing ridge 03/14/24 1142   Margins Attached edges 03/14/24 1142   Closure Open to air 03/14/24 1142   Sutures/Staple Line Approximated 03/14/24 1142   Drainage Description None 03/14/24 1142   Drainage Amount None 03/14/24 1142   Dressing Open to air 03/14/24 1142   Dressing Status Clean;Dry 03/12/24 1930       Wound 03/06/24 Incision Back Lower;Medial;Right (Active)   Wound Image   03/14/24 1142   Site Assessment Clean;Dry;Intact 03/14/24 1142   Malika-Wound Assessment Clean;Dry;Intact 03/14/24 1142   Wound Length (cm) 3 cm 03/14/24 1142   Wound Width (cm) 0 cm 03/14/24 1142   Wound Surface Area (cm^2) 0 cm^2 03/14/24 1142   Wound Depth (cm) 0 cm 03/14/24 1142   Wound Volume (cm^3) 0 cm^3 03/14/24 1142   State of Healing Healing ridge 03/14/24 1142   Margins Attached edges 03/14/24 1142   Closure Open to air 03/14/24 1142   Sutures/Staple Line Approximated 03/14/24 1142   Drainage Description None 03/14/24 1142   Drainage Amount None 03/14/24 1142    Dressing Open to air 03/14/24 1142   Dressing Status Clean;Dry;New drainage 03/07/24 1300       Wound 03/08/24 Incision Leg Proximal;Right;Upper;Anterior (Active)   Site Assessment Clean;Dry 03/14/24 0753   Malika-Wound Assessment Clean;Dry 03/14/24 0753   State of Healing Healing ridge 03/13/24 1938   Margins Attached edges 03/13/24 1938   Closure Approximated 03/13/24 1938   Sutures/Staple Line Approximated 03/13/24 1938   Drainage Description None 03/14/24 0753   Drainage Amount None 03/14/24 0753   Dressing Open to air 03/14/24 0753       Wound Team Summary Assessment: Both incisions to flank area clean, dry and intact. Both are dry and neither are draining. There is a very small area to the superior left incision that is not completely pulled together. This is still dry.No pain or discomfort with palpation to both incisions. No redness, or any concern of infection.   Recommendations:  - Clean both incisions daily with NSS or wound cleanser.  - Keep SEEMA    Wound Team Plan: Wound team does not need to follow. Please reconsult with any changes or concerns with these incisions.     SUZANNE GERHARDT, RN, BSN, CWOCN  3/14/2024  3:28 PM

## 2024-03-14 NOTE — POST-PROCEDURE NOTE
Physician Transition of Care Summary  Invasive Cardiovascular Lab    Procedure Date: 3/14/2024  Attending:    * David Hernandes - Primary  Resident/Fellow/Other Assistant: Surgeon(s) and Role:     * Christine Dumont MD - Fellow     * Adarsh Kirk MD - Fellow    Indications:   Pre-op Diagnosis     * Endocarditis, unspecified chronicity, unspecified endocarditis type [I38]    Post-procedure diagnosis:   Post-op Diagnosis     * Endocarditis, unspecified chronicity, unspecified endocarditis type [I38]    Procedure(s):   Left Heart Cath  92688 - MA CATH PLMT L HRT & ARTS W/NJX & ANGIO IMG S&I        Procedure Findings:   Luminal irregularities w/o any obstructive CAD    Description of the Procedure:   Right Radial 6 Fr --> TR Band    Complications:   NA    Stents/Implants:       Anticoagulation/Antiplatelet Plan:   NA    Estimated Blood Loss:   * No values recorded between 3/14/2024  3:00 PM and 3/14/2024  3:44 PM *    Anesthesia: Moderate Sedation Anesthesia Staff: No anesthesia staff entered.    Any Specimen(s) Removed:   No specimens collected during this procedure.    Disposition:   Telemetry      Electronically signed by: Christine Dumont MD, 3/14/2024 3:44 PM

## 2024-03-14 NOTE — DISCHARGE SUMMARY
Physician Discharge Summary     Patient ID:  Lauren Elizondo  [id number]  78 y.o.  1945    Admit date: 3/1/2024    Discharge date and time: 3/5/2024  9:15 PM     Admitting Physician: Bhavani Goetz MD     Discharge Physician: bhavani goetz md     Admission Diagnoses: Spondylolisthesis of lumbar region [M43.16]  Spondylolisthesis at L4-L5 level [M43.16]  Unstable burst fracture of t11-T12 vertebra, subsequent encounter for fracture with delayed healing [S22.082G]    Discharge Diagnoses: subarachnoid hemorrhage     Admission Condition: stable    Discharged Condition: critical    Indication for Admission: spondylolisthesis l45    Hospital Course: tolerated surgery well, was ambulating w therapy, had constipation treated, had confusion, ct head showed subarachnoid hemorrhage right, transferred to [facility] and transferred tertiary care facility for vasospasm which could not be treated at [facility]    Consults: neurology    Significant Diagnostic Studies: labs:  and radiology: X-Ray:  and CT scan:     Treatments: therapies: PT, OT, and SW    Discharge Exam:  Decreased mental status exam    Disposition: tertiary care facility neuro icu    In process/preliminary results:  Outstanding Order Results       No orders found from 2/1/2024 to 3/2/2024.            Patient Instructions:   Discharge Medication List as of 3/5/2024 10:30 PM        START taking these medications    Details   oxyCODONE-acetaminophen (PERCOCET) 5-325 MG per tablet Take 1 tablet by mouth every 8 hours as needed for Pain for up to 7 days. Intended supply: 3 days. Take lowest dose possible to manage pain Max Daily Amount: 3 tablets, Disp-21 tablet, R-0Normal           CONTINUE these medications which have NOT CHANGED    Details   pregabalin (LYRICA) 75 MG capsule Take 1 capsule by mouth 3 times daily.Historical Med      famotidine (PEPCID) 40 MG tablet Take 1 tablet by mouth every 12 hoursHistorical Med      albuterol sulfate HFA (VENTOLIN HFA) 108 (90

## 2024-03-14 NOTE — DISCHARGE INSTRUCTIONS
CARDIAC CATHETERIZATION DISCHARGE INSTRUCTIONS     FOR SUDDEN AND SEVERE CHEST PAIN, SHORTNESS OF BREATH, EXCESSIVE BLEEDING, SIGNS OF STROKE, OR CHANGES IN MENTAL STATUS YOU SHOULD CALL 911 IMMEDIATELY.     FOR NEXT 24 HOURS  - Upon discharge, you should return home and rest for the remainder of the day and evening. You do not have to stay on bed rest but should not be very active.  It is recommended a responsible adult be with you for the first 24 hours after the procedure.    - No driving for 24 hours after procedure. Please arrange for someone to drive you home from the hospital today.     - Do not drive, operate machinery, or use power tools for 24 hours after your procedure.     - Do not make any legal decisions for 24 hours after your procedure.     - Do not drink alcoholic beverages for 24 hours after your procedure.    WOUND CARE   *FOR FEMORAL (LEG) ACCESS*  ·      Avoid heavy lifting (over 10 pounds) for 7 days, squatting or excessive bending for 2 days, and strenuous exercise for 7 days.  ·      No submerged bathing, swimming, or hot tubs for the next 7 days, or until fully healed.  ·      Avoid sexual activity for 3-4 days until any groin discomfort has ceased.     *FOR RADIAL (WRIST) ACCESS*  ·      No lifting more than 5 pounds or excessive use of the wrist for 24 hours - for example, treat your wrist as if it is sprained.  ·      Do not engage in vigorous activities (tennis, golf, bowling, weights) for at least 48 hours after the procedure.  ·      Do not submerge the wrist for 7 days after the procedure.  ·      You should expect mild tingling in your hand and tenderness at the puncture site for up to 3 days.    - The transparent dressing should be removed from the site 24 hours after the procedure.  Wash the site gently with soap and water. Rinse well and pat dry. Keep the area clean and dry. You may apply a Band-Aid to the site. Avoid lotions, ointments, or powders until fully healed.      - You may shower the day after your procedure.      - It is normal to notice a small bruise around the puncture site and/or a small grape sized or smaller lump. Any large bruising or large lump warrants a call to the office.     - If bleeding should occur, lay down and apply pressure to the affected area for 10 minutes.  If the bleeding stops notify your physician.  If there is a large amount of bleeding or spurting of blood CALL 911 immediately.  DO NOT drive yourself to the hospital.    - You may experience some tenderness, bruising or minimal inflammation.  If you have any concerns, you may contact the Cath Lab or if any of these symptoms become excessive, contact your cardiologist or go to the emergency room.     OTHER INSTRUCTIONS  - You may take acetaminophen (Tylenol) as directed for discomfort.  If pain is not relieved with acetaminophen (Tylenol), contact your doctor.    - If you notice or experience any of the following, you should notify your doctor or seek medical attention  Chest pain or discomfort  Change in mental status or weakness in extremities.  Dizziness, light headedness, or feeling faint.  Change in the site where the procedure was performed, such as bleeding or an increased area of bruising or swelling.  Tingling, numbness, pain, or coolness in the leg/arm beyond the site where the procedure was performed.  Signs of infection (i.e. shaking chills, temperature > 100 degrees Fahrenheit, warmth, redness) in the leg/arm area where the procedure was performed.  Changes in urination   Bloody or black stools  Vomiting blood  Severe nose bleeds  Any excessive bleeding    - If you DO NOT have an appointment with your cardiologist within 2-4 weeks following your procedure, please contact their office.

## 2024-03-15 ENCOUNTER — APPOINTMENT (OUTPATIENT)
Dept: VASCULAR MEDICINE | Facility: HOSPITAL | Age: 79
DRG: 981 | End: 2024-03-15
Payer: MEDICARE

## 2024-03-15 LAB
ALBUMIN SERPL BCP-MCNC: 3.3 G/DL (ref 3.4–5)
ANION GAP SERPL CALC-SCNC: 13 MMOL/L (ref 10–20)
BACTERIA BLD AEROBE CULT: ABNORMAL
BACTERIA BLD CULT: ABNORMAL
BASOPHILS # BLD MANUAL: 0 X10*3/UL (ref 0–0.1)
BASOPHILS NFR BLD MANUAL: 0 %
BUN SERPL-MCNC: 4 MG/DL (ref 6–23)
BURR CELLS BLD QL SMEAR: ABNORMAL
CALCIUM SERPL-MCNC: 8.8 MG/DL (ref 8.6–10.6)
CHLORIDE SERPL-SCNC: 97 MMOL/L (ref 98–107)
CO2 SERPL-SCNC: 27 MMOL/L (ref 21–32)
CREAT SERPL-MCNC: 0.56 MG/DL (ref 0.5–1.05)
EGFRCR SERPLBLD CKD-EPI 2021: >90 ML/MIN/1.73M*2
EOSINOPHIL # BLD MANUAL: 0.1 X10*3/UL (ref 0–0.4)
EOSINOPHIL NFR BLD MANUAL: 0.9 %
ERYTHROCYTE [DISTWIDTH] IN BLOOD BY AUTOMATED COUNT: 15.4 % (ref 11.5–14.5)
GLUCOSE SERPL-MCNC: 120 MG/DL (ref 74–99)
GRAM STN SPEC: ABNORMAL
HCT VFR BLD AUTO: 32.6 % (ref 36–46)
HGB BLD-MCNC: 10.1 G/DL (ref 12–16)
IMM GRANULOCYTES # BLD AUTO: 0.36 X10*3/UL (ref 0–0.5)
IMM GRANULOCYTES NFR BLD AUTO: 3.1 % (ref 0–0.9)
LYMPHOCYTES # BLD MANUAL: 1.43 X10*3/UL (ref 0.8–3)
LYMPHOCYTES NFR BLD MANUAL: 12.3 %
MAGNESIUM SERPL-MCNC: 2.03 MG/DL (ref 1.6–2.4)
MCH RBC QN AUTO: 26.4 PG (ref 26–34)
MCHC RBC AUTO-ENTMCNC: 31 G/DL (ref 32–36)
MCV RBC AUTO: 85 FL (ref 80–100)
MONOCYTES # BLD MANUAL: 0.3 X10*3/UL (ref 0.05–0.8)
MONOCYTES NFR BLD MANUAL: 2.6 %
MYELOCYTES # BLD MANUAL: 0.41 X10*3/UL
MYELOCYTES NFR BLD MANUAL: 3.5 %
NEUTS SEG # BLD MANUAL: 8.96 X10*3/UL (ref 1.6–5)
NEUTS SEG NFR BLD MANUAL: 77.2 %
NRBC BLD-RTO: 0 /100 WBCS (ref 0–0)
PHOSPHATE SERPL-MCNC: 3.5 MG/DL (ref 2.5–4.9)
PLATELET # BLD AUTO: 392 X10*3/UL (ref 150–450)
POTASSIUM SERPL-SCNC: 3.9 MMOL/L (ref 3.5–5.3)
RBC # BLD AUTO: 3.83 X10*6/UL (ref 4–5.2)
RBC MORPH BLD: ABNORMAL
SODIUM SERPL-SCNC: 133 MMOL/L (ref 136–145)
STAPHYLOCOCCUS SPEC CULT: NORMAL
TOTAL CELLS COUNTED BLD: 114
TSH SERPL-ACNC: 2.68 MIU/L (ref 0.44–3.98)
UFH PPP CHRO-ACNC: 0.1 IU/ML
UFH PPP CHRO-ACNC: 0.4 IU/ML
UFH PPP CHRO-ACNC: 0.4 IU/ML
UFH PPP CHRO-ACNC: 0.5 IU/ML
VARIANT LYMPHS # BLD MANUAL: 0.41 X10*3/UL (ref 0–0.3)
VARIANT LYMPHS NFR BLD: 3.5 %
WBC # BLD AUTO: 11.6 X10*3/UL (ref 4.4–11.3)

## 2024-03-15 PROCEDURE — 2500000002 HC RX 250 W HCPCS SELF ADMINISTERED DRUGS (ALT 637 FOR MEDICARE OP, ALT 636 FOR OP/ED)

## 2024-03-15 PROCEDURE — 2500000001 HC RX 250 WO HCPCS SELF ADMINISTERED DRUGS (ALT 637 FOR MEDICARE OP)

## 2024-03-15 PROCEDURE — 99233 SBSQ HOSP IP/OBS HIGH 50: CPT | Performed by: INTERNAL MEDICINE

## 2024-03-15 PROCEDURE — 36415 COLL VENOUS BLD VENIPUNCTURE: CPT

## 2024-03-15 PROCEDURE — 2500000004 HC RX 250 GENERAL PHARMACY W/ HCPCS (ALT 636 FOR OP/ED): Mod: MUE

## 2024-03-15 PROCEDURE — 85520 HEPARIN ASSAY: CPT | Mod: MUE

## 2024-03-15 PROCEDURE — 85027 COMPLETE CBC AUTOMATED: CPT

## 2024-03-15 PROCEDURE — 2500000002 HC RX 250 W HCPCS SELF ADMINISTERED DRUGS (ALT 637 FOR MEDICARE OP, ALT 636 FOR OP/ED): Mod: MUE

## 2024-03-15 PROCEDURE — 85520 HEPARIN ASSAY: CPT

## 2024-03-15 PROCEDURE — 80069 RENAL FUNCTION PANEL: CPT

## 2024-03-15 PROCEDURE — 85007 BL SMEAR W/DIFF WBC COUNT: CPT

## 2024-03-15 PROCEDURE — 93880 EXTRACRANIAL BILAT STUDY: CPT | Performed by: INTERNAL MEDICINE

## 2024-03-15 PROCEDURE — 97535 SELF CARE MNGMENT TRAINING: CPT | Mod: GO

## 2024-03-15 PROCEDURE — 2500000004 HC RX 250 GENERAL PHARMACY W/ HCPCS (ALT 636 FOR OP/ED)

## 2024-03-15 PROCEDURE — 99233 SBSQ HOSP IP/OBS HIGH 50: CPT

## 2024-03-15 PROCEDURE — 84443 ASSAY THYROID STIM HORMONE: CPT | Performed by: PHYSICIAN ASSISTANT

## 2024-03-15 PROCEDURE — 2500000005 HC RX 250 GENERAL PHARMACY W/O HCPCS

## 2024-03-15 PROCEDURE — 1200000002 HC GENERAL ROOM WITH TELEMETRY DAILY

## 2024-03-15 PROCEDURE — 87040 BLOOD CULTURE FOR BACTERIA: CPT

## 2024-03-15 PROCEDURE — 97530 THERAPEUTIC ACTIVITIES: CPT | Mod: GP,CQ

## 2024-03-15 PROCEDURE — 83735 ASSAY OF MAGNESIUM: CPT

## 2024-03-15 PROCEDURE — 99232 SBSQ HOSP IP/OBS MODERATE 35: CPT | Performed by: PHYSICIAN ASSISTANT

## 2024-03-15 PROCEDURE — 93880 EXTRACRANIAL BILAT STUDY: CPT

## 2024-03-15 RX ORDER — FUROSEMIDE 40 MG/1
40 TABLET ORAL ONCE
Status: COMPLETED | OUTPATIENT
Start: 2024-03-15 | End: 2024-03-15

## 2024-03-15 RX ORDER — BISACODYL 10 MG/1
10 SUPPOSITORY RECTAL ONCE
Status: COMPLETED | OUTPATIENT
Start: 2024-03-15 | End: 2024-03-15

## 2024-03-15 RX ADMIN — SENNOSIDES AND DOCUSATE SODIUM 2 TABLET: 8.6; 5 TABLET ORAL at 21:44

## 2024-03-15 RX ADMIN — MUPIROCIN: 20 OINTMENT TOPICAL at 21:00

## 2024-03-15 RX ADMIN — SENNOSIDES AND DOCUSATE SODIUM 2 TABLET: 8.6; 5 TABLET ORAL at 08:06

## 2024-03-15 RX ADMIN — AMPICILLIN SODIUM 2 G: 2 INJECTION, POWDER, FOR SOLUTION INTRAMUSCULAR; INTRAVENOUS at 01:09

## 2024-03-15 RX ADMIN — CEFTRIAXONE SODIUM 2 G: 2 INJECTION, SOLUTION INTRAVENOUS at 08:07

## 2024-03-15 RX ADMIN — POLYETHYLENE GLYCOL 3350 17 G: 17 POWDER, FOR SOLUTION ORAL at 21:43

## 2024-03-15 RX ADMIN — OXYCODONE HYDROCHLORIDE 5 MG: 5 TABLET ORAL at 14:06

## 2024-03-15 RX ADMIN — HEPARIN SODIUM 2000 UNITS: 5000 INJECTION INTRAVENOUS; SUBCUTANEOUS at 04:17

## 2024-03-15 RX ADMIN — HEPARIN SODIUM 1300 UNITS/HR: 10000 INJECTION, SOLUTION INTRAVENOUS at 14:13

## 2024-03-15 RX ADMIN — AMPICILLIN SODIUM 2 G: 2 INJECTION, POWDER, FOR SOLUTION INTRAMUSCULAR; INTRAVENOUS at 15:48

## 2024-03-15 RX ADMIN — AMPICILLIN SODIUM 2 G: 2 INJECTION, POWDER, FOR SOLUTION INTRAMUSCULAR; INTRAVENOUS at 11:49

## 2024-03-15 RX ADMIN — FUROSEMIDE 40 MG: 40 TABLET ORAL at 11:49

## 2024-03-15 RX ADMIN — AMPICILLIN SODIUM 2 G: 2 INJECTION, POWDER, FOR SOLUTION INTRAMUSCULAR; INTRAVENOUS at 08:07

## 2024-03-15 RX ADMIN — METOPROLOL TARTRATE 12.5 MG: 25 TABLET, FILM COATED ORAL at 21:43

## 2024-03-15 RX ADMIN — ONDANSETRON HYDROCHLORIDE 4 MG: 4 TABLET, FILM COATED ORAL at 08:06

## 2024-03-15 RX ADMIN — BISACODYL 10 MG: 10 SUPPOSITORY RECTAL at 14:06

## 2024-03-15 RX ADMIN — IPRATROPIUM BROMIDE AND ALBUTEROL SULFATE 3 ML: .5; 3 SOLUTION RESPIRATORY (INHALATION) at 04:50

## 2024-03-15 RX ADMIN — OXYCODONE HYDROCHLORIDE 5 MG: 5 TABLET ORAL at 08:06

## 2024-03-15 RX ADMIN — AMPICILLIN SODIUM 2 G: 2 INJECTION, POWDER, FOR SOLUTION INTRAMUSCULAR; INTRAVENOUS at 04:29

## 2024-03-15 RX ADMIN — POLYETHYLENE GLYCOL 3350 17 G: 17 POWDER, FOR SOLUTION ORAL at 08:07

## 2024-03-15 RX ADMIN — AMPICILLIN SODIUM 2 G: 2 INJECTION, POWDER, FOR SOLUTION INTRAMUSCULAR; INTRAVENOUS at 20:00

## 2024-03-15 RX ADMIN — METOPROLOL TARTRATE 12.5 MG: 25 TABLET, FILM COATED ORAL at 08:16

## 2024-03-15 RX ADMIN — AMPICILLIN SODIUM 2 G: 2 INJECTION, POWDER, FOR SOLUTION INTRAMUSCULAR; INTRAVENOUS at 23:35

## 2024-03-15 RX ADMIN — ONDANSETRON HYDROCHLORIDE 4 MG: 4 TABLET, FILM COATED ORAL at 20:00

## 2024-03-15 RX ADMIN — OXYCODONE HYDROCHLORIDE 5 MG: 5 TABLET ORAL at 20:00

## 2024-03-15 RX ADMIN — CEFTRIAXONE SODIUM 2 G: 2 INJECTION, SOLUTION INTRAVENOUS at 21:43

## 2024-03-15 ASSESSMENT — COGNITIVE AND FUNCTIONAL STATUS - GENERAL
HELP NEEDED FOR BATHING: A LITTLE
DAILY ACTIVITIY SCORE: 19
MOVING FROM LYING ON BACK TO SITTING ON SIDE OF FLAT BED WITH BEDRAILS: A LITTLE
CLIMB 3 TO 5 STEPS WITH RAILING: A LITTLE
STANDING UP FROM CHAIR USING ARMS: A LITTLE
DRESSING REGULAR LOWER BODY CLOTHING: A LITTLE
TOILETING: A LITTLE
DRESSING REGULAR UPPER BODY CLOTHING: A LITTLE
STANDING UP FROM CHAIR USING ARMS: A LITTLE
MOVING TO AND FROM BED TO CHAIR: A LITTLE
MOBILITY SCORE: 18
MOVING FROM LYING ON BACK TO SITTING ON SIDE OF FLAT BED WITH BEDRAILS: A LITTLE
WALKING IN HOSPITAL ROOM: A LITTLE
DRESSING REGULAR LOWER BODY CLOTHING: A LITTLE
PERSONAL GROOMING: A LITTLE
WALKING IN HOSPITAL ROOM: A LITTLE
CLIMB 3 TO 5 STEPS WITH RAILING: A LITTLE
MOBILITY SCORE: 18
TURNING FROM BACK TO SIDE WHILE IN FLAT BAD: A LITTLE
TOILETING: A LITTLE
DAILY ACTIVITIY SCORE: 20
HELP NEEDED FOR BATHING: A LITTLE
PERSONAL GROOMING: A LITTLE
TURNING FROM BACK TO SIDE WHILE IN FLAT BAD: A LITTLE
MOVING TO AND FROM BED TO CHAIR: A LITTLE

## 2024-03-15 ASSESSMENT — PAIN SCALES - GENERAL
PAINLEVEL_OUTOF10: 5 - MODERATE PAIN
PAINLEVEL_OUTOF10: 0 - NO PAIN
PAINLEVEL_OUTOF10: 5 - MODERATE PAIN
PAINLEVEL_OUTOF10: 5 - MODERATE PAIN
PAINLEVEL_OUTOF10: 3
PAINLEVEL_OUTOF10: 0 - NO PAIN
PAINLEVEL_OUTOF10: 2
PAINLEVEL_OUTOF10: 5 - MODERATE PAIN
PAINLEVEL_OUTOF10: 6
PAINLEVEL_OUTOF10: 5 - MODERATE PAIN

## 2024-03-15 ASSESSMENT — PAIN - FUNCTIONAL ASSESSMENT
PAIN_FUNCTIONAL_ASSESSMENT: 0-10

## 2024-03-15 ASSESSMENT — ACTIVITIES OF DAILY LIVING (ADL): HOME_MANAGEMENT_TIME_ENTRY: 19

## 2024-03-15 ASSESSMENT — PAIN DESCRIPTION - LOCATION
LOCATION: BACK

## 2024-03-15 ASSESSMENT — PAIN DESCRIPTION - ORIENTATION: ORIENTATION: LEFT

## 2024-03-15 NOTE — PROGRESS NOTES
Occupational Therapy    OT Treatment    Patient Name: Fannie Watson  MRN: [id number]  Today's Date: 3/15/2024  Time Calculation  Start Time: 1221  Stop Time: 1240  Time Calculation (min): 19 min         Assessment:  OT Assessment: Pt would benefit from continued skilled OT to increase independence in ADLs, functional mobility, activity tolerance, and cognition  Prognosis: Good  Barriers to Discharge: None  Evaluation/Treatment Tolerance: Patient tolerated treatment well  Medical Staff Made Aware: Yes  End of Session Communication: Bedside nurse  End of Session Patient Position: Alarm off, not on at start of session, Up in chair  OT Assessment Results: Decreased ADL status, Decreased upper extremity strength, Decreased safe judgment during ADL, Decreased cognition, Decreased endurance, Decreased functional mobility, Decreased gross motor control, Decreased IADLs  Prognosis: Good  Barriers to Discharge: None  Evaluation/Treatment Tolerance: Patient tolerated treatment well  Medical Staff Made Aware: Yes  Plan:  Treatment Interventions: ADL retraining, Functional transfer training, Endurance training, Cognitive reorientation, Patient/family training, Equipment evaluation/education, Fine motor coordination activities, Compensatory technique education  OT Frequency: 3 times per week  OT Discharge Recommendations: Low intensity level of continued care  Equipment Recommended upon Discharge: Wheeled walker  OT Recommended Transfer Status: Assist of 1  OT - OK to Discharge: Yes  Treatment Interventions: ADL retraining, Functional transfer training, Endurance training, Cognitive reorientation, Patient/family training, Equipment evaluation/education, Fine motor coordination activities, Compensatory technique education    Subjective   Previous Visit Info:  OT Last Visit  OT Received On: 03/15/24  General:  General  Reason for Referral: AMS and R sylvian SAH  Past Medical History Relevant to Rehab: HTN, HLD, COPD, 3/1 s/p MIS  L4-5 TLIF  Family/Caregiver Present: Yes  Caregiver Feedback: spouse entering room at end of session  Prior to Session Communication: Bedside nurse  Patient Position Received: Up in bathroom  Preferred Learning Style: auditory, verbal, visual  General Comment: Pt up in bathroom upon arrival, agreeable to OT  Precautions:  Medical Precautions: Fall precautions  Post-Surgical Precautions: Spinal precautions  Precautions Comment: SBP < 140    Pain:  Pain Assessment  Pain Assessment: 0-10  Pain Score: 5 - Moderate pain  Pain Type: Surgical pain  Pain Location: Back    Objective    Cognition:  Cognition  Overall Cognitive Status: Within Functional Limits  Arousal/Alertness: Delayed responses to stimuli  Orientation Level: Oriented X4  Following Commands: Follows one step commands with repetition  Insight: Mild  Impulsive: Mildly  Processing Speed: Delayed    Activities of Daily Living:      Grooming  Grooming Level of Assistance: Setup, Contact guard  Grooming Where Assessed: Standing sinkside  Grooming Comments: Pt performed teethbrushing and handwashing standing at sink CGA FWW    UE Dressing  UE Dressing Level of Assistance: Minimum assistance  UE Dressing Where Assessed: Edge of bed  UE Dressing Comments: donned gown over back min A FWW    LE Dressing  LE Dressing: Yes  Pants Level of Assistance: Contact guard  Sock Level of Assistance: Contact guard  LE Dressing Where Assessed: Edge of bed  LE Dressing Comments: pt donned/doffed pants over feet seated EOB CGA, donned/doffed B socks EOB CGA, min VCs to bring feet to self in figure four    Toileting  Toileting Level of Assistance: Contact guard  Where Assessed: Toilet  Toileting Comments: sit<>stand CGA FWW, SBA felton care seated, min VCs for proper hand placement and to follow spinal precautions    Bed Mobility/Transfers: Bed Mobility  Bed Mobility: No    Transfers  Transfer: Yes  Transfer 1  Transfer From 1: Stand to, Sit to  Transfer to 1: Sit, Stand  Technique 1:  Sit to stand, Stand to sit  Transfer Device 1: Walker  Transfer Level of Assistance 1: Contact guard  Trials/Comments 1: 2x  Transfers 2  Transfer From 2: Stand to, Toilet to  Transfer to 2: Toilet, Stand  Technique 2: Sit to stand, Stand to sit  Transfer Device 2: Walker  Transfer Level of Assistance 2: Contact guard, Minimal verbal cues    Functional Mobility:  Functional Mobility  Functional Mobility Performed: Yes  Functional Mobility 1  Device 1: Rolling walker  Assistance 1: Contact guard  Comments 1: Pt performed functional mobility bathroom to bed to bathroom to chair CGA FWW.  Sitting Balance:  Static Sitting Balance  Static Sitting-Balance Support: Feet supported  Static Sitting-Level of Assistance: Close supervision  Dynamic Sitting Balance  Dynamic Sitting-Balance Support: Feet supported  Dynamic Sitting-Balance: Reaching for objects, Reaching across midline  Dynamic Sitting-Comments: SBA  Standing Balance:  Static Standing Balance  Static Standing-Balance Support: Bilateral upper extremity supported  Static Standing-Level of Assistance: Contact guard  Dynamic Standing Balance  Dynamic Standing-Balance Support: Bilateral upper extremity supported  Dynamic Standing-Balance: Turning  Dynamic Standing-Comments: CGA  Modalities:  Modalities Used: No    Therapy/Activity:      Therapeutic Activity  Therapeutic Activity Performed: Yes  Therapeutic Activity 1: Pt able to recall 2/3 spinal precautions, educated on precautions and ADL participation, log roll, transfers, and safety.    Outcome Measures:[facility] Daily Activity  Putting on and taking off regular lower body clothing: A little  Bathing (including washing, rinsing, drying): A little  Putting on and taking off regular upper body clothing: A little  Toileting, which includes using toilet, bedpan or urinal: A little  Taking care of personal grooming such as brushing teeth: A little  Eating Meals: None  Daily Activity - Total Score: 19    Education  Documentation  Body Mechanics, taught by Simon Venegas OT at 3/15/2024  1:07 PM.  Learner: Patient  Readiness: Acceptance  Method: Explanation  Response: Verbalizes Understanding    Precautions, taught by Simon Venegas OT at 3/15/2024  1:07 PM.  Learner: Patient  Readiness: Acceptance  Method: Explanation  Response: Verbalizes Understanding    ADL Training, taught by Simon Venegas OT at 3/15/2024  1:07 PM.  Learner: Patient  Readiness: Acceptance  Method: Explanation  Response: Verbalizes Understanding    Education Comments  No comments found.      Goals:  Encounter Problems       Encounter Problems (Active)       ADLs       Patient with complete lower body dressing with independent level of assistance donning and doffing all LE clothes  with PRN adaptive equipment while edge of bed  (Progressing)       Start:  03/07/24    Expected End:  03/28/24            Patient will complete toileting including hygiene clothing management/hygiene with independent level of assistance and grab bars. (Progressing)       Start:  03/07/24    Expected End:  03/28/24               BALANCE       Pt will maintain dynamic standing balance during ADL task with contact guard assist level of assistance in order to demonstrate decreased risk of falling and improved postural control. (Progressing)       Start:  03/07/24    Expected End:  03/28/24               COGNITION/SAFETY       Patient will score WFL on standardized cognitive assessment with no cues and within reasonable time frame (Progressing)       Start:  03/07/24    Expected End:  03/28/24               TRANSFERS       Patient will perform bed mobility independent level of assistance in order to improve safety and independence with mobility (Progressing)       Start:  03/07/24    Expected End:  03/28/24            Patient will complete functional transfer to toilet with least restrictive device with stand by assist level of assistance. (Progressing)       Start:  03/07/24     Expected End:  03/28/24                 Simon Venegas, OTR/L

## 2024-03-15 NOTE — PROGRESS NOTES
Fannie Watson is a 78 y.o. female on day 10 of admission presenting with SAH (subarachnoid hemorrhage) (CMS/[facility]).    Subjective   Interval History: Patient seen and examined at bedside with her  present, and daughter via telephone.  Discussed plan of care including recommendations of IR guided diagnostic/therapeutic drainage of lumbar fluid collection.   Patient denies any chest pain, shortness of breath, worsening leg edema or back pain.  She expressed frustration with her situation and desire to go home but voiced understanding of need for further evaluation and treatment of her infection prior to being able to more safely undergo valve replacement.    Review of Systems    Objective   Range of Vitals (last 24 hours)  Heart Rate:  []   Temp:  [36.4 °C (97.5 °F)-36.6 °C (97.9 °F)]   Resp:  [17-22]   BP: (115-145)/(47-72)   Weight:  [66.5 kg (146 lb 9.7 oz)]   SpO2:  [93 %-97 %]   Daily Weight  03/15/24 : 66.5 kg (146 lb 9.7 oz)    Body mass index is 26.98 kg/m².    Physical Exam    GENERAL APPEARANCE: 79y/o white female, tired appearing, alert and cooperative, and appears to be in no acute distress.  Anxious.  HEAD: normocephalic  EYES: PERRL, EOMI.   NOSE: No nasal discharge.  THROAT: Oral mucosa moist.   CARDIAC: Regular rate and rhythm. 2/6 murmur appreciated.   LUNGS: Clear to auscultation bilaterally. Normal work of breathing.  ABDOMEN: Positive bowel sounds. Soft, nondistended, nontender. No guarding or rebound. No masses appreciated.  MUSCULOSKELETAL: No joint erythema or tenderness appreciated. Symmetric muscular development.  NEUROLOGICAL: No focal deficits. Moving all 4 ext.  SKIN: Skin pale.  Lumbar surgical wounds examined on 3/14, superficial healing dehiscence noted, no purulence could be expressed on palpation, no surrounding erythema.  Otherwise no lesions or eruptions on exposed areas.  PSYCHIATRIC: Appropriate affect.  Anxious.          Relevant Results  Labs  Results from last 72  "hours   Lab Units 03/15/24  0645 03/14/24  0958 03/13/24  0958   WBC AUTO x10*3/uL 11.6* 11.0 8.3   HEMOGLOBIN g/dL 10.1* 10.1* 10.9*   HEMATOCRIT % 32.6* 32.7* 34.1*   PLATELETS AUTO x10*3/uL 392 416 426   NEUTROS PCT AUTO %  --  66.2 59.7   LYMPHO PCT MAN % 12.3  --   --    LYMPHS PCT AUTO %  --  18.5 24.4   MONO PCT MAN % 2.6  --   --    MONOS PCT AUTO %  --  9.2 9.3   EOSINO PCT MAN % 0.9  --   --    EOS PCT AUTO %  --  1.5 2.2     Results from last 72 hours   Lab Units 03/15/24  0645 03/14/24  0958 03/13/24  0958   SODIUM mmol/L 133* 135* 136   POTASSIUM mmol/L 3.9 3.5 3.7   CHLORIDE mmol/L 97* 98 98   CO2 mmol/L 27 28 29   BUN mg/dL 4* 5* 6   CREATININE mg/dL 0.56 0.49* 0.51   GLUCOSE mg/dL 120* 108* 99   CALCIUM mg/dL 8.8 9.0 9.1   ANION GAP mmol/L 13 13 13   EGFR mL/min/1.73m*2 >90 >90 >90   PHOSPHORUS mg/dL 3.5 3.6 4.2     Results from last 72 hours   Lab Units 03/15/24  0645 03/14/24  0958 03/13/24  0958   ALBUMIN g/dL 3.3* 3.1* 3.2*     Estimated Creatinine Clearance: 76.1 mL/min (by C-G formula based on SCr of 0.56 mg/dL).  No results found for: \"CRP\"    Microbiology      3/15 blood culture x 2 in process  3/13 blood culture x 2 NGTD  3/13 urine culture no growth  3/13 Staphylococcus aureus/MRSA screen negative  3/12 blood culture x 1 NGTD  3/11 blood culture x 1 NGTD    Susceptibility data from last 14 days.  Collected Specimen Info Organism Ampicillin Gentamicin Synergy Penicillin Tetracycline Trimethoprim/Sulfamethoxazole Vancomycin   03/10/24 Blood culture from Peripheral Venipuncture Enterococcus faecium         03/10/24 Blood culture from Peripheral Venipuncture Enterococcus faecium         03/09/24 Blood culture from Peripheral Venipuncture Enterococcus faecium         03/09/24 Blood culture from Peripheral Venipuncture Enterococcus faecium         03/07/24 Blood culture from Peripheral Venipuncture Enterococcus faecium S S S S R S   03/07/24 Blood culture from Peripheral Venipuncture Enterococcus " faecium         03/07/24 Blood culture from Peripheral Venipuncture Enterococcus faecium             Imaging  STUDY:   MR LUMBAR SPINE W AND WO IV CONTRAST;  3/14/2024 11:01 pm   Impression:     1.  Postsurgical changes from left L4 facetectomy/laminectomy with  L4-L5 posterior fusion hardware and placement of L4-L5 intervertebral  disc spacer. Slight interval enlargement heterogenous intensity fluid  collection with a thin rim enhancement along the hemilaminectomy bed  currently measuring 5.1 x 1.8 x 2.4 cm, previously measuring 2.7 x  2.0 x 2.9 cm, and again favored to represent a postoperative seroma  or hematoma.  2. Similar degenerative changes within the lumbar spine most  prominent at L4-L5.      Amended report by neuro radiology attending:  There is decreased T1 signal and elevated T2 stir signal with  enhancement along the inferior endplate of L3. While this finding may  be on the basis of type 1 Modic discogenic degenerative changes and  given thin rim of enhancement described above surrounding fluid  collection in the operative bed infectious inflammatory disease can  not be excluded particularly given clinical concern for such. Please  correlate clinically and consider follow-up examination and or  nuclear medicine if clinically necessary.         Assessment/Plan   Fannie Watson is a 78 y.o. female with h/o HTN, HLD, COPD, spondylolisthesis L4-L5, admitted to [facility] on 3/1 for L4-L5 decompression and fusion, transferred to [facility] on 3/5 with encephalopathy and subarachnoid hemorrhage.   MRI lumbar spine 3/6 showed heterogenous fluid collection within hemilaminectomy bed measuring 2.7 x 2.0 x 2.9 cm felt to represent seroma/hematoma.  Found to have bacteremia with Enterococcus faecium 3/7, blood cultures were positive through 3/10.  First negative cultures on 3/11, have remained negative since then.  TTE done on 3/7 showed possible vegetation on mitral valve, CROW done on 3/7 showed elongated 2.5 x 0.3 cm  filamentous mass on mitral valve with moderate to severe mitral valve regurgitation.  Repeat MRI on 3/14 with interval enlargement of heterogenous fluid collection with thin rim enhancement measuring 5.1 x 1.8 x 2.4 cm.  Also noted enhancement along the inferior endplate of L3-possibly degenerative changes versus infectious/inflammatory disease.  Cardiac surgery consulted, plan for mitral valve replacement pending evaluation of lumbar surgical site fluid collection.        Given notable interval enlargement of lumbar fluid collection, there is concern for infection.  While it is heartening Ms. Watson's blood cultures have remained negative since 3/11, additional nidus of infection should be ruled out, such as postsurgical lumbar abscess/infected hematoma.  Unfortunately this cannot be definitively ascertained via imaging.     This is especially important given planned cardiac surgical procedure for mitral valve replacement, and every reasonable measure should be taken to mitigate risk of new valve infection.      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   Continue ampicillin 2 g q 4   Continue ceftriaxone 2 g q 12      Patient seen and discussed with Dr. Jamison.    Uma Mcfadden MD  ID Fellow, PGY IV.  Pager Team B: [phone].

## 2024-03-15 NOTE — PROGRESS NOTES
Physical Therapy    Physical Therapy Treatment    Patient Name: Fannie Watson  MRN: [id number]  Today's Date: 3/15/2024  Time Calculation  Start Time: 0825  Stop Time: 0838  Time Calculation (min): 13 min       Assessment/Plan   PT Assessment  PT Assessment Results: Decreased strength, Decreased endurance, Impaired balance, Decreased mobility  Rehab Prognosis: Excellent  Evaluation/Treatment Tolerance:  (Treatment limited by transport to vascular)  End of Session Communication: Bedside nurse  End of Session Patient Position: On cart (with transport, RN aware)     PT Plan  Treatment/Interventions: Bed mobility, Transfer training, Gait training, Stair training, Balance training, Neuromuscular re-education, Strengthening, Endurance training, Range of motion, Therapeutic exercise, Therapeutic activity, Home exercise program, Postural re-education  PT Plan: Skilled PT  PT Frequency: Daily  PT Discharge Recommendations: Low intensity level of continued care  Equipment Recommended upon Discharge: Wheeled walker  PT Recommended Transfer Status: Assist x1, Assistive device, Stand by assist  PT - OK to Discharge: Yes      General Visit Information:   PT  Visit  PT Received On: 03/15/24  Response to Previous Treatment: Patient with no complaints from previous session.  General  Prior to Session Communication: Bedside nurse  Patient Position Received: Bed, 3 rail up  General Comment: Pt c/o significant fatigue, but agreeable to therapy. Session limited by transport arriving for vascular testing  Per handoff with RN, pt is appropriate for therapy, vitals are stable and pain is controlled. Other concerns prior to tx are: none    Subjective   Precautions:  Precautions  Medical Precautions: Fall precautions  Post-Surgical Precautions: Spinal precautions    Objective   Pain:  Pain Assessment  Pain Assessment: 0-10  Pain Score: 5 - Moderate pain  Pain Location: Back  Pain Interventions:  (RN aware, pt receiving pain medication prior  to therapy)  Cognition:  Cognition  Overall Cognitive Status: Within Functional Limits    Treatments:     Therapeutic Activity  Therapeutic Activity Performed: Yes  Therapeutic Activity 1: Reviewed spinal precautions, pt able to state 2/3 without cues, able to recall 3/3 with min cuing.  Therapeutic Activity 2: Supervision provided while pt in bathroom, requires assist and cues to avoid twisting while attending to bathroom needs.    Balance/Neuromuscular Re-Education  Balance/Neuromuscular Re-Education Activity Performed: Yes  Balance/Neuromuscular Re-Education Activity 1: Unsupported standing while attending to hand hygiene needs, CGA throughout.              Bed Mobility  Bed Mobility: Yes  Bed Mobility 1  Bed Mobility 1: Supine to sitting, Sitting to supine  Level of Assistance 1: Close supervision  Bed Mobility Comments 1: cues for log roll    Ambulation/Gait Training  Ambulation/Gait Training Performed: Yes  Ambulation/Gait Training 1  Surface 1: Level tile  Device 1: No device  Assistance 1: Contact guard, Hand held assistance  Comments/Distance (ft) 1: 10'x2 within room, negotiating small spaces  Transfers  Transfer: Yes  Transfer 1  Transfer From 1: Sit to, Stand to  Transfer to 1: Stand  Technique 1: Sit to stand, Stand to sit  Transfer Level of Assistance 1: Contact guard  Trials/Comments 1: min cues for hand placement, from EOB vs toilet    Outcome Measures:     [facility] Basic Mobility  Turning from your back to your side while in a flat bed without using bedrails: A little  Moving from lying on your back to sitting on the side of a flat bed without using bedrails: A little  Moving to and from bed to chair (including a wheelchair): A little  Standing up from a chair using your arms (e.g. wheelchair or bedside chair): A little  To walk in hospital room: A little  Climbing 3-5 steps with railing: A little  Basic Mobility - Total Score: 18    Education Documentation  Precautions, taught by Erin Vergara,  PTA at 3/15/2024  8:51 AM.  Learner: Patient  Readiness: Acceptance  Method: Explanation  Response: Verbalizes Understanding, Demonstrated Understanding    Body Mechanics, taught by Erin Vergara PTA at 3/15/2024  8:51 AM.  Learner: Patient  Readiness: Acceptance  Method: Explanation  Response: Verbalizes Understanding, Demonstrated Understanding    Mobility Training, taught by Erin Vergara, MATHEW at 3/15/2024  8:51 AM.  Learner: Patient  Readiness: Acceptance  Method: Explanation  Response: Verbalizes Understanding, Demonstrated Understanding    Education Comments  No comments found.        OP EDUCATION:       Encounter Problems       Encounter Problems (Active)       PT Problem       Pt. will score 24/28 on Tinetti balance test.  (Progressing)       Start:  03/07/24            Patient will complete supine to sit and sit to supine Supervision  (Progressing)       Start:  03/07/24            Patient will complete stand to sit and sit to stand with Supervision with wheeled walker.  (Progressing)       Start:  03/07/24            Patient will ambulate >400' with Rolling Walker and Supervision  (Progressing)       Start:  03/07/24            Patient will ascend and descend 4 steps with 2 UE Support and Supervision  (Progressing)       Start:  03/07/24               Pain - Adult            MAAME Espinosa

## 2024-03-15 NOTE — PROGRESS NOTES
PLAN: NMR, MRI and LHC 3/14- ID following, anticipate discharge next week.     PAYOR: Medicare      DISPO: [facility] with home infusion

## 2024-03-15 NOTE — PROGRESS NOTES
"CARDIAC SURGERY CONSULT PROGRESS NOTE    SUBJECTIVE  Fannie Watson is a 78 y.o. female presenting with h/o HTN, HLD, COPD, 3/1 s/p MIS L4-5 TLIF, p/w AMS, 3/5 CTH trace R sylvian SAH. 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      Patient was transferred from [facility] in [place]. She was admitted there on 3/1/24 for an elective L4-L5 decompression laminectomy and fusion. Due to confusion CT head was completed and showed SAH. She was then transferred to  on 3/5 with concern for SAH.      The patient has been managed conservatively for her SAH. CTA was negative, MRI negative, MRI L spine was negative. EP was consulted for new Afib. EP recommended an echo be done given the new afib. The TTE was concerning for a MV mass. 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      A CROW was obtained to better visualize the mass/vegetation and mitral valve. CROW showed a large mobile vegetation on the MV and mod/severe MR.      Patient and family state that prior to her planned surgery 3/1 she was fairly active, doing her own grocery shopping and ADLs. She lives with her .      Cardiac surgery consulted for surgical evaluation.       Objective   /65 (Patient Position: Sitting)   Pulse 100   Temp 36.4 °C (97.5 °F)   Resp 18   Ht 1.57 m (5' 1.81\")   Wt 66.5 kg (146 lb 9.7 oz)   LMP  (LMP Unknown)   SpO2 95%   BMI 26.98 kg/m²   Pain Score: 5 - Moderate pain   Vitals:    03/15/24 0534   Weight: 66.5 kg (146 lb 9.7 oz)          Intake/Output Summary (Last 24 hours) at 3/15/2024 1452  Last data filed at 3/15/2024 0807  Gross per 24 hour   Intake 948.65 ml   Output 5 ml   Net 943.65 ml         Physical Exam  Physical Exam  Constitutional:       Appearance: She is not ill-appearing or toxic-appearing.   HENT:      Head: Normocephalic.      Mouth/Throat:      Mouth: Mucous membranes are moist.   Cardiovascular:      Rate and Rhythm: Normal rate and regular rhythm.      " Heart sounds: Murmur heard.   Pulmonary:      Effort: Pulmonary effort is normal.      Breath sounds: Normal breath sounds.   Abdominal:      General: Bowel sounds are normal.      Palpations: Abdomen is soft.   Musculoskeletal:         General: Normal range of motion.      Cervical back: Neck supple.      Right lower leg: No edema.      Left lower leg: No edema.   Skin:     General: Skin is warm and dry.   Neurological:      General: No focal deficit present.      Mental Status: She is alert and oriented to person, place, and time.   Psychiatric:         Mood and Affect: Mood normal.         Behavior: Behavior normal.      Comments: Tearful on exam         Medications  Scheduled medications  ampicillin, 2 g, intravenous, q4h  cefTRIAXone, 2 g, intravenous, q12h  metoprolol tartrate, 12.5 mg, oral, BID  mupirocin, , Topical, BID  polyethylene glycol, 17 g, oral, BID  sennosides-docusate sodium, 2 tablet, oral, BID    Continuous medications  heparin, 0-4,000 Units/hr, Last Rate: 1,300 Units/hr (03/15/24 1413)    PRN medications  PRN medications: acetaminophen, benzocaine-menthol, benzonatate, guaiFENesin, heparin, HYDROmorphone, ipratropium-albuteroL, ondansetron, oxyCODONE, oxyCODONE    Labs  Results for orders placed or performed during the hospital encounter of 03/05/24 (from the past 24 hour(s))   Heparin Assay, UFH   Result Value Ref Range    Heparin Unfractionated 0.1 See Comment Below for Therapeutic Ranges IU/mL   Heparin Assay, UFH   Result Value Ref Range    Heparin Unfractionated 0.5 See Comment Below for Therapeutic Ranges IU/mL   CBC and Auto Differential   Result Value Ref Range    WBC 11.6 (H) 4.4 - 11.3 x10*3/uL    nRBC 0.0 0.0 - 0.0 /100 WBCs    RBC 3.83 (L) 4.00 - 5.20 x10*6/uL    Hemoglobin 10.1 (L) 12.0 - 16.0 g/dL    Hematocrit 32.6 (L) 36.0 - 46.0 %    MCV 85 80 - 100 fL    MCH 26.4 26.0 - 34.0 pg    MCHC 31.0 (L) 32.0 - 36.0 g/dL    RDW 15.4 (H) 11.5 - 14.5 %    Platelets 392 150 - 450 x10*3/uL     Immature Granulocytes %, Automated 3.1 (H) 0.0 - 0.9 %    Immature Granulocytes Absolute, Automated 0.36 0.00 - 0.50 x10*3/uL   Magnesium   Result Value Ref Range    Magnesium 2.03 1.60 - 2.40 mg/dL   Renal Function Panel   Result Value Ref Range    Glucose 120 (H) 74 - 99 mg/dL    Sodium 133 (L) 136 - 145 mmol/L    Potassium 3.9 3.5 - 5.3 mmol/L    Chloride 97 (L) 98 - 107 mmol/L    Bicarbonate 27 21 - 32 mmol/L    Anion Gap 13 10 - 20 mmol/L    Urea Nitrogen 4 (L) 6 - 23 mg/dL    Creatinine 0.56 0.50 - 1.05 mg/dL    eGFR >90 >60 mL/min/1.73m*2    Calcium 8.8 8.6 - 10.6 mg/dL    Phosphorus 3.5 2.5 - 4.9 mg/dL    Albumin 3.3 (L) 3.4 - 5.0 g/dL   Manual Differential   Result Value Ref Range    Neutrophils %, Manual 77.2 40.0 - 80.0 %    Lymphocytes %, Manual 12.3 13.0 - 44.0 %    Monocytes %, Manual 2.6 2.0 - 10.0 %    Eosinophils %, Manual 0.9 0.0 - 6.0 %    Basophils %, Manual 0.0 0.0 - 2.0 %    Atypical Lymphocytes %, Manual 3.5 0.0 - 2.0 %    Myelocytes %, Manual 3.5 0.0 - 0.0 %    Seg Neutrophils Absolute, Manual 8.96 (H) 1.60 - 5.00 x10*3/uL    Lymphocytes Absolute, Manual 1.43 0.80 - 3.00 x10*3/uL    Monocytes Absolute, Manual 0.30 0.05 - 0.80 x10*3/uL    Eosinophils Absolute, Manual 0.10 0.00 - 0.40 x10*3/uL    Basophils Absolute, Manual 0.00 0.00 - 0.10 x10*3/uL    Atypical Lymphs Absolute, Manual 0.41 (H) 0.00 - 0.30 x10*3/uL    Myelocytes Absolute, Manual 0.41 0.00 - 0.00 x10*3/uL    Total Cells Counted 114     RBC Morphology See Below     [place] Cells Few    TSH with reflex to Free T4 if abnormal   Result Value Ref Range    Thyroid Stimulating Hormone 2.68 0.44 - 3.98 mIU/L   Heparin Assay, UFH   Result Value Ref Range    Heparin Unfractionated 0.4 See Comment Below for Therapeutic Ranges IU/mL   Heparin Assay, UFH   Result Value Ref Range    Heparin Unfractionated 0.4 See Comment Below for Therapeutic Ranges IU/mL   Blood Culture    Specimen: Peripheral Venipuncture; Blood culture   Result Value Ref  Range    Blood Culture Loaded on Instrument - Culture in progress    Blood Culture    Specimen: Peripheral Venipuncture; Blood culture   Result Value Ref Range    Blood Culture Loaded on Instrument - Culture in progress                ASSESSMENT & PLAN  Fannie Watson is a 78 y.o. female presenting with h/o HTN, HLD, COPD, 3/1 s/p MIS L4-5 TLIF, p/w AMS, 3/5 CTH trace R sylvian SAH. 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      Patient was transferred from [facility] in [place]. She was admitted there on 3/1/24 for an elective L4-L5 decompression laminectomy and fusion. Due to confusion CT head was completed and showed SAH. She was then transferred to  on 3/5 with concern for SAH.      The patient has been managed conservatively for her SAH. CTA was negative, MRI negative, MRI L spine was negative. EP was consulted for new Afib. EP recommended an echo be done given the new afib. The TTE was concerning for a MV mass. 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      A CROW was obtained to better visualize the mass/vegetation and mitral valve. CROW showed a large mobile vegetation on the MV and mod/severe MR.      Patient and family state that prior to her planned surgery 3/1 she was fairly active, doing her own grocery shopping and ADLs. She lives with her .      Cardiac surgery consulted for surgical evaluation.     RECOMMENDATIONS  - Dr. Chaudhari met with patient and reviewed imaging and data  - CROW does show a vegetation on the MV along with mod/severe MR  - However, patient with current SAH, and although tolerating heparin  - Patient also needs source control of this infection. ID is questioning hardware in spine given CT abdomen/pelvis shows concerning of fluid collection in the spine and possible discitis osteomyelitis given no source of infection found. Will need that ruled out as possible source, especially before any valvular surgery. MRI spine showed  enlarging fluid collection.  - ID recommends IR removal of fluid for diagnostic/therapeutic evaluation  - LHC showed no coronary artery disease  - Preop risk stratification studies/labs/UA/PFTs and vascular ultrasounds ordered and pending  - CT facial bones done and unremarkable         When/if goes to surgery:  - Continue ASA, high-intensity statin, and BB (or document contraindications for use)  - No ACEi/ARBs in the pre-op period (at least 48 hours)  - Hold any SGLT2 inhibitors (Farxiga, Jardiance, etc.) for at least 3 days prior to cardiac surgery to prevent euglycemic DKA  - No antiplatelets other than ASA, no anticoagulants other than Heparin  - NPO after midnight, blood on hold/T&S, and preop scrubs only to be ordered once OR date is established        Will continue to follow along.  Thank you for the consultation.   Patient educated and all questions answered.  Please page the cardiac surgery consult pager [phone] with any questions or changes in patient condition.      Delma Cantu PA-C  Cardiac Surgery Consult [facility]  [facility]  Cardiac Surgery Consult Pager [phone]     3/15/2024  2:52 PM

## 2024-03-15 NOTE — PROGRESS NOTES
Progress Note   [facility]  March 15, 2024   Patient: Fannie Watson    Medical Record: [id number]      SUBJECTIVE     Seen Today setting in chair, looks anxious and says she wants to go home. mentions she feels her Shortness of breath is better after receiving lasix yesterday. And Is better able to lay flat on the bed.      Reports increase yellow green sputum and some cough that improve on Duo nebs, says this is her COPD acting up. No URI symptoms, no fever or bodyache.  Tolerate Spine MRI and LHC yesterday.     No other complaints. Back pain controlled.     Active Medication:  ampicillin, 2 g, intravenous, q4h  cefTRIAXone, 2 g, intravenous, q12h  metoprolol tartrate, 12.5 mg, oral, BID  mupirocin, , Topical, BID  polyethylene glycol, 17 g, oral, BID  sennosides-docusate sodium, 2 tablet, oral, BID    PRN medications: acetaminophen, benzocaine-menthol, benzonatate, guaiFENesin, heparin, HYDROmorphone, ipratropium-albuteroL, ondansetron, oxyCODONE, oxyCODONE    OBJECTIVE     Vitals:    Vitals:    03/15/24 0039 03/15/24 0344 03/15/24 0534 03/15/24 0806   BP: 139/69 133/72  145/65   BP Location:       Patient Position: Lying Sitting  Sitting   Pulse: 79 87  100   Resp: 18 17  18   Temp: 36.6 °C (97.9 °F) 36.6 °C (97.9 °F)  36.4 °C (97.5 °F)   TempSrc:       SpO2: 97% 93%  95%   Weight:  66.5 kg (146 lb 9.7 oz) 66.5 kg (146 lb 9.7 oz)    Height:         Failed to redirect to the Timeline version of the [employer] SmartLink.    Intake/Output Summary (Last 24 hours) at 3/15/2024 0958  Last data filed at 3/15/2024 0807  Gross per 24 hour   Intake 1098.65 ml   Output 205 ml   Net 893.65 ml       Physical Exam  Gen: well appearing, A+Ox3, in no acute distress, anxious   HEENT: PEERL, EOMI, sclera anicteric, no conjunctival injection, MMM.  Resp: CTAB, normal breath sounds, faint crackles at lung bases, no wheezing.  CV: RRR, normal S1/S2, systolic murmur no rubs/gallops, no JVD  GI: non-tender,  non-distended, no rebound or guarding.  Extremities/MSK: warm and well perfused, trace edema brody extr.  Neuro: A+Ox3, no focal deficits, no asterixis  Skin: warm and dry, no lesions, no rashes   Pressure ulcers: no  Back: paraspinal sutures removed, no tenderness, hotness or discharge at the surgery site.     Labs:   Lab Results   Component Value Date    WBC 11.6 (H) 03/15/2024    HGB 10.1 (L) 03/15/2024    HCT 32.6 (L) 03/15/2024    MCV 85 03/15/2024     03/15/2024     Lab Results   Component Value Date    GLUCOSE 120 (H) 03/15/2024    CALCIUM 8.8 03/15/2024     (L) 03/15/2024    K 3.9 03/15/2024    CO2 27 03/15/2024    CL 97 (L) 03/15/2024    BUN 4 (L) 03/15/2024    CREATININE 0.56 03/15/2024     Lab Results   Component Value Date    ALT 23 03/12/2024    AST 17 03/12/2024    ALKPHOS 116 03/12/2024    BILITOT 0.7 03/12/2024     Lab Results   Component Value Date    INR 1.2 (H) 03/10/2024    INR 1.2 (H) 03/09/2024    INR 1.2 (H) 03/08/2024    PROTIME 13.2 (H) 03/10/2024    PROTIME 13.5 (H) 03/09/2024    PROTIME 13.0 (H) 03/08/2024     Cardiac History    CROW 3/8/24  CONCLUSIONS:   1. Left ventricular systolic function is normal with a 65-70% estimated ejection fraction.   2. There is an elongated (2.5 x 0.3 cm), filamentous mass that is likely a vegetation attached to the atrial side of the posterior leaflet. There is a resultant eccentric mitral regurgitation that is anteriorly directed. Using PISA, ERO: 0.34 cm2 with a regurg volume of 48.62 ml/beat. Although difficult to see, there does not appear to be a perforation. There is severe blunting of all pulmonary vein inflow with early temporary reversal in the right inferior pulmonary vein.   3. Moderate to severe mitral valve regurgitation.   4. The pulmonary artery is not well visualized.     TTE 3/7/24  CONCLUSIONS:   1. Left ventricular systolic function is hyperdynamic with a 70-75% estimated ejection fraction.   2. Spectral Doppler shows an  impaired relaxation pattern of left ventricular diastolic filling.   3. Anterior leaflet mobile mass, consider vegetation, consider CROW.   4. Mild to moderate mitral valve regurgitation.     TTE (12/18/19 -[facility])  CONCLUSIONS:   - Exam indication: Routine surveillance (>1yr) of known cardiomyopathy without a change in clinical status   - The left ventricle is normal in size. Left ventricular systolic function is   normal. EF = 62 ± 5% (2D biplane) Grade I left ventricular diastolic dysfunction.   Normal global strain -23.0%.   - The right ventricle is normal in size. Right ventricular systolic function is   normal.   - There are no significant valvular abnormalities.   - Exam was compared with the prior  echocardiographic exam performed on   1/30/2019. No significant change.      [facility] (12/27/2018)  In summary:   1. Non-occlusive CAD  2. Mild  dilated  hypokinetic left ventricle with ejection fraction estimated at 40%. Consider stress cardiomyopathy/takotsubo variant.      Outpatient regimen  - bisoprolol 10mg  - lisinopril 10 mg daily  - Spironolactone 25 mg stopped at 1/19/24 admission     Relevant Results     MRI SPINE w and WO Contrast:  Impression:     1.  Postsurgical changes from left L4 facetectomy/laminectomy with  L4-L5 posterior fusion hardware and placement of L4-L5 intervertebral  disc spacer. Slight interval enlargement heterogenous intensity fluid  collection with a thin rim enhancement along the hemilaminectomy bed  currently measuring 5.1 x 1.8 x 2.4 cm, previously measuring 2.7 x  2.0 x 2.9 cm, and again favored to represent a postoperative seroma  or hematoma.  2. Similar degenerative changes within the lumbar spine most  prominent at L4-L5.      Amended report by neuro radiology attending:  There is decreased T1 signal and elevated T2 stir signal with  enhancement along the inferior endplate of L3. While this finding may  be on the basis of type 1 Modic discogenic degenerative changes and  given  thin rim of enhancement described above surrounding fluid  collection in the operative bed infectious inflammatory disease can  not be excluded particularly given clinical concern for such. Please  correlate clinically and consider follow-up examination and or  nuclear medicine if clinically necessary.        MRI brain 3/6/24  IMPRESSION:  1. Redemonstration of subarachnoid hemorrhage most pronounced in the right sylvian fissure and scattered right frontal and temporal sulci. There is also a small amount of intraventricular hemorrhage.  2. Suspect small areas of acute to early subacute ischemic injury in the right frontal lobe.  3. Nonspecific white matter changes most likely represent small-vessel ischemic disease in a patient of this age.        Blood Cx 3x drawn 3/7, 3/8, 3/9, 3/10  Positive for E faecium    CT CAP IMPRESSION 3/09:    1. No source of infection in the chest, abdomen, or pelvis.  2. Trace amount of layering debris in the trachea and mainstem  bronchi with bibasilar dependent subsegmental and left lower lobe  segmental atelectasis. No evidence of consolidative pneumonia.  3. A redemonstration of a loculated fluid collection in the left  paraspinal muscles at the L4 level extending toward the laminectomy  space measuring approximately 2.9 x 2.2 x 5.2 cm, the visualization  which is limited due to beam hardening artifact. Differential  diagnosis includes postop seroma/hematoma with or without  superimposed infection of the collection.  4. Several areas of ill-defined endplate erosions along the left  inferior L3 endplate and central superior L4 endplate. On MRI lumbar  spine 03/06/2024, there is loss of cortical definition in these  locations associated with hyperintense intradiscal signal in the  posterior L3-L4 intervertebral disc. Therefore, while degenerative  Modic changes are possible, consideration for discitis osteomyelitis  should remain in the differential diagnosis given no  additional  source of infection the chest/abdomen/pelvis. Short-term follow-up  with contrast enhanced MRI may be of further diagnostic value should  patient's infection remain persistent/resistant to treatment.  5. 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  6. Colonic diverticulosis without evidence of acute diverticulitis.  7. 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 female with a PMH of hypertension, hyperlipidemia, COPD, spondylolisthesis of lumbar lesion and status post L4-L5 decompression laminectomy and fusion on 3/1/2024 who was transferred from [facility] on 3/5/24 with altered mentation and trace R sylvian SAH on CT head for further management by neurosurgery in the [facility].  New onset Afib with RVR which was managed with metoprolol. workup led to a echo which ultimately demonstrated a large MV vegetation and moderate to severe MR by CROW. 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 Transferred from [facility] to cardiology service on 3/11.      Updates 3/15:  - following ID plan, apprec recs.   - following with CTS for surgery for recs  - F/U spine surgery and Id impressions on repeat MRI spine result  - C/W Ceftriaxone 2g q12h 3/11- ampicillin 3/11-  - Give lasix 40 mg PO  - Mirelax, senna BID and suppository for constipation     #new onset Afib with RVR  #HTN  #Mitral valve vegetation  #moderate to severe Mitral regurgitation  3/7 afib w/ RVR, s/p IVF, s/p metop, TTE EF 70-75%, MV mobile mass     - C/W on Metoprolol 12.5 BID  - CTS are following, Dr. Chaudhari aware of patient, pending recs. Recommend Spine surgery evaluation for suspicion of Discitis/ OM   - NPO for LHC on 3/13  - as per [facility] note, CTH done 3/11-Imaging reviewed showing resolving blood. Patient is cleared to undergo anticoagulation for  procedures from a neurosurgical perspective. If patient has exam change please stop anticoagulation and obtain STAT CT Head.   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   - CT face/ CAP negative for other source of  infection, ( there is a splenic 1 cm infarct and suspicion of L3-L4 intervertebral disc. discitis osteomyelitis)    Blood Cx 3x drawn 3/7, 3/8, 3/9, 3/10  started on (Cefepime 3/7-3/8, switched to daptomycin 3/8- 3/11 ) currently on Ceftriaxone 2g q12h 3/11- ampicillin 3/11-  Positive for E faecium    - ID following, appreciate recs.   - C/W Ceftriaxone 2g q12h 3/11- ampicillin 3/11-  - continue daily cultures until negative.    # Back pain iso Recent surgical intervention on 3/1 with MIS L4-5 TLIF  On:   Tylenol 650 every 4 hours as needed  Oxycodone 5 mg every 6 hours as needed for moderate pain  Oxycodone 10 mg every 6 hours as needed for severe pain first-line  Hydromorphone 0.2 every 3 hours as needed for severe pain 2nd line    # COPD  Duo nebs at home    Antibiotics:: currently on  Ceftriaxone 2g q12h 3/11- ampicillin 3/11-  Dispo: PT/OT: Low intensity level of continued care , Wheeled walker     F: Replete PRN  E: Keep Mg >2, phos >3  and K >4  N: cardiac diet  A: PIV  O2: On RA  DVT ppx: heparin gtt  GI ppx: no indication  Code Status: Full code- confirmed 3/11  Contact: : Xander Watson, [phone]     Gilda Gar, PGY1 Internal Medicine    SIGNATURE: Gilda Gar MD PATIENT NAME: Fannie Watson   DATE: March 15, 2024 MRN: [id number]

## 2024-03-16 LAB
ALBUMIN SERPL BCP-MCNC: 3.3 G/DL (ref 3.4–5)
ANION GAP SERPL CALC-SCNC: 14 MMOL/L (ref 10–20)
BACTERIA BLD CULT: NORMAL
BACTERIA BLD CULT: NORMAL
BASOPHILS # BLD MANUAL: 0 X10*3/UL (ref 0–0.1)
BASOPHILS NFR BLD MANUAL: 0 %
BUN SERPL-MCNC: 5 MG/DL (ref 6–23)
CALCIUM SERPL-MCNC: 8.9 MG/DL (ref 8.6–10.6)
CHLORIDE SERPL-SCNC: 94 MMOL/L (ref 98–107)
CO2 SERPL-SCNC: 28 MMOL/L (ref 21–32)
CREAT SERPL-MCNC: 0.53 MG/DL (ref 0.5–1.05)
EGFRCR SERPLBLD CKD-EPI 2021: >90 ML/MIN/1.73M*2
EOSINOPHIL # BLD MANUAL: 0 X10*3/UL (ref 0–0.4)
EOSINOPHIL NFR BLD MANUAL: 0 %
ERYTHROCYTE [DISTWIDTH] IN BLOOD BY AUTOMATED COUNT: 15.4 % (ref 11.5–14.5)
GLUCOSE SERPL-MCNC: 119 MG/DL (ref 74–99)
HCT VFR BLD AUTO: 30.7 % (ref 36–46)
HGB BLD-MCNC: 9.9 G/DL (ref 12–16)
IMM GRANULOCYTES # BLD AUTO: 0.29 X10*3/UL (ref 0–0.5)
IMM GRANULOCYTES NFR BLD AUTO: 2.1 % (ref 0–0.9)
LYMPHOCYTES # BLD MANUAL: 1.88 X10*3/UL (ref 0.8–3)
LYMPHOCYTES NFR BLD MANUAL: 13.9 %
MAGNESIUM SERPL-MCNC: 1.92 MG/DL (ref 1.6–2.4)
MCH RBC QN AUTO: 27.2 PG (ref 26–34)
MCHC RBC AUTO-ENTMCNC: 32.2 G/DL (ref 32–36)
MCV RBC AUTO: 84 FL (ref 80–100)
MONOCYTES # BLD MANUAL: 0.59 X10*3/UL (ref 0.05–0.8)
MONOCYTES NFR BLD MANUAL: 4.4 %
NEUTROPHILS # BLD MANUAL: 10.8 X10*3/UL (ref 1.6–5.5)
NEUTS BAND # BLD MANUAL: 0.12 X10*3/UL (ref 0–0.5)
NEUTS BAND NFR BLD MANUAL: 0.9 %
NEUTS SEG # BLD MANUAL: 10.68 X10*3/UL (ref 1.6–5)
NEUTS SEG NFR BLD MANUAL: 79.1 %
NRBC BLD-RTO: 0 /100 WBCS (ref 0–0)
PHOSPHATE SERPL-MCNC: 4 MG/DL (ref 2.5–4.9)
PLATELET # BLD AUTO: 408 X10*3/UL (ref 150–450)
POLYCHROMASIA BLD QL SMEAR: ABNORMAL
POTASSIUM SERPL-SCNC: 3.9 MMOL/L (ref 3.5–5.3)
RBC # BLD AUTO: 3.64 X10*6/UL (ref 4–5.2)
RBC MORPH BLD: ABNORMAL
SODIUM SERPL-SCNC: 132 MMOL/L (ref 136–145)
STOMATOCYTES BLD QL SMEAR: ABNORMAL
TOTAL CELLS COUNTED BLD: 115
UFH PPP CHRO-ACNC: 0.4 IU/ML
VARIANT LYMPHS # BLD MANUAL: 0.23 X10*3/UL (ref 0–0.3)
VARIANT LYMPHS NFR BLD: 1.7 %
WBC # BLD AUTO: 13.5 X10*3/UL (ref 4.4–11.3)

## 2024-03-16 PROCEDURE — 2500000002 HC RX 250 W HCPCS SELF ADMINISTERED DRUGS (ALT 637 FOR MEDICARE OP, ALT 636 FOR OP/ED)

## 2024-03-16 PROCEDURE — 97530 THERAPEUTIC ACTIVITIES: CPT | Mod: GP

## 2024-03-16 PROCEDURE — 83735 ASSAY OF MAGNESIUM: CPT

## 2024-03-16 PROCEDURE — 97116 GAIT TRAINING THERAPY: CPT | Mod: GP

## 2024-03-16 PROCEDURE — 99232 SBSQ HOSP IP/OBS MODERATE 35: CPT

## 2024-03-16 PROCEDURE — 2500000004 HC RX 250 GENERAL PHARMACY W/ HCPCS (ALT 636 FOR OP/ED)

## 2024-03-16 PROCEDURE — 2500000001 HC RX 250 WO HCPCS SELF ADMINISTERED DRUGS (ALT 637 FOR MEDICARE OP)

## 2024-03-16 PROCEDURE — 80069 RENAL FUNCTION PANEL: CPT

## 2024-03-16 PROCEDURE — 94640 AIRWAY INHALATION TREATMENT: CPT

## 2024-03-16 PROCEDURE — 1200000002 HC GENERAL ROOM WITH TELEMETRY DAILY

## 2024-03-16 PROCEDURE — 85520 HEPARIN ASSAY: CPT | Performed by: INTERNAL MEDICINE

## 2024-03-16 PROCEDURE — 36415 COLL VENOUS BLD VENIPUNCTURE: CPT

## 2024-03-16 PROCEDURE — 85007 BL SMEAR W/DIFF WBC COUNT: CPT

## 2024-03-16 PROCEDURE — 85027 COMPLETE CBC AUTOMATED: CPT

## 2024-03-16 PROCEDURE — 2500000002 HC RX 250 W HCPCS SELF ADMINISTERED DRUGS (ALT 637 FOR MEDICARE OP, ALT 636 FOR OP/ED): Mod: MUE

## 2024-03-16 RX ORDER — FUROSEMIDE 40 MG/1
40 TABLET ORAL ONCE
Status: COMPLETED | OUTPATIENT
Start: 2024-03-16 | End: 2024-03-16

## 2024-03-16 RX ADMIN — AMPICILLIN SODIUM 2 G: 2 INJECTION, POWDER, FOR SOLUTION INTRAMUSCULAR; INTRAVENOUS at 17:43

## 2024-03-16 RX ADMIN — METOPROLOL TARTRATE 12.5 MG: 25 TABLET, FILM COATED ORAL at 20:50

## 2024-03-16 RX ADMIN — OXYCODONE HYDROCHLORIDE 10 MG: 5 TABLET ORAL at 20:51

## 2024-03-16 RX ADMIN — IPRATROPIUM BROMIDE AND ALBUTEROL SULFATE 3 ML: .5; 3 SOLUTION RESPIRATORY (INHALATION) at 11:24

## 2024-03-16 RX ADMIN — POLYETHYLENE GLYCOL 3350 17 G: 17 POWDER, FOR SOLUTION ORAL at 20:49

## 2024-03-16 RX ADMIN — AMPICILLIN SODIUM 2 G: 2 INJECTION, POWDER, FOR SOLUTION INTRAMUSCULAR; INTRAVENOUS at 20:51

## 2024-03-16 RX ADMIN — CEFTRIAXONE SODIUM 2 G: 2 INJECTION, SOLUTION INTRAVENOUS at 10:33

## 2024-03-16 RX ADMIN — SENNOSIDES AND DOCUSATE SODIUM 2 TABLET: 8.6; 5 TABLET ORAL at 20:50

## 2024-03-16 RX ADMIN — IPRATROPIUM BROMIDE AND ALBUTEROL SULFATE 3 ML: .5; 3 SOLUTION RESPIRATORY (INHALATION) at 17:51

## 2024-03-16 RX ADMIN — AMPICILLIN SODIUM 2 G: 2 INJECTION, POWDER, FOR SOLUTION INTRAMUSCULAR; INTRAVENOUS at 13:36

## 2024-03-16 RX ADMIN — AMPICILLIN SODIUM 2 G: 2 INJECTION, POWDER, FOR SOLUTION INTRAMUSCULAR; INTRAVENOUS at 04:16

## 2024-03-16 RX ADMIN — HEPARIN SODIUM 13 UNITS/HR: 10000 INJECTION, SOLUTION INTRAVENOUS at 14:47

## 2024-03-16 RX ADMIN — HYDROMORPHONE HYDROCHLORIDE 0.2 MG: 1 INJECTION, SOLUTION INTRAMUSCULAR; INTRAVENOUS; SUBCUTANEOUS at 00:14

## 2024-03-16 RX ADMIN — CEFTRIAXONE SODIUM 2 G: 2 INJECTION, SOLUTION INTRAVENOUS at 20:51

## 2024-03-16 RX ADMIN — OXYCODONE HYDROCHLORIDE 5 MG: 5 TABLET ORAL at 05:08

## 2024-03-16 RX ADMIN — MUPIROCIN 1 APPLICATION: 20 OINTMENT TOPICAL at 09:55

## 2024-03-16 RX ADMIN — FUROSEMIDE 40 MG: 40 TABLET ORAL at 14:45

## 2024-03-16 RX ADMIN — METOPROLOL TARTRATE 12.5 MG: 25 TABLET, FILM COATED ORAL at 09:42

## 2024-03-16 RX ADMIN — AMPICILLIN SODIUM 2 G: 2 INJECTION, POWDER, FOR SOLUTION INTRAMUSCULAR; INTRAVENOUS at 09:42

## 2024-03-16 ASSESSMENT — COGNITIVE AND FUNCTIONAL STATUS - GENERAL
MOVING FROM LYING ON BACK TO SITTING ON SIDE OF FLAT BED WITH BEDRAILS: A LITTLE
WALKING IN HOSPITAL ROOM: A LITTLE
DRESSING REGULAR LOWER BODY CLOTHING: A LITTLE
STANDING UP FROM CHAIR USING ARMS: A LITTLE
MOVING TO AND FROM BED TO CHAIR: A LITTLE
STANDING UP FROM CHAIR USING ARMS: A LITTLE
MOVING TO AND FROM BED TO CHAIR: A LITTLE
DAILY ACTIVITIY SCORE: 20
TOILETING: A LITTLE
TURNING FROM BACK TO SIDE WHILE IN FLAT BAD: A LITTLE
WALKING IN HOSPITAL ROOM: A LITTLE
TOILETING: A LITTLE
TURNING FROM BACK TO SIDE WHILE IN FLAT BAD: A LITTLE
CLIMB 3 TO 5 STEPS WITH RAILING: A LITTLE
MOVING TO AND FROM BED TO CHAIR: A LITTLE
MOVING TO AND FROM BED TO CHAIR: A LITTLE
DAILY ACTIVITIY SCORE: 20
CLIMB 3 TO 5 STEPS WITH RAILING: A LOT
PERSONAL GROOMING: A LITTLE
TURNING FROM BACK TO SIDE WHILE IN FLAT BAD: A LITTLE
HELP NEEDED FOR BATHING: A LITTLE
TURNING FROM BACK TO SIDE WHILE IN FLAT BAD: A LITTLE
PERSONAL GROOMING: A LITTLE
CLIMB 3 TO 5 STEPS WITH RAILING: A LOT
MOVING FROM LYING ON BACK TO SITTING ON SIDE OF FLAT BED WITH BEDRAILS: A LITTLE
MOBILITY SCORE: 17
DRESSING REGULAR LOWER BODY CLOTHING: A LITTLE
STANDING UP FROM CHAIR USING ARMS: A LITTLE
HELP NEEDED FOR BATHING: A LITTLE
WALKING IN HOSPITAL ROOM: A LITTLE
TOILETING: A LITTLE
DRESSING REGULAR LOWER BODY CLOTHING: A LITTLE
WALKING IN HOSPITAL ROOM: A LITTLE
MOBILITY SCORE: 18
MOBILITY SCORE: 17
MOVING FROM LYING ON BACK TO SITTING ON SIDE OF FLAT BED WITH BEDRAILS: A LITTLE
PERSONAL GROOMING: A LITTLE
CLIMB 3 TO 5 STEPS WITH RAILING: A LOT
HELP NEEDED FOR BATHING: A LITTLE
STANDING UP FROM CHAIR USING ARMS: A LITTLE
MOVING FROM LYING ON BACK TO SITTING ON SIDE OF FLAT BED WITH BEDRAILS: A LITTLE

## 2024-03-16 ASSESSMENT — PAIN SCALES - GENERAL
PAINLEVEL_OUTOF10: 6
PAINLEVEL_OUTOF10: 0 - NO PAIN
PAINLEVEL_OUTOF10: 4
PAINLEVEL_OUTOF10: 7
PAINLEVEL_OUTOF10: 7
PAINLEVEL_OUTOF10: 6
PAINLEVEL_OUTOF10: 3

## 2024-03-16 ASSESSMENT — PAIN - FUNCTIONAL ASSESSMENT
PAIN_FUNCTIONAL_ASSESSMENT: 0-10

## 2024-03-16 ASSESSMENT — PAIN DESCRIPTION - DESCRIPTORS: DESCRIPTORS: ACHING;SORE

## 2024-03-16 ASSESSMENT — PAIN DESCRIPTION - LOCATION: LOCATION: BACK

## 2024-03-16 NOTE — PROGRESS NOTES
Progress Note   [facility]  March 16, 2024   Patient: Fannie Watson    Medical Record: [id number]      SUBJECTIVE     No acute overnight events    Seen Today she mentions she feels better.   No other complaints. Back pain controlled.     Active Medication:  ampicillin, 2 g, intravenous, q4h  cefTRIAXone, 2 g, intravenous, q12h  metoprolol tartrate, 12.5 mg, oral, BID  mupirocin, , Topical, BID  polyethylene glycol, 17 g, oral, BID  sennosides-docusate sodium, 2 tablet, oral, BID    PRN medications: acetaminophen, benzocaine-menthol, benzonatate, guaiFENesin, heparin, HYDROmorphone, ipratropium-albuteroL, ondansetron, oxyCODONE, oxyCODONE    OBJECTIVE     Vitals:    Vitals:    03/16/24 0014 03/16/24 0400 03/16/24 0546 03/16/24 0845   BP: 130/77 114/62  136/63   BP Location: Right arm Left arm  Left arm   Patient Position: Lying Lying  Lying   Pulse: 85 82  84   Resp: 22 20  20   Temp: 36.8 °C (98.2 °F) 36.4 °C (97.5 °F)  36.7 °C (98 °F)   TempSrc: Temporal Temporal  Temporal   SpO2: 93% 93%  95%   Weight:  66.6 kg (146 lb 13.2 oz) 66.6 kg (146 lb 13.2 oz)    Height:         Failed to redirect to the Timeline version of the [employer] SmartLink.    Intake/Output Summary (Last 24 hours) at 3/16/2024 0954  Last data filed at 3/16/2024 0845  Gross per 24 hour   Intake 470 ml   Output --   Net 470 ml       Physical Exam  Gen: well appearing, A+Ox3, in no acute distress.  HEENT: PEERL, EOMI, sclera anicteric, no conjunctival injection, MMM.  Resp: CTAB, normal breath sounds, faint crackles at lung bases, no wheezing.  CV: RRR, normal S1/S2, systolic murmur no rubs/gallops, no JVD  GI: non-tender, non-distended, no rebound or guarding.  Extremities/MSK: warm and well perfused, trace edema brody extr.  Neuro: A+Ox3, no focal deficits, no asterixis  Skin: warm and dry, no lesions, no rashes   Pressure ulcers: no  Back: paraspinal sutures removed, no tenderness, hotness or discharge at the surgery  site.     Labs:   Lab Results   Component Value Date    WBC 13.5 (H) 03/16/2024    HGB 9.9 (L) 03/16/2024    HCT 30.7 (L) 03/16/2024    MCV 84 03/16/2024     03/16/2024     Lab Results   Component Value Date    GLUCOSE 119 (H) 03/16/2024    CALCIUM 8.9 03/16/2024     (L) 03/16/2024    K 3.9 03/16/2024    CO2 28 03/16/2024    CL 94 (L) 03/16/2024    BUN 5 (L) 03/16/2024    CREATININE 0.53 03/16/2024     Lab Results   Component Value Date    ALT 23 03/12/2024    AST 17 03/12/2024    ALKPHOS 116 03/12/2024    BILITOT 0.7 03/12/2024     Lab Results   Component Value Date    INR 1.2 (H) 03/10/2024    INR 1.2 (H) 03/09/2024    INR 1.2 (H) 03/08/2024    PROTIME 13.2 (H) 03/10/2024    PROTIME 13.5 (H) 03/09/2024    PROTIME 13.0 (H) 03/08/2024     Cardiac History    CROW 3/8/24  CONCLUSIONS:   1. Left ventricular systolic function is normal with a 65-70% estimated ejection fraction.   2. There is an elongated (2.5 x 0.3 cm), filamentous mass that is likely a vegetation attached to the atrial side of the posterior leaflet. There is a resultant eccentric mitral regurgitation that is anteriorly directed. Using PISA, ERO: 0.34 cm2 with a regurg volume of 48.62 ml/beat. Although difficult to see, there does not appear to be a perforation. There is severe blunting of all pulmonary vein inflow with early temporary reversal in the right inferior pulmonary vein.   3. Moderate to severe mitral valve regurgitation.   4. The pulmonary artery is not well visualized.     TTE 3/7/24  CONCLUSIONS:   1. Left ventricular systolic function is hyperdynamic with a 70-75% estimated ejection fraction.   2. Spectral Doppler shows an impaired relaxation pattern of left ventricular diastolic filling.   3. Anterior leaflet mobile mass, consider vegetation, consider CROW.   4. Mild to moderate mitral valve regurgitation.     TTE (12/18/19 -[facility])  CONCLUSIONS:   - Exam indication: Routine surveillance (>1yr) of known cardiomyopathy without a  change in clinical status   - The left ventricle is normal in size. Left ventricular systolic function is   normal. EF = 62 ± 5% (2D biplane) Grade I left ventricular diastolic dysfunction.   Normal global strain -23.0%.   - The right ventricle is normal in size. Right ventricular systolic function is   normal.   - There are no significant valvular abnormalities.   - Exam was compared with the prior  echocardiographic exam performed on   1/30/2019. No significant change.      [facility] [place] (12/27/2018)  In summary:   1. Non-occlusive CAD  2. Mild  dilated  hypokinetic left ventricle with ejection fraction estimated at 40%. Consider stress cardiomyopathy/takotsubo variant.      Outpatient regimen  - bisoprolol 10mg  - lisinopril 10 mg daily  - Spironolactone 25 mg stopped at 1/19/24 admission     Relevant Results     MRI SPINE w and WO Contrast:  Impression:     1.  Postsurgical changes from left L4 facetectomy/laminectomy with  L4-L5 posterior fusion hardware and placement of L4-L5 intervertebral  disc spacer. Slight interval enlargement heterogenous intensity fluid  collection with a thin rim enhancement along the hemilaminectomy bed  currently measuring 5.1 x 1.8 x 2.4 cm, previously measuring 2.7 x  2.0 x 2.9 cm, and again favored to represent a postoperative seroma  or hematoma.  2. Similar degenerative changes within the lumbar spine most  prominent at L4-L5.      Amended report by neuro radiology attending:  There is decreased T1 signal and elevated T2 stir signal with  enhancement along the inferior endplate of L3. While this finding may  be on the basis of type 1 Modic discogenic degenerative changes and  given thin rim of enhancement described above surrounding fluid  collection in the operative bed infectious inflammatory disease can  not be excluded particularly given clinical concern for such. Please  correlate clinically and consider follow-up examination and or  nuclear medicine if clinically necessary.         MRI brain 3/6/24  IMPRESSION:  1. Redemonstration of subarachnoid hemorrhage most pronounced in the right sylvian fissure and scattered right frontal and temporal sulci. There is also a small amount of intraventricular hemorrhage.  2. Suspect small areas of acute to early subacute ischemic injury in the right frontal lobe.  3. Nonspecific white matter changes most likely represent small-vessel ischemic disease in a patient of this age.        Blood Cx 3x drawn 3/7, 3/8, 3/9, 3/10  Positive for E faecium    CT CAP IMPRESSION 3/09:    1. No source of infection in the chest, abdomen, or pelvis.  2. Trace amount of layering debris in the trachea and mainstem  bronchi with bibasilar dependent subsegmental and left lower lobe  segmental atelectasis. No evidence of consolidative pneumonia.  3. A redemonstration of a loculated fluid collection in the left  paraspinal muscles at the L4 level extending toward the laminectomy  space measuring approximately 2.9 x 2.2 x 5.2 cm, the visualization  which is limited due to beam hardening artifact. Differential  diagnosis includes postop seroma/hematoma with or without  superimposed infection of the collection.  4. Several areas of ill-defined endplate erosions along the left  inferior L3 endplate and central superior L4 endplate. On MRI lumbar  spine 03/06/2024, there is loss of cortical definition in these  locations associated with hyperintense intradiscal signal in the  posterior L3-L4 intervertebral disc. Therefore, while degenerative  Modic changes are possible, consideration for discitis osteomyelitis  should remain in the differential diagnosis given no additional  source of infection the chest/abdomen/pelvis. Short-term follow-up  with contrast enhanced MRI may be of further diagnostic value should  patient's infection remain persistent/resistant to treatment.  5. 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  6. Colonic diverticulosis without evidence of  acute diverticulitis.  7. 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 female with a PMH of hypertension, hyperlipidemia, COPD, spondylolisthesis of lumbar lesion and status post L4-L5 decompression laminectomy and fusion on 3/1/2024 who was transferred from OSH ([facility]) on 3/5/24 with altered mentation and trace R sylvian SAH on CT head for further management by neurosurgery in the [facility].  New onset Afib with RVR which was managed with metoprolol. workup led to a echo which ultimately demonstrated a large MV vegetation and moderate to severe MR by CROW. 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 Transferred from [facility] to cardiology service on 3/11.      Updates 3/16:  - following ID plan, recommended for aspiration of spinal fluid collection by IR diagnostic and therapeutic ( no IR available ovr the Weekend, NSG contacted and are unabable to perform the procedure unfortunately)   - following with CTS recs, in regard to surgery - pending IR drainage of spine collection.  - C/W Ceftriaxone 2g q12h 3/11- ampicillin 3/11-  - C/W lasix 40 mg PO daily, accurate I&Os    #new onset Afib with RVR  #HTN  #Mitral valve vegetation  #moderate to severe Mitral regurgitation  3/7 afib w/ RVR, s/p IVF, s/p metop, TTE EF 70-75%, MV mobile mass     - C/W on Metoprolol 12.5 BID  - CTS are following, Dr. Chaudhari aware of patient, pending recs. Recommend Spine surgery evaluation for suspicion of Discitis/ OM   - NPO for LHC on 3/13  - as per [facility] note, CTH done 3/11-Imaging reviewed showing resolving blood. Patient is cleared to undergo anticoagulation for procedures from a neurosurgical perspective. If patient has exam change please stop anticoagulation and obtain STAT CT Head.   - Telemetry    #Enterococcus faecium bacteremia and native valve  mitral endocarditis  #Recent surgical intervention on 3/1 with MIS L4-5 TLIF  - Diagnostic Cerebral Angiogram on 3/08 showed No evidence of mycotic aneurysm.   - CT face/ CAP negative for other source of  infection, ( there is a splenic 1 cm infarct and suspicion of L3-L4 intervertebral disc. discitis osteomyelitis)    Blood Cx 3x drawn 3/7, 3/8, 3/9, 3/10, last positive blood culture on 3/10  started on (Cefepime 3/7-3/8, switched to daptomycin 3/8- 3/11 ) currently on Ceftriaxone 2g q12h 3/11- ampicillin 3/11-  Positive for E faecium    - ID following, appreciate recs.   - C/W Ceftriaxone 2g q12h 3/11- ampicillin 3/11-  - continue daily cultures until negative.    # Back pain iso Recent surgical intervention on 3/1 with MIS L4-5 TLIF  On:   Tylenol 650 every 4 hours as needed  Oxycodone 5 mg every 6 hours as needed for moderate pain  Oxycodone 10 mg every 6 hours as needed for severe pain first-line  Hydromorphone 0.2 every 3 hours as needed for severe pain 2nd line    # COPD  Duo nebs at home    Antibiotics:: currently on  Ceftriaxone 2g q12h 3/11- ampicillin 3/11-  Dispo: PT/OT: Low intensity level of continued care , Wheeled walker     F: Replete PRN  E: Keep Mg >2, phos >3  and K >4  N: cardiac diet  A: PIV  O2: On RA  DVT ppx: heparin gtt  GI ppx: no indication  Code Status: Full code- confirmed 3/11  Contact: : Xander Watson, [phone]     Gilda Gar, PGY1 Internal Medicine    SIGNATURE: Gilda Gar MD PATIENT NAME: Fannie Watson   DATE: March 16, 2024 MRN: [id number]

## 2024-03-16 NOTE — CARE PLAN
The patient's goals for the shift include ELDA    The clinical goals for the shift include Rest and to remain in less pain this shift    Over the shift, the patient did not make progress toward the following goals. Barriers to progression include Patient still with ferquent urine issues. Recommendations to address these barriers include continue to treat and monitor.

## 2024-03-16 NOTE — PROGRESS NOTES
Physical Therapy    Physical Therapy Treatment    Patient Name: Fannie Watson  MRN: [id number]  Today's Date: 3/16/2024  Time Calculation  Start Time: 1329  Stop Time: 1355  Time Calculation (min): 26 min       Assessment/Plan   PT Assessment  End of Session Communication: Bedside nurse  Assessment Comment: Pt remains limited by dec strength, balance, & endurance.  Would benefit from continued skilled therapy.  End of Session Patient Position: Bed, 2 rail up, Alarm off, not on at start of session     PT Plan  Treatment/Interventions: Bed mobility, Transfer training, Gait training, Stair training, Balance training, Neuromuscular re-education, Strengthening, Endurance training, Range of motion, Therapeutic exercise, Therapeutic activity, Home exercise program, Postural re-education  PT Plan: Skilled PT  PT Frequency: Daily  PT Discharge Recommendations: Low intensity level of continued care  Equipment Recommended upon Discharge: Wheeled walker  PT Recommended Transfer Status: Assist x1, Assistive device, Stand by assist  PT - OK to Discharge: Yes      General Visit Information:   PT  Visit  PT Received On: 03/16/24  Response to Previous Treatment: Patient with no complaints from previous session.  General  Family/Caregiver Present: Yes  Caregiver Feedback: spouse present during session  Prior to Session Communication: Bedside nurse  Patient Position Received: Bed, 2 rail up, Alarm off, not on at start of session  General Comment: Pt asking to get OOB to bathroom, agreeable to therapy session    Subjective   Precautions:  Precautions  Medical Precautions: Fall precautions  Post-Surgical Precautions: Spinal precautions  Vital Signs:  Vital Signs  Heart Rate: 87  SpO2: 97 %  Patient Position: Sitting    Objective   Pain:  Pain Assessment  Pain Score:  (c/o back soreness, not rated)  Pain Interventions: Ambulation/increased activity  Cognition:  Cognition  Overall Cognitive Status: Within Functional Limits  Postural  Control:  Postural Control  Postural Control: Within Functional Limits  Extremity/Trunk Assessments:     Activity Tolerance:  Activity Tolerance  Endurance: Tolerates 10 - 20 min exercise with multiple rests  Treatments:  Therapeutic Exercise  Therapeutic Exercise Performed: Yes  Therapeutic Exercise Activity 1: BLE x10- AP, GS, QS; cues for technique & breathing    Bed Mobility  Bed Mobility: Yes  Bed Mobility 1  Bed Mobility 1: Supine to sitting  Level of Assistance 1: Contact guard, Minimal verbal cues  Bed Mobility Comments 1: logrolling with HOB elevated, use of rails  Bed Mobility 2  Bed Mobility  2: Sitting to supine  Level of Assistance 2: Minimum assistance (for LEs)  Bed Mobility Comments 2: via logrolling    Ambulation/Gait Training  Ambulation/Gait Training Performed: Yes  Ambulation/Gait Training 1  Surface 1: Level tile  Device 1: Rolling walker  Assistance 1: Contact guard  Quality of Gait 1: Diminished heel strike, Forward flexed posture  Comments/Distance (ft) 1: 10'x2  Ambulation/Gait Training 2  Surface 2: Level tile  Device 2: Rolling walker  Assistance 2: Contact guard  Quality of Gait 2: Diminished heel strike, Forward flexed posture  Comments/Distance (ft) 2: 60'x2  Transfers  Transfer: Yes  Transfer 1  Technique 1: Sit to stand, Stand to sit  Transfer Device 1: Walker  Transfer Level of Assistance 1: Contact guard  Trials/Comments 1: x2 (cues for safe hand placement)  Transfers 2  Transfer From 2: Stand to, Toilet to  Transfer to 2: Toilet, Stand  Technique 2: Sit to stand, Stand to sit  Transfer Device 2: Walker  Transfer Level of Assistance 2: Contact guard, Minimal verbal cues    Stairs  Stairs: No    Outcome Measures:  [facility] Basic Mobility  Turning from your back to your side while in a flat bed without using bedrails: A little  Moving from lying on your back to sitting on the side of a flat bed without using bedrails: A little  Moving to and from bed to chair (including a wheelchair): A  little  Standing up from a chair using your arms (e.g. wheelchair or bedside chair): A little  To walk in hospital room: A little  Climbing 3-5 steps with railing: A lot  Basic Mobility - Total Score: 17    Education Documentation  Precautions, taught by Shey Salcedo, PT at 3/16/2024  2:26 PM.  Learner: Significant Other, Patient  Readiness: Acceptance  Method: Explanation  Response: Verbalizes Understanding    Body Mechanics, taught by Shey Salcedo, PT at 3/16/2024  2:26 PM.  Learner: Significant Other, Patient  Readiness: Acceptance  Method: Explanation  Response: Verbalizes Understanding    Mobility Training, taught by Shey Salcedo, PT at 3/16/2024  2:26 PM.  Learner: Significant Other, Patient  Readiness: Acceptance  Method: Explanation  Response: Verbalizes Understanding    Education Comments  No comments found.        OP EDUCATION:       Encounter Problems       Encounter Problems (Active)       PT Problem       Pt. will score 24/28 on Tinetti balance test.  (Progressing)       Start:  03/07/24    Expected End:  03/22/24            Patient will complete supine to sit and sit to supine Supervision  (Progressing)       Start:  03/07/24    Expected End:  03/22/24            Patient will complete stand to sit and sit to stand with Supervision with wheeled walker.  (Progressing)       Start:  03/07/24    Expected End:  03/22/24            Patient will ambulate >400' with Rolling Walker and Supervision  (Progressing)       Start:  03/07/24    Expected End:  03/22/24            Patient will ascend and descend 4 steps with 2 UE Support and Supervision  (Progressing)       Start:  03/07/24    Expected End:  03/22/24               Pain - Adult              03/16/24 at 2:27 PM - Shey Salcedo, PT

## 2024-03-17 LAB
ALBUMIN SERPL BCP-MCNC: 3.1 G/DL (ref 3.4–5)
ANION GAP SERPL CALC-SCNC: 16 MMOL/L (ref 10–20)
BACTERIA BLD CULT: NORMAL
BACTERIA BLD CULT: NORMAL
BASOPHILS # BLD AUTO: 0.06 X10*3/UL (ref 0–0.1)
BASOPHILS NFR BLD AUTO: 0.4 %
BUN SERPL-MCNC: 7 MG/DL (ref 6–23)
CALCIUM SERPL-MCNC: 9 MG/DL (ref 8.6–10.6)
CHLORIDE SERPL-SCNC: 92 MMOL/L (ref 98–107)
CO2 SERPL-SCNC: 29 MMOL/L (ref 21–32)
CREAT SERPL-MCNC: 0.58 MG/DL (ref 0.5–1.05)
EGFRCR SERPLBLD CKD-EPI 2021: >90 ML/MIN/1.73M*2
EOSINOPHIL # BLD AUTO: 0.07 X10*3/UL (ref 0–0.4)
EOSINOPHIL NFR BLD AUTO: 0.5 %
ERYTHROCYTE [DISTWIDTH] IN BLOOD BY AUTOMATED COUNT: 15.5 % (ref 11.5–14.5)
GLUCOSE SERPL-MCNC: 102 MG/DL (ref 74–99)
HCT VFR BLD AUTO: 32.7 % (ref 36–46)
HGB BLD-MCNC: 10.3 G/DL (ref 12–16)
IMM GRANULOCYTES # BLD AUTO: 0.26 X10*3/UL (ref 0–0.5)
IMM GRANULOCYTES NFR BLD AUTO: 1.9 % (ref 0–0.9)
LYMPHOCYTES # BLD AUTO: 2.33 X10*3/UL (ref 0.8–3)
LYMPHOCYTES NFR BLD AUTO: 16.9 %
MAGNESIUM SERPL-MCNC: 2.12 MG/DL (ref 1.6–2.4)
MCH RBC QN AUTO: 27.3 PG (ref 26–34)
MCHC RBC AUTO-ENTMCNC: 31.5 G/DL (ref 32–36)
MCV RBC AUTO: 87 FL (ref 80–100)
MONOCYTES # BLD AUTO: 1.29 X10*3/UL (ref 0.05–0.8)
MONOCYTES NFR BLD AUTO: 9.3 %
NEUTROPHILS # BLD AUTO: 9.79 X10*3/UL (ref 1.6–5.5)
NEUTROPHILS NFR BLD AUTO: 71 %
NRBC BLD-RTO: 0 /100 WBCS (ref 0–0)
PHOSPHATE SERPL-MCNC: 4.4 MG/DL (ref 2.5–4.9)
PLATELET # BLD AUTO: 412 X10*3/UL (ref 150–450)
POTASSIUM SERPL-SCNC: 4.6 MMOL/L (ref 3.5–5.3)
RBC # BLD AUTO: 3.77 X10*6/UL (ref 4–5.2)
SODIUM SERPL-SCNC: 132 MMOL/L (ref 136–145)
UFH PPP CHRO-ACNC: 0.5 IU/ML
WBC # BLD AUTO: 13.8 X10*3/UL (ref 4.4–11.3)

## 2024-03-17 PROCEDURE — 36415 COLL VENOUS BLD VENIPUNCTURE: CPT

## 2024-03-17 PROCEDURE — 2500000004 HC RX 250 GENERAL PHARMACY W/ HCPCS (ALT 636 FOR OP/ED)

## 2024-03-17 PROCEDURE — 85025 COMPLETE CBC W/AUTO DIFF WBC: CPT

## 2024-03-17 PROCEDURE — 85520 HEPARIN ASSAY: CPT | Performed by: INTERNAL MEDICINE

## 2024-03-17 PROCEDURE — 2500000001 HC RX 250 WO HCPCS SELF ADMINISTERED DRUGS (ALT 637 FOR MEDICARE OP)

## 2024-03-17 PROCEDURE — 2500000002 HC RX 250 W HCPCS SELF ADMINISTERED DRUGS (ALT 637 FOR MEDICARE OP, ALT 636 FOR OP/ED): Mod: MUE

## 2024-03-17 PROCEDURE — 83735 ASSAY OF MAGNESIUM: CPT

## 2024-03-17 PROCEDURE — 80069 RENAL FUNCTION PANEL: CPT

## 2024-03-17 PROCEDURE — 2500000002 HC RX 250 W HCPCS SELF ADMINISTERED DRUGS (ALT 637 FOR MEDICARE OP, ALT 636 FOR OP/ED)

## 2024-03-17 PROCEDURE — 1200000002 HC GENERAL ROOM WITH TELEMETRY DAILY

## 2024-03-17 PROCEDURE — 99232 SBSQ HOSP IP/OBS MODERATE 35: CPT

## 2024-03-17 RX ORDER — FUROSEMIDE 40 MG/1
40 TABLET ORAL ONCE
Status: COMPLETED | OUTPATIENT
Start: 2024-03-17 | End: 2024-03-17

## 2024-03-17 RX ADMIN — CEFTRIAXONE SODIUM 2 G: 2 INJECTION, SOLUTION INTRAVENOUS at 09:29

## 2024-03-17 RX ADMIN — IPRATROPIUM BROMIDE AND ALBUTEROL SULFATE 3 ML: .5; 3 SOLUTION RESPIRATORY (INHALATION) at 04:05

## 2024-03-17 RX ADMIN — CEFTRIAXONE SODIUM 2 G: 2 INJECTION, SOLUTION INTRAVENOUS at 21:02

## 2024-03-17 RX ADMIN — METOPROLOL TARTRATE 12.5 MG: 25 TABLET, FILM COATED ORAL at 20:10

## 2024-03-17 RX ADMIN — AMPICILLIN SODIUM 2 G: 2 INJECTION, POWDER, FOR SOLUTION INTRAMUSCULAR; INTRAVENOUS at 10:03

## 2024-03-17 RX ADMIN — AMPICILLIN SODIUM 2 G: 2 INJECTION, POWDER, FOR SOLUTION INTRAMUSCULAR; INTRAVENOUS at 20:11

## 2024-03-17 RX ADMIN — AMPICILLIN SODIUM 2 G: 2 INJECTION, POWDER, FOR SOLUTION INTRAMUSCULAR; INTRAVENOUS at 13:42

## 2024-03-17 RX ADMIN — SENNOSIDES AND DOCUSATE SODIUM 2 TABLET: 8.6; 5 TABLET ORAL at 20:11

## 2024-03-17 RX ADMIN — MUPIROCIN: 20 OINTMENT TOPICAL at 09:30

## 2024-03-17 RX ADMIN — AMPICILLIN SODIUM 2 G: 2 INJECTION, POWDER, FOR SOLUTION INTRAMUSCULAR; INTRAVENOUS at 17:14

## 2024-03-17 RX ADMIN — OXYCODONE HYDROCHLORIDE 10 MG: 5 TABLET ORAL at 13:00

## 2024-03-17 RX ADMIN — AMPICILLIN SODIUM 2 G: 2 INJECTION, POWDER, FOR SOLUTION INTRAMUSCULAR; INTRAVENOUS at 00:40

## 2024-03-17 RX ADMIN — FUROSEMIDE 40 MG: 20 TABLET ORAL at 10:38

## 2024-03-17 RX ADMIN — OXYCODONE HYDROCHLORIDE 10 MG: 5 TABLET ORAL at 20:10

## 2024-03-17 RX ADMIN — HEPARIN SODIUM 1300 UNITS/HR: 10000 INJECTION, SOLUTION INTRAVENOUS at 09:12

## 2024-03-17 RX ADMIN — POLYETHYLENE GLYCOL 3350 17 G: 17 POWDER, FOR SOLUTION ORAL at 20:10

## 2024-03-17 RX ADMIN — AMPICILLIN SODIUM 2 G: 2 INJECTION, POWDER, FOR SOLUTION INTRAMUSCULAR; INTRAVENOUS at 04:05

## 2024-03-17 RX ADMIN — METOPROLOL TARTRATE 12.5 MG: 25 TABLET, FILM COATED ORAL at 09:30

## 2024-03-17 RX ADMIN — OXYCODONE HYDROCHLORIDE 10 MG: 5 TABLET ORAL at 04:10

## 2024-03-17 RX ADMIN — IPRATROPIUM BROMIDE AND ALBUTEROL SULFATE 3 ML: .5; 3 SOLUTION RESPIRATORY (INHALATION) at 13:47

## 2024-03-17 ASSESSMENT — COGNITIVE AND FUNCTIONAL STATUS - GENERAL
HELP NEEDED FOR BATHING: A LITTLE
MOBILITY SCORE: 17
TURNING FROM BACK TO SIDE WHILE IN FLAT BAD: A LITTLE
DRESSING REGULAR LOWER BODY CLOTHING: A LITTLE
TURNING FROM BACK TO SIDE WHILE IN FLAT BAD: A LITTLE
MOVING FROM LYING ON BACK TO SITTING ON SIDE OF FLAT BED WITH BEDRAILS: A LITTLE
MOVING TO AND FROM BED TO CHAIR: A LITTLE
DAILY ACTIVITIY SCORE: 20
STANDING UP FROM CHAIR USING ARMS: A LITTLE
STANDING UP FROM CHAIR USING ARMS: A LITTLE
CLIMB 3 TO 5 STEPS WITH RAILING: A LOT
HELP NEEDED FOR BATHING: A LITTLE
WALKING IN HOSPITAL ROOM: A LITTLE
CLIMB 3 TO 5 STEPS WITH RAILING: A LOT
DRESSING REGULAR LOWER BODY CLOTHING: A LITTLE
WALKING IN HOSPITAL ROOM: A LITTLE
TOILETING: A LITTLE
PERSONAL GROOMING: A LITTLE
PERSONAL GROOMING: A LITTLE
MOVING FROM LYING ON BACK TO SITTING ON SIDE OF FLAT BED WITH BEDRAILS: A LITTLE
MOVING TO AND FROM BED TO CHAIR: A LITTLE
TOILETING: A LITTLE

## 2024-03-17 ASSESSMENT — PAIN - FUNCTIONAL ASSESSMENT
PAIN_FUNCTIONAL_ASSESSMENT: 0-10

## 2024-03-17 ASSESSMENT — PAIN SCALES - GENERAL
PAINLEVEL_OUTOF10: 7
PAINLEVEL_OUTOF10: 7
PAINLEVEL_OUTOF10: 2
PAINLEVEL_OUTOF10: 0 - NO PAIN
PAINLEVEL_OUTOF10: 6

## 2024-03-17 ASSESSMENT — PAIN DESCRIPTION - ORIENTATION: ORIENTATION: LEFT;RIGHT

## 2024-03-17 ASSESSMENT — PAIN DESCRIPTION - LOCATION: LOCATION: BACK

## 2024-03-17 NOTE — PROGRESS NOTES
Progress Note   [facility]  March 17, 2024   Patient: Fannie Watson    Medical Record: [id number]      SUBJECTIVE     No acute overnight events reported.    Pt seen today resting comfortably in bed. Pt denies chest pain, shortness of breath, abdominal pain, n/v/, and had a bowel movement yesterday. Pt understands the plan regarding recommendations from ID and CT Surg and waiting for IR.   Prior back pain is well controlled.     Active Medication:  ampicillin, 2 g, intravenous, q4h  cefTRIAXone, 2 g, intravenous, q12h  metoprolol tartrate, 12.5 mg, oral, BID  mupirocin, , Topical, BID  polyethylene glycol, 17 g, oral, BID  sennosides-docusate sodium, 2 tablet, oral, BID    PRN medications: acetaminophen, benzocaine-menthol, benzonatate, guaiFENesin, heparin, HYDROmorphone, ipratropium-albuteroL, ondansetron, oxyCODONE, oxyCODONE    OBJECTIVE     Vitals:    Vitals:    03/16/24 1638 03/16/24 2048 03/17/24 0003 03/17/24 0402   BP: 153/67 133/68 135/61 127/67   BP Location: Left arm Left arm Left arm Left arm   Patient Position: Lying Lying Lying Sitting   Pulse: 88 92 79    Resp: 22 20 22    Temp: 36.5 °C (97.7 °F) 35.8 °C (96.5 °F) 35.9 °C (96.6 °F) 36.4 °C (97.5 °F)   TempSrc: Temporal Skin Temporal Temporal   SpO2: 98% 94% 94% 100%   Weight:    66.1 kg (145 lb 11.6 oz)   Height:         Failed to redirect to the Timeline version of the [employer] SmartLink.    Intake/Output Summary (Last 24 hours) at 3/17/2024 0845  Last data filed at 3/16/2024 2050  Gross per 24 hour   Intake 240 ml   Output 400 ml   Net -160 ml         Physical Exam  Gen: well appearing, A+Ox3, in no acute distress.  HEENT: PEERL, EOMI, sclera anicteric, no conjunctival injection, MMM.  Resp: CTAB, normal breath sounds, faint crackles at lung bases, no wheezing.  CV: RRR, normal S1/S2, systolic murmur no rubs/gallops, no JVD  GI: non-tender, non-distended, no rebound or guarding.  Extremities/MSK: warm and well  perfused, trace edema brody extr.  Neuro: A+Ox3, no focal deficits, no asterixis  Skin: warm and dry, no lesions, no rashes   Pressure ulcers: no  Back: paraspinal sutures removed, no tenderness, hotness or discharge at the surgery site.     Labs:   Lab Results   Component Value Date    WBC 13.8 (H) 03/17/2024    HGB 10.3 (L) 03/17/2024    HCT 32.7 (L) 03/17/2024    MCV 87 03/17/2024     03/17/2024     Lab Results   Component Value Date    GLUCOSE 119 (H) 03/16/2024    CALCIUM 8.9 03/16/2024     (L) 03/16/2024    K 3.9 03/16/2024    CO2 28 03/16/2024    CL 94 (L) 03/16/2024    BUN 5 (L) 03/16/2024    CREATININE 0.53 03/16/2024     Lab Results   Component Value Date    ALT 23 03/12/2024    AST 17 03/12/2024    ALKPHOS 116 03/12/2024    BILITOT 0.7 03/12/2024     Lab Results   Component Value Date    INR 1.2 (H) 03/10/2024    INR 1.2 (H) 03/09/2024    INR 1.2 (H) 03/08/2024    PROTIME 13.2 (H) 03/10/2024    PROTIME 13.5 (H) 03/09/2024    PROTIME 13.0 (H) 03/08/2024     Cardiac History    CROW 3/8/24  CONCLUSIONS:   1. Left ventricular systolic function is normal with a 65-70% estimated ejection fraction.   2. There is an elongated (2.5 x 0.3 cm), filamentous mass that is likely a vegetation attached to the atrial side of the posterior leaflet. There is a resultant eccentric mitral regurgitation that is anteriorly directed. Using PISA, ERO: 0.34 cm2 with a regurg volume of 48.62 ml/beat. Although difficult to see, there does not appear to be a perforation. There is severe blunting of all pulmonary vein inflow with early temporary reversal in the right inferior pulmonary vein.   3. Moderate to severe mitral valve regurgitation.   4. The pulmonary artery is not well visualized.     TTE 3/7/24  CONCLUSIONS:   1. Left ventricular systolic function is hyperdynamic with a 70-75% estimated ejection fraction.   2. Spectral Doppler shows an impaired relaxation pattern of left ventricular diastolic filling.   3.  Anterior leaflet mobile mass, consider vegetation, consider CROW.   4. Mild to moderate mitral valve regurgitation.     TTE (12/18/19 -[facility])  CONCLUSIONS:   - Exam indication: Routine surveillance (>1yr) of known cardiomyopathy without a change in clinical status   - The left ventricle is normal in size. Left ventricular systolic function is   normal. EF = 62 ± 5% (2D biplane) Grade I left ventricular diastolic dysfunction.   Normal global strain -23.0%.   - The right ventricle is normal in size. Right ventricular systolic function is   normal.   - There are no significant valvular abnormalities.   - Exam was compared with the prior  echocardiographic exam performed on   1/30/2019. No significant change.      [facility] (12/27/2018)  In summary:   1. Non-occlusive CAD  2. Mild  dilated  hypokinetic left ventricle with ejection fraction estimated at 40%. Consider stress cardiomyopathy/takotsubo variant.      Outpatient regimen  - bisoprolol 10mg  - lisinopril 10 mg daily  - Spironolactone 25 mg stopped at 1/19/24 admission     Relevant Results     MRI SPINE w and WO Contrast:  Impression:     1.  Postsurgical changes from left L4 facetectomy/laminectomy with  L4-L5 posterior fusion hardware and placement of L4-L5 intervertebral  disc spacer. Slight interval enlargement heterogenous intensity fluid  collection with a thin rim enhancement along the hemilaminectomy bed  currently measuring 5.1 x 1.8 x 2.4 cm, previously measuring 2.7 x  2.0 x 2.9 cm, and again favored to represent a postoperative seroma  or hematoma.  2. Similar degenerative changes within the lumbar spine most  prominent at L4-L5.      Amended report by neuro radiology attending:  There is decreased T1 signal and elevated T2 stir signal with  enhancement along the inferior endplate of L3. While this finding may  be on the basis of type 1 Modic discogenic degenerative changes and  given thin rim of enhancement described above surrounding fluid  collection in  the operative bed infectious inflammatory disease can  not be excluded particularly given clinical concern for such. Please  correlate clinically and consider follow-up examination and or  nuclear medicine if clinically necessary.        MRI brain 3/6/24  IMPRESSION:  1. Redemonstration of subarachnoid hemorrhage most pronounced in the right sylvian fissure and scattered right frontal and temporal sulci. There is also a small amount of intraventricular hemorrhage.  2. Suspect small areas of acute to early subacute ischemic injury in the right frontal lobe.  3. Nonspecific white matter changes most likely represent small-vessel ischemic disease in a patient of this age.        Blood Cx 3x drawn 3/7, 3/8, 3/9, 3/10  Positive for E faecium    CT CAP IMPRESSION 3/09:    1. No source of infection in the chest, abdomen, or pelvis.  2. Trace amount of layering debris in the trachea and mainstem  bronchi with bibasilar dependent subsegmental and left lower lobe  segmental atelectasis. No evidence of consolidative pneumonia.  3. A redemonstration of a loculated fluid collection in the left  paraspinal muscles at the L4 level extending toward the laminectomy  space measuring approximately 2.9 x 2.2 x 5.2 cm, the visualization  which is limited due to beam hardening artifact. Differential  diagnosis includes postop seroma/hematoma with or without  superimposed infection of the collection.  4. Several areas of ill-defined endplate erosions along the left  inferior L3 endplate and central superior L4 endplate. On MRI lumbar  spine 03/06/2024, there is loss of cortical definition in these  locations associated with hyperintense intradiscal signal in the  posterior L3-L4 intervertebral disc. Therefore, while degenerative  Modic changes are possible, consideration for discitis osteomyelitis  should remain in the differential diagnosis given no additional  source of infection the chest/abdomen/pelvis. Short-term follow-up  with  contrast enhanced MRI may be of further diagnostic value should  patient's infection remain persistent/resistant to treatment.  5. 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  6. Colonic diverticulosis without evidence of acute diverticulitis.  7. 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 female with a PMH of hypertension, hyperlipidemia, COPD, spondylolisthesis of lumbar lesion and status post L4-L5 decompression laminectomy and fusion on 3/1/2024 who was transferred from OSH ([facility]) on 3/5/24 with altered mentation and trace R sylvian SAH on CT head for further management by neurosurgery in the [facility].  New onset Afib with RVR which was managed with metoprolol. workup led to a echo which ultimately demonstrated a large MV vegetation and moderate to severe MR by CROW. 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 Transferred from [facility] to cardiology service on 3/11. CT Surg and ID following, rec aspiration of spinal fluid collection by IR.     Updates 3/17:  - Will be NPO at midnight, Heparin to be held at 4am  - following ID plan, recommended for aspiration of spinal fluid collection by IR diagnostic and therapeutic (no IR available ovr the Weekend)   - following with CTS recs, in regard to surgery - pending IR drainage of spine collection.  - C/W Ceftriaxone 2g q12h 3/11- ampicillin 3/11-  - C/W lasix 40 mg PO daily, accurate I&Os    #new onset Afib with RVR  #HTN  #Mitral valve vegetation  #moderate to severe Mitral regurgitation  3/7 afib w/ RVR, s/p IVF, s/p metop, TTE EF 70-75%, MV mobile mass     - C/W on Metoprolol 12.5 BID  - CTS are following, Dr. Chaudhari aware of patient, pending recs. Recommend Spine surgery evaluation for suspicion of Discitis/ OM   - NPO for LHC on 3/13  - as per [facility] note, CTH done  3/11-Imaging reviewed showing resolving blood. Patient is cleared to undergo anticoagulation for procedures from a neurosurgical perspective. If patient has exam change please stop anticoagulation and obtain STAT CT Head.   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   - CT face/ CAP negative for other source of  infection, ( there is a splenic 1 cm infarct and suspicion of L3-L4 intervertebral disc. discitis osteomyelitis)    Blood Cx 3x drawn 3/7, 3/8, 3/9, 3/10, last positive blood culture on 3/10  started on (Cefepime 3/7-3/8, switched to daptomycin 3/8- 3/11 ) currently on Ceftriaxone 2g q12h 3/11- ampicillin 3/11-  Positive for E faecium    - ID following, appreciate recs.   - C/W Ceftriaxone 2g q12h 3/11- ampicillin 3/11-  - continue daily cultures until negative.    # Back pain iso Recent surgical intervention on 3/1 with MIS L4-5 TLIF  On:   Tylenol 650 every 4 hours as needed  Oxycodone 5 mg every 6 hours as needed for moderate pain  Oxycodone 10 mg every 6 hours as needed for severe pain first-line  Hydromorphone 0.2 every 3 hours as needed for severe pain 2nd line    # COPD  Duo nebs at home    Antibiotics:: currently on  Ceftriaxone 2g q12h 3/11- ampicillin 3/11-  Dispo: PT/OT: Low intensity level of continued care , Wheeled walker     F: Replete PRN  E: Keep Mg >2, phos >3  and K >4  N: cardiac diet  A: PIV  O2: On RA  DVT ppx: heparin gtt  GI ppx: no indication  Code Status: Full code- confirmed 3/11  Contact: : Xander Watson, [phone]     Patient seen and discussed with attending physician Dr. Bedolla  Plan preliminary until cosigned by attending physician.    Eric Núñez MD  Family Medicine - PGY 1      SIGNATURE: Eric Núñez MD PATIENT NAME: Fannie Watson   DATE: March 17, 2024 MRN: [id number]

## 2024-03-18 ENCOUNTER — APPOINTMENT (OUTPATIENT)
Dept: CARDIOLOGY | Facility: HOSPITAL | Age: 79
DRG: 981 | End: 2024-03-18
Payer: MEDICARE

## 2024-03-18 ENCOUNTER — APPOINTMENT (OUTPATIENT)
Dept: RADIOLOGY | Facility: HOSPITAL | Age: 79
DRG: 981 | End: 2024-03-18
Payer: MEDICARE

## 2024-03-18 LAB
CLARITY FLD: ABNORMAL
COLOR FLD: ABNORMAL
GLUCOSE BLD MANUAL STRIP-MCNC: 142 MG/DL (ref 74–99)
INR PPP: 1.2 (ref 0.9–1.1)
PROTHROMBIN TIME: 13.5 SECONDS (ref 9.8–12.8)
RBC # FLD AUTO: ABNORMAL /UL
WBC # FLD AUTO: ABNORMAL /UL

## 2024-03-18 PROCEDURE — 2500000004 HC RX 250 GENERAL PHARMACY W/ HCPCS (ALT 636 FOR OP/ED): Performed by: STUDENT IN AN ORGANIZED HEALTH CARE EDUCATION/TRAINING PROGRAM

## 2024-03-18 PROCEDURE — 1200000002 HC GENERAL ROOM WITH TELEMETRY DAILY

## 2024-03-18 PROCEDURE — 2500000002 HC RX 250 W HCPCS SELF ADMINISTERED DRUGS (ALT 637 FOR MEDICARE OP, ALT 636 FOR OP/ED)

## 2024-03-18 PROCEDURE — 2500000004 HC RX 250 GENERAL PHARMACY W/ HCPCS (ALT 636 FOR OP/ED): Performed by: RADIOLOGY

## 2024-03-18 PROCEDURE — 10160 PNXR ASPIR ABSC HMTMA BULLA: CPT | Performed by: RADIOLOGY

## 2024-03-18 PROCEDURE — 36415 COLL VENOUS BLD VENIPUNCTURE: CPT

## 2024-03-18 PROCEDURE — 85610 PROTHROMBIN TIME: CPT

## 2024-03-18 PROCEDURE — 89050 BODY FLUID CELL COUNT: CPT

## 2024-03-18 PROCEDURE — 2500000001 HC RX 250 WO HCPCS SELF ADMINISTERED DRUGS (ALT 637 FOR MEDICARE OP)

## 2024-03-18 PROCEDURE — 2500000004 HC RX 250 GENERAL PHARMACY W/ HCPCS (ALT 636 FOR OP/ED)

## 2024-03-18 PROCEDURE — 76942 ECHO GUIDE FOR BIOPSY: CPT

## 2024-03-18 PROCEDURE — 99152 MOD SED SAME PHYS/QHP 5/>YRS: CPT | Performed by: RADIOLOGY

## 2024-03-18 PROCEDURE — 93005 ELECTROCARDIOGRAM TRACING: CPT

## 2024-03-18 PROCEDURE — 99232 SBSQ HOSP IP/OBS MODERATE 35: CPT | Performed by: PHYSICIAN ASSISTANT

## 2024-03-18 PROCEDURE — 2500000001 HC RX 250 WO HCPCS SELF ADMINISTERED DRUGS (ALT 637 FOR MEDICARE OP): Performed by: PHARMACIST

## 2024-03-18 PROCEDURE — 99233 SBSQ HOSP IP/OBS HIGH 50: CPT | Performed by: INTERNAL MEDICINE

## 2024-03-18 PROCEDURE — 76942 ECHO GUIDE FOR BIOPSY: CPT | Performed by: RADIOLOGY

## 2024-03-18 PROCEDURE — 99152 MOD SED SAME PHYS/QHP 5/>YRS: CPT

## 2024-03-18 PROCEDURE — 87075 CULTR BACTERIA EXCEPT BLOOD: CPT | Mod: 91

## 2024-03-18 PROCEDURE — 009Y3ZX DRAINAGE OF LUMBAR SPINAL CORD, PERCUTANEOUS APPROACH, DIAGNOSTIC: ICD-10-PCS | Performed by: STUDENT IN AN ORGANIZED HEALTH CARE EDUCATION/TRAINING PROGRAM

## 2024-03-18 PROCEDURE — 97530 THERAPEUTIC ACTIVITIES: CPT | Mod: GP,CQ

## 2024-03-18 PROCEDURE — 2500000002 HC RX 250 W HCPCS SELF ADMINISTERED DRUGS (ALT 637 FOR MEDICARE OP, ALT 636 FOR OP/ED): Mod: MUE

## 2024-03-18 PROCEDURE — 97116 GAIT TRAINING THERAPY: CPT | Mod: GP,CQ

## 2024-03-18 PROCEDURE — 82947 ASSAY GLUCOSE BLOOD QUANT: CPT

## 2024-03-18 RX ORDER — FENTANYL CITRATE 50 UG/ML
INJECTION, SOLUTION INTRAMUSCULAR; INTRAVENOUS
Status: COMPLETED | OUTPATIENT
Start: 2024-03-18 | End: 2024-03-18

## 2024-03-18 RX ORDER — CHLORHEXIDINE GLUCONATE ORAL RINSE 1.2 MG/ML
15 SOLUTION DENTAL 2 TIMES DAILY
Status: COMPLETED | OUTPATIENT
Start: 2024-03-21 | End: 2024-03-21

## 2024-03-18 RX ORDER — MIDAZOLAM HYDROCHLORIDE 1 MG/ML
INJECTION INTRAMUSCULAR; INTRAVENOUS
Status: COMPLETED | OUTPATIENT
Start: 2024-03-18 | End: 2024-03-18

## 2024-03-18 RX ORDER — HEPARIN SODIUM 10000 [USP'U]/100ML
0-4000 INJECTION, SOLUTION INTRAVENOUS CONTINUOUS
Status: DISCONTINUED | OUTPATIENT
Start: 2024-03-18 | End: 2024-03-22

## 2024-03-18 RX ORDER — ONDANSETRON HYDROCHLORIDE 2 MG/ML
4 INJECTION, SOLUTION INTRAVENOUS ONCE
Status: COMPLETED | OUTPATIENT
Start: 2024-03-18 | End: 2024-03-18

## 2024-03-18 RX ORDER — FUROSEMIDE 40 MG/1
40 TABLET ORAL ONCE
Status: COMPLETED | OUTPATIENT
Start: 2024-03-18 | End: 2024-03-18

## 2024-03-18 RX ADMIN — AMPICILLIN SODIUM 2 G: 2 INJECTION, POWDER, FOR SOLUTION INTRAMUSCULAR; INTRAVENOUS at 08:29

## 2024-03-18 RX ADMIN — METOPROLOL TARTRATE 12.5 MG: 25 TABLET, FILM COATED ORAL at 21:23

## 2024-03-18 RX ADMIN — AMPICILLIN SODIUM 2 G: 2 INJECTION, POWDER, FOR SOLUTION INTRAMUSCULAR; INTRAVENOUS at 04:02

## 2024-03-18 RX ADMIN — AMPICILLIN SODIUM 2 G: 2 INJECTION, POWDER, FOR SOLUTION INTRAMUSCULAR; INTRAVENOUS at 23:04

## 2024-03-18 RX ADMIN — METOPROLOL TARTRATE 12.5 MG: 25 TABLET, FILM COATED ORAL at 10:13

## 2024-03-18 RX ADMIN — ACETAMINOPHEN 650 MG: 325 TABLET ORAL at 21:27

## 2024-03-18 RX ADMIN — ONDANSETRON 4 MG: 2 INJECTION INTRAMUSCULAR; INTRAVENOUS at 14:47

## 2024-03-18 RX ADMIN — IPRATROPIUM BROMIDE AND ALBUTEROL SULFATE 3 ML: .5; 3 SOLUTION RESPIRATORY (INHALATION) at 10:34

## 2024-03-18 RX ADMIN — AMPICILLIN SODIUM 2 G: 2 INJECTION, POWDER, FOR SOLUTION INTRAMUSCULAR; INTRAVENOUS at 15:46

## 2024-03-18 RX ADMIN — SENNOSIDES AND DOCUSATE SODIUM 2 TABLET: 8.6; 5 TABLET ORAL at 10:12

## 2024-03-18 RX ADMIN — AMPICILLIN SODIUM 2 G: 2 INJECTION, POWDER, FOR SOLUTION INTRAMUSCULAR; INTRAVENOUS at 12:41

## 2024-03-18 RX ADMIN — SENNOSIDES AND DOCUSATE SODIUM 2 TABLET: 8.6; 5 TABLET ORAL at 21:23

## 2024-03-18 RX ADMIN — FUROSEMIDE 40 MG: 40 TABLET ORAL at 12:41

## 2024-03-18 RX ADMIN — FENTANYL CITRATE 50 MCG: 50 INJECTION, SOLUTION INTRAMUSCULAR; INTRAVENOUS at 15:04

## 2024-03-18 RX ADMIN — IPRATROPIUM BROMIDE AND ALBUTEROL SULFATE 3 ML: .5; 3 SOLUTION RESPIRATORY (INHALATION) at 00:06

## 2024-03-18 RX ADMIN — CEFTRIAXONE SODIUM 2 G: 2 INJECTION, SOLUTION INTRAVENOUS at 07:45

## 2024-03-18 RX ADMIN — OXYCODONE HYDROCHLORIDE 10 MG: 5 TABLET ORAL at 04:42

## 2024-03-18 RX ADMIN — AMPICILLIN SODIUM 2 G: 2 INJECTION, POWDER, FOR SOLUTION INTRAMUSCULAR; INTRAVENOUS at 00:08

## 2024-03-18 RX ADMIN — OXYCODONE HYDROCHLORIDE 10 MG: 5 TABLET ORAL at 23:25

## 2024-03-18 RX ADMIN — MIDAZOLAM HYDROCHLORIDE 1 MG: 1 INJECTION, SOLUTION INTRAMUSCULAR; INTRAVENOUS at 15:04

## 2024-03-18 RX ADMIN — OXYCODONE HYDROCHLORIDE 10 MG: 5 TABLET ORAL at 17:18

## 2024-03-18 RX ADMIN — HEPARIN SODIUM 800 UNITS/HR: 10000 INJECTION, SOLUTION INTRAVENOUS at 22:57

## 2024-03-18 RX ADMIN — CEFTRIAXONE SODIUM 2 G: 2 INJECTION, SOLUTION INTRAVENOUS at 22:06

## 2024-03-18 ASSESSMENT — COGNITIVE AND FUNCTIONAL STATUS - GENERAL
MOVING TO AND FROM BED TO CHAIR: A LITTLE
STANDING UP FROM CHAIR USING ARMS: A LITTLE
TURNING FROM BACK TO SIDE WHILE IN FLAT BAD: A LITTLE
CLIMB 3 TO 5 STEPS WITH RAILING: A LITTLE
CLIMB 3 TO 5 STEPS WITH RAILING: A LOT
WALKING IN HOSPITAL ROOM: A LITTLE
STANDING UP FROM CHAIR USING ARMS: A LITTLE
MOBILITY SCORE: 17
PERSONAL GROOMING: A LITTLE
DRESSING REGULAR LOWER BODY CLOTHING: A LITTLE
TOILETING: A LITTLE
MOBILITY SCORE: 18
PERSONAL GROOMING: A LITTLE
MOVING FROM LYING ON BACK TO SITTING ON SIDE OF FLAT BED WITH BEDRAILS: A LITTLE
DAILY ACTIVITIY SCORE: 20
MOVING FROM LYING ON BACK TO SITTING ON SIDE OF FLAT BED WITH BEDRAILS: A LITTLE
DRESSING REGULAR LOWER BODY CLOTHING: A LITTLE
HELP NEEDED FOR BATHING: A LITTLE
WALKING IN HOSPITAL ROOM: A LITTLE
TOILETING: A LITTLE
MOVING TO AND FROM BED TO CHAIR: A LITTLE
TURNING FROM BACK TO SIDE WHILE IN FLAT BAD: A LITTLE
MOVING TO AND FROM BED TO CHAIR: A LITTLE
HELP NEEDED FOR BATHING: A LITTLE
WALKING IN HOSPITAL ROOM: A LITTLE
CLIMB 3 TO 5 STEPS WITH RAILING: A LOT
MOBILITY SCORE: 17
STANDING UP FROM CHAIR USING ARMS: A LITTLE
TURNING FROM BACK TO SIDE WHILE IN FLAT BAD: A LITTLE
MOVING FROM LYING ON BACK TO SITTING ON SIDE OF FLAT BED WITH BEDRAILS: A LITTLE
DAILY ACTIVITIY SCORE: 20

## 2024-03-18 ASSESSMENT — PAIN DESCRIPTION - DESCRIPTORS
DESCRIPTORS: SORE
DESCRIPTORS: ACHING;SORE
DESCRIPTORS: ACHING;SORE

## 2024-03-18 ASSESSMENT — PAIN DESCRIPTION - ORIENTATION: ORIENTATION: LEFT

## 2024-03-18 ASSESSMENT — PAIN SCALES - GENERAL
PAINLEVEL_OUTOF10: 3
PAINLEVEL_OUTOF10: 0 - NO PAIN
PAINLEVEL_OUTOF10: 8
PAINLEVEL_OUTOF10: 0 - NO PAIN
PAINLEVEL_OUTOF10: 5 - MODERATE PAIN
PAINLEVEL_OUTOF10: 5 - MODERATE PAIN
PAINLEVEL_OUTOF10: 3
PAINLEVEL_OUTOF10: 7

## 2024-03-18 ASSESSMENT — PAIN - FUNCTIONAL ASSESSMENT
PAIN_FUNCTIONAL_ASSESSMENT: 0-10

## 2024-03-18 ASSESSMENT — PAIN DESCRIPTION - LOCATION: LOCATION: BACK

## 2024-03-18 NOTE — PRE-PROCEDURE NOTE
INTERVENTIONAL RADIOLOGY PRE-PROCEDURE NOTE    Fannie Watson is a 78 y.o. female with PMHx including COPD, HTN, and spondylolisthesis of the lumbar spine status post L4-L5 decompression laminectomy and fusion on 3/1/2024 who presents to the interventional radiology department for aspiration of a fluid collection within the left L4 hemilaminectomy surgical bed.    Procedure: US guided percutaneous aspiration of fluid within the left L4 hemilaminectomy surgical bed.    IV Zofran 4 mg administered pre-operatively for nausea.     Indication for procedure: The primary encounter diagnosis was SAH (subarachnoid hemorrhage) (CMS/HCC). Diagnoses of Episodic atrial fibrillation (CMS/HCC), Mobile mass of breast, Endocarditis, unspecified chronicity, unspecified endocarditis type, Other disorders of arteries, arterioles and capillaries in diseases classified elsewhere (CMS/HCC), Other nonrheumatic mitral valve disorders, and Other specified symptoms and signs involving the circulatory and respiratory systems were also pertinent to this visit.    Past Medical History:   Diagnosis Date    COPD (chronic obstructive pulmonary disease) (CMS/HCC)     Hypertension       History reviewed. No pertinent surgical history.    Relevant Labs:   Lab Results   Component Value Date    CREATININE 0.58 03/17/2024    EGFR >90 03/17/2024    INR 1.2 (H) 03/10/2024    PROTIME 13.2 (H) 03/10/2024       Planned Sedation/Anesthesia: Moderate    Directed physical examination:    Normal appearance, behavior, cognition and NAD  Heart: Heart regular rate and rhythm  Lungs: No increased work of breathing  Abdomen: soft and nontender  Neurologic: negative      Current Facility-Administered Medications:     acetaminophen (Tylenol) tablet 650 mg, 650 mg, oral, q4h PRN, Francisco Javier Barron MD, 650 mg at 03/08/24 2016    ampicillin (Omnipen) 2 g in sodium chloride 0.9 % 100 mL IV, 2 g, intravenous, q4h, Gilda Gar MD, Stopped at 03/18/24 1311     benzocaine-menthol (Cepastat Sore Throat) 15-3.6 mg lozenge 1 lozenge, 1 lozenge, Mouth/Throat, q2h PRN, Rufina Chua MD    benzonatate (Tessalon) capsule 100 mg, 100 mg, oral, TID PRN, Rufina Chua MD, 100 mg at 03/12/24 1726    cefTRIAXone (Rocephin) 2 g IV in dextrose 5% 50 mL, 2 g, intravenous, q12h, Gilda Gar MD, Stopped at 03/18/24 0815    guaiFENesin (Mucinex) 12 hr tablet 600 mg, 600 mg, oral, BID PRN, Rufina Chua MD, 600 mg at 03/13/24 2036    heparin (porcine) injection 2,000-4,000 Units, 2,000-4,000 Units, intravenous, q4h PRN, Gilda Gar MD, 2,000 Units at 03/15/24 0417    HYDROmorphone (Dilaudid) injection 0.2 mg, 0.2 mg, intravenous, q3h PRN, Francisco Javier Barron MD, 0.2 mg at 03/16/24 0014    ipratropium-albuteroL (Duo-Neb) 0.5-2.5 mg/3 mL nebulizer solution 3 mL, 3 mL, nebulization, q6h PRN, Gilda Gar MD, 3 mL at 03/18/24 1034    metoprolol tartrate (Lopressor) tablet 12.5 mg, 12.5 mg, oral, BID, Francisco Javier Barron MD, 12.5 mg at 03/18/24 1013    ondansetron (Zofran) tablet 4 mg, 4 mg, oral, q8h PRN, Grady Aguilera MD, 4 mg at 03/15/24 2000    oxyCODONE (Roxicodone) immediate release tablet 10 mg, 10 mg, oral, q6h PRN, Francisco Javier Barron MD, 10 mg at 03/18/24 0442    oxyCODONE (Roxicodone) immediate release tablet 5 mg, 5 mg, oral, q6h PRN, Francisco Javier Barron MD, 5 mg at 03/16/24 0508    polyethylene glycol (Glycolax, Miralax) packet 17 g, 17 g, oral, BID, Grady Aguilera MD, 17 g at 03/17/24 2010    sennosides-docusate sodium (Malika-Colace) 8.6-50 mg per tablet 2 tablet, 2 tablet, oral, BID, Bilal SEBASTIAN Barron MD, 2 tablet at 03/18/24 1012     Mallampati: II (hard and soft palate, upper portion of tonsils anduvula visible)    ASA Score: ASA 2 - Patient with mild systemic disease with no functional limitations    Benefits, risks and alternatives of procedure and planned sedation have been discussed with the patient and/or their representative. All questions answered and they agree to  proceed.     Santos Lewis, PGY-3  Diagnostic Radiology     NON-Urgent on call weekends and after hours weekdays (5pm - 5am) IR pager: [phone]  Urgent & emergent on call weekends and after hours weekdays (5pm-7am) IR pager: [phone]

## 2024-03-18 NOTE — PROGRESS NOTES
"CARDIAC SURGERY CONSULT PROGRESS NOTE    SUBJECTIVE  Fannie Watson is a 78 y.o. female presenting with h/o HTN, HLD, COPD, 3/1 s/p MIS L4-5 TLIF, p/w AMS, 3/5 CTH trace R sylvian SAH. 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      Patient was transferred from [facility] in [place]. She was admitted there on 3/1/24 for an elective L4-L5 decompression laminectomy and fusion. Due to confusion CT head was completed and showed SAH. She was then transferred to  on 3/5 with concern for SAH.      The patient has been managed conservatively for her SAH. CTA was negative, MRI negative, MRI L spine was negative. EP was consulted for new Afib. EP recommended an echo be done given the new afib. The TTE was concerning for a MV mass. 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      A CROW was obtained to better visualize the mass/vegetation and mitral valve. CROW showed a large mobile vegetation on the MV and mod/severe MR.      Patient and family state that prior to her planned surgery 3/1 she was fairly active, doing her own grocery shopping and ADLs. She lives with her .      Cardiac surgery consulted for surgical evaluation.       Objective   /82   Pulse 78   Temp 36.7 °C (98.1 °F) (Temporal)   Resp 16   Ht 1.57 m (5' 1.81\")   Wt 67.2 kg (148 lb 2.4 oz)   LMP  (LMP Unknown)   SpO2 98%   BMI 27.26 kg/m²   Pain Score: 5 - Moderate pain   Vitals:    03/18/24 0004   Weight: 67.2 kg (148 lb 2.4 oz)          Intake/Output Summary (Last 24 hours) at 3/18/2024 1746  Last data filed at 3/18/2024 0432  Gross per 24 hour   Intake --   Output 500 ml   Net -500 ml         Physical Exam  Physical Exam  Constitutional:       Appearance: She is not ill-appearing or toxic-appearing.   HENT:      Head: Normocephalic.      Mouth/Throat:      Mouth: Mucous membranes are moist.   Cardiovascular:      Rate and Rhythm: Normal rate and regular rhythm.      Heart sounds: Murmur " heard.   Pulmonary:      Effort: Pulmonary effort is normal.      Breath sounds: Normal breath sounds.   Abdominal:      General: Bowel sounds are normal.      Palpations: Abdomen is soft.   Musculoskeletal:         General: Normal range of motion.      Cervical back: Neck supple.      Right lower leg: No edema.      Left lower leg: No edema.   Skin:     General: Skin is warm and dry.   Neurological:      General: No focal deficit present.      Mental Status: She is alert and oriented to person, place, and time.   Psychiatric:         Mood and Affect: Mood normal.         Behavior: Behavior normal.      Comments: Tearful on exam         Medications  Scheduled medications  ampicillin, 2 g, intravenous, q4h  cefTRIAXone, 2 g, intravenous, q12h  metoprolol tartrate, 12.5 mg, oral, BID  polyethylene glycol, 17 g, oral, BID  sennosides-docusate sodium, 2 tablet, oral, BID    Continuous medications     PRN medications  PRN medications: acetaminophen, benzocaine-menthol, benzonatate, guaiFENesin, heparin, HYDROmorphone, ipratropium-albuteroL, ondansetron, oxyCODONE, oxyCODONE    Labs  No results found for this or any previous visit (from the past 24 hour(s)).              ASSESSMENT & PLAN  Fannie Watson is a 78 y.o. female presenting with h/o HTN, HLD, COPD, 3/1 s/p MIS L4-5 TLIF, p/w AMS, 3/5 CTH trace R sylvian SAH. 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      Patient was transferred from [facility] in [place]. She was admitted there on 3/1/24 for an elective L4-L5 decompression laminectomy and fusion. Due to confusion CT head was completed and showed SAH. She was then transferred to  on 3/5 with concern for SAH.      The patient has been managed conservatively for her SAH. CTA was negative, MRI negative, MRI L spine was negative. EP was consulted for new Afib. EP recommended an echo be done given the new afib. The TTE was concerning for a MV mass. Blood cultures positive  3/7/24 for GPC on gram stain.      A CROW was obtained to better visualize the mass/vegetation and mitral valve. CROW showed a large mobile vegetation on the MV and mod/severe MR.      Patient and family state that prior to her planned surgery 3/1 she was fairly active, doing her own grocery shopping and ADLs. She lives with her .      Cardiac surgery consulted for surgical evaluation.     RECOMMENDATIONS/PLAN  - Dr. Chaudhari met with patient and reviewed imaging and data; tentatively booked for surgery for a mini MVR for 3/22 pending the below workup obtained and patient medically ready.   - CROW does show a vegetation on the MV along with mod/severe MR  - However, patient with current SAH, and although tolerating heparin. On 3/12 neurosurgery wrote a note saying patient is cleared to undergo anticoagulation for procedures.   - Patient also needs source control of this infection. ID is questioning hardware in spine given CT abdomen/pelvis shows concerning of fluid collection in the spine and possible discitis osteomyelitis given no source of infection found. Will need that ruled out as possible source, especially before any valvular surgery. MRI spine showed enlarging fluid collection.  - ID recommends IR removal of fluid for diagnostic/therapeutic evaluation - planning 3/18 - pending.   - LHC showed no coronary artery disease  - Preop risk stratification studies/labs/UA/PFTs and vascular ultrasounds ordered and pending  - CT facial bones done and unremarkable   - No ACEi/ARBs in the pre-op period (at least 48 hours)  - Hold any SGLT2 inhibitors (Farxiga, Jardiance, etc.) for at least 3 days prior to cardiac surgery to prevent euglycemic DKA  - No antiplatelets other than ASA, no anticoagulants other than Heparin  - NPO after midnight, blood on hold/T&S, and preop scrubs ordered for OR 3/22.         Will continue to follow along.  Thank you for the consultation.   Patient educated and all questions  answered.  Please page the cardiac surgery consult pager [phone] with any questions or changes in patient condition.      Delma Cantu PA-C  Cardiac Surgery Consult [facility]  [facility]  Cardiac Surgery Consult Pager [phone]     3/18/2024  2:56 PM

## 2024-03-18 NOTE — PROGRESS NOTES
"Physical Therapy    Physical Therapy Treatment    Patient Name: Fannie Watson  MRN: [id number]  Today's Date: 3/18/2024  Time Calculation  Start Time: 1157  Stop Time: 1221  Time Calculation (min): 24 min       Assessment/Plan   PT Assessment  End of Session Communication: Bedside nurse  Assessment Comment: .  End of Session Patient Position: Bed, 2 rail up, Alarm off, not on at start of session     PT Plan  Treatment/Interventions: Bed mobility, Transfer training, Gait training, Stair training, Balance training, Neuromuscular re-education, Strengthening, Endurance training, Range of motion, Therapeutic exercise, Therapeutic activity, Home exercise program, Postural re-education  PT Plan: Skilled PT  PT Frequency: Daily  PT Discharge Recommendations: Low intensity level of continued care  Equipment Recommended upon Discharge: Wheeled walker  PT Recommended Transfer Status: Assist x1, Assistive device, Stand by assist  PT - OK to Discharge: Yes      General Visit Information:   PT  Visit  PT Received On: 03/18/24  General  Prior to Session Communication: Bedside nurse  Patient Position Received: Alarm off, not on at start of session (sitting EOB)  General Comment: pt agreeable to session. pt reports feeling \" shakey\" from being NPO for procedure this afternoon. pt toleartes negotating 4 stairs using 1 rail. pt fatigues with 120' ambulation trial    Subjective   Precautions:  Precautions  Medical Precautions: Fall precautions  Post-Surgical Precautions: Spinal precautions  Vital Signs:       Objective   Pain:     Cognition:  Cognition  Orientation Level: Oriented X4    Treatments:            Ambulation/Gait Training 1  Surface 1: Level tile  Device 1: Rolling walker  Assistance 1: Contact guard  Comments/Distance (ft) 1: 120, cues for walker safety  Transfers 2  Technique 2: Sit to stand, Stand to sit  Transfer Device 2: Walker  Transfer Level of Assistance 2: Distant supervision    Stairs  Stairs: Yes  Stairs  Rails " 1: Right  Curb Step 1: No  Assistance 1: Close supervision  Comment/Number of Steps 1: 4, cues for safety and sequencing    Outcome Measures:  [facility] Basic Mobility  Turning from your back to your side while in a flat bed without using bedrails: A little  Moving from lying on your back to sitting on the side of a flat bed without using bedrails: A little  Moving to and from bed to chair (including a wheelchair): A little  Standing up from a chair using your arms (e.g. wheelchair or bedside chair): A little  To walk in hospital room: A little  Climbing 3-5 steps with railing: A little  Basic Mobility - Total Score: 18    Education Documentation  Mobility Training, taught by Nando Alves PTA at 3/18/2024  1:59 PM.  Learner: Patient  Readiness: Acceptance  Method: Explanation  Response: Verbalizes Understanding    Education Comments  No comments found.        OP EDUCATION:       Encounter Problems       Encounter Problems (Active)       PT Problem       Pt. will score 24/28 on Tinetti balance test.  (Progressing)       Start:  03/07/24    Expected End:  03/22/24            Patient will complete supine to sit and sit to supine Supervision  (Progressing)       Start:  03/07/24    Expected End:  03/22/24            Patient will complete stand to sit and sit to stand with Supervision with wheeled walker.  (Progressing)       Start:  03/07/24    Expected End:  03/22/24            Patient will ambulate >400' with Rolling Walker and Supervision  (Progressing)       Start:  03/07/24    Expected End:  03/22/24            Patient will ascend and descend 4 steps with 2 UE Support and Supervision  (Progressing)       Start:  03/07/24    Expected End:  03/22/24               Pain - Adult

## 2024-03-18 NOTE — PROGRESS NOTES
Progress Note   [facility]  March 18, 2024   Patient: Fannie Watson    Medical Record: [id number]      SUBJECTIVE     No acute overnight events reported.    Pt seen and examined this morning. Comfortable, asking questions about her procedure with IR today. Otherwise, denies any new symptoms including sob, cp, f/c/n/v, no abdominal pain. No other concerns.    Active Medication:  ampicillin, 2 g, intravenous, q4h  cefTRIAXone, 2 g, intravenous, q12h  metoprolol tartrate, 12.5 mg, oral, BID  mupirocin, , Topical, BID  polyethylene glycol, 17 g, oral, BID  sennosides-docusate sodium, 2 tablet, oral, BID    PRN medications: acetaminophen, benzocaine-menthol, benzonatate, guaiFENesin, heparin, HYDROmorphone, ipratropium-albuteroL, ondansetron, oxyCODONE, oxyCODONE    OBJECTIVE     Vitals:    Vitals:    03/17/24 1618 03/17/24 2008 03/18/24 0004 03/18/24 0405   BP: 137/62 148/61  124/58   BP Location: Left arm Left arm  Left arm   Patient Position: Lying Lying  Lying   Pulse: 91   82   Resp: 20   25   Temp: 36.9 °C (98.4 °F) 36.4 °C (97.5 °F)  36.2 °C (97.2 °F)   TempSrc: Temporal Temporal  Temporal   SpO2: 93% 93%  90%   Weight:   67.2 kg (148 lb 2.4 oz)    Height:         Failed to redirect to the Timeline version of the [employer] SmartLink.    Intake/Output Summary (Last 24 hours) at 3/18/2024 0636  Last data filed at 3/18/2024 0432  Gross per 24 hour   Intake 120 ml   Output 750 ml   Net -630 ml       Physical Exam  Gen: well appearing, A+Ox3, in no acute distress.  HEENT: PEERL, EOMI, sclera anicteric, no conjunctival injection, MMM.  Resp: CTAB, normal breath sounds, faint crackles at lung bases, no wheezing.  CV: RRR, normal S1/S2, systolic murmur no rubs/gallops, no JVD  GI: non-tender, non-distended, no rebound or guarding.  Extremities/MSK: warm and well perfused, trace edema brody extr.  Neuro: A+Ox3, no focal deficits, no asterixis  Skin: warm and dry, no lesions, no rashes   Pressure  ulcers: no  Back: paraspinal sutures removed, no tenderness, hotness or discharge at the surgery site.     Labs:   Lab Results   Component Value Date    WBC 13.8 (H) 03/17/2024    HGB 10.3 (L) 03/17/2024    HCT 32.7 (L) 03/17/2024    MCV 87 03/17/2024     03/17/2024     Lab Results   Component Value Date    GLUCOSE 102 (H) 03/17/2024    CALCIUM 9.0 03/17/2024     (L) 03/17/2024    K 4.6 03/17/2024    CO2 29 03/17/2024    CL 92 (L) 03/17/2024    BUN 7 03/17/2024    CREATININE 0.58 03/17/2024     Lab Results   Component Value Date    ALT 23 03/12/2024    AST 17 03/12/2024    ALKPHOS 116 03/12/2024    BILITOT 0.7 03/12/2024     Lab Results   Component Value Date    INR 1.2 (H) 03/10/2024    INR 1.2 (H) 03/09/2024    INR 1.2 (H) 03/08/2024    PROTIME 13.2 (H) 03/10/2024    PROTIME 13.5 (H) 03/09/2024    PROTIME 13.0 (H) 03/08/2024     Cardiac History    CROW 3/8/24  CONCLUSIONS:   1. Left ventricular systolic function is normal with a 65-70% estimated ejection fraction.   2. There is an elongated (2.5 x 0.3 cm), filamentous mass that is likely a vegetation attached to the atrial side of the posterior leaflet. There is a resultant eccentric mitral regurgitation that is anteriorly directed. Using PISA, ERO: 0.34 cm2 with a regurg volume of 48.62 ml/beat. Although difficult to see, there does not appear to be a perforation. There is severe blunting of all pulmonary vein inflow with early temporary reversal in the right inferior pulmonary vein.   3. Moderate to severe mitral valve regurgitation.   4. The pulmonary artery is not well visualized.     TTE 3/7/24  CONCLUSIONS:   1. Left ventricular systolic function is hyperdynamic with a 70-75% estimated ejection fraction.   2. Spectral Doppler shows an impaired relaxation pattern of left ventricular diastolic filling.   3. Anterior leaflet mobile mass, consider vegetation, consider CROW.   4. Mild to moderate mitral valve regurgitation.     TTE (12/18/19  -[facility])  CONCLUSIONS:   - Exam indication: Routine surveillance (>1yr) of known cardiomyopathy without a change in clinical status   - The left ventricle is normal in size. Left ventricular systolic function is   normal. EF = 62 ± 5% (2D biplane) Grade I left ventricular diastolic dysfunction.   Normal global strain -23.0%.   - The right ventricle is normal in size. Right ventricular systolic function is   normal.   - There are no significant valvular abnormalities.   - Exam was compared with the prior  echocardiographic exam performed on   1/30/2019. No significant change.      [facility] (12/27/2018)  In summary:   1. Non-occlusive CAD  2. Mild  dilated  hypokinetic left ventricle with ejection fraction estimated at 40%. Consider stress cardiomyopathy/takotsubo variant.      Outpatient regimen  - bisoprolol 10mg  - lisinopril 10 mg daily  - Spironolactone 25 mg stopped at 1/19/24 admission     Relevant Results     MRI SPINE w and WO Contrast:  Impression:     1.  Postsurgical changes from left L4 facetectomy/laminectomy with  L4-L5 posterior fusion hardware and placement of L4-L5 intervertebral  disc spacer. Slight interval enlargement heterogenous intensity fluid  collection with a thin rim enhancement along the hemilaminectomy bed  currently measuring 5.1 x 1.8 x 2.4 cm, previously measuring 2.7 x  2.0 x 2.9 cm, and again favored to represent a postoperative seroma  or hematoma.  2. Similar degenerative changes within the lumbar spine most  prominent at L4-L5.      Amended report by neuro radiology attending:  There is decreased T1 signal and elevated T2 stir signal with  enhancement along the inferior endplate of L3. While this finding may  be on the basis of type 1 Modic discogenic degenerative changes and  given thin rim of enhancement described above surrounding fluid  collection in the operative bed infectious inflammatory disease can  not be excluded particularly given clinical concern for such. Please  correlate  clinically and consider follow-up examination and or  nuclear medicine if clinically necessary.        MRI brain 3/6/24  IMPRESSION:  1. Redemonstration of subarachnoid hemorrhage most pronounced in the right sylvian fissure and scattered right frontal and temporal sulci. There is also a small amount of intraventricular hemorrhage.  2. Suspect small areas of acute to early subacute ischemic injury in the right frontal lobe.  3. Nonspecific white matter changes most likely represent small-vessel ischemic disease in a patient of this age.        Blood Cx 3x drawn 3/7, 3/8, 3/9, 3/10  Positive for E faecium    CT CAP IMPRESSION 3/09:    1. No source of infection in the chest, abdomen, or pelvis.  2. Trace amount of layering debris in the trachea and mainstem  bronchi with bibasilar dependent subsegmental and left lower lobe  segmental atelectasis. No evidence of consolidative pneumonia.  3. A redemonstration of a loculated fluid collection in the left  paraspinal muscles at the L4 level extending toward the laminectomy  space measuring approximately 2.9 x 2.2 x 5.2 cm, the visualization  which is limited due to beam hardening artifact. Differential  diagnosis includes postop seroma/hematoma with or without  superimposed infection of the collection.  4. Several areas of ill-defined endplate erosions along the left  inferior L3 endplate and central superior L4 endplate. On MRI lumbar  spine 03/06/2024, there is loss of cortical definition in these  locations associated with hyperintense intradiscal signal in the  posterior L3-L4 intervertebral disc. Therefore, while degenerative  Modic changes are possible, consideration for discitis osteomyelitis  should remain in the differential diagnosis given no additional  source of infection the chest/abdomen/pelvis. Short-term follow-up  with contrast enhanced MRI may be of further diagnostic value should  patient's infection remain persistent/resistant to treatment.  5. 1 cm  infarct in the spleen of either acute or subacute age.  6. Colonic diverticulosis without evidence of acute diverticulitis.  7. 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 female with a PMH of hypertension, hyperlipidemia, COPD, spondylolisthesis of lumbar lesion and status post L4-L5 decompression laminectomy and fusion on 3/1/2024 who was transferred from OSH ([facility]) on 3/5/24 with altered mentation and trace R sylvian SAH on CT head for further management by neurosurgery in the [facility].  New onset Afib with RVR which was managed with metoprolol. workup led to a echo which ultimately demonstrated a large MV vegetation and moderate to severe MR by CROW. 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 Transferred from [facility] to cardiology service on 3/11. CT Surg and ID following, rec aspiration of spinal fluid collection by IR.    Updates 3/18:  -Plan for IR drainage today  -Cardiac surgery planning for procedure tentatively Friday 3/22     Updates 3/17:  - Will be NPO at midnight, Heparin to be held at 4am  - following ID plan, recommended for aspiration of spinal fluid collection by IR diagnostic and therapeutic (no IR available ovr the Weekend)   - following with CTS recs, in regard to surgery - pending IR drainage of spine collection.  - C/W Ceftriaxone 2g q12h 3/11- ampicillin 3/11-  - C/W lasix 40 mg PO daily, accurate I&Os    #new onset Afib with RVR  #HTN  #Mitral valve vegetation  #moderate to severe Mitral regurgitation  3/7 afib w/ RVR, s/p IVF, s/p metop, TTE EF 70-75%, MV mobile mass     - C/W on Metoprolol 12.5 BID  - CTS are following, Dr. Chaudhari aware of patient, pending recs. Recommend Spine surgery evaluation for suspicion of Discitis/ OM   - NPO for LHC on 3/13  - as per [facility] note, CTH done 3/11-Imaging reviewed  showing resolving blood. Patient is cleared to undergo anticoagulation for procedures from a neurosurgical perspective. If patient has exam change please stop anticoagulation and obtain STAT CT Head.   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   - CT face/ CAP negative for other source of  infection, ( there is a splenic 1 cm infarct and suspicion of L3-L4 intervertebral disc. discitis osteomyelitis)    Blood Cx 3x drawn 3/7, 3/8, 3/9, 3/10, last positive blood culture on 3/10  started on (Cefepime 3/7-3/8, switched to daptomycin 3/8- 3/11 ) currently on Ceftriaxone 2g q12h 3/11- ampicillin 3/11-  Positive for E faecium    - ID following, appreciate recs.   - C/W Ceftriaxone 2g q12h 3/11- ampicillin 3/11-  - continue daily cultures until negative.    # Back pain iso Recent surgical intervention on 3/1 with MIS L4-5 TLIF  On:   Tylenol 650 every 4 hours as needed  Oxycodone 5 mg every 6 hours as needed for moderate pain  Oxycodone 10 mg every 6 hours as needed for severe pain first-line  Hydromorphone 0.2 every 3 hours as needed for severe pain 2nd line    # COPD  Duo nebs at home    Antibiotics:: currently on  Ceftriaxone 2g q12h 3/11- ampicillin 3/11-  Dispo: PT/OT: Low intensity level of continued care , Wheeled walker     F: Replete PRN  E: Keep Mg >2, phos >3  and K >4  N: cardiac diet  A: PIV  O2: On RA  DVT ppx: heparin gtt  GI ppx: no indication  Code Status: Full code- confirmed 3/11  Contact: : Xander Watson, [phone]     Lg Robb, DO  PGY-1 Neurology

## 2024-03-18 NOTE — PROGRESS NOTES
03/18/24        Transitional Care Coordination Progress Note:   Patient discussed during interdisciplinary rounds.   Team members present: RILEY TCC ALEXX CUI   Plan per Medical/Surgical team: adm dx SAH (subarachnoid hemorrhage  PMHX of hypertension, hyperlipidemia, COPD, spondylolisthesis of lumbar lesion diuresis followed by ID pt/ot ID   Discharge disposition: Home with Home care   Status-Inpatient   Payer- MEDICARE   Potential Barriers: None   ADOD: 3-   Sheldon Kang RN [facility] [phone]

## 2024-03-18 NOTE — POST-PROCEDURE NOTE
Interventional Radiology Post-Procedure Note    US guided percutaneous aspiration of a fluid collection within the left L4 hemilaminectomy surgical bed.       Indication for procedure: The primary encounter diagnosis was SAH (subarachnoid hemorrhage) (CMS/[facility]). Diagnoses of Episodic atrial fibrillation (CMS/HCC), Mobile mass of breast, Endocarditis, unspecified chronicity, unspecified endocarditis type, Other disorders of arteries, arterioles and capillaries in diseases classified elsewhere (CMS/[facility]), Other nonrheumatic mitral valve disorders, and Other specified symptoms and signs involving the circulatory and respiratory systems were also pertinent to this visit.    Pre-Procedure Verification and Time Out:  · Procedure Location procedure area   · HUDDLE - Pre-procedure Verification completed   · TIME OUT - Final Verification completed immediately prior to procedure start   · DEBRIEF completed     General Information:  Date/Time of Procedure: 03/18/24 at 3:46 PM  Indication(s)/Pre - Procedure Diagnoses: Spondylolisthesis of the lumbar spine status post L4-L5 decompression laminectomy and fusion on 3/1/2024 with fluid collection within the left L4 hemilaminectomy surgical bed.   Post-Procedure Diagnosis: As above.  Procedure Name: US guided percutaneous aspiration of a fluid collection within the left L4 hemilaminectomy surgical bed.   Findings: See PACS  Procedure performed by: Dr. Kameron Rouse MD  Assistant(s): Santos Lewis MD   Estimated Blood Loss (mL): minimal  Specimen: Yes, sent for cell count and culture.  Informed Consent: written consent obtained    Procedure Details:    Technically successful and uncomplicated US guided percutaneous aspiration of a fluid collection within the left L4 hemilaminectomy surgical bed. .  Please see PACS for full procedural details.    Patient Tolerance: good  Complications: None    Prep:  Ultrasound Guided Insertion: Yes  Large Drape, Hand Hygiene, Surgical Cap, Surgical  Mask, Sterile Gloves, Glasses, and Scrubs  Patient Position: Left Lateral Decubitus  Site Prep: chlorhexidine, draped, usual sterile procedure followed    Anesthesia/Medications:  Procedural Sedation:  Medications  As of 03/18/24 1546        fentaNYL PF (Sublimaze) injection (mcg) Total dose:  50 mcg      Date/Time Rate/Dose/Volume Action       03/18/24  1504 50 mcg Given               midazolam (Versed) injection (mg) Total dose:  1 mg      Date/Time Rate/Dose/Volume Action       03/18/24  1504 1 mg Given                 ondansetron (Zofran) injection 4 mg (mg) Total dose:  4 mg Dosing weight:  67.2      Date/Time Rate/Dose/Volume Action       03/18/24  1447 4 mg Given              IV Zofran 4 mg administered preoperatively for nausea.    Fentanyl: 50 mcg  Versed: 1 mg  1% Lidocaine: 8 mL    Attending Attestation:  I was present for the entire procedure    Santos Lewis, PGY-3  Diagnostic Radiology     NON-Urgent on call weekends and after hours weekdays (5pm - 5am) IR pager: [phone]  Urgent & emergent on call weekends and after hours weekdays (5pm-7am) IR pager: [phone]

## 2024-03-19 ENCOUNTER — APPOINTMENT (OUTPATIENT)
Dept: RESPIRATORY THERAPY | Facility: HOSPITAL | Age: 79
DRG: 981 | End: 2024-03-19
Payer: MEDICARE

## 2024-03-19 LAB
ALBUMIN SERPL BCP-MCNC: 3.1 G/DL (ref 3.4–5)
ANION GAP SERPL CALC-SCNC: 16 MMOL/L (ref 10–20)
BACTERIA BLD CULT: NORMAL
BACTERIA BLD CULT: NORMAL
BASE EXCESS BLDA CALC-SCNC: 6.9 MMOL/L (ref -2–3)
BASOPHILS # BLD AUTO: 0.04 X10*3/UL (ref 0–0.1)
BASOPHILS NFR BLD AUTO: 0.4 %
BODY TEMPERATURE: 37 DEGREES CELSIUS
BUN SERPL-MCNC: 7 MG/DL (ref 6–23)
CALCIUM SERPL-MCNC: 8.8 MG/DL (ref 8.6–10.6)
CHLORIDE SERPL-SCNC: 96 MMOL/L (ref 98–107)
CO2 SERPL-SCNC: 28 MMOL/L (ref 21–32)
COHGB MFR BLDA: 2 %
CREAT SERPL-MCNC: 0.43 MG/DL (ref 0.5–1.05)
DO-HGB MFR BLDA: 4.5 % (ref 0–5)
EGFRCR SERPLBLD CKD-EPI 2021: >90 ML/MIN/1.73M*2
EOSINOPHIL # BLD AUTO: 0.15 X10*3/UL (ref 0–0.4)
EOSINOPHIL NFR BLD AUTO: 1.6 %
ERYTHROCYTE [DISTWIDTH] IN BLOOD BY AUTOMATED COUNT: 15.2 % (ref 11.5–14.5)
GLUCOSE SERPL-MCNC: 95 MG/DL (ref 74–99)
HCO3 BLDA-SCNC: 31.3 MMOL/L (ref 22–26)
HCT VFR BLD AUTO: 29.6 % (ref 36–46)
HGB BLD-MCNC: 9.6 G/DL (ref 12–16)
HGB BLDA-MCNC: 10.4 G/DL (ref 12–16)
IMM GRANULOCYTES # BLD AUTO: 0.17 X10*3/UL (ref 0–0.5)
IMM GRANULOCYTES NFR BLD AUTO: 1.8 % (ref 0–0.9)
LYMPHOCYTES # BLD AUTO: 2.38 X10*3/UL (ref 0.8–3)
LYMPHOCYTES NFR BLD AUTO: 25.7 %
MAGNESIUM SERPL-MCNC: 2.07 MG/DL (ref 1.6–2.4)
MCH RBC QN AUTO: 27.2 PG (ref 26–34)
MCHC RBC AUTO-ENTMCNC: 32.4 G/DL (ref 32–36)
MCV RBC AUTO: 84 FL (ref 80–100)
METHGB MFR BLDA: 0.5 % (ref 0–1.5)
MGC ASCENT PFT - FEV1 - PRE: 1.44
MGC ASCENT PFT - FEV1 - PREDICTED: 1.93
MGC ASCENT PFT - FVC - PRE: 1.96
MGC ASCENT PFT - FVC - PREDICTED: 2.51
MONOCYTES # BLD AUTO: 0.9 X10*3/UL (ref 0.05–0.8)
MONOCYTES NFR BLD AUTO: 9.7 %
NEUTROPHILS # BLD AUTO: 5.62 X10*3/UL (ref 1.6–5.5)
NEUTROPHILS NFR BLD AUTO: 60.8 %
NRBC BLD-RTO: 0 /100 WBCS (ref 0–0)
OXYHGB MFR BLDA: 92.9 % (ref 94–98)
PCO2 BLDA: 43 MM HG (ref 38–42)
PH BLDA: 7.47 PH (ref 7.38–7.42)
PHOSPHATE SERPL-MCNC: 4.3 MG/DL (ref 2.5–4.9)
PLATELET # BLD AUTO: 399 X10*3/UL (ref 150–450)
PO2 BLDA: 68 MM HG (ref 85–95)
POTASSIUM SERPL-SCNC: 4.2 MMOL/L (ref 3.5–5.3)
RBC # BLD AUTO: 3.53 X10*6/UL (ref 4–5.2)
SAO2 % BLDA: 95 % (ref 94–100)
SODIUM SERPL-SCNC: 136 MMOL/L (ref 136–145)
UFH PPP CHRO-ACNC: 0.1 IU/ML
UFH PPP CHRO-ACNC: 0.3 IU/ML
UFH PPP CHRO-ACNC: 0.6 IU/ML
WBC # BLD AUTO: 9.3 X10*3/UL (ref 4.4–11.3)

## 2024-03-19 PROCEDURE — 99232 SBSQ HOSP IP/OBS MODERATE 35: CPT | Performed by: INTERNAL MEDICINE

## 2024-03-19 PROCEDURE — 92526 ORAL FUNCTION THERAPY: CPT | Mod: GN

## 2024-03-19 PROCEDURE — 2500000001 HC RX 250 WO HCPCS SELF ADMINISTERED DRUGS (ALT 637 FOR MEDICARE OP)

## 2024-03-19 PROCEDURE — 2500000004 HC RX 250 GENERAL PHARMACY W/ HCPCS (ALT 636 FOR OP/ED)

## 2024-03-19 PROCEDURE — 80069 RENAL FUNCTION PANEL: CPT

## 2024-03-19 PROCEDURE — 85520 HEPARIN ASSAY: CPT

## 2024-03-19 PROCEDURE — 2500000002 HC RX 250 W HCPCS SELF ADMINISTERED DRUGS (ALT 637 FOR MEDICARE OP, ALT 636 FOR OP/ED): Mod: MUE

## 2024-03-19 PROCEDURE — 83735 ASSAY OF MAGNESIUM: CPT

## 2024-03-19 PROCEDURE — 36415 COLL VENOUS BLD VENIPUNCTURE: CPT

## 2024-03-19 PROCEDURE — 99232 SBSQ HOSP IP/OBS MODERATE 35: CPT | Performed by: PHYSICIAN ASSISTANT

## 2024-03-19 PROCEDURE — 1200000002 HC GENERAL ROOM WITH TELEMETRY DAILY

## 2024-03-19 PROCEDURE — 2500000004 HC RX 250 GENERAL PHARMACY W/ HCPCS (ALT 636 FOR OP/ED): Mod: MUE

## 2024-03-19 PROCEDURE — 2500000005 HC RX 250 GENERAL PHARMACY W/O HCPCS

## 2024-03-19 PROCEDURE — 36600 WITHDRAWAL OF ARTERIAL BLOOD: CPT

## 2024-03-19 PROCEDURE — 85025 COMPLETE CBC W/AUTO DIFF WBC: CPT

## 2024-03-19 PROCEDURE — 82375 ASSAY CARBOXYHB QUANT: CPT

## 2024-03-19 PROCEDURE — 2500000002 HC RX 250 W HCPCS SELF ADMINISTERED DRUGS (ALT 637 FOR MEDICARE OP, ALT 636 FOR OP/ED)

## 2024-03-19 RX ORDER — BISMUTH SUBSALICYLATE 525 MG/30ML
524 LIQUID ORAL ONCE
Status: COMPLETED | OUTPATIENT
Start: 2024-03-19 | End: 2024-03-19

## 2024-03-19 RX ORDER — FUROSEMIDE 20 MG/1
20 TABLET ORAL ONCE
Status: COMPLETED | OUTPATIENT
Start: 2024-03-19 | End: 2024-03-19

## 2024-03-19 RX ORDER — BISMUTH SUBSALICYLATE 262 MG/1
524 TABLET ORAL DAILY PRN
Status: DISCONTINUED | OUTPATIENT
Start: 2024-03-19 | End: 2024-03-22

## 2024-03-19 RX ORDER — FUROSEMIDE 20 MG/1
20 TABLET ORAL ONCE
Status: DISCONTINUED | OUTPATIENT
Start: 2024-03-19 | End: 2024-03-19

## 2024-03-19 RX ADMIN — AMPICILLIN SODIUM 2 G: 2 INJECTION, POWDER, FOR SOLUTION INTRAMUSCULAR; INTRAVENOUS at 01:19

## 2024-03-19 RX ADMIN — AMPICILLIN SODIUM 2 G: 2 INJECTION, POWDER, FOR SOLUTION INTRAMUSCULAR; INTRAVENOUS at 04:23

## 2024-03-19 RX ADMIN — OXYCODONE HYDROCHLORIDE 10 MG: 5 TABLET ORAL at 14:17

## 2024-03-19 RX ADMIN — BISMUTH SUBSALICYLATE 524 MG: 525 SUSPENSION ORAL at 22:00

## 2024-03-19 RX ADMIN — ACETAMINOPHEN 650 MG: 325 TABLET ORAL at 03:24

## 2024-03-19 RX ADMIN — CEFTRIAXONE SODIUM 2 G: 2 INJECTION, SOLUTION INTRAVENOUS at 20:18

## 2024-03-19 RX ADMIN — METOPROLOL TARTRATE 12.5 MG: 25 TABLET, FILM COATED ORAL at 20:18

## 2024-03-19 RX ADMIN — AMPICILLIN SODIUM 2 G: 2 INJECTION, POWDER, FOR SOLUTION INTRAMUSCULAR; INTRAVENOUS at 12:30

## 2024-03-19 RX ADMIN — IPRATROPIUM BROMIDE AND ALBUTEROL SULFATE 3 ML: .5; 3 SOLUTION RESPIRATORY (INHALATION) at 09:13

## 2024-03-19 RX ADMIN — ACETAMINOPHEN 650 MG: 325 TABLET ORAL at 11:08

## 2024-03-19 RX ADMIN — HEPARIN SODIUM 1300 UNITS/HR: 10000 INJECTION, SOLUTION INTRAVENOUS at 15:25

## 2024-03-19 RX ADMIN — METOPROLOL TARTRATE 12.5 MG: 25 TABLET, FILM COATED ORAL at 08:59

## 2024-03-19 RX ADMIN — AMPICILLIN SODIUM 2 G: 2 INJECTION, POWDER, FOR SOLUTION INTRAMUSCULAR; INTRAVENOUS at 17:54

## 2024-03-19 RX ADMIN — OXYCODONE HYDROCHLORIDE 5 MG: 5 TABLET ORAL at 20:18

## 2024-03-19 RX ADMIN — AMPICILLIN SODIUM 2 G: 2 INJECTION, POWDER, FOR SOLUTION INTRAMUSCULAR; INTRAVENOUS at 20:30

## 2024-03-19 RX ADMIN — OXYCODONE HYDROCHLORIDE 10 MG: 5 TABLET ORAL at 05:44

## 2024-03-19 RX ADMIN — CEFTRIAXONE SODIUM 2 G: 2 INJECTION, SOLUTION INTRAVENOUS at 10:05

## 2024-03-19 RX ADMIN — BISMUTH SUBSALICYLATE 524 MG: 262 TABLET, CHEWABLE ORAL at 14:21

## 2024-03-19 RX ADMIN — OXYCODONE HYDROCHLORIDE 5 MG: 5 TABLET ORAL at 09:03

## 2024-03-19 RX ADMIN — ONDANSETRON HYDROCHLORIDE 4 MG: 4 TABLET, FILM COATED ORAL at 23:30

## 2024-03-19 RX ADMIN — FUROSEMIDE 20 MG: 20 TABLET ORAL at 11:01

## 2024-03-19 RX ADMIN — AMPICILLIN SODIUM 2 G: 2 INJECTION, POWDER, FOR SOLUTION INTRAMUSCULAR; INTRAVENOUS at 08:59

## 2024-03-19 RX ADMIN — HEPARIN SODIUM 1300 UNITS/HR: 10000 INJECTION, SOLUTION INTRAVENOUS at 17:08

## 2024-03-19 RX ADMIN — ONDANSETRON HYDROCHLORIDE 4 MG: 4 TABLET, FILM COATED ORAL at 11:52

## 2024-03-19 RX ADMIN — HEPARIN SODIUM 4000 UNITS: 5000 INJECTION INTRAVENOUS; SUBCUTANEOUS at 15:26

## 2024-03-19 RX ADMIN — HEPARIN SODIUM 2000 UNITS: 5000 INJECTION INTRAVENOUS; SUBCUTANEOUS at 04:20

## 2024-03-19 ASSESSMENT — PAIN SCALES - GENERAL
PAINLEVEL_OUTOF10: 6
PAINLEVEL_OUTOF10: 0 - NO PAIN
PAINLEVEL_OUTOF10: 7
PAINLEVEL_OUTOF10: 0 - NO PAIN
PAINLEVEL_OUTOF10: 0 - NO PAIN
PAINLEVEL_OUTOF10: 8
PAINLEVEL_OUTOF10: 0 - NO PAIN
PAINLEVEL_OUTOF10: 3
PAINLEVEL_OUTOF10: 3
PAINLEVEL_OUTOF10: 0 - NO PAIN

## 2024-03-19 ASSESSMENT — PAIN - FUNCTIONAL ASSESSMENT
PAIN_FUNCTIONAL_ASSESSMENT: 0-10

## 2024-03-19 ASSESSMENT — COGNITIVE AND FUNCTIONAL STATUS - GENERAL
MOBILITY SCORE: 21
CLIMB 3 TO 5 STEPS WITH RAILING: A LOT
WALKING IN HOSPITAL ROOM: A LITTLE
MOBILITY SCORE: 21
CLIMB 3 TO 5 STEPS WITH RAILING: A LOT
WALKING IN HOSPITAL ROOM: A LITTLE
DAILY ACTIVITIY SCORE: 24
DAILY ACTIVITIY SCORE: 24

## 2024-03-19 ASSESSMENT — PAIN DESCRIPTION - DESCRIPTORS
DESCRIPTORS: ACHING;DISCOMFORT
DESCRIPTORS: ACHING;SORE
DESCRIPTORS: ACHING
DESCRIPTORS: ACHING;SORE

## 2024-03-19 NOTE — PROGRESS NOTES
"CARDIAC SURGERY CONSULT PROGRESS NOTE    SUBJECTIVE  Fannie Watson is a 78 y.o. female presenting with h/o HTN, HLD, COPD, 3/1 s/p MIS L4-5 TLIF, p/w AMS, 3/5 CTH trace R sylvian SAH. 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      Patient was transferred from [facility] in [place]. She was admitted there on 3/1/24 for an elective L4-L5 decompression laminectomy and fusion. Due to confusion CT head was completed and showed SAH. She was then transferred to  on 3/5 with concern for SAH.      The patient has been managed conservatively for her SAH. CTA was negative, MRI negative, MRI L spine was negative. EP was consulted for new Afib. EP recommended an echo be done given the new afib. The TTE was concerning for a MV mass. 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      A CROW was obtained to better visualize the mass/vegetation and mitral valve. CROW showed a large mobile vegetation on the MV and mod/severe MR.      Patient and family state that prior to her planned surgery 3/1 she was fairly active, doing her own grocery shopping and ADLs. She lives with her .      Cardiac surgery consulted for surgical evaluation.       Objective   /84 (BP Location: Right arm, Patient Position: Sitting)   Pulse 83   Temp 35.8 °C (96.4 °F) (Temporal)   Resp 18   Ht 1.57 m (5' 1.81\")   Wt 66 kg (145 lb 8.1 oz)   LMP  (LMP Unknown)   SpO2 94%   BMI 26.78 kg/m²   Pain Score: 3   Vitals:    03/19/24 0329   Weight: 66 kg (145 lb 8.1 oz)          Intake/Output Summary (Last 24 hours) at 3/19/2024 1154  Last data filed at 3/19/2024 1106  Gross per 24 hour   Intake --   Output 2200 ml   Net -2200 ml         Physical Exam  Physical Exam  Constitutional:       Appearance: She is not ill-appearing or toxic-appearing.   HENT:      Head: Normocephalic.      Mouth/Throat:      Mouth: Mucous membranes are moist.   Cardiovascular:      Rate and Rhythm: Normal rate and regular " rhythm.      Heart sounds: Murmur heard.   Pulmonary:      Effort: Pulmonary effort is normal.      Breath sounds: Normal breath sounds.   Abdominal:      General: Bowel sounds are normal.      Palpations: Abdomen is soft.   Musculoskeletal:         General: Normal range of motion.      Cervical back: Neck supple.      Right lower leg: No edema.      Left lower leg: No edema.   Skin:     General: Skin is warm and dry.   Neurological:      General: No focal deficit present.      Mental Status: She is alert and oriented to person, place, and time.   Psychiatric:         Mood and Affect: Mood normal.         Behavior: Behavior normal.         Medications  Scheduled medications  ampicillin, 2 g, intravenous, q4h  cefTRIAXone, 2 g, intravenous, q12h  [START ON 3/21/2024] chlorhexidine, 15 mL, Mouth/Throat, BID  metoprolol tartrate, 12.5 mg, oral, BID  polyethylene glycol, 17 g, oral, BID  sennosides-docusate sodium, 2 tablet, oral, BID    Continuous medications  heparin, 0-4,000 Units/hr, Last Rate: 1,000 Units/hr (03/19/24 0700)    PRN medications  PRN medications: acetaminophen, benzocaine-menthol, benzonatate, guaiFENesin, heparin, HYDROmorphone, ipratropium-albuteroL, ondansetron, oxyCODONE, oxyCODONE    Labs  Results for orders placed or performed during the hospital encounter of 03/05/24 (from the past 24 hour(s))   Sterile Fluid Culture/Smear    Specimen: Aspirate; Fluid   Result Value Ref Range    Sterile Fluid Culture/Smear Culture in progress     Gram Stain (3+) Moderate Polymorphonuclear leukocytes     Gram Stain No organisms seen    Body Fluid Cell Count   Result Value Ref Range    Color, Fluid Red (A) Colorless, Straw, Yellow    Clarity, Fluid Turbid (A) Clear    WBC, Fluid 30,030 See Comment /uL    RBC, Fluid 477,000 0  /uL /uL   POCT GLUCOSE   Result Value Ref Range    POCT Glucose 142 (H) 74 - 99 mg/dL   Protime-INR   Result Value Ref Range    Protime 13.5 (H) 9.8 - 12.8 seconds    INR 1.2 (H) 0.9 - 1.1    Magnesium   Result Value Ref Range    Magnesium 2.07 1.60 - 2.40 mg/dL   Renal Function Panel   Result Value Ref Range    Glucose 95 74 - 99 mg/dL    Sodium 136 136 - 145 mmol/L    Potassium 4.2 3.5 - 5.3 mmol/L    Chloride 96 (L) 98 - 107 mmol/L    Bicarbonate 28 21 - 32 mmol/L    Anion Gap 16 10 - 20 mmol/L    Urea Nitrogen 7 6 - 23 mg/dL    Creatinine 0.43 (L) 0.50 - 1.05 mg/dL    eGFR >90 >60 mL/min/1.73m*2    Calcium 8.8 8.6 - 10.6 mg/dL    Phosphorus 4.3 2.5 - 4.9 mg/dL    Albumin 3.1 (L) 3.4 - 5.0 g/dL   CBC and Auto Differential   Result Value Ref Range    WBC 9.3 4.4 - 11.3 x10*3/uL    nRBC 0.0 0.0 - 0.0 /100 WBCs    RBC 3.53 (L) 4.00 - 5.20 x10*6/uL    Hemoglobin 9.6 (L) 12.0 - 16.0 g/dL    Hematocrit 29.6 (L) 36.0 - 46.0 %    MCV 84 80 - 100 fL    MCH 27.2 26.0 - 34.0 pg    MCHC 32.4 32.0 - 36.0 g/dL    RDW 15.2 (H) 11.5 - 14.5 %    Platelets 399 150 - 450 x10*3/uL    Neutrophils % 60.8 40.0 - 80.0 %    Immature Granulocytes %, Automated 1.8 (H) 0.0 - 0.9 %    Lymphocytes % 25.7 13.0 - 44.0 %    Monocytes % 9.7 2.0 - 10.0 %    Eosinophils % 1.6 0.0 - 6.0 %    Basophils % 0.4 0.0 - 2.0 %    Neutrophils Absolute 5.62 (H) 1.60 - 5.50 x10*3/uL    Immature Granulocytes Absolute, Automated 0.17 0.00 - 0.50 x10*3/uL    Lymphocytes Absolute 2.38 0.80 - 3.00 x10*3/uL    Monocytes Absolute 0.90 (H) 0.05 - 0.80 x10*3/uL    Eosinophils Absolute 0.15 0.00 - 0.40 x10*3/uL    Basophils Absolute 0.04 0.00 - 0.10 x10*3/uL   Heparin Assay, UFH   Result Value Ref Range    Heparin Unfractionated 0.1 See Comment Below for Therapeutic Ranges IU/mL   Blood Gas Arterial, COOX Unsolicited   Result Value Ref Range    POCT pH, Arterial 7.47 (H) 7.38 - 7.42 pH    POCT pCO2, Arterial 43 (H) 38 - 42 mm Hg    POCT pO2, Arterial 68 (L) 85 - 95 mm Hg    POCT SO2, Arterial 95 94 - 100 %    POCT Oxy Hemoglobin, Arterial 92.9 (L) 94.0 - 98.0 %    POCT Base Excess, Arterial 6.9 (H) -2.0 - 3.0 mmol/L    POCT HCO3 Calculated, Arterial 31.3  (H) 22.0 - 26.0 mmol/L    POCT Hemoglobin, Arterial 10.4 (L) 12.0 - 16.0 g/dL    POCT Carboxyhemoglobin, Arterial 2.0 %    POCT Methemoglobin, Arterial 0.5 0.0 - 1.5 %    POCT Deoxy Hemoglobin, Arterial 4.5 0.0 - 5.0 %    Patient Temperature 37.0 degrees Celsius   Pulmonary function testing   Result Value Ref Range    FVC - Predicted 2.51     FEV1 - Predicted 1.93     FVC - PRE 1.96     FEV1 - Pre 1.44    Heparin Assay, UFH   Result Value Ref Range    Heparin Unfractionated 0.3 See Comment Below for Therapeutic Ranges IU/mL                 ASSESSMENT & PLAN  Fannie Watson is a 78 y.o. female presenting with h/o HTN, HLD, COPD, 3/1 s/p MIS L4-5 TLIF, p/w AMS, 3/5 CTH trace R sylvian SAH. 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      Patient was transferred from [facility] in [place]. She was admitted there on 3/1/24 for an elective L4-L5 decompression laminectomy and fusion. Due to confusion CT head was completed and showed SAH. She was then transferred to  on 3/5 with concern for SAH.      The patient has been managed conservatively for her SAH. CTA was negative, MRI negative, MRI L spine was negative. EP was consulted for new Afib. EP recommended an echo be done given the new afib. The TTE was concerning for a MV mass. 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      A CROW was obtained to better visualize the mass/vegetation and mitral valve. CROW showed a large mobile vegetation on the MV and mod/severe MR.      Patient and family state that prior to her planned surgery 3/1 she was fairly active, doing her own grocery shopping and ADLs. She lives with her .      Cardiac surgery consulted for surgical evaluation.     RECOMMENDATIONS/PLAN  - Dr. Chaudhari met with patient and reviewed imaging and data; tentatively booked for surgery for a mini MVR for 3/22 pending the below workup obtained and patient medically ready.   - CROW does show a vegetation on the MV  along with mod/severe MR  - However, patient with current SAH although patient tolerated heparin. On 3/12 neurosurgery wrote a note saying patient is cleared to undergo anticoagulation for procedures.   - Patient also needs source control of this infection. ID is questioning hardware in spine given CT abdomen/pelvis shows concerning of fluid collection in the spine and possible discitis osteomyelitis given no source of infection found. Will need that ruled out as possible source, especially before any valvular surgery. MRI spine showed enlarging fluid collection. IR drained fluid - results pending.   - LHC showed no coronary artery disease  - Preop risk stratification studies/labs/UA/PFTs and vascular ultrasounds ordered  --- PFTs done 3/19, FVC and FEV1 77% and 74% respectively   --- US carotids less than 50% stenosis bilaterally   - CT facial bones done and unremarkable   - No ACEi/ARBs in the pre-op period (at least 48 hours)  - Hold any SGLT2 inhibitors (Farxiga, Jardiance, etc.) for at least 3 days prior to cardiac surgery to prevent euglycemic DKA  - No antiplatelets other than ASA, no anticoagulants other than Heparin  - NPO after midnight, blood on hold/T&S, and preop scrubs ordered for OR 3/22.         Will continue to follow along.  Thank you for the consultation.   Patient educated and all questions answered.  Please page the cardiac surgery consult pager [phone] with any questions or changes in patient condition.      Delma Cantu PA-C  Cardiac Surgery Consult [facility]  [facility]  Cardiac Surgery Consult Pager [phone]     3/19/2024  11:54 AM

## 2024-03-19 NOTE — CARE PLAN
Problem: General Stroke  Goal: Establish a mutual long term goal with patient by discharge  Outcome: Progressing  Goal: Demonstrate improvement in neurological exam throughout the shift  Outcome: Progressing  Goal: Maintain BP within ordered limits throughout shift  Outcome: Progressing  Goal: Participate in treatment (ie., meds, therapy) throughout shift  Outcome: Progressing  Goal: No symptoms of aspiration throughout shift  Outcome: Progressing  Goal: No symptoms of hemorrhage throughout shift  Outcome: Progressing  Goal: Tolerate enteral feeding throughout shift  Outcome: Progressing  Goal: Decreased nausea/vomiting throughout shift  Outcome: Progressing  Goal: Controlled blood glucose throughout shift  Outcome: Progressing  Goal: Out of bed three times today  Outcome: Progressing     Problem: Skin  Goal: Decreased wound size/increased tissue granulation at next dressing change  Outcome: Progressing  Goal: Participates in plan/prevention/treatment measures  Outcome: Progressing  Goal: Prevent/manage excess moisture  Outcome: Progressing  Goal: Prevent/minimize sheer/friction injuries  Outcome: Progressing  Goal: Promote/optimize nutrition  Outcome: Progressing  Goal: Promote skin healing  Outcome: Progressing     Problem: Fall/Injury  Goal: Not fall by end of shift  Outcome: Progressing  Goal: Be free from injury by end of the shift  Outcome: Progressing  Goal: Verbalize understanding of personal risk factors for fall in the hospital  Outcome: Progressing  Goal: Verbalize understanding of risk factor reduction measures to prevent injury from fall in the home  Outcome: Progressing  Goal: Use assistive devices by end of the shift  Outcome: Progressing  Goal: Pace activities to prevent fatigue by end of the shift  Outcome: Progressing     Problem: Pain  Goal: Takes deep breaths with improved pain control throughout the shift  Outcome: Progressing  Goal: Turns in bed with improved pain control throughout the  shift  Outcome: Progressing  Goal: Walks with improved pain control throughout the shift  Outcome: Progressing  Goal: Performs ADL's with improved pain control throughout shift  Outcome: Progressing  Goal: Participates in PT with improved pain control throughout the shift  Outcome: Progressing  Goal: Free from opioid side effects throughout the shift  Outcome: Progressing  Goal: Free from acute confusion related to pain meds throughout the shift  Outcome: Progressing   The patient's goals for the shift include ELDA    The clinical goals for the shift include pt remain injury free during shift  Patient remained HDS and free from falls and injury this shift.      03/19/24 at 6:02 AM - Liat Morales RN

## 2024-03-19 NOTE — PROGRESS NOTES
Speech-Language Pathology  Adult Inpatient Clinical Bedside Swallow RE-Evaluation    Patient Name: Fannie Watson  MRN: [id number]  Today's Date: 3/19/2024   Start Time: 0900  Stop Time: 0930  Time Calculation (min): 30    History of Present Illness:    78 y.o. female with a PMH of hypertension, hyperlipidemia, COPD, spondylolisthesis of lumbar lesion and status post L4-L5 decompression laminectomy and fusion on 3/1/2024 (at [facility]) who was transferred from [facility] ([facility]) on 3/5/24 with altered mentation and trace R sylvian SAH on CT head for further management by neurosurgery in the [facility].      She has had new onset paroxysmal afib with RVR this admission. EP-cardiology was consulted and recommended TTE. TTE suggested a vegetation on the mitral valve so CROW was performed and showed 2.5 cm mobile vegetation with moderate to severe mitral regurgitation. MRI showed SAH in R sylvian fissue and and subacute infarcts in R parieto-temporal lobe. Blood cultures draw 3/7 are positive for E. faecium.      Assessment:   Speech therapy reconsulted for patient complaining of difficulty swallowing.      Patient status unchanged.  Oral motor WFL, vocal quality clear, strong, at baseline.  States she has to masticate food for  some time prior to swallow.  Endorses having hiatal hernia, this was found from her GI endoscopy roughly around December last year.      Patient tolerating thin liquids without clinical s/s of aspiration, 3oz water test completed without difficulty.  Tolerating regular solids without s/s of aspiration.  Patient was observed to burp and hiccup during assessment.      Education provided regarding GI symptoms vs. Dysphagia, current symptoms indicative of esophageal phase dysphagia.  Reviewed strategies (smaller, more frequent meals, avoiding foods that worsen symptoms, maintaining upright positioning 20-30 minutes after meals).      No further speech therapy is indicated at this time.   Patient may pursue GI consult as outpatient, symptoms are not indicative of consult in an acute setting.      Recommendations:  Regular solids  Thin liquids  Upright for all PO intake  Remain upright for 20-30 min after eating       Plan:  SLP Services Indicated: No  Frequency: 2x week  Discussed POC with patient  SLP - OK to Discharge    Pain:   0-10  0 = No pain.     Inpatient Education:  Extensive education provided to patient regarding current swallow function, recommendations/results, and POC.      Consultations/Referrals/Coordination of Services:   GI - outpatient okay

## 2024-03-19 NOTE — PROGRESS NOTES
Progress Note   [facility]  March 19, 2024   Patient: Fannie Watson    Medical Record: [id number]      SUBJECTIVE     No acute overnight events reported.    Pt seen and examined this morning. Comfortable, denies sob/cp/n/v/f/c/abd pain. States procedure went well yesterday. No other concerns.    Active Medication:  ampicillin, 2 g, intravenous, q4h  cefTRIAXone, 2 g, intravenous, q12h  [START ON 3/21/2024] chlorhexidine, 15 mL, Mouth/Throat, BID  metoprolol tartrate, 12.5 mg, oral, BID  polyethylene glycol, 17 g, oral, BID  sennosides-docusate sodium, 2 tablet, oral, BID    PRN medications: acetaminophen, benzocaine-menthol, benzonatate, guaiFENesin, heparin, HYDROmorphone, ipratropium-albuteroL, ondansetron, oxyCODONE, oxyCODONE    OBJECTIVE     Vitals:    Vitals:    03/18/24 2032 03/18/24 2323 03/19/24 0329 03/19/24 0501   BP: 148/74 137/77  134/69   BP Location:       Patient Position:       Pulse: 107 73  80   Resp: 20 17  20   Temp: 36.2 °C (97.2 °F) 36 °C (96.8 °F)  35.8 °C (96.4 °F)   TempSrc: Temporal Temporal  Temporal   SpO2: 95% 94%  93%   Weight:   66 kg (145 lb 8.1 oz)    Height:         Failed to redirect to the Timeline version of the [employer] SmartLink.    Intake/Output Summary (Last 24 hours) at 3/19/2024 0638  Last data filed at 3/19/2024 0332  Gross per 24 hour   Intake --   Output 2100 ml   Net -2100 ml       Physical Exam  Gen: well appearing, A+Ox3, in no acute distress.  HEENT: PEERL, EOMI, sclera anicteric, no conjunctival injection, MMM.  Resp: No resp distress, no accessory muscle usage  CV: RRR, normal S1/S2, systolic murmur no rubs/gallops, no JVD  GI: non-tender, non-distended, no rebound or guarding.  Extremities/MSK: warm and well perfused, trace edema brody extr.  Neuro: A+Ox3, no focal deficits  Skin: warm and dry, no lesions, no rashes   Pressure ulcers: no  Back: paraspinal sutures removed, no tenderness, hotness or discharge at the surgery site.      Labs:   Lab Results   Component Value Date    WBC 9.3 03/19/2024    HGB 9.6 (L) 03/19/2024    HCT 29.6 (L) 03/19/2024    MCV 84 03/19/2024     03/19/2024     Lab Results   Component Value Date    GLUCOSE 95 03/19/2024    CALCIUM 8.8 03/19/2024     03/19/2024    K 4.2 03/19/2024    CO2 28 03/19/2024    CL 96 (L) 03/19/2024    BUN 7 03/19/2024    CREATININE 0.43 (L) 03/19/2024     Lab Results   Component Value Date    ALT 23 03/12/2024    AST 17 03/12/2024    ALKPHOS 116 03/12/2024    BILITOT 0.7 03/12/2024     Lab Results   Component Value Date    INR 1.2 (H) 03/18/2024    INR 1.2 (H) 03/10/2024    INR 1.2 (H) 03/09/2024    PROTIME 13.5 (H) 03/18/2024    PROTIME 13.2 (H) 03/10/2024    PROTIME 13.5 (H) 03/09/2024     Cardiac History    CROW 3/8/24  CONCLUSIONS:   1. Left ventricular systolic function is normal with a 65-70% estimated ejection fraction.   2. There is an elongated (2.5 x 0.3 cm), filamentous mass that is likely a vegetation attached to the atrial side of the posterior leaflet. There is a resultant eccentric mitral regurgitation that is anteriorly directed. Using PISA, ERO: 0.34 cm2 with a regurg volume of 48.62 ml/beat. Although difficult to see, there does not appear to be a perforation. There is severe blunting of all pulmonary vein inflow with early temporary reversal in the right inferior pulmonary vein.   3. Moderate to severe mitral valve regurgitation.   4. The pulmonary artery is not well visualized.     TTE 3/7/24  CONCLUSIONS:   1. Left ventricular systolic function is hyperdynamic with a 70-75% estimated ejection fraction.   2. Spectral Doppler shows an impaired relaxation pattern of left ventricular diastolic filling.   3. Anterior leaflet mobile mass, consider vegetation, consider CROW.   4. Mild to moderate mitral valve regurgitation.     TTE (12/18/19 -[facility])  CONCLUSIONS:   - Exam indication: Routine surveillance (>1yr) of known cardiomyopathy without a change in clinical  status   - The left ventricle is normal in size. Left ventricular systolic function is   normal. EF = 62 ± 5% (2D biplane) Grade I left ventricular diastolic dysfunction.   Normal global strain -23.0%.   - The right ventricle is normal in size. Right ventricular systolic function is   normal.   - There are no significant valvular abnormalities.   - Exam was compared with the prior  echocardiographic exam performed on   1/30/2019. No significant change.      [facility] (12/27/2018)  In summary:   1. Non-occlusive CAD  2. Mild  dilated  hypokinetic left ventricle with ejection fraction estimated at 40%. Consider stress cardiomyopathy/takotsubo variant.      Outpatient regimen  - bisoprolol 10mg  - lisinopril 10 mg daily  - Spironolactone 25 mg stopped at 1/19/24 admission     Relevant Results     MRI SPINE w and WO Contrast:  Impression:     1.  Postsurgical changes from left L4 facetectomy/laminectomy with  L4-L5 posterior fusion hardware and placement of L4-L5 intervertebral  disc spacer. Slight interval enlargement heterogenous intensity fluid  collection with a thin rim enhancement along the hemilaminectomy bed  currently measuring 5.1 x 1.8 x 2.4 cm, previously measuring 2.7 x  2.0 x 2.9 cm, and again favored to represent a postoperative seroma  or hematoma.  2. Similar degenerative changes within the lumbar spine most  prominent at L4-L5.      Amended report by neuro radiology attending:  There is decreased T1 signal and elevated T2 stir signal with  enhancement along the inferior endplate of L3. While this finding may  be on the basis of type 1 Modic discogenic degenerative changes and  given thin rim of enhancement described above surrounding fluid  collection in the operative bed infectious inflammatory disease can  not be excluded particularly given clinical concern for such. Please  correlate clinically and consider follow-up examination and or  nuclear medicine if clinically necessary.        MRI brain  3/6/24  IMPRESSION:  1. Redemonstration of subarachnoid hemorrhage most pronounced in the right sylvian fissure and scattered right frontal and temporal sulci. There is also a small amount of intraventricular hemorrhage.  2. Suspect small areas of acute to early subacute ischemic injury in the right frontal lobe.  3. Nonspecific white matter changes most likely represent small-vessel ischemic disease in a patient of this age.        Blood Cx 3x drawn 3/7, 3/8, 3/9, 3/10  Positive for E faecium    CT CAP IMPRESSION 3/09:    1. No source of infection in the chest, abdomen, or pelvis.  2. Trace amount of layering debris in the trachea and mainstem  bronchi with bibasilar dependent subsegmental and left lower lobe  segmental atelectasis. No evidence of consolidative pneumonia.  3. A redemonstration of a loculated fluid collection in the left  paraspinal muscles at the L4 level extending toward the laminectomy  space measuring approximately 2.9 x 2.2 x 5.2 cm, the visualization  which is limited due to beam hardening artifact. Differential  diagnosis includes postop seroma/hematoma with or without  superimposed infection of the collection.  4. Several areas of ill-defined endplate erosions along the left  inferior L3 endplate and central superior L4 endplate. On MRI lumbar  spine 03/06/2024, there is loss of cortical definition in these  locations associated with hyperintense intradiscal signal in the  posterior L3-L4 intervertebral disc. Therefore, while degenerative  Modic changes are possible, consideration for discitis osteomyelitis  should remain in the differential diagnosis given no additional  source of infection the chest/abdomen/pelvis. Short-term follow-up  with contrast enhanced MRI may be of further diagnostic value should  patient's infection remain persistent/resistant to treatment.  5. 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  6. Colonic diverticulosis without evidence of acute  diverticulitis.  7. 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 female with a PMH of hypertension, hyperlipidemia, COPD, spondylolisthesis of lumbar lesion and status post L4-L5 decompression laminectomy and fusion on 3/1/2024 who was transferred from [facility] ([facility]) on 3/5/24 with altered mentation and trace R sylvian SAH on CT head for further management by neurosurgery in the [facility].  New onset Afib with RVR which was managed with metoprolol. workup led to a echo which ultimately demonstrated a large MV vegetation and moderate to severe MR by CROW. 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 Transferred from [facility] to cardiology service on 3/11. CT Surg and ID following, rec aspiration of spinal fluid collection by IR.    Updates 3/19:  -Following fluid culture, currently PMNs but NGTD  -Tentative plan for CT surgery for MVR 3/22  -Lasix 20mg PO x1 (UOP net -2.1L 3/18)    Updates 3/18:  -Plan for IR drainage today  -Cardiac surgery planning for procedure tentatively Friday 3/22    #new onset Afib with RVR  #HTN  #Mitral valve vegetation  #moderate to severe Mitral regurgitation  3/7 afib w/ RVR, s/p IVF, s/p metop, TTE EF 70-75%, MV mobile mass     - C/W on Metoprolol 12.5 BID  - CTS are following, Dr. Chaudhari aware of patient, pending recs. Recommend Spine surgery evaluation for suspicion of Discitis/ OM   - as per [facility] note, CTH done 3/11-Imaging reviewed showing resolving blood. Patient is cleared to undergo anticoagulation for procedures from a neurosurgical perspective. If patient has exam change please stop anticoagulation and obtain STAT CT Head.   -Tentative plan for CT surgery for MVR 3/22  - Telemetry    #Enterococcus faecium bacteremia and native valve mitral endocarditis  #Recent surgical intervention on 3/1 with MIS  L4-5 TLIF  - Diagnostic Cerebral Angiogram on 3/08 showed No evidence of mycotic aneurysm.   - CT face/ CAP negative for other source of  infection, ( there is a splenic 1 cm infarct and suspicion of L3-L4 intervertebral disc. discitis osteomyelitis)    Blood Cx 3x drawn 3/7, 3/8, 3/9, 3/10, last positive blood culture on 3/10  started on (Cefepime 3/7-3/8, switched to daptomycin 3/8- 3/11 ) currently on Ceftriaxone 2g q12h 3/11- ampicillin 3/11-  Positive for E faecium    - ID following, appreciate recs.   - C/W Ceftriaxone 2g q12h 3/11- ampicillin 3/11-  - continue daily cultures until negative.    # Back pain iso Recent surgical intervention on 3/1 with MIS L4-5 TLIF  On:   Tylenol 650 every 4 hours as needed  Oxycodone 5 mg every 6 hours as needed for moderate pain  Oxycodone 10 mg every 6 hours as needed for severe pain first-line  Hydromorphone 0.2 every 3 hours as needed for severe pain 2nd line  -S/p fluid aspiration post surgical site 3/18: Following fluid culture, currently PMNs but NGTD    # COPD  Duo nebs at home    #Dysphagia  ::Hx of hiatal hernia  ::Pt states frequently with hiccups/burping, has to chew food extensively  ::SLP evaluated and cleared for regular diet  -Consider GI outpatient for further workup and mgmt    Antibiotics:: currently on  Ceftriaxone 2g q12h 3/11- ampicillin 3/11-  Dispo: PT/OT: Low intensity level of continued care , Wheeled walker     F: Replete PRN  E: Keep Mg >2, phos >3  and K >4  N: cardiac diet  A: PIV  O2: On RA  DVT ppx: heparin gtt  GI ppx: no indication  Code Status: Full code- confirmed 3/11  Contact: : Xander Watson, [phone]     Lg Robb,   PGY-1 Neurology

## 2024-03-20 LAB
ABO GROUP (TYPE) IN BLOOD: NORMAL
ALBUMIN SERPL BCP-MCNC: 3.2 G/DL (ref 3.4–5)
ANION GAP SERPL CALC-SCNC: 13 MMOL/L (ref 10–20)
ANTIBODY SCREEN: NORMAL
BASOPHILS # BLD AUTO: 0.06 X10*3/UL (ref 0–0.1)
BASOPHILS NFR BLD AUTO: 0.6 %
BUN SERPL-MCNC: 6 MG/DL (ref 6–23)
CALCIUM SERPL-MCNC: 9.1 MG/DL (ref 8.6–10.6)
CHLORIDE SERPL-SCNC: 98 MMOL/L (ref 98–107)
CO2 SERPL-SCNC: 29 MMOL/L (ref 21–32)
CREAT SERPL-MCNC: 0.48 MG/DL (ref 0.5–1.05)
EGFRCR SERPLBLD CKD-EPI 2021: >90 ML/MIN/1.73M*2
EOSINOPHIL # BLD AUTO: 0.19 X10*3/UL (ref 0–0.4)
EOSINOPHIL NFR BLD AUTO: 1.9 %
ERYTHROCYTE [DISTWIDTH] IN BLOOD BY AUTOMATED COUNT: 15 % (ref 11.5–14.5)
GLUCOSE SERPL-MCNC: 92 MG/DL (ref 74–99)
HCT VFR BLD AUTO: 31.9 % (ref 36–46)
HGB BLD-MCNC: 10.1 G/DL (ref 12–16)
IMM GRANULOCYTES # BLD AUTO: 0.17 X10*3/UL (ref 0–0.5)
IMM GRANULOCYTES NFR BLD AUTO: 1.7 % (ref 0–0.9)
LYMPHOCYTES # BLD AUTO: 2.68 X10*3/UL (ref 0.8–3)
LYMPHOCYTES NFR BLD AUTO: 26.3 %
MCH RBC QN AUTO: 27.2 PG (ref 26–34)
MCHC RBC AUTO-ENTMCNC: 31.7 G/DL (ref 32–36)
MCV RBC AUTO: 86 FL (ref 80–100)
MONOCYTES # BLD AUTO: 0.78 X10*3/UL (ref 0.05–0.8)
MONOCYTES NFR BLD AUTO: 7.6 %
NEUTROPHILS # BLD AUTO: 6.32 X10*3/UL (ref 1.6–5.5)
NEUTROPHILS NFR BLD AUTO: 61.9 %
NRBC BLD-RTO: 0 /100 WBCS (ref 0–0)
PHOSPHATE SERPL-MCNC: 3.3 MG/DL (ref 2.5–4.9)
PLATELET # BLD AUTO: 427 X10*3/UL (ref 150–450)
POTASSIUM SERPL-SCNC: 3.8 MMOL/L (ref 3.5–5.3)
RBC # BLD AUTO: 3.71 X10*6/UL (ref 4–5.2)
RH FACTOR (ANTIGEN D): NORMAL
SODIUM SERPL-SCNC: 136 MMOL/L (ref 136–145)
UFH PPP CHRO-ACNC: 0.4 IU/ML
UFH PPP CHRO-ACNC: 0.5 IU/ML
WBC # BLD AUTO: 10.2 X10*3/UL (ref 4.4–11.3)

## 2024-03-20 PROCEDURE — 2500000001 HC RX 250 WO HCPCS SELF ADMINISTERED DRUGS (ALT 637 FOR MEDICARE OP)

## 2024-03-20 PROCEDURE — 85025 COMPLETE CBC W/AUTO DIFF WBC: CPT

## 2024-03-20 PROCEDURE — 2500000004 HC RX 250 GENERAL PHARMACY W/ HCPCS (ALT 636 FOR OP/ED)

## 2024-03-20 PROCEDURE — 2500000002 HC RX 250 W HCPCS SELF ADMINISTERED DRUGS (ALT 637 FOR MEDICARE OP, ALT 636 FOR OP/ED): Mod: MUE

## 2024-03-20 PROCEDURE — 80069 RENAL FUNCTION PANEL: CPT

## 2024-03-20 PROCEDURE — 97116 GAIT TRAINING THERAPY: CPT | Mod: GP,CQ

## 2024-03-20 PROCEDURE — 1200000002 HC GENERAL ROOM WITH TELEMETRY DAILY

## 2024-03-20 PROCEDURE — 99232 SBSQ HOSP IP/OBS MODERATE 35: CPT | Performed by: INTERNAL MEDICINE

## 2024-03-20 PROCEDURE — 99232 SBSQ HOSP IP/OBS MODERATE 35: CPT | Performed by: PHYSICIAN ASSISTANT

## 2024-03-20 PROCEDURE — 85520 HEPARIN ASSAY: CPT

## 2024-03-20 PROCEDURE — 86920 COMPATIBILITY TEST SPIN: CPT

## 2024-03-20 PROCEDURE — 97530 THERAPEUTIC ACTIVITIES: CPT | Mod: GP,CQ

## 2024-03-20 PROCEDURE — 2500000002 HC RX 250 W HCPCS SELF ADMINISTERED DRUGS (ALT 637 FOR MEDICARE OP, ALT 636 FOR OP/ED)

## 2024-03-20 PROCEDURE — 86901 BLOOD TYPING SEROLOGIC RH(D): CPT | Performed by: PHYSICIAN ASSISTANT

## 2024-03-20 PROCEDURE — 36415 COLL VENOUS BLD VENIPUNCTURE: CPT

## 2024-03-20 RX ORDER — TALC
3 POWDER (GRAM) TOPICAL NIGHTLY PRN
Status: CANCELLED | OUTPATIENT
Start: 2024-03-20

## 2024-03-20 RX ORDER — POTASSIUM CHLORIDE 20 MEQ/1
20 TABLET, EXTENDED RELEASE ORAL ONCE
Status: COMPLETED | OUTPATIENT
Start: 2024-03-20 | End: 2024-03-20

## 2024-03-20 RX ORDER — TALC
3 POWDER (GRAM) TOPICAL NIGHTLY PRN
Status: DISCONTINUED | OUTPATIENT
Start: 2024-03-20 | End: 2024-03-22

## 2024-03-20 RX ADMIN — IPRATROPIUM BROMIDE AND ALBUTEROL SULFATE 3 ML: .5; 3 SOLUTION RESPIRATORY (INHALATION) at 09:24

## 2024-03-20 RX ADMIN — HYDROMORPHONE HYDROCHLORIDE 0.2 MG: 1 INJECTION, SOLUTION INTRAMUSCULAR; INTRAVENOUS; SUBCUTANEOUS at 11:51

## 2024-03-20 RX ADMIN — OXYCODONE HYDROCHLORIDE 5 MG: 5 TABLET ORAL at 16:49

## 2024-03-20 RX ADMIN — ACETAMINOPHEN 650 MG: 325 TABLET ORAL at 13:30

## 2024-03-20 RX ADMIN — MELATONIN 3 MG: 3 TAB ORAL at 20:17

## 2024-03-20 RX ADMIN — METOPROLOL TARTRATE 12.5 MG: 25 TABLET, FILM COATED ORAL at 08:16

## 2024-03-20 RX ADMIN — METOPROLOL TARTRATE 12.5 MG: 25 TABLET, FILM COATED ORAL at 20:17

## 2024-03-20 RX ADMIN — AMPICILLIN SODIUM 2 G: 2 INJECTION, POWDER, FOR SOLUTION INTRAMUSCULAR; INTRAVENOUS at 21:02

## 2024-03-20 RX ADMIN — CEFTRIAXONE SODIUM 2 G: 2 INJECTION, SOLUTION INTRAVENOUS at 20:17

## 2024-03-20 RX ADMIN — AMPICILLIN SODIUM 2 G: 2 INJECTION, POWDER, FOR SOLUTION INTRAMUSCULAR; INTRAVENOUS at 12:30

## 2024-03-20 RX ADMIN — HEPARIN SODIUM 1300 UNITS/HR: 10000 INJECTION, SOLUTION INTRAVENOUS at 13:55

## 2024-03-20 RX ADMIN — POTASSIUM CHLORIDE 20 MEQ: 1500 TABLET, EXTENDED RELEASE ORAL at 13:31

## 2024-03-20 RX ADMIN — AMPICILLIN SODIUM 2 G: 2 INJECTION, POWDER, FOR SOLUTION INTRAMUSCULAR; INTRAVENOUS at 04:30

## 2024-03-20 RX ADMIN — AMPICILLIN SODIUM 2 G: 2 INJECTION, POWDER, FOR SOLUTION INTRAMUSCULAR; INTRAVENOUS at 00:07

## 2024-03-20 RX ADMIN — AMPICILLIN SODIUM 2 G: 2 INJECTION, POWDER, FOR SOLUTION INTRAMUSCULAR; INTRAVENOUS at 09:24

## 2024-03-20 RX ADMIN — AMPICILLIN SODIUM 2 G: 2 INJECTION, POWDER, FOR SOLUTION INTRAMUSCULAR; INTRAVENOUS at 16:40

## 2024-03-20 RX ADMIN — CEFTRIAXONE SODIUM 2 G: 2 INJECTION, SOLUTION INTRAVENOUS at 08:16

## 2024-03-20 RX ADMIN — OXYCODONE HYDROCHLORIDE 5 MG: 5 TABLET ORAL at 08:16

## 2024-03-20 ASSESSMENT — COGNITIVE AND FUNCTIONAL STATUS - GENERAL
CLIMB 3 TO 5 STEPS WITH RAILING: A LOT
CLIMB 3 TO 5 STEPS WITH RAILING: A LITTLE
MOVING FROM LYING ON BACK TO SITTING ON SIDE OF FLAT BED WITH BEDRAILS: A LITTLE
MOVING TO AND FROM BED TO CHAIR: A LITTLE
TURNING FROM BACK TO SIDE WHILE IN FLAT BAD: A LITTLE
MOVING TO AND FROM BED TO CHAIR: A LITTLE
STANDING UP FROM CHAIR USING ARMS: A LITTLE
MOBILITY SCORE: 18
STANDING UP FROM CHAIR USING ARMS: A LITTLE
MOBILITY SCORE: 18
WALKING IN HOSPITAL ROOM: A LITTLE
DAILY ACTIVITIY SCORE: 24
TURNING FROM BACK TO SIDE WHILE IN FLAT BAD: A LITTLE
WALKING IN HOSPITAL ROOM: A LITTLE
MOBILITY SCORE: 21
DAILY ACTIVITIY SCORE: 24
CLIMB 3 TO 5 STEPS WITH RAILING: A LITTLE
WALKING IN HOSPITAL ROOM: A LITTLE
MOVING FROM LYING ON BACK TO SITTING ON SIDE OF FLAT BED WITH BEDRAILS: A LITTLE

## 2024-03-20 ASSESSMENT — PAIN SCALES - GENERAL
PAINLEVEL_OUTOF10: 0 - NO PAIN
PAINLEVEL_OUTOF10: 0 - NO PAIN
PAINLEVEL_OUTOF10: 6
PAINLEVEL_OUTOF10: 2
PAINLEVEL_OUTOF10: 6
PAINLEVEL_OUTOF10: 3
PAINLEVEL_OUTOF10: 0 - NO PAIN

## 2024-03-20 ASSESSMENT — PAIN - FUNCTIONAL ASSESSMENT
PAIN_FUNCTIONAL_ASSESSMENT: 0-10

## 2024-03-20 NOTE — PROGRESS NOTES
Physical Therapy    Physical Therapy Treatment    Patient Name: Fannie Watson  MRN: [id number]  Today's Date: 3/20/2024  Time Calculation  Start Time: 1410  Stop Time: 1435  Time Calculation (min): 25 min       Assessment/Plan   PT Assessment  End of Session Communication: Bedside nurse  Assessment Comment: .  End of Session Patient Position: Bed, 2 rail up, Alarm off, not on at start of session     PT Plan  Treatment/Interventions: Bed mobility, Transfer training, Gait training, Stair training, Balance training, Neuromuscular re-education, Strengthening, Endurance training, Range of motion, Therapeutic exercise, Therapeutic activity, Home exercise program, Postural re-education  PT Plan: Skilled PT  PT Frequency: Daily  PT Discharge Recommendations: Low intensity level of continued care  Equipment Recommended upon Discharge: Wheeled walker  PT Recommended Transfer Status: Assist x1, Assistive device, Stand by assist  PT - OK to Discharge: Yes      General Visit Information:   PT  Visit  PT Received On: 03/20/24  General  Prior to Session Communication: Bedside nurse  Patient Position Received: Alarm off, not on at start of session  General Comment: pt agreeable to session. pt reports    Subjective   Precautions:  Precautions  Medical Precautions: Fall precautions  Post-Surgical Precautions: Spinal precautions  Vital Signs:       Objective   Pain:  Pain Assessment  Pain Assessment: 0-10  Pain Score: 2  Cognition:  Cognition  Orientation Level: Oriented X4    Treatments:  Therapeutic Exercise  Therapeutic Exercise Activity 1: BLE x10- AP, GS, QS; cues for technique & breathing    Bed Mobility 1  Bed Mobility 1: Supine to sitting  Level of Assistance 1: Contact guard, Minimal verbal cues    Ambulation/Gait Training 1  Surface 1: Level tile  Device 1: Rolling walker  Assistance 1: Contact guard  Quality of Gait 1: Diminished heel strike, Forward flexed posture  Comments/Distance (ft) 1: 240, cues for walker  safety  Transfer 1  Technique 1: Sit to stand, Stand to sit  Transfer Device 1: Walker  Transfer Level of Assistance 1: Contact guard         Outcome Measures:  [facility] Basic Mobility  Turning from your back to your side while in a flat bed without using bedrails: A little  Moving from lying on your back to sitting on the side of a flat bed without using bedrails: A little  Moving to and from bed to chair (including a wheelchair): A little  Standing up from a chair using your arms (e.g. wheelchair or bedside chair): A little  To walk in hospital room: A little  Climbing 3-5 steps with railing: A little  Basic Mobility - Total Score: 18    Education Documentation  Mobility Training, taught by Nando Alves PTA at 3/20/2024  3:38 PM.  Learner: Patient  Readiness: Acceptance  Method: Explanation  Response: Verbalizes Understanding    Education Comments  No comments found.        OP EDUCATION:       Encounter Problems       Encounter Problems (Active)       PT Problem       Pt. will score 24/28 on Tinetti balance test.  (Progressing)       Start:  03/07/24    Expected End:  03/22/24            Patient will complete supine to sit and sit to supine Supervision  (Progressing)       Start:  03/07/24    Expected End:  03/22/24            Patient will complete stand to sit and sit to stand with Supervision with wheeled walker.  (Progressing)       Start:  03/07/24    Expected End:  03/22/24            Patient will ambulate >400' with Rolling Walker and Supervision  (Progressing)       Start:  03/07/24    Expected End:  03/22/24            Patient will ascend and descend 4 steps with 2 UE Support and Supervision  (Progressing)       Start:  03/07/24    Expected End:  03/22/24               Pain - Adult

## 2024-03-20 NOTE — CONSULTS
"Nutrition Follow Up Assessment:   Nutrition Assessment    Reason for Assessment: Admission nursing screening (Follow up)    Patient is a 78 y.o. female presenting with R SAH PMH of hypertension, hyperlipidemia, COPD, spondylolisthesis of lumbar lesion and status post L4-L5 decompression laminectomy and fusion on 3/1/2024  who was transferred from [facility] on 3/5/24 with altered mentation and trace R sylvian SAH on CT head for further management by neurosurgery in the [facility].        Nutrition History:  Energy Intake: Fair 50-75 %  Food and Nutrient History: Pt remains with variable Po intake range % of documented Po intake. Per Health touch assessment Pt ordering 3 meals per day. Cotinines to use Ensure plus BID-TID.No nausea or vomiting reported at this time. No other nutrition related questions or concerns at this time  Food Allergies/Intolerances:  None  GI Symptoms: None  Oral Problems: None       Anthropometrics:  Height: 157 cm (5' 1.81\")   Weight: 66.7 kg (147 lb 0.8 oz)   BMI (Calculated): 27.06  IBW/kg (Dietitian Calculated): 52.3 kg  Percent of IBW: 127 %       Weight History:   Wt Readings from Last 15 Encounters:   03/20/24 66.7 kg (147 lb 0.8 oz)      Weight Change %:  Weight History / % Weight Change: No significant weight changes since last nutrition assessment (3/13/24) Weight fluctuations may be related to fluid shifts and or scale variances. Limited weight history to assess recent weight changes.   Significant Weight Loss: No    Nutrition Focused Physical Exam Findings:    Subcutaneous Fat Loss:   Orbital Fat Pads: Mild-Moderate (slight dark circles and slight hollowing)  Buccal Fat Pads: Well nourished (full, rounded cheeks)  Triceps: Well nourished (ample fat tissue)  Muscle Wasting:  Temporalis: Well nourished (well-defined muscle)  Pectoralis (Clavicular Region): Mild-Moderate (some protrusion of clavicle)  Deltoid/Trapezius: Well nourished (rounded appearance at arm, " "shoulder, neck)  Interosseous: Well nourished (muscle bulges)  Edema:  Edema: +1 trace  Edema Location: BLE  Physical Findings:  Skin: Negative    Nutrition Significant Labs:  BMP Trend:   Results from last 7 days   Lab Units 03/20/24  0739 03/19/24  0310 03/17/24  0729 03/16/24  0440   GLUCOSE mg/dL 92 95 102* 119*   CALCIUM mg/dL 9.1 8.8 9.0 8.9   SODIUM mmol/L 136 136 132* 132*   POTASSIUM mmol/L 3.8 4.2 4.6 3.9   CO2 mmol/L 29 28 29 28   CHLORIDE mmol/L 98 96* 92* 94*   BUN mg/dL 6 7 7 5*   CREATININE mg/dL 0.48* 0.43* 0.58 0.53    , A1C:  Lab Results   Component Value Date    HGBA1C 5.5 03/06/2024   , BG POCT trend:   Results from last 7 days   Lab Units 03/18/24  1619   POCT GLUCOSE mg/dL 142*    , Vit D: No results found for: \"VITD25\" , Vit B12:   Lab Results   Component Value Date    [id number] 533 03/06/2024    , Iron Panel: No results found for: \"IRON\", \"TIBC\", \"FERRITIN\"     Nutrition Specific Medications:  Reviewed    I/O:   Last BM Date: 03/19/24; Stool Appearance: Hard (03/20/24 0913)    Dietary Orders (From admission, onward)       Start     Ordered    03/22/24 0001  NPO Diet Except: Sips with meds; Effective midnight  Diet effective midnight        Question:  Except:  Answer:  Sips with meds    03/18/24 1548    03/18/24 1922  Adult diet Cardiac; 70 gm fat; 2 - 3 grams Sodium  Diet effective now        Question Answer Comment   Diet type Cardiac    Fat restriction: 70 gm fat    Sodium restriction: 2 - 3 grams Sodium        03/18/24 1921    03/13/24 1641  Oral nutritional supplements  Until discontinued        Comments: Chocolate Ensure only - thanks   Question Answer Comment   Deliver with All meals    Select supplement: Ensure Plus        03/13/24 1640                     Estimated Needs:   Total Energy Estimated Needs (kCal): 1700 kCal  Method for Estimating Needs: 25kcal/kg ABW  Total Protein Estimated Needs (g): 70 g  Method for Estimating Needs: 1.4gm/kg IBW  Total Fluid Estimated Needs (mL):  " (per team)           Nutrition Diagnosis   Malnutrition Diagnosis  Patient has Malnutrition Diagnosis: No    Nutrition Diagnosis  Patient has Nutrition Diagnosis: Yes  Diagnosis Status (1): Ongoing  Nutrition Diagnosis 1: Inadequate oral intake  Related to (1): decreased appetite/intake with acute illness  As Evidenced by (1): report of eating less than 50% of meals.       Nutrition Interventions/Recommendations         Nutrition Prescription:  Individualized Nutrition Prescription Provided for : 5644-0521 kcals, 70 gm PRO        Nutrition Interventions:   Interventions: Medical food supplement  Medical Food Supplement: Commercial beverage  Goal: Recommend Ensure plus TID- Provides additional 350 kcasl and 13 gm PRO     Continue to document meal intake percent- Thank you!     Nutrition Education:   N/A       Nutrition Monitoring and Evaluation   Food/Nutrient Related History Monitoring  Monitoring and Evaluation Plan: Energy intake, Amount of food  Energy Intake: Estimated energy intake  Criteria: >75% of energy needs met via PO+ supplements  Amount of Food: Estimated amout of food  Criteria: >50% of meals and supplements    Body Composition/Growth/Weight History  Monitoring and Evaluation Plan: Weight  Criteria: no sig weight changes    Biochemical Data, Medical Tests and Procedures  Monitoring and Evaluation Plan: Glucose/endocrine profile  Glucose/Endocrine Profile: Glucose, casual, Glucose, fasting  Criteria: <180            Time Spent/Follow-up Reminder:   Time Spent (min): 45 minutes  Last Date of Nutrition Visit: 03/20/24  Nutrition Follow-Up Needed?: Dietitian to reassess per policy

## 2024-03-20 NOTE — PROGRESS NOTES
"CARDIAC SURGERY CONSULT PROGRESS NOTE    SUBJECTIVE  Fannie aWtson is a 78 y.o. female presenting with h/o HTN, HLD, COPD, 3/1 s/p MIS L4-5 TLIF, p/w AMS, 3/5 CTH trace R sylvian SAH. 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      Patient was transferred from [facility] in [place]. She was admitted there on 3/1/24 for an elective L4-L5 decompression laminectomy and fusion. Due to confusion CT head was completed and showed SAH. She was then transferred to  on 3/5 with concern for SAH.      The patient has been managed conservatively for her SAH. CTA was negative, MRI negative, MRI L spine was negative. EP was consulted for new Afib. EP recommended an echo be done given the new afib. The TTE was concerning for a MV mass. 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      A CROW was obtained to better visualize the mass/vegetation and mitral valve. CROW showed a large mobile vegetation on the MV and mod/severe MR.      Patient and family state that prior to her planned surgery 3/1 she was fairly active, doing her own grocery shopping and ADLs. She lives with her .      Cardiac surgery consulted for surgical evaluation.       Objective   /70 (BP Location: Right arm, Patient Position: Lying)   Pulse 80   Temp 36.3 °C (97.4 °F) (Temporal)   Resp 16   Ht 1.57 m (5' 1.81\")   Wt 66.7 kg (147 lb 0.8 oz)   LMP  (LMP Unknown)   SpO2 94%   BMI 27.06 kg/m²   Pain Score: 3   Vitals:    03/20/24 0423   Weight: 66.7 kg (147 lb 0.8 oz)          Intake/Output Summary (Last 24 hours) at 3/20/2024 1505  Last data filed at 3/20/2024 1300  Gross per 24 hour   Intake 480 ml   Output 300 ml   Net 180 ml           Medications  Scheduled medications  ampicillin, 2 g, intravenous, q4h  cefTRIAXone, 2 g, intravenous, q12h  [START ON 3/21/2024] chlorhexidine, 15 mL, Mouth/Throat, BID  metoprolol tartrate, 12.5 mg, oral, BID  polyethylene glycol, 17 g, oral, " BID  sennosides-docusate sodium, 2 tablet, oral, BID    Continuous medications  heparin, 0-4,000 Units/hr, Last Rate: 1,300 Units/hr (03/20/24 1355)    PRN medications  PRN medications: acetaminophen, benzocaine-menthol, benzonatate, bismuth subsalicylate, guaiFENesin, heparin, HYDROmorphone, ipratropium-albuteroL, ondansetron, oxyCODONE, oxyCODONE    Labs  Results for orders placed or performed during the hospital encounter of 03/05/24 (from the past 24 hour(s))   Heparin Assay, UFH   Result Value Ref Range    Heparin Unfractionated 0.6 See Comment Below for Therapeutic Ranges IU/mL   Heparin Assay, UFH   Result Value Ref Range    Heparin Unfractionated 0.5 See Comment Below for Therapeutic Ranges IU/mL   Type And Screen   Result Value Ref Range    ABO TYPE O     Rh TYPE NEG     ANTIBODY SCREEN NEG    Renal Function Panel   Result Value Ref Range    Glucose 92 74 - 99 mg/dL    Sodium 136 136 - 145 mmol/L    Potassium 3.8 3.5 - 5.3 mmol/L    Chloride 98 98 - 107 mmol/L    Bicarbonate 29 21 - 32 mmol/L    Anion Gap 13 10 - 20 mmol/L    Urea Nitrogen 6 6 - 23 mg/dL    Creatinine 0.48 (L) 0.50 - 1.05 mg/dL    eGFR >90 >60 mL/min/1.73m*2    Calcium 9.1 8.6 - 10.6 mg/dL    Phosphorus 3.3 2.5 - 4.9 mg/dL    Albumin 3.2 (L) 3.4 - 5.0 g/dL   CBC and Auto Differential   Result Value Ref Range    WBC 10.2 4.4 - 11.3 x10*3/uL    nRBC 0.0 0.0 - 0.0 /100 WBCs    RBC 3.71 (L) 4.00 - 5.20 x10*6/uL    Hemoglobin 10.1 (L) 12.0 - 16.0 g/dL    Hematocrit 31.9 (L) 36.0 - 46.0 %    MCV 86 80 - 100 fL    MCH 27.2 26.0 - 34.0 pg    MCHC 31.7 (L) 32.0 - 36.0 g/dL    RDW 15.0 (H) 11.5 - 14.5 %    Platelets 427 150 - 450 x10*3/uL    Neutrophils % 61.9 40.0 - 80.0 %    Immature Granulocytes %, Automated 1.7 (H) 0.0 - 0.9 %    Lymphocytes % 26.3 13.0 - 44.0 %    Monocytes % 7.6 2.0 - 10.0 %    Eosinophils % 1.9 0.0 - 6.0 %    Basophils % 0.6 0.0 - 2.0 %    Neutrophils Absolute 6.32 (H) 1.60 - 5.50 x10*3/uL    Immature Granulocytes Absolute,  Automated 0.17 0.00 - 0.50 x10*3/uL    Lymphocytes Absolute 2.68 0.80 - 3.00 x10*3/uL    Monocytes Absolute 0.78 0.05 - 0.80 x10*3/uL    Eosinophils Absolute 0.19 0.00 - 0.40 x10*3/uL    Basophils Absolute 0.06 0.00 - 0.10 x10*3/uL   Heparin Assay, UFH   Result Value Ref Range    Heparin Unfractionated 0.4 See Comment Below for Therapeutic Ranges IU/mL                 ASSESSMENT & PLAN  Fannie Watson is a 78 y.o. female presenting with h/o HTN, HLD, COPD, 3/1 s/p MIS L4-5 TLIF, p/w AMS, 3/5 CTH trace R sylvian SAH. 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      Patient was transferred from [facility] in [place]. She was admitted there on 3/1/24 for an elective L4-L5 decompression laminectomy and fusion. Due to confusion CT head was completed and showed SAH. She was then transferred to  on 3/5 with concern for SAH.      The patient has been managed conservatively for her SAH. CTA was negative, MRI negative, MRI L spine was negative. EP was consulted for new Afib. EP recommended an echo be done given the new afib. The TTE was concerning for a MV mass. 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      A CROW was obtained to better visualize the mass/vegetation and mitral valve. CROW showed a large mobile vegetation on the MV and mod/severe MR.      Patient and family state that prior to her planned surgery 3/1 she was fairly active, doing her own grocery shopping and ADLs. She lives with her .      Cardiac surgery consulted for surgical evaluation.     RECOMMENDATIONS/PLAN  - Dr. Chaudhari met with patient and reviewed imaging and data; tentatively booked for surgery for a mini MVR for 3/22 pending the below workup obtained and patient medically ready.     - CROW does show a vegetation on the MV along with mod/severe MR  - However, patient with current SAH although patient tolerated heparin. On 3/12 neurosurgery wrote a note saying patient is cleared to undergo  anticoagulation for procedures.   - Patient also needs source control of this infection. ID is questioning hardware in spine given CT abdomen/pelvis shows concerning of fluid collection in the spine and possible discitis osteomyelitis given no source of infection found. Will need that ruled out as possible source, especially before any valvular surgery. MRI spine showed enlarging fluid collection. IR drained fluid - results pending.     - LHC showed no coronary artery disease  - Preop risk stratification studies/labs/UA/PFTs and vascular ultrasounds ordered  --- PFTs done 3/19, FVC and FEV1 77% and 74% respectively   --- US carotids less than 50% stenosis bilaterally   - CT facial bones done and unremarkable   - No ACEi/ARBs in the pre-op period (at least 48 hours)  - Hold any SGLT2 inhibitors (Farxiga, Jardiance, etc.) for at least 3 days prior to cardiac surgery to prevent euglycemic DKA  - No antiplatelets other than ASA, no anticoagulants other than Heparin  - NPO after midnight, blood on hold/T&S, and preop scrubs ordered for OR 3/22.         Will continue to follow along.  Thank you for the consultation.   Patient educated and all questions answered.  Please page the cardiac surgery consult pager [phone] with any questions or changes in patient condition.      Delma Cantu PA-C  Cardiac Surgery Consult [facility]  [facility] ([facility]  Cardiac Surgery Consult Pager [phone]     3/20/2024  3:05 PM

## 2024-03-20 NOTE — CARE PLAN
The patient's goals for the shift include ELDA    The clinical goals for the shift include Patient will have theuputic heparin    Patient received q4 antibiotics and heparin therapeutic x2

## 2024-03-20 NOTE — PROGRESS NOTES
Physical Therapy                 Therapy Communication Note    Patient Name: Fannie Watson  MRN: [id number]  Today's Date: 3/20/2024     Discipline: Physical Therapy    Missed Visit Reason:      Missed Time: Attempt    Comment: pt refusing secondary to fatigue from recent ambulation to rest room

## 2024-03-20 NOTE — PROGRESS NOTES
Progress Note   [place]  March 20, 2024   Patient: Fannie Watson    Medical Record: [id number]      SUBJECTIVE     No acute overnight events reported.    Pt seen and examined this morning. Comfortable, denies sob/cp/n/v/f/c/abd pain. States procedure went well yesterday. No other concerns.    Active Medication:  ampicillin, 2 g, intravenous, q4h  cefTRIAXone, 2 g, intravenous, q12h  [START ON 3/21/2024] chlorhexidine, 15 mL, Mouth/Throat, BID  metoprolol tartrate, 12.5 mg, oral, BID  polyethylene glycol, 17 g, oral, BID  sennosides-docusate sodium, 2 tablet, oral, BID    PRN medications: acetaminophen, benzocaine-menthol, benzonatate, bismuth subsalicylate, guaiFENesin, heparin, HYDROmorphone, ipratropium-albuteroL, ondansetron, oxyCODONE, oxyCODONE    OBJECTIVE     Vitals:    Vitals:    03/19/24 1641 03/19/24 2058 03/19/24 2357 03/20/24 0423   BP: 158/71 150/74 144/77 149/75   BP Location: Left arm Left arm Left arm Left arm   Patient Position: Lying Lying Lying Lying   Pulse: 79 72 75    Resp: 23      Temp: 36.1 °C (97 °F) 35.7 °C (96.3 °F) 35.8 °C (96.5 °F) 36.2 °C (97.2 °F)   TempSrc: Temporal Temporal Temporal Temporal   SpO2: 93% 96% 96% 94%   Weight:    66.7 kg (147 lb 0.8 oz)   Height:         Failed to redirect to the Timeline version of the NWIX SmartLink.    Intake/Output Summary (Last 24 hours) at 3/20/2024 0639  Last data filed at 3/19/2024 1754  Gross per 24 hour   Intake 740 ml   Output 300 ml   Net 440 ml       Physical Exam  Gen: well appearing, A+Ox3, in no acute distress.  HEENT: PEERL, EOMI, sclera anicteric, no conjunctival injection, MMM.  Resp: No resp distress, no accessory muscle usage  CV: RRR, normal S1/S2, systolic murmur no rubs/gallops, no JVD  GI: non-tender, non-distended, no rebound or guarding.  Extremities/MSK: warm and well perfused, trace edema brody extr.  Neuro: A+Ox3, no focal deficits  Skin: warm and dry, no lesions, no rashes   Pressure  ulcers: no  Back: paraspinal sutures removed, no tenderness, hotness or discharge at the surgery site.     Labs:   Lab Results   Component Value Date    WBC 9.3 03/19/2024    HGB 9.6 (L) 03/19/2024    HCT 29.6 (L) 03/19/2024    MCV 84 03/19/2024     03/19/2024     Lab Results   Component Value Date    GLUCOSE 95 03/19/2024    CALCIUM 8.8 03/19/2024     03/19/2024    K 4.2 03/19/2024    CO2 28 03/19/2024    CL 96 (L) 03/19/2024    BUN 7 03/19/2024    CREATININE 0.43 (L) 03/19/2024     Lab Results   Component Value Date    ALT 23 03/12/2024    AST 17 03/12/2024    ALKPHOS 116 03/12/2024    BILITOT 0.7 03/12/2024     Lab Results   Component Value Date    INR 1.2 (H) 03/18/2024    INR 1.2 (H) 03/10/2024    INR 1.2 (H) 03/09/2024    PROTIME 13.5 (H) 03/18/2024    PROTIME 13.2 (H) 03/10/2024    PROTIME 13.5 (H) 03/09/2024     Cardiac History    CROW 3/8/24  CONCLUSIONS:   1. Left ventricular systolic function is normal with a 65-70% estimated ejection fraction.   2. There is an elongated (2.5 x 0.3 cm), filamentous mass that is likely a vegetation attached to the atrial side of the posterior leaflet. There is a resultant eccentric mitral regurgitation that is anteriorly directed. Using PISA, ERO: 0.34 cm2 with a regurg volume of 48.62 ml/beat. Although difficult to see, there does not appear to be a perforation. There is severe blunting of all pulmonary vein inflow with early temporary reversal in the right inferior pulmonary vein.   3. Moderate to severe mitral valve regurgitation.   4. The pulmonary artery is not well visualized.     TTE 3/7/24  CONCLUSIONS:   1. Left ventricular systolic function is hyperdynamic with a 70-75% estimated ejection fraction.   2. Spectral Doppler shows an impaired relaxation pattern of left ventricular diastolic filling.   3. Anterior leaflet mobile mass, consider vegetation, consider CROW.   4. Mild to moderate mitral valve regurgitation.     TTE (12/18/19 -[facility])  CONCLUSIONS:    - Exam indication: Routine surveillance (>1yr) of known cardiomyopathy without a change in clinical status   - The left ventricle is normal in size. Left ventricular systolic function is   normal. EF = 62 ± 5% (2D biplane) Grade I left ventricular diastolic dysfunction.   Normal global strain -23.0%.   - The right ventricle is normal in size. Right ventricular systolic function is   normal.   - There are no significant valvular abnormalities.   - Exam was compared with the prior  echocardiographic exam performed on   1/30/2019. No significant change.      [facility] (12/27/2018)  In summary:   1. Non-occlusive CAD  2. Mild  dilated  hypokinetic left ventricle with ejection fraction estimated at 40%. Consider stress cardiomyopathy/takotsubo variant.      Outpatient regimen  - bisoprolol 10mg  - lisinopril 10 mg daily  - Spironolactone 25 mg stopped at 1/19/24 admission     Relevant Results     MRI SPINE w and WO Contrast:  Impression:     1.  Postsurgical changes from left L4 facetectomy/laminectomy with  L4-L5 posterior fusion hardware and placement of L4-L5 intervertebral  disc spacer. Slight interval enlargement heterogenous intensity fluid  collection with a thin rim enhancement along the hemilaminectomy bed  currently measuring 5.1 x 1.8 x 2.4 cm, previously measuring 2.7 x  2.0 x 2.9 cm, and again favored to represent a postoperative seroma  or hematoma.  2. Similar degenerative changes within the lumbar spine most  prominent at L4-L5.      Amended report by neuro radiology attending:  There is decreased T1 signal and elevated T2 stir signal with  enhancement along the inferior endplate of L3. While this finding may  be on the basis of type 1 Modic discogenic degenerative changes and  given thin rim of enhancement described above surrounding fluid  collection in the operative bed infectious inflammatory disease can  not be excluded particularly given clinical concern for such. Please  correlate clinically and  consider follow-up examination and or  nuclear medicine if clinically necessary.        MRI brain 3/6/24  IMPRESSION:  1. Redemonstration of subarachnoid hemorrhage most pronounced in the right sylvian fissure and scattered right frontal and temporal sulci. There is also a small amount of intraventricular hemorrhage.  2. Suspect small areas of acute to early subacute ischemic injury in the right frontal lobe.  3. Nonspecific white matter changes most likely represent small-vessel ischemic disease in a patient of this age.        Blood Cx 3x drawn 3/7, 3/8, 3/9, 3/10  Positive for E faecium    CT CAP IMPRESSION 3/09:    1. No source of infection in the chest, abdomen, or pelvis.  2. Trace amount of layering debris in the trachea and mainstem  bronchi with bibasilar dependent subsegmental and left lower lobe  segmental atelectasis. No evidence of consolidative pneumonia.  3. A redemonstration of a loculated fluid collection in the left  paraspinal muscles at the L4 level extending toward the laminectomy  space measuring approximately 2.9 x 2.2 x 5.2 cm, the visualization  which is limited due to beam hardening artifact. Differential  diagnosis includes postop seroma/hematoma with or without  superimposed infection of the collection.  4. Several areas of ill-defined endplate erosions along the left  inferior L3 endplate and central superior L4 endplate. On MRI lumbar  spine 03/06/2024, there is loss of cortical definition in these  locations associated with hyperintense intradiscal signal in the  posterior L3-L4 intervertebral disc. Therefore, while degenerative  Modic changes are possible, consideration for discitis osteomyelitis  should remain in the differential diagnosis given no additional  source of infection the chest/abdomen/pelvis. Short-term follow-up  with contrast enhanced MRI may be of further diagnostic value should  patient's infection remain persistent/resistant to treatment.  5. 1 cm infarct in the  spleen of either acute or subacute age.  6. Colonic diverticulosis without evidence of acute diverticulitis.  7. 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 female with a PMH of hypertension, hyperlipidemia, COPD, spondylolisthesis of lumbar lesion and status post L4-L5 decompression laminectomy and fusion on 3/1/2024 who was transferred from [facility] ([facility] on 3/5/24 with altered mentation and trace R sylvian SAH on CT head for further management by neurosurgery in the [facility].  New onset Afib with RVR which was managed with metoprolol. workup led to a echo which ultimately demonstrated a large MV vegetation and moderate to severe MR by CROW. 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 Transferred from [facility] to cardiology service on 3/11. CT Surg and ID following, rec aspiration of spinal fluid collection by IR.    Updates 3/20:  -NGTD for fluid culture  -Fu cardiac sx, procedure planned for 3/22    Updates 3/19:  -Following fluid culture, currently PMNs but NGTD  -Tentative plan for CT surgery for MVR 3/22  -Lasix 20mg PO x1 (UOP net -2.1L 3/18)    #new onset Afib with RVR  #HTN  #Mitral valve vegetation  #moderate to severe Mitral regurgitation  3/7 afib w/ RVR, s/p IVF, s/p metop, TTE EF 70-75%, MV mobile mass     - C/W on Metoprolol 12.5 BID  - CTS are following, Dr. Chaudhari aware of patient, pending recs. Recommend Spine surgery evaluation for suspicion of Discitis/ OM   - as per [facility] note, CTH done 3/11-Imaging reviewed showing resolving blood. Patient is cleared to undergo anticoagulation for procedures from a neurosurgical perspective. If patient has exam change please stop anticoagulation and obtain STAT CT Head.   -Tentative plan for CT surgery for MVR 3/22  - Telemetry    #Enterococcus faecium bacteremia and native valve  mitral endocarditis  #Recent surgical intervention on 3/1 with MIS L4-5 TLIF  - Diagnostic Cerebral Angiogram on 3/08 showed No evidence of mycotic aneurysm.   - CT face/ CAP negative for other source of  infection, ( there is a splenic 1 cm infarct and suspicion of L3-L4 intervertebral disc. discitis osteomyelitis)    Blood Cx 3x drawn 3/7, 3/8, 3/9, 3/10, last positive blood culture on 3/10  started on (Cefepime 3/7-3/8, switched to daptomycin 3/8- 3/11 ) currently on Ceftriaxone 2g q12h 3/11- ampicillin 3/11-  Positive for E faecium    - ID following, appreciate recs.   - C/W Ceftriaxone 2g q12h 3/11- ampicillin 3/11-  - continue daily cultures until negative.    # Back pain iso Recent surgical intervention on 3/1 with MIS L4-5 TLIF  On:   Tylenol 650 every 4 hours as needed  Oxycodone 5 mg every 6 hours as needed for moderate pain  Oxycodone 10 mg every 6 hours as needed for severe pain first-line  Hydromorphone 0.2 every 3 hours as needed for severe pain 2nd line  -S/p fluid aspiration post surgical site 3/18: Following fluid culture, currently PMNs but NGTD    # COPD  Duo nebs at home    #Dysphagia  ::Hx of hiatal hernia  ::Pt states frequently with hiccups/burping, has to chew food extensively  ::SLP evaluated and cleared for regular diet  -Consider GI outpatient for further workup and mgmt    Antibiotics:: currently on  Ceftriaxone 2g q12h 3/11- ampicillin 3/11-  Dispo: PT/OT: Low intensity level of continued care , Wheeled walker     F: Replete PRN  E: Keep Mg >2, phos >3  and K >4  N: cardiac diet  A: PIV  O2: On RA  DVT ppx: heparin gtt  GI ppx: no indication  Code Status: Full code- confirmed 3/11  Contact: : Xander Watson, [phone]     Lg Robb, DO  PGY-1 Neurology

## 2024-03-21 ENCOUNTER — ANESTHESIA EVENT (OUTPATIENT)
Dept: OPERATING ROOM | Facility: HOSPITAL | Age: 79
DRG: 981 | End: 2024-03-21
Payer: MEDICARE

## 2024-03-21 LAB
ALBUMIN SERPL BCP-MCNC: 3.3 G/DL (ref 3.4–5)
ANION GAP SERPL CALC-SCNC: 14 MMOL/L (ref 10–20)
BACTERIA FLD CULT: NORMAL
BASOPHILS # BLD AUTO: 0.06 X10*3/UL (ref 0–0.1)
BASOPHILS NFR BLD AUTO: 0.6 %
BUN SERPL-MCNC: 6 MG/DL (ref 6–23)
CALCIUM SERPL-MCNC: 9.3 MG/DL (ref 8.6–10.6)
CHLORIDE SERPL-SCNC: 98 MMOL/L (ref 98–107)
CO2 SERPL-SCNC: 28 MMOL/L (ref 21–32)
CREAT SERPL-MCNC: 0.49 MG/DL (ref 0.5–1.05)
EGFRCR SERPLBLD CKD-EPI 2021: >90 ML/MIN/1.73M*2
EOSINOPHIL # BLD AUTO: 0.17 X10*3/UL (ref 0–0.4)
EOSINOPHIL NFR BLD AUTO: 1.8 %
ERYTHROCYTE [DISTWIDTH] IN BLOOD BY AUTOMATED COUNT: 15.4 % (ref 11.5–14.5)
GLUCOSE SERPL-MCNC: 89 MG/DL (ref 74–99)
GRAM STN SPEC: NORMAL
GRAM STN SPEC: NORMAL
HCT VFR BLD AUTO: 31.8 % (ref 36–46)
HGB BLD-MCNC: 10 G/DL (ref 12–16)
IMM GRANULOCYTES # BLD AUTO: 0.21 X10*3/UL (ref 0–0.5)
IMM GRANULOCYTES NFR BLD AUTO: 2.2 % (ref 0–0.9)
LYMPHOCYTES # BLD AUTO: 2.75 X10*3/UL (ref 0.8–3)
LYMPHOCYTES NFR BLD AUTO: 28.9 %
MAGNESIUM SERPL-MCNC: 2.16 MG/DL (ref 1.6–2.4)
MCH RBC QN AUTO: 27 PG (ref 26–34)
MCHC RBC AUTO-ENTMCNC: 31.4 G/DL (ref 32–36)
MCV RBC AUTO: 86 FL (ref 80–100)
MONOCYTES # BLD AUTO: 0.81 X10*3/UL (ref 0.05–0.8)
MONOCYTES NFR BLD AUTO: 8.5 %
NEUTROPHILS # BLD AUTO: 5.53 X10*3/UL (ref 1.6–5.5)
NEUTROPHILS NFR BLD AUTO: 58 %
NRBC BLD-RTO: 0 /100 WBCS (ref 0–0)
PHOSPHATE SERPL-MCNC: 3.7 MG/DL (ref 2.5–4.9)
PLATELET # BLD AUTO: 444 X10*3/UL (ref 150–450)
POTASSIUM SERPL-SCNC: 4.2 MMOL/L (ref 3.5–5.3)
RBC # BLD AUTO: 3.71 X10*6/UL (ref 4–5.2)
SODIUM SERPL-SCNC: 136 MMOL/L (ref 136–145)
UFH PPP CHRO-ACNC: 0.6 IU/ML
WBC # BLD AUTO: 9.5 X10*3/UL (ref 4.4–11.3)

## 2024-03-21 PROCEDURE — 2500000004 HC RX 250 GENERAL PHARMACY W/ HCPCS (ALT 636 FOR OP/ED)

## 2024-03-21 PROCEDURE — 99232 SBSQ HOSP IP/OBS MODERATE 35: CPT | Performed by: INTERNAL MEDICINE

## 2024-03-21 PROCEDURE — 2500000001 HC RX 250 WO HCPCS SELF ADMINISTERED DRUGS (ALT 637 FOR MEDICARE OP)

## 2024-03-21 PROCEDURE — 2500000002 HC RX 250 W HCPCS SELF ADMINISTERED DRUGS (ALT 637 FOR MEDICARE OP, ALT 636 FOR OP/ED): Mod: MUE

## 2024-03-21 PROCEDURE — 2500000002 HC RX 250 W HCPCS SELF ADMINISTERED DRUGS (ALT 637 FOR MEDICARE OP, ALT 636 FOR OP/ED)

## 2024-03-21 PROCEDURE — 97535 SELF CARE MNGMENT TRAINING: CPT | Mod: GO

## 2024-03-21 PROCEDURE — 97530 THERAPEUTIC ACTIVITIES: CPT | Mod: GP,CQ

## 2024-03-21 PROCEDURE — 85025 COMPLETE CBC W/AUTO DIFF WBC: CPT

## 2024-03-21 PROCEDURE — 97116 GAIT TRAINING THERAPY: CPT | Mod: GP,CQ

## 2024-03-21 PROCEDURE — 1200000002 HC GENERAL ROOM WITH TELEMETRY DAILY

## 2024-03-21 PROCEDURE — 83735 ASSAY OF MAGNESIUM: CPT

## 2024-03-21 PROCEDURE — 2500000001 HC RX 250 WO HCPCS SELF ADMINISTERED DRUGS (ALT 637 FOR MEDICARE OP): Performed by: PHYSICIAN ASSISTANT

## 2024-03-21 PROCEDURE — 80069 RENAL FUNCTION PANEL: CPT

## 2024-03-21 PROCEDURE — 85520 HEPARIN ASSAY: CPT

## 2024-03-21 PROCEDURE — 36415 COLL VENOUS BLD VENIPUNCTURE: CPT

## 2024-03-21 RX ORDER — HYDROXYZINE HYDROCHLORIDE 10 MG/1
10 TABLET, FILM COATED ORAL ONCE
Status: COMPLETED | OUTPATIENT
Start: 2024-03-21 | End: 2024-03-21

## 2024-03-21 RX ORDER — DIPHENHYDRAMINE HCL 25 MG
25 CAPSULE ORAL ONCE
Status: COMPLETED | OUTPATIENT
Start: 2024-03-21 | End: 2024-03-21

## 2024-03-21 RX ORDER — DIPHENHYDRAMINE HCL 25 MG
50 CAPSULE ORAL ONCE
Status: COMPLETED | OUTPATIENT
Start: 2024-03-21 | End: 2024-03-21

## 2024-03-21 RX ADMIN — OXYCODONE HYDROCHLORIDE 5 MG: 5 TABLET ORAL at 18:27

## 2024-03-21 RX ADMIN — CHLORHEXIDINE GLUCONATE 15 ML: 1.2 SOLUTION ORAL at 08:48

## 2024-03-21 RX ADMIN — AMPICILLIN SODIUM 2 G: 2 INJECTION, POWDER, FOR SOLUTION INTRAMUSCULAR; INTRAVENOUS at 00:28

## 2024-03-21 RX ADMIN — POLYETHYLENE GLYCOL 3350 17 G: 17 POWDER, FOR SOLUTION ORAL at 08:48

## 2024-03-21 RX ADMIN — ACETAMINOPHEN 650 MG: 325 TABLET ORAL at 15:47

## 2024-03-21 RX ADMIN — AMPICILLIN SODIUM 2 G: 2 INJECTION, POWDER, FOR SOLUTION INTRAMUSCULAR; INTRAVENOUS at 17:04

## 2024-03-21 RX ADMIN — IPRATROPIUM BROMIDE AND ALBUTEROL SULFATE 3 ML: .5; 3 SOLUTION RESPIRATORY (INHALATION) at 12:14

## 2024-03-21 RX ADMIN — OXYCODONE HYDROCHLORIDE 5 MG: 5 TABLET ORAL at 12:22

## 2024-03-21 RX ADMIN — HYDROXYZINE HYDROCHLORIDE 10 MG: 10 TABLET ORAL at 08:49

## 2024-03-21 RX ADMIN — ACETAMINOPHEN 650 MG: 325 TABLET ORAL at 20:49

## 2024-03-21 RX ADMIN — AMPICILLIN SODIUM 2 G: 2 INJECTION, POWDER, FOR SOLUTION INTRAMUSCULAR; INTRAVENOUS at 09:44

## 2024-03-21 RX ADMIN — CEFTRIAXONE SODIUM 2 G: 2 INJECTION, SOLUTION INTRAVENOUS at 20:02

## 2024-03-21 RX ADMIN — MELATONIN 3 MG: 3 TAB ORAL at 20:13

## 2024-03-21 RX ADMIN — AMPICILLIN SODIUM 2 G: 2 INJECTION, POWDER, FOR SOLUTION INTRAMUSCULAR; INTRAVENOUS at 12:10

## 2024-03-21 RX ADMIN — HEPARIN SODIUM 1300 UNITS/HR: 10000 INJECTION, SOLUTION INTRAVENOUS at 08:29

## 2024-03-21 RX ADMIN — DIPHENHYDRAMINE HYDROCHLORIDE 50 MG: 25 CAPSULE ORAL at 07:56

## 2024-03-21 RX ADMIN — CEFTRIAXONE SODIUM 2 G: 2 INJECTION, SOLUTION INTRAVENOUS at 08:28

## 2024-03-21 RX ADMIN — METOPROLOL TARTRATE 12.5 MG: 25 TABLET, FILM COATED ORAL at 08:48

## 2024-03-21 RX ADMIN — METOPROLOL TARTRATE 12.5 MG: 25 TABLET, FILM COATED ORAL at 20:02

## 2024-03-21 RX ADMIN — IPRATROPIUM BROMIDE AND ALBUTEROL SULFATE 3 ML: .5; 3 SOLUTION RESPIRATORY (INHALATION) at 20:02

## 2024-03-21 RX ADMIN — CHLORHEXIDINE GLUCONATE 15 ML: 1.2 SOLUTION ORAL at 20:02

## 2024-03-21 RX ADMIN — DIPHENHYDRAMINE HYDROCHLORIDE 25 MG: 25 CAPSULE ORAL at 18:28

## 2024-03-21 RX ADMIN — AMPICILLIN SODIUM 2 G: 2 INJECTION, POWDER, FOR SOLUTION INTRAMUSCULAR; INTRAVENOUS at 20:49

## 2024-03-21 RX ADMIN — AMPICILLIN SODIUM 2 G: 2 INJECTION, POWDER, FOR SOLUTION INTRAMUSCULAR; INTRAVENOUS at 04:33

## 2024-03-21 RX ADMIN — SENNOSIDES AND DOCUSATE SODIUM 2 TABLET: 8.6; 5 TABLET ORAL at 08:48

## 2024-03-21 ASSESSMENT — COGNITIVE AND FUNCTIONAL STATUS - GENERAL
TURNING FROM BACK TO SIDE WHILE IN FLAT BAD: A LITTLE
TOILETING: A LITTLE
DRESSING REGULAR LOWER BODY CLOTHING: A LITTLE
CLIMB 3 TO 5 STEPS WITH RAILING: A LITTLE
TURNING FROM BACK TO SIDE WHILE IN FLAT BAD: A LITTLE
CLIMB 3 TO 5 STEPS WITH RAILING: A LITTLE
HELP NEEDED FOR BATHING: A LITTLE
WALKING IN HOSPITAL ROOM: A LITTLE
MOVING TO AND FROM BED TO CHAIR: A LITTLE
WALKING IN HOSPITAL ROOM: A LITTLE
MOVING TO AND FROM BED TO CHAIR: A LITTLE
TURNING FROM BACK TO SIDE WHILE IN FLAT BAD: A LITTLE
MOVING FROM LYING ON BACK TO SITTING ON SIDE OF FLAT BED WITH BEDRAILS: A LITTLE
DRESSING REGULAR LOWER BODY CLOTHING: A LITTLE
MOBILITY SCORE: 18
MOVING FROM LYING ON BACK TO SITTING ON SIDE OF FLAT BED WITH BEDRAILS: A LITTLE
MOBILITY SCORE: 18
DAILY ACTIVITIY SCORE: 19
DRESSING REGULAR UPPER BODY CLOTHING: A LITTLE
CLIMB 3 TO 5 STEPS WITH RAILING: A LITTLE
DRESSING REGULAR UPPER BODY CLOTHING: A LITTLE
HELP NEEDED FOR BATHING: A LITTLE
STANDING UP FROM CHAIR USING ARMS: A LITTLE
DRESSING REGULAR LOWER BODY CLOTHING: A LITTLE
STANDING UP FROM CHAIR USING ARMS: A LITTLE
WALKING IN HOSPITAL ROOM: A LITTLE
PERSONAL GROOMING: A LITTLE
MOVING FROM LYING ON BACK TO SITTING ON SIDE OF FLAT BED WITH BEDRAILS: A LITTLE
TOILETING: A LITTLE
PERSONAL GROOMING: A LITTLE
TOILETING: A LITTLE
DAILY ACTIVITIY SCORE: 19
HELP NEEDED FOR BATHING: A LITTLE
MOBILITY SCORE: 18
MOVING TO AND FROM BED TO CHAIR: A LITTLE
DRESSING REGULAR UPPER BODY CLOTHING: A LITTLE
DAILY ACTIVITIY SCORE: 19
PERSONAL GROOMING: A LITTLE
STANDING UP FROM CHAIR USING ARMS: A LITTLE

## 2024-03-21 ASSESSMENT — PAIN DESCRIPTION - DESCRIPTORS
DESCRIPTORS: THROBBING;SHARP
DESCRIPTORS: THROBBING
DESCRIPTORS: SHARP;THROBBING

## 2024-03-21 ASSESSMENT — PAIN SCALES - GENERAL
PAINLEVEL_OUTOF10: 6
PAINLEVEL_OUTOF10: 3
PAINLEVEL_OUTOF10: 0 - NO PAIN
PAINLEVEL_OUTOF10: 0 - NO PAIN
PAINLEVEL_OUTOF10: 6
PAINLEVEL_OUTOF10: 0 - NO PAIN
PAINLEVEL_OUTOF10: 0 - NO PAIN

## 2024-03-21 ASSESSMENT — PAIN DESCRIPTION - ORIENTATION
ORIENTATION: LOWER

## 2024-03-21 ASSESSMENT — PAIN - FUNCTIONAL ASSESSMENT
PAIN_FUNCTIONAL_ASSESSMENT: 0-10

## 2024-03-21 ASSESSMENT — PAIN DESCRIPTION - LOCATION
LOCATION: BACK

## 2024-03-21 ASSESSMENT — ACTIVITIES OF DAILY LIVING (ADL): HOME_MANAGEMENT_TIME_ENTRY: 11

## 2024-03-21 NOTE — PROGRESS NOTES
03/21/24        Transitional Care Coordination Progress Note:   Patient discussed during interdisciplinary rounds.   Team members present: RILEY COFFEY MD   Plan per Medical/Surgical team: adm dx subarachnoid hemorrhage,h/o HTN, HLD, COPD OR 3/22 Patient Not medically ready   Discharge disposition: Home with Home care vs outpatient   Status-Inpatient   Payer- MEDICARE   Potential Barriers: None   ADOD: 3-   Sheldon Kang RN [facility] [phone]

## 2024-03-21 NOTE — PROGRESS NOTES
Physical Therapy    Physical Therapy Treatment    Patient Name: Fannie Watson  MRN: [id number]  Today's Date: 3/21/2024  Time Calculation  Start Time: 0950  Stop Time: 1015  Time Calculation (min): 25 min       Assessment/Plan   PT Assessment  End of Session Communication: Bedside nurse  Assessment Comment: .  End of Session Patient Position: Bed, 2 rail up, Alarm off, not on at start of session     PT Plan  Treatment/Interventions: Bed mobility, Transfer training, Gait training, Stair training, Balance training, Neuromuscular re-education, Strengthening, Endurance training, Range of motion, Therapeutic exercise, Therapeutic activity, Home exercise program, Postural re-education  PT Plan: Skilled PT  PT Frequency: Daily  PT Discharge Recommendations: Low intensity level of continued care  Equipment Recommended upon Discharge: Wheeled walker  PT Recommended Transfer Status: Assist x1, Assistive device, Stand by assist  PT - OK to Discharge: Yes      General Visit Information:   PT  Visit  PT Received On: 03/21/24  General  Prior to Session Communication: Bedside nurse  Patient Position Received: Alarm off, not on at start of session  General Comment: pt agreeable to session. pt demonstrates increased ambulation tolerance and able to ambulate 2x300' using wheeled walker.    Subjective   Precautions:  Precautions  Medical Precautions: Fall precautions  Post-Surgical Precautions: Spinal precautions  Vital Signs:       Objective   Pain:  Pain Assessment  Pain Assessment: 0-10  Pain Score: 2  Cognition:  Cognition  Orientation Level: Oriented X4       Treatments:  Therapeutic Exercise  Therapeutic Exercise Activity 1: sitting AP and LAQ x15 reps AROM    Bed Mobility 1  Bed Mobility 1: Supine to sitting  Level of Assistance 1: Close supervision  Bed Mobility Comments 1: cues for log roll    Ambulation/Gait Training 1  Surface 1: Level tile  Device 1: Rolling walker  Assistance 1: Contact guard  Quality of Gait 1: Diminished  heel strike, Forward flexed posture  Comments/Distance (ft) 1: 2x300, cues for posture and energy conservation  Transfer 1  Technique 1: Sit to stand, Stand to sit  Transfer Device 1: Walker  Transfer Level of Assistance 1: Contact guard  Trials/Comments 1: cues for hand placmeent         Outcome Measures:  [facility] Basic Mobility  Turning from your back to your side while in a flat bed without using bedrails: A little  Moving from lying on your back to sitting on the side of a flat bed without using bedrails: A little  Moving to and from bed to chair (including a wheelchair): A little  Standing up from a chair using your arms (e.g. wheelchair or bedside chair): A little  To walk in hospital room: A little  Climbing 3-5 steps with railing: A little  Basic Mobility - Total Score: 18    Education Documentation  Body Mechanics, taught by Nando Alves PTA at 3/21/2024 12:23 PM.  Learner: Patient  Readiness: Acceptance  Method: Explanation  Response: Verbalizes Understanding    Education Comments  No comments found.        OP EDUCATION:       Encounter Problems       Encounter Problems (Active)       PT Problem       Pt. will score 24/28 on Tinetti balance test.  (Progressing)       Start:  03/07/24    Expected End:  03/22/24            Patient will complete supine to sit and sit to supine Supervision  (Progressing)       Start:  03/07/24    Expected End:  03/22/24            Patient will complete stand to sit and sit to stand with Supervision with wheeled walker.  (Progressing)       Start:  03/07/24    Expected End:  03/22/24            Patient will ambulate >400' with Rolling Walker and Supervision  (Progressing)       Start:  03/07/24    Expected End:  03/22/24            Patient will ascend and descend 4 steps with 2 UE Support and Supervision  (Progressing)       Start:  03/07/24    Expected End:  03/22/24               Pain - Adult

## 2024-03-21 NOTE — PROGRESS NOTES
Progress Note   [facility]  March 21, 2024   Patient: Fannie Watson    Medical Record: [id number]      SUBJECTIVE     No acute overnight events reported.    Pt seen and examined this morning. Comfortable, denies sob/cp/n/v/f/c/abd pain. Is endorsing itching to her hands and posterior proximal posterior LE bilaterally. Attributes this to lying in bed for prolonged period of time. Used lotion without relief, requesting Benadryl. Otherwise no concerns, looking forward to procedure tomorrow.    Active Medication:  ampicillin, 2 g, intravenous, q4h  cefTRIAXone, 2 g, intravenous, q12h  chlorhexidine, 15 mL, Mouth/Throat, BID  metoprolol tartrate, 12.5 mg, oral, BID  polyethylene glycol, 17 g, oral, BID  sennosides-docusate sodium, 2 tablet, oral, BID    PRN medications: acetaminophen, benzocaine-menthol, benzonatate, bismuth subsalicylate, guaiFENesin, heparin, HYDROmorphone, ipratropium-albuteroL, melatonin, ondansetron, oxyCODONE, oxyCODONE    OBJECTIVE     Vitals:    Vitals:    03/20/24 1654 03/20/24 1910 03/20/24 2321 03/21/24 0500   BP: 138/65 158/74 143/77 144/74   BP Location: Left arm Left arm Left arm Left arm   Patient Position: Lying Lying Lying Lying   Pulse: 76 84 73    Resp: 18  18    Temp: 36.2 °C (97.1 °F) 35.9 °C (96.6 °F) 35.9 °C (96.6 °F) 36.2 °C (97.2 °F)   TempSrc: Temporal Temporal Temporal Temporal   SpO2: 94% 97% 95% 95%   Weight:    66.7 kg (147 lb 0.8 oz)   Height:         Failed to redirect to the Timeline version of the [employer] SmartLink.    Intake/Output Summary (Last 24 hours) at 3/21/2024 0644  Last data filed at 3/20/2024 1654  Gross per 24 hour   Intake 600 ml   Output 401 ml   Net 199 ml       Physical Exam  Gen: well appearing, A+Ox3, in no acute distress.  HEENT: PEERL, EOMI, sclera anicteric, no conjunctival injection, MMM.  Resp: No resp distress, no accessory muscle usage  CV: RRR, normal S1/S2, systolic murmur no rubs/gallops, no JVD  GI: non-tender,  non-distended, no rebound or guarding.  Extremities/MSK: warm and well perfused, trace edema brody extr.  Neuro: A+Ox3, no focal deficits  Skin: warm and dry, no lesions, no rashes   Pressure ulcers: no  Back: paraspinal sutures removed, no tenderness, hotness or discharge at the surgery site.     Labs:   Lab Results   Component Value Date    WBC 10.2 03/20/2024    HGB 10.1 (L) 03/20/2024    HCT 31.9 (L) 03/20/2024    MCV 86 03/20/2024     03/20/2024     Lab Results   Component Value Date    GLUCOSE 92 03/20/2024    CALCIUM 9.1 03/20/2024     03/20/2024    K 3.8 03/20/2024    CO2 29 03/20/2024    CL 98 03/20/2024    BUN 6 03/20/2024    CREATININE 0.48 (L) 03/20/2024     Lab Results   Component Value Date    ALT 23 03/12/2024    AST 17 03/12/2024    ALKPHOS 116 03/12/2024    BILITOT 0.7 03/12/2024     Lab Results   Component Value Date    INR 1.2 (H) 03/18/2024    INR 1.2 (H) 03/10/2024    INR 1.2 (H) 03/09/2024    PROTIME 13.5 (H) 03/18/2024    PROTIME 13.2 (H) 03/10/2024    PROTIME 13.5 (H) 03/09/2024     Cardiac History    CROW 3/8/24  CONCLUSIONS:   1. Left ventricular systolic function is normal with a 65-70% estimated ejection fraction.   2. There is an elongated (2.5 x 0.3 cm), filamentous mass that is likely a vegetation attached to the atrial side of the posterior leaflet. There is a resultant eccentric mitral regurgitation that is anteriorly directed. Using PISA, ERO: 0.34 cm2 with a regurg volume of 48.62 ml/beat. Although difficult to see, there does not appear to be a perforation. There is severe blunting of all pulmonary vein inflow with early temporary reversal in the right inferior pulmonary vein.   3. Moderate to severe mitral valve regurgitation.   4. The pulmonary artery is not well visualized.     TTE 3/7/24  CONCLUSIONS:   1. Left ventricular systolic function is hyperdynamic with a 70-75% estimated ejection fraction.   2. Spectral Doppler shows an impaired relaxation pattern of left  ventricular diastolic filling.   3. Anterior leaflet mobile mass, consider vegetation, consider CROW.   4. Mild to moderate mitral valve regurgitation.     TTE (12/18/19 -[facility])  CONCLUSIONS:   - Exam indication: Routine surveillance (>1yr) of known cardiomyopathy without a change in clinical status   - The left ventricle is normal in size. Left ventricular systolic function is   normal. EF = 62 ± 5% (2D biplane) Grade I left ventricular diastolic dysfunction.   Normal global strain -23.0%.   - The right ventricle is normal in size. Right ventricular systolic function is   normal.   - There are no significant valvular abnormalities.   - Exam was compared with the prior  echocardiographic exam performed on   1/30/2019. No significant change.      [facility] (12/27/2018)  In summary:   1. Non-occlusive CAD  2. Mild  dilated  hypokinetic left ventricle with ejection fraction estimated at 40%. Consider stress cardiomyopathy/takotsubo variant.      Outpatient regimen  - bisoprolol 10mg  - lisinopril 10 mg daily  - Spironolactone 25 mg stopped at 1/19/24 admission     Relevant Results     MRI SPINE w and WO Contrast:  Impression:     1.  Postsurgical changes from left L4 facetectomy/laminectomy with  L4-L5 posterior fusion hardware and placement of L4-L5 intervertebral  disc spacer. Slight interval enlargement heterogenous intensity fluid  collection with a thin rim enhancement along the hemilaminectomy bed  currently measuring 5.1 x 1.8 x 2.4 cm, previously measuring 2.7 x  2.0 x 2.9 cm, and again favored to represent a postoperative seroma  or hematoma.  2. Similar degenerative changes within the lumbar spine most  prominent at L4-L5.      Amended report by neuro radiology attending:  There is decreased T1 signal and elevated T2 stir signal with  enhancement along the inferior endplate of L3. While this finding may  be on the basis of type 1 Modic discogenic degenerative changes and  given thin rim of enhancement described  above surrounding fluid  collection in the operative bed infectious inflammatory disease can  not be excluded particularly given clinical concern for such. Please  correlate clinically and consider follow-up examination and or  nuclear medicine if clinically necessary.        MRI brain 3/6/24  IMPRESSION:  1. Redemonstration of subarachnoid hemorrhage most pronounced in the right sylvian fissure and scattered right frontal and temporal sulci. There is also a small amount of intraventricular hemorrhage.  2. Suspect small areas of acute to early subacute ischemic injury in the right frontal lobe.  3. Nonspecific white matter changes most likely represent small-vessel ischemic disease in a patient of this age.        Blood Cx 3x drawn 3/7, 3/8, 3/9, 3/10  Positive for E faecium    CT CAP IMPRESSION 3/09:    1. No source of infection in the chest, abdomen, or pelvis.  2. Trace amount of layering debris in the trachea and mainstem  bronchi with bibasilar dependent subsegmental and left lower lobe  segmental atelectasis. No evidence of consolidative pneumonia.  3. A redemonstration of a loculated fluid collection in the left  paraspinal muscles at the L4 level extending toward the laminectomy  space measuring approximately 2.9 x 2.2 x 5.2 cm, the visualization  which is limited due to beam hardening artifact. Differential  diagnosis includes postop seroma/hematoma with or without  superimposed infection of the collection.  4. Several areas of ill-defined endplate erosions along the left  inferior L3 endplate and central superior L4 endplate. On MRI lumbar  spine 03/06/2024, there is loss of cortical definition in these  locations associated with hyperintense intradiscal signal in the  posterior L3-L4 intervertebral disc. Therefore, while degenerative  Modic changes are possible, consideration for discitis osteomyelitis  should remain in the differential diagnosis given no additional  source of infection the  chest/abdomen/pelvis. Short-term follow-up  with contrast enhanced MRI may be of further diagnostic value should  patient's infection remain persistent/resistant to treatment.  5. 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  6. Colonic diverticulosis without evidence of acute diverticulitis.  7. 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 female with a PMH of hypertension, hyperlipidemia, COPD, spondylolisthesis of lumbar lesion and status post L4-L5 decompression laminectomy and fusion on 3/1/2024 who was transferred from [facility] ([facility]) on 3/5/24 with altered mentation and trace R sylvian SAH on CT head for further management by neurosurgery in the [facility].  New onset Afib with RVR which was managed with metoprolol. workup led to a echo which ultimately demonstrated a large MV vegetation and moderate to severe MR by CROW. 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 Transferred from [facility] to cardiology service on 3/11. CT Surg and ID following, rec aspiration of spinal fluid collection by IR.    Updates 3/21:  -Atarax 10mg x1 for itching  -Fluid culture remains NGTD  -NPO@MN for MVR  -Dispo is home with home PT    Updates 3/20:  -NGTD for fluid culture  -Fu cardiac sx, procedure planned for 3/22    #new onset Afib with RVR  #HTN  #Mitral valve vegetation  #moderate to severe Mitral regurgitation  3/7 afib w/ RVR, s/p IVF, s/p metop, TTE EF 70-75%, MV mobile mass     - C/W on Metoprolol 12.5 BID  - CTS are following, Dr. Chaudhari aware of patient, pending recs. Recommend Spine surgery evaluation for suspicion of Discitis/ OM   - as per [facility] note, CTH done 3/11-Imaging reviewed showing resolving blood. Patient is cleared to undergo anticoagulation for procedures from a neurosurgical perspective. If patient has exam  change please stop anticoagulation and obtain STAT CT Head.   -Plan for CT surgery for MVR 3/22  - Telemetry    #Enterococcus faecium bacteremia and native valve mitral endocarditis  #Recent surgical intervention on 3/1 with MIS L4-5 TLIF  - Diagnostic Cerebral Angiogram on 3/08 showed No evidence of mycotic aneurysm.   - CT face/ CAP negative for other source of  infection, ( there is a splenic 1 cm infarct and suspicion of L3-L4 intervertebral disc. discitis osteomyelitis)    Blood Cx 3x drawn 3/7, 3/8, 3/9, 3/10, last positive blood culture on 3/10  started on (Cefepime 3/7-3/8, switched to daptomycin 3/8- 3/11 ) currently on Ceftriaxone 2g q12h 3/11- ampicillin 3/11-  Positive for E faecium    - ID following, appreciate recs.   - C/W Ceftriaxone 2g q12h 3/11- ampicillin 3/11-  - continue daily cultures until negative.    # Back pain iso Recent surgical intervention on 3/1 with MIS L4-5 TLIF  On:   Tylenol 650 every 4 hours as needed  Oxycodone 5 mg every 6 hours as needed for moderate pain  Oxycodone 10 mg every 6 hours as needed for severe pain first-line  Hydromorphone 0.2 every 3 hours as needed for severe pain 2nd line  -S/p fluid aspiration post surgical site 3/18: Following fluid culture, currently PMNs but NGTD    # COPD  Duo nebs at home    #Dysphagia  ::Hx of hiatal hernia  ::Pt states frequently with hiccups/burping, has to chew food extensively  ::SLP evaluated and cleared for regular diet  -Consider GI outpatient for further workup and mgmt    Antibiotics:: currently on  Ceftriaxone 2g q12h 3/11- ampicillin 3/11-  Dispo: PT/OT: Low intensity level of continued care , Wheeled walker     F: Replete PRN  E: Keep Mg >2, phos >3  and K >4  N: cardiac diet  A: PIV  O2: On RA  DVT ppx: heparin gtt  GI ppx: no indication  Code Status: Full code- confirmed 3/11  Contact: : Xander Watson, [phone]     Lg Robb, DO  PGY-1 Neurology

## 2024-03-21 NOTE — PROGRESS NOTES
Occupational Therapy    OT Treatment    Patient Name: Fannie Watson  MRN: [id number]  Today's Date: 3/21/2024  Time Calculation  Start Time: 1458  Stop Time: 1509  Time Calculation (min): 11 min       Assessment:  OT Assessment: Pt will benefit from continued skilled OT to increase independence in ADLs, functional mobility, cognition, and activity tolerance  Prognosis: Good  Barriers to Discharge: None  Evaluation/Treatment Tolerance: Patient tolerated treatment well  Medical Staff Made Aware: Yes  End of Session Communication: Bedside nurse  End of Session Patient Position: Bed, 3 rail up, Alarm off, not on at start of session  OT Assessment Results: Decreased ADL status, Decreased upper extremity strength, Decreased safe judgment during ADL, Decreased cognition, Decreased endurance, Decreased functional mobility, Decreased gross motor control, Decreased IADLs  Prognosis: Good  Barriers to Discharge: None  Evaluation/Treatment Tolerance: Patient tolerated treatment well  Medical Staff Made Aware: Yes  Plan:  Treatment Interventions: ADL retraining, Functional transfer training, Endurance training, Cognitive reorientation, Patient/family training, Equipment evaluation/education, Compensatory technique education  OT Frequency: 3 times per week  OT Discharge Recommendations: Low intensity level of continued care  Equipment Recommended upon Discharge: Wheeled walker  OT Recommended Transfer Status: Assist of 1  OT - OK to Discharge: Yes  Treatment Interventions: ADL retraining, Functional transfer training, Endurance training, Cognitive reorientation, Patient/family training, Equipment evaluation/education, Compensatory technique education    Subjective   Previous Visit Info:  OT Last Visit  OT Received On: 03/21/24  General:  General  Reason for Referral: AMS and R sylvian SAH  Past Medical History Relevant to Rehab: HTN, HLD, COPD, 3/1 s/p MIS L4-5 TLIF  Family/Caregiver Present: No  Prior to Session Communication:  Bedside nurse  Patient Position Received: Bed, 3 rail up, Alarm off, not on at start of session  Preferred Learning Style: auditory, verbal, visual  General Comment: Pt supine in bed upon arrival, agreeable to OT  Precautions:  Medical Precautions: Fall precautions  Post-Surgical Precautions: Spinal precautions  Precautions Comment: SBP < 140    Pain:  Pain Assessment  Pain Assessment: 0-10  Pain Score: 0 - No pain    Objective    Cognition:  Cognition  Orientation Level: Oriented X4  Following Commands: Follows one step commands with repetition  Insight: Mild  Impulsive: Mildly  Processing Speed: Delayed    Activities of Daily Living:      Grooming  Grooming Level of Assistance: Contact guard  Grooming Where Assessed: Standing sinkside  Grooming Comments: Pt performed hanswashing standing at sink SBA FWW      Toileting  Toileting Level of Assistance: Contact guard  Where Assessed: Toilet  Toileting Comments: Pt performed toileting seated, sit <>stand CGA FWW, SBA felton care seated, min VCs for positioning and hand placement    Bed Mobility/Transfers: Bed Mobility  Bed Mobility: Yes  Bed Mobility 1  Bed Mobility 1: Supine to sitting, Sitting to supine  Level of Assistance 1: Close supervision, Minimal verbal cues  Bed Mobility Comments 1: HOB elevated, min VCs for log roll    Transfers  Transfer: Yes  Transfer 1  Transfer From 1: Bed to, Stand to  Transfer to 1: Stand, Bed  Technique 1: Sit to stand, Stand to sit  Transfer Device 1: Walker  Transfer Level of Assistance 1: Contact guard, Minimal verbal cues  Trials/Comments 1: VCs for hand placement    Toilet Transfers  Toilet Transfer From: Walker  Toilet Transfer Type: To and from  Toilet Transfer to: Standard toilet  Toilet Transfer Technique: Ambulating  Toilet Transfers: Contact guard, Verbal cues    Functional Mobility:  Functional Mobility  Functional Mobility Performed: Yes  Functional Mobility 1  Surface 1: Level tile  Device 1: Rolling walker  Assistance 1:  Contact guard  Comments 1: fxnl mob bed <>bathroom CGA FWW, min VCs for walker placement throughout  Sitting Balance:  Static Sitting Balance  Static Sitting-Balance Support: Feet supported  Static Sitting-Level of Assistance: Close supervision  Dynamic Sitting Balance  Dynamic Sitting-Balance Support: Feet supported  Dynamic Sitting-Balance: Reaching for objects, Reaching across midline  Dynamic Sitting-Comments: SBA  Standing Balance:  Static Standing Balance  Static Standing-Balance Support: Bilateral upper extremity supported  Static Standing-Level of Assistance: Contact guard  Dynamic Standing Balance  Dynamic Standing-Balance Support: Bilateral upper extremity supported  Dynamic Standing-Comments: CGA  Modalities:  Modalities Used: No    Therapy/Activity:      Therapeutic Activity  Therapeutic Activity Performed: Yes  Therapeutic Activity 1: pt able to recall 3/3 spinal prec, educated on precautions and ADL completion, log roll    Outcome Measures:[facility] Daily Activity  Putting on and taking off regular lower body clothing: A little  Bathing (including washing, rinsing, drying): A little  Putting on and taking off regular upper body clothing: A little  Toileting, which includes using toilet, bedpan or urinal: A little  Taking care of personal grooming such as brushing teeth: A little  Eating Meals: None  Daily Activity - Total Score: 19    Education Documentation  Body Mechanics, taught by Simon Venegas OT at 3/21/2024  4:01 PM.  Learner: Patient  Readiness: Acceptance  Method: Explanation  Response: Verbalizes Understanding    Precautions, taught by Simon Venegas OT at 3/21/2024  4:01 PM.  Learner: Patient  Readiness: Acceptance  Method: Explanation  Response: Verbalizes Understanding    ADL Training, taught by Simon Venegas OT at 3/21/2024  4:01 PM.  Learner: Patient  Readiness: Acceptance  Method: Explanation  Response: Verbalizes Understanding    Education Comments  No comments  found.      Goals:  Encounter Problems       Encounter Problems (Active)       ADLs       Patient with complete lower body dressing with independent level of assistance donning and doffing all LE clothes  with PRN adaptive equipment while edge of bed  (Progressing)       Start:  03/07/24    Expected End:  03/28/24            Patient will complete toileting including hygiene clothing management/hygiene with independent level of assistance and grab bars. (Progressing)       Start:  03/07/24    Expected End:  03/28/24               BALANCE       Pt will maintain dynamic standing balance during ADL task with contact guard assist level of assistance in order to demonstrate decreased risk of falling and improved postural control. (Progressing)       Start:  03/07/24    Expected End:  03/28/24               COGNITION/SAFETY       Patient will score WFL on standardized cognitive assessment with no cues and within reasonable time frame (Progressing)       Start:  03/07/24    Expected End:  03/28/24               TRANSFERS       Patient will perform bed mobility independent level of assistance in order to improve safety and independence with mobility (Progressing)       Start:  03/07/24    Expected End:  03/28/24            Patient will complete functional transfer to toilet with least restrictive device with stand by assist level of assistance. (Progressing)       Start:  03/07/24    Expected End:  03/28/24                   FELIX Kidd/YESSENIA

## 2024-03-22 ENCOUNTER — ANESTHESIA (OUTPATIENT)
Dept: OPERATING ROOM | Facility: HOSPITAL | Age: 79
DRG: 981 | End: 2024-03-22
Payer: MEDICARE

## 2024-03-22 ENCOUNTER — APPOINTMENT (OUTPATIENT)
Dept: RADIOLOGY | Facility: HOSPITAL | Age: 79
DRG: 981 | End: 2024-03-22
Payer: MEDICARE

## 2024-03-22 ENCOUNTER — APPOINTMENT (OUTPATIENT)
Dept: CARDIOLOGY | Facility: HOSPITAL | Age: 79
DRG: 981 | End: 2024-03-22
Payer: MEDICARE

## 2024-03-22 ENCOUNTER — HOSPITAL ENCOUNTER (OUTPATIENT)
Dept: OPERATING ROOM | Facility: HOSPITAL | Age: 79
Discharge: HOME | End: 2024-03-22

## 2024-03-22 PROBLEM — G89.4 CHRONIC PAIN SYNDROME: Status: ACTIVE | Noted: 2024-03-22

## 2024-03-22 PROBLEM — J44.9 CHRONIC OBSTRUCTIVE PULMONARY DISEASE (MULTI): Status: ACTIVE | Noted: 2024-03-22

## 2024-03-22 PROBLEM — I21.9 MI (MYOCARDIAL INFARCTION) (MULTI): Status: ACTIVE | Noted: 2024-03-22

## 2024-03-22 PROBLEM — I10 HTN (HYPERTENSION): Status: ACTIVE | Noted: 2024-03-22

## 2024-03-22 PROBLEM — F90.9 ADHD: Status: ACTIVE | Noted: 2024-03-22

## 2024-03-22 PROBLEM — E78.5 HYPERLIPIDEMIA: Status: ACTIVE | Noted: 2024-03-22

## 2024-03-22 PROBLEM — M54.2 CHRONIC NECK PAIN: Status: ACTIVE | Noted: 2024-03-22

## 2024-03-22 PROBLEM — M51.9 LUMBAR DISC DISEASE: Status: ACTIVE | Noted: 2024-03-22

## 2024-03-22 PROBLEM — G89.29 CHRONIC NECK PAIN: Status: ACTIVE | Noted: 2024-03-22

## 2024-03-22 PROBLEM — M54.50 CHRONIC LOW BACK PAIN: Status: ACTIVE | Noted: 2024-03-22

## 2024-03-22 PROBLEM — E66.9 OBESITY: Status: ACTIVE | Noted: 2024-03-22

## 2024-03-22 PROBLEM — G89.29 CHRONIC LOW BACK PAIN: Status: ACTIVE | Noted: 2024-03-22

## 2024-03-22 PROBLEM — F41.9 ANXIETY: Status: ACTIVE | Noted: 2024-03-22

## 2024-03-22 LAB
ALBUMIN SERPL BCP-MCNC: 3.2 G/DL (ref 3.4–5)
ANION GAP BLDA CALCULATED.4IONS-SCNC: 10 MMO/L (ref 10–25)
ANION GAP BLDA CALCULATED.4IONS-SCNC: 11 MMO/L (ref 10–25)
ANION GAP BLDA CALCULATED.4IONS-SCNC: 11 MMO/L (ref 10–25)
ANION GAP BLDA CALCULATED.4IONS-SCNC: 12 MMO/L (ref 10–25)
ANION GAP BLDA CALCULATED.4IONS-SCNC: 13 MMO/L (ref 10–25)
ANION GAP BLDA CALCULATED.4IONS-SCNC: 14 MMO/L (ref 10–25)
ANION GAP BLDA CALCULATED.4IONS-SCNC: 18 MMO/L (ref 10–25)
ANION GAP BLDA CALCULATED.4IONS-SCNC: 9 MMO/L (ref 10–25)
ANION GAP BLDV CALCULATED.4IONS-SCNC: 11 MMOL/L (ref 10–25)
ANION GAP SERPL CALC-SCNC: 15 MMOL/L (ref 10–20)
APTT PPP: 29 SECONDS (ref 27–38)
BASE EXCESS BLDA CALC-SCNC: -1.7 MMOL/L (ref -2–3)
BASE EXCESS BLDA CALC-SCNC: -2 MMOL/L (ref -2–3)
BASE EXCESS BLDA CALC-SCNC: 0 MMOL/L (ref -2–3)
BASE EXCESS BLDA CALC-SCNC: 1 MMOL/L (ref -2–3)
BASE EXCESS BLDA CALC-SCNC: 2 MMOL/L (ref -2–3)
BASE EXCESS BLDA CALC-SCNC: 2.2 MMOL/L (ref -2–3)
BASE EXCESS BLDA CALC-SCNC: 2.6 MMOL/L (ref -2–3)
BASE EXCESS BLDA CALC-SCNC: 3.9 MMOL/L (ref -2–3)
BASE EXCESS BLDA CALC-SCNC: 4.4 MMOL/L (ref -2–3)
BASE EXCESS BLDA CALC-SCNC: 4.7 MMOL/L (ref -2–3)
BASE EXCESS BLDV CALC-SCNC: 1.1 MMOL/L (ref -2–3)
BLOOD EXPIRATION DATE: NORMAL
BLOOD EXPIRATION DATE: NORMAL
BODY TEMPERATURE: 37 DEGREES CELSIUS
BUN SERPL-MCNC: 5 MG/DL (ref 6–23)
CA-I BLD-SCNC: 1.09 MMOL/L (ref 1.1–1.33)
CA-I BLDA-SCNC: 0.88 MMOL/L (ref 1.1–1.33)
CA-I BLDA-SCNC: 0.89 MMOL/L (ref 1.1–1.33)
CA-I BLDA-SCNC: 0.95 MMOL/L (ref 1.1–1.33)
CA-I BLDA-SCNC: 0.97 MMOL/L (ref 1.1–1.33)
CA-I BLDA-SCNC: 0.99 MMOL/L (ref 1.1–1.33)
CA-I BLDA-SCNC: 1.09 MMOL/L (ref 1.1–1.33)
CA-I BLDA-SCNC: 1.12 MMOL/L (ref 1.1–1.33)
CA-I BLDA-SCNC: 1.15 MMOL/L (ref 1.1–1.33)
CA-I BLDA-SCNC: 1.2 MMOL/L (ref 1.1–1.33)
CA-I BLDA-SCNC: 1.33 MMOL/L (ref 1.1–1.33)
CA-I BLDV-SCNC: 1.02 MMOL/L (ref 1.1–1.33)
CALCIUM SERPL-MCNC: 8.6 MG/DL (ref 8.6–10.6)
CFT BLD TEG: 0.8 MIN (ref 0.8–2.1)
CFT BLD TEG: 1.3 MIN (ref 0.8–2.1)
CHLORIDE BLDA-SCNC: 100 MMOL/L (ref 98–107)
CHLORIDE BLDA-SCNC: 101 MMOL/L (ref 98–107)
CHLORIDE BLDA-SCNC: 102 MMOL/L (ref 98–107)
CHLORIDE BLDA-SCNC: 98 MMOL/L (ref 98–107)
CHLORIDE BLDA-SCNC: 99 MMOL/L (ref 98–107)
CHLORIDE BLDA-SCNC: 99 MMOL/L (ref 98–107)
CHLORIDE BLDV-SCNC: 100 MMOL/L (ref 98–107)
CHLORIDE SERPL-SCNC: 100 MMOL/L (ref 98–107)
CLOT ANGLE BLD TEG: 74 DEG (ref 63–78)
CLOT ANGLE BLD TEG: 80 DEG (ref 63–78)
CLOT INIT BLD TEG: 7.5 MIN (ref 4.6–9.1)
CLOT INIT BLD TEG: 8.3 MIN (ref 4.6–9.1)
CLOT INIT P HPASE BLD TEG: 6.4 MIN (ref 4.3–8.3)
CLOT INIT P HPASE BLD TEG: 8.8 MIN (ref 4.3–8.3)
CO2 SERPL-SCNC: 29 MMOL/L (ref 21–32)
COHGB MFR BLDA: 0.8 %
COHGB MFR BLDA: 0.9 %
COHGB MFR BLDA: 0.9 %
COHGB MFR BLDA: 1 %
COHGB MFR BLDA: 1 %
COHGB MFR BLDA: 1.1 %
COHGB MFR BLDA: 1.2 %
COHGB MFR BLDA: 1.2 %
COHGB MFR BLDV: 1.2 %
CREAT SERPL-MCNC: 0.47 MG/DL (ref 0.5–1.05)
DISPENSE STATUS: NORMAL
DISPENSE STATUS: NORMAL
DO-HGB MFR BLDA: 0 % (ref 0–5)
DO-HGB MFR BLDA: 0 % (ref 0–5)
DO-HGB MFR BLDA: 0.1 % (ref 0–5)
DO-HGB MFR BLDA: 0.5 % (ref 0–5)
DO-HGB MFR BLDA: 1.3 % (ref 0–5)
DO-HGB MFR BLDA: 2.2 % (ref 0–5)
EGFRCR SERPLBLD CKD-EPI 2021: >90 ML/MIN/1.73M*2
ERYTHROCYTE [DISTWIDTH] IN BLOOD BY AUTOMATED COUNT: 15.9 % (ref 11.5–14.5)
FIBRINOGEN BLD CALC-MCNC: 582 MG/DL (ref 278–581)
FIBRINOGEN BLD CALC-MCNC: 843 MG/DL (ref 278–581)
FIBRINOGEN PPP-MCNC: 328 MG/DL (ref 200–400)
GLUCOSE BLD MANUAL STRIP-MCNC: 136 MG/DL (ref 74–99)
GLUCOSE BLD MANUAL STRIP-MCNC: 136 MG/DL (ref 74–99)
GLUCOSE BLDA-MCNC: 104 MG/DL (ref 74–99)
GLUCOSE BLDA-MCNC: 107 MG/DL (ref 74–99)
GLUCOSE BLDA-MCNC: 123 MG/DL (ref 74–99)
GLUCOSE BLDA-MCNC: 141 MG/DL (ref 74–99)
GLUCOSE BLDA-MCNC: 149 MG/DL (ref 74–99)
GLUCOSE BLDA-MCNC: 154 MG/DL (ref 74–99)
GLUCOSE BLDA-MCNC: 159 MG/DL (ref 74–99)
GLUCOSE BLDA-MCNC: 167 MG/DL (ref 74–99)
GLUCOSE BLDA-MCNC: 176 MG/DL (ref 74–99)
GLUCOSE BLDA-MCNC: 181 MG/DL (ref 74–99)
GLUCOSE BLDV-MCNC: 160 MG/DL (ref 74–99)
GLUCOSE SERPL-MCNC: 96 MG/DL (ref 74–99)
HCO3 BLDA-SCNC: 23.1 MMOL/L (ref 22–26)
HCO3 BLDA-SCNC: 23.7 MMOL/L (ref 22–26)
HCO3 BLDA-SCNC: 26 MMOL/L (ref 22–26)
HCO3 BLDA-SCNC: 26.5 MMOL/L (ref 22–26)
HCO3 BLDA-SCNC: 26.7 MMOL/L (ref 22–26)
HCO3 BLDA-SCNC: 27.2 MMOL/L (ref 22–26)
HCO3 BLDA-SCNC: 28.2 MMOL/L (ref 22–26)
HCO3 BLDA-SCNC: 28.4 MMOL/L (ref 22–26)
HCO3 BLDA-SCNC: 28.5 MMOL/L (ref 22–26)
HCO3 BLDA-SCNC: 29.2 MMOL/L (ref 22–26)
HCO3 BLDV-SCNC: 26.6 MMOL/L (ref 22–26)
HCT VFR BLD AUTO: 31 % (ref 36–46)
HCT VFR BLD EST: 23 % (ref 36–46)
HCT VFR BLD EST: 23 % (ref 36–46)
HCT VFR BLD EST: 26 % (ref 36–46)
HCT VFR BLD EST: 27 % (ref 36–46)
HCT VFR BLD EST: 28 % (ref 36–46)
HCT VFR BLD EST: 29 % (ref 36–46)
HCT VFR BLD EST: 31 % (ref 36–46)
HCT VFR BLD EST: 31 % (ref 36–46)
HCT VFR BLD EST: 34 % (ref 36–46)
HGB BLD-MCNC: 10.2 G/DL (ref 12–16)
HGB BLDA-MCNC: 10.2 G/DL (ref 12–16)
HGB BLDA-MCNC: 10.2 G/DL (ref 12–16)
HGB BLDA-MCNC: 10.3 G/DL (ref 12–16)
HGB BLDA-MCNC: 11.3 G/DL (ref 12–16)
HGB BLDA-MCNC: 7.6 G/DL (ref 12–16)
HGB BLDA-MCNC: 7.6 G/DL (ref 12–16)
HGB BLDA-MCNC: 8.8 G/DL (ref 12–16)
HGB BLDA-MCNC: 8.8 G/DL (ref 12–16)
HGB BLDA-MCNC: 9.1 G/DL (ref 12–16)
HGB BLDA-MCNC: 9.1 G/DL (ref 12–16)
HGB BLDA-MCNC: 9.2 G/DL (ref 12–16)
HGB BLDA-MCNC: 9.2 G/DL (ref 12–16)
HGB BLDA-MCNC: 9.3 G/DL (ref 12–16)
HGB BLDA-MCNC: 9.3 G/DL (ref 12–16)
HGB BLDA-MCNC: 9.4 G/DL (ref 12–16)
HGB BLDA-MCNC: 9.4 G/DL (ref 12–16)
HGB BLDA-MCNC: 9.7 G/DL (ref 12–16)
HGB BLDA-MCNC: 9.7 G/DL (ref 12–16)
HGB BLDV-MCNC: 7.7 G/DL (ref 12–16)
INHALED O2 CONCENTRATION: 100 %
INHALED O2 CONCENTRATION: 30 %
INHALED O2 CONCENTRATION: 40 %
INHALED O2 CONCENTRATION: 50 %
INHALED O2 CONCENTRATION: 75 %
INHALED O2 CONCENTRATION: 75 %
INHALED O2 CONCENTRATION: 85 %
INHALED O2 CONCENTRATION: 90 %
INR PPP: 1.3 (ref 0.9–1.1)
LACTATE BLDA-SCNC: 0.9 MMOL/L (ref 0.4–2)
LACTATE BLDA-SCNC: 1 MMOL/L (ref 0.4–2)
LACTATE BLDA-SCNC: 1.3 MMOL/L (ref 0.4–2)
LACTATE BLDA-SCNC: 1.5 MMOL/L (ref 0.4–2)
LACTATE BLDA-SCNC: 1.5 MMOL/L (ref 0.4–2)
LACTATE BLDA-SCNC: 2.7 MMOL/L (ref 0.4–2)
LACTATE BLDA-SCNC: 2.8 MMOL/L (ref 0.4–2)
LACTATE BLDA-SCNC: 2.9 MMOL/L (ref 0.4–2)
LACTATE BLDA-SCNC: 3 MMOL/L (ref 0.4–2)
LACTATE BLDA-SCNC: 3 MMOL/L (ref 0.4–2)
LACTATE BLDV-SCNC: 1.6 MMOL/L (ref 0.4–2)
MAGNESIUM SERPL-MCNC: 3.35 MG/DL (ref 1.6–2.4)
MCF BLD TEG: 32 MM (ref 15–32)
MCF BLD TEG: 46 MM (ref 15–32)
MCF BLD TEG: 67 MM (ref 52–70)
MCF BLD TEG: 68 MM (ref 52–69)
MCF BLD TEG: 72 MM (ref 52–69)
MCF BLD TEG: 74 MM (ref 52–70)
MCH RBC QN AUTO: 27.1 PG (ref 26–34)
MCHC RBC AUTO-ENTMCNC: 32.9 G/DL (ref 32–36)
MCV RBC AUTO: 82 FL (ref 80–100)
METHGB MFR BLDA: 0.5 % (ref 0–1.5)
METHGB MFR BLDA: 0.7 % (ref 0–1.5)
METHGB MFR BLDA: 0.9 % (ref 0–1.5)
METHGB MFR BLDA: 1 % (ref 0–1.5)
METHGB MFR BLDV: 0.5 % (ref 0–1.5)
NRBC BLD-RTO: 0 /100 WBCS (ref 0–0)
OXYHGB MFR BLDA: 96.1 % (ref 94–98)
OXYHGB MFR BLDA: 96.1 % (ref 94–98)
OXYHGB MFR BLDA: 96.6 % (ref 94–98)
OXYHGB MFR BLDA: 97.3 % (ref 94–98)
OXYHGB MFR BLDA: 97.7 % (ref 94–98)
OXYHGB MFR BLDA: 97.7 % (ref 94–98)
OXYHGB MFR BLDA: 97.9 % (ref 94–98)
OXYHGB MFR BLDA: 97.9 % (ref 94–98)
OXYHGB MFR BLDA: 98 % (ref 94–98)
OXYHGB MFR BLDA: 98.1 % (ref 94–98)
OXYHGB MFR BLDA: 98.1 % (ref 94–98)
OXYHGB MFR BLDA: 98.2 % (ref 94–98)
OXYHGB MFR BLDA: 98.2 % (ref 94–98)
OXYHGB MFR BLDV: 86.4 % (ref 45–75)
PCO2 BLDA: 39 MM HG (ref 38–42)
PCO2 BLDA: 40 MM HG (ref 38–42)
PCO2 BLDA: 40 MM HG (ref 38–42)
PCO2 BLDA: 41 MM HG (ref 38–42)
PCO2 BLDA: 42 MM HG (ref 38–42)
PCO2 BLDA: 50 MM HG (ref 38–42)
PCO2 BLDA: 53 MM HG (ref 38–42)
PCO2 BLDV: 46 MM HG (ref 41–51)
PH BLDA: 7.31 PH (ref 7.38–7.42)
PH BLDA: 7.36 PH (ref 7.38–7.42)
PH BLDA: 7.36 PH (ref 7.38–7.42)
PH BLDA: 7.37 PH (ref 7.38–7.42)
PH BLDA: 7.4 PH (ref 7.38–7.42)
PH BLDA: 7.43 PH (ref 7.38–7.42)
PH BLDA: 7.43 PH (ref 7.38–7.42)
PH BLDA: 7.44 PH (ref 7.38–7.42)
PH BLDA: 7.45 PH (ref 7.38–7.42)
PH BLDA: 7.47 PH (ref 7.38–7.42)
PH BLDV: 7.37 PH (ref 7.33–7.43)
PHOSPHATE SERPL-MCNC: 3.9 MG/DL (ref 2.5–4.9)
PLATELET # BLD AUTO: 199 X10*3/UL (ref 150–450)
PO2 BLDA: 107 MM HG (ref 85–95)
PO2 BLDA: 118 MM HG (ref 85–95)
PO2 BLDA: 151 MM HG (ref 85–95)
PO2 BLDA: 308 MM HG (ref 85–95)
PO2 BLDA: 314 MM HG (ref 85–95)
PO2 BLDA: 405 MM HG (ref 85–95)
PO2 BLDA: 439 MM HG (ref 85–95)
PO2 BLDA: 442 MM HG (ref 85–95)
PO2 BLDA: 87 MM HG (ref 85–95)
PO2 BLDA: 92 MM HG (ref 85–95)
PO2 BLDV: 55 MM HG (ref 35–45)
POTASSIUM BLDA-SCNC: 4.1 MMOL/L (ref 3.5–5.3)
POTASSIUM BLDA-SCNC: 4.3 MMOL/L (ref 3.5–5.3)
POTASSIUM BLDA-SCNC: 4.3 MMOL/L (ref 3.5–5.3)
POTASSIUM BLDA-SCNC: 4.5 MMOL/L (ref 3.5–5.3)
POTASSIUM BLDA-SCNC: 4.8 MMOL/L (ref 3.5–5.3)
POTASSIUM BLDA-SCNC: 4.9 MMOL/L (ref 3.5–5.3)
POTASSIUM BLDA-SCNC: 5.4 MMOL/L (ref 3.5–5.3)
POTASSIUM BLDA-SCNC: 6 MMOL/L (ref 3.5–5.3)
POTASSIUM BLDV-SCNC: 4.6 MMOL/L (ref 3.5–5.3)
POTASSIUM SERPL-SCNC: 4 MMOL/L (ref 3.5–5.3)
PRODUCT BLOOD TYPE: 1700
PRODUCT BLOOD TYPE: 600
PRODUCT CODE: NORMAL
PRODUCT CODE: NORMAL
PROTHROMBIN TIME: 14.6 SECONDS (ref 9.8–12.8)
RBC # BLD AUTO: 3.76 X10*6/UL (ref 4–5.2)
SAO2 % BLDA: 100 % (ref 94–100)
SAO2 % BLDA: 98 % (ref 94–100)
SAO2 % BLDA: 99 % (ref 94–100)
SAO2 % BLDV: 88 % (ref 45–75)
SODIUM BLDA-SCNC: 132 MMOL/L (ref 136–145)
SODIUM BLDA-SCNC: 133 MMOL/L (ref 136–145)
SODIUM BLDA-SCNC: 134 MMOL/L (ref 136–145)
SODIUM BLDA-SCNC: 135 MMOL/L (ref 136–145)
SODIUM BLDA-SCNC: 136 MMOL/L (ref 136–145)
SODIUM BLDA-SCNC: 136 MMOL/L (ref 136–145)
SODIUM BLDV-SCNC: 133 MMOL/L (ref 136–145)
SODIUM SERPL-SCNC: 140 MMOL/L (ref 136–145)
TEST COMMENT: ABNORMAL
TEST COMMENT: ABNORMAL
UNIT ABO: NORMAL
UNIT ABO: NORMAL
UNIT NUMBER: NORMAL
UNIT NUMBER: NORMAL
UNIT RH: NORMAL
UNIT RH: NORMAL
UNIT VOLUME: 205
UNIT VOLUME: 314
WBC # BLD AUTO: 18.2 X10*3/UL (ref 4.4–11.3)

## 2024-03-22 PROCEDURE — 2020000001 HC ICU ROOM DAILY

## 2024-03-22 PROCEDURE — 3700000002 HC GENERAL ANESTHESIA TIME - EACH INCREMENTAL 1 MINUTE: Performed by: THORACIC SURGERY (CARDIOTHORACIC VASCULAR SURGERY)

## 2024-03-22 PROCEDURE — 3600000018 HC OR TIME - INITIAL BASE CHARGE - PROCEDURE LEVEL SIX: Performed by: THORACIC SURGERY (CARDIOTHORACIC VASCULAR SURGERY)

## 2024-03-22 PROCEDURE — P9045 ALBUMIN (HUMAN), 5%, 250 ML: HCPCS | Mod: JZ | Performed by: NURSE PRACTITIONER

## 2024-03-22 PROCEDURE — 2720000007 HC OR 272 NO HCPCS: Performed by: THORACIC SURGERY (CARDIOTHORACIC VASCULAR SURGERY)

## 2024-03-22 PROCEDURE — 2500000004 HC RX 250 GENERAL PHARMACY W/ HCPCS (ALT 636 FOR OP/ED): Mod: JZ | Performed by: THORACIC SURGERY (CARDIOTHORACIC VASCULAR SURGERY)

## 2024-03-22 PROCEDURE — 2500000002 HC RX 250 W HCPCS SELF ADMINISTERED DRUGS (ALT 637 FOR MEDICARE OP, ALT 636 FOR OP/ED)

## 2024-03-22 PROCEDURE — 2500000005 HC RX 250 GENERAL PHARMACY W/O HCPCS

## 2024-03-22 PROCEDURE — 2500000004 HC RX 250 GENERAL PHARMACY W/ HCPCS (ALT 636 FOR OP/ED)

## 2024-03-22 PROCEDURE — P9047 ALBUMIN (HUMAN), 25%, 50ML: HCPCS | Mod: JZ | Performed by: THORACIC SURGERY (CARDIOTHORACIC VASCULAR SURGERY)

## 2024-03-22 PROCEDURE — C1900 LEAD, CORONARY VENOUS: HCPCS | Performed by: THORACIC SURGERY (CARDIOTHORACIC VASCULAR SURGERY)

## 2024-03-22 PROCEDURE — 71045 X-RAY EXAM CHEST 1 VIEW: CPT | Performed by: RADIOLOGY

## 2024-03-22 PROCEDURE — 36556 INSERT NON-TUNNEL CV CATH: CPT

## 2024-03-22 PROCEDURE — 2500000001 HC RX 250 WO HCPCS SELF ADMINISTERED DRUGS (ALT 637 FOR MEDICARE OP): Performed by: NURSE PRACTITIONER

## 2024-03-22 PROCEDURE — 80202 ASSAY OF VANCOMYCIN: CPT | Performed by: NURSE PRACTITIONER

## 2024-03-22 PROCEDURE — 2780000003 HC OR 278 NO HCPCS: Performed by: THORACIC SURGERY (CARDIOTHORACIC VASCULAR SURGERY)

## 2024-03-22 PROCEDURE — 2500000001 HC RX 250 WO HCPCS SELF ADMINISTERED DRUGS (ALT 637 FOR MEDICARE OP)

## 2024-03-22 PROCEDURE — 84132 ASSAY OF SERUM POTASSIUM: CPT | Performed by: NURSE PRACTITIONER

## 2024-03-22 PROCEDURE — 76937 US GUIDE VASCULAR ACCESS: CPT

## 2024-03-22 PROCEDURE — 02BG0ZZ EXCISION OF MITRAL VALVE, OPEN APPROACH: ICD-10-PCS | Performed by: THORACIC SURGERY (CARDIOTHORACIC VASCULAR SURGERY)

## 2024-03-22 PROCEDURE — 37799 UNLISTED PX VASCULAR SURGERY: CPT | Performed by: NURSE PRACTITIONER

## 2024-03-22 PROCEDURE — 85347 COAGULATION TIME ACTIVATED: CPT | Mod: MUE

## 2024-03-22 PROCEDURE — 71045 X-RAY EXAM CHEST 1 VIEW: CPT

## 2024-03-22 PROCEDURE — 82375 ASSAY CARBOXYHB QUANT: CPT

## 2024-03-22 PROCEDURE — 85027 COMPLETE CBC AUTOMATED: CPT | Performed by: NURSE PRACTITIONER

## 2024-03-22 PROCEDURE — 3600000017 HC OR TIME - EACH INCREMENTAL 1 MINUTE - PROCEDURE LEVEL SIX: Performed by: THORACIC SURGERY (CARDIOTHORACIC VASCULAR SURGERY)

## 2024-03-22 PROCEDURE — 5A1221Z PERFORMANCE OF CARDIAC OUTPUT, CONTINUOUS: ICD-10-PCS | Performed by: THORACIC SURGERY (CARDIOTHORACIC VASCULAR SURGERY)

## 2024-03-22 PROCEDURE — C1889 IMPLANT/INSERT DEVICE, NOC: HCPCS | Performed by: THORACIC SURGERY (CARDIOTHORACIC VASCULAR SURGERY)

## 2024-03-22 PROCEDURE — 83735 ASSAY OF MAGNESIUM: CPT | Performed by: NURSE PRACTITIONER

## 2024-03-22 PROCEDURE — 83050 HGB METHEMOGLOBIN QUAN: CPT | Mod: MUE

## 2024-03-22 PROCEDURE — 93010 ELECTROCARDIOGRAM REPORT: CPT | Performed by: INTERNAL MEDICINE

## 2024-03-22 PROCEDURE — 84132 ASSAY OF SERUM POTASSIUM: CPT

## 2024-03-22 PROCEDURE — 02RG08Z REPLACEMENT OF MITRAL VALVE WITH ZOOPLASTIC TISSUE, OPEN APPROACH: ICD-10-PCS | Performed by: THORACIC SURGERY (CARDIOTHORACIC VASCULAR SURGERY)

## 2024-03-22 PROCEDURE — 88312 SPECIAL STAINS GROUP 1: CPT | Mod: TC,SUR | Performed by: PHYSICIAN ASSISTANT

## 2024-03-22 PROCEDURE — 82947 ASSAY GLUCOSE BLOOD QUANT: CPT | Mod: MUE

## 2024-03-22 PROCEDURE — 6360000002 HC RX 636 FACTOR: Mod: JZ

## 2024-03-22 PROCEDURE — P9017 PLASMA 1 DONOR FRZ W/IN 8 HR: HCPCS

## 2024-03-22 PROCEDURE — 82330 ASSAY OF CALCIUM: CPT | Mod: MUE | Performed by: NURSE PRACTITIONER

## 2024-03-22 PROCEDURE — 94640 AIRWAY INHALATION TREATMENT: CPT

## 2024-03-22 PROCEDURE — 88305 TISSUE EXAM BY PATHOLOGIST: CPT | Performed by: PATHOLOGY

## 2024-03-22 PROCEDURE — 88312 SPECIAL STAINS GROUP 1: CPT | Performed by: PATHOLOGY

## 2024-03-22 PROCEDURE — A4649 SURGICAL SUPPLIES: HCPCS | Performed by: THORACIC SURGERY (CARDIOTHORACIC VASCULAR SURGERY)

## 2024-03-22 PROCEDURE — 2500000002 HC RX 250 W HCPCS SELF ADMINISTERED DRUGS (ALT 637 FOR MEDICARE OP, ALT 636 FOR OP/ED): Mod: MUE | Performed by: NURSE PRACTITIONER

## 2024-03-22 PROCEDURE — 93005 ELECTROCARDIOGRAM TRACING: CPT

## 2024-03-22 PROCEDURE — 71045 X-RAY EXAM CHEST 1 VIEW: CPT | Performed by: STUDENT IN AN ORGANIZED HEALTH CARE EDUCATION/TRAINING PROGRAM

## 2024-03-22 PROCEDURE — P9045 ALBUMIN (HUMAN), 5%, 250 ML: HCPCS | Mod: JZ | Performed by: STUDENT IN AN ORGANIZED HEALTH CARE EDUCATION/TRAINING PROGRAM

## 2024-03-22 PROCEDURE — 99223 1ST HOSP IP/OBS HIGH 75: CPT | Performed by: STUDENT IN AN ORGANIZED HEALTH CARE EDUCATION/TRAINING PROGRAM

## 2024-03-22 PROCEDURE — 85610 PROTHROMBIN TIME: CPT | Performed by: NURSE PRACTITIONER

## 2024-03-22 PROCEDURE — 85384 FIBRINOGEN ACTIVITY: CPT

## 2024-03-22 PROCEDURE — 2500000004 HC RX 250 GENERAL PHARMACY W/ HCPCS (ALT 636 FOR OP/ED): Performed by: NURSE PRACTITIONER

## 2024-03-22 PROCEDURE — 33430 REPLACEMENT OF MITRAL VALVE: CPT | Performed by: THORACIC SURGERY (CARDIOTHORACIC VASCULAR SURGERY)

## 2024-03-22 PROCEDURE — 2500000002 HC RX 250 W HCPCS SELF ADMINISTERED DRUGS (ALT 637 FOR MEDICARE OP, ALT 636 FOR OP/ED): Performed by: NURSE PRACTITIONER

## 2024-03-22 PROCEDURE — 3600000012 HC PERFUSION TIME - EACH INCREMENTAL 1 MINUTE: Performed by: THORACIC SURGERY (CARDIOTHORACIC VASCULAR SURGERY)

## 2024-03-22 PROCEDURE — 3600000011 HC PERFUSION TIME - INITIAL BASE CHARGE: Performed by: THORACIC SURGERY (CARDIOTHORACIC VASCULAR SURGERY)

## 2024-03-22 PROCEDURE — 85384 FIBRINOGEN ACTIVITY: CPT | Mod: MUE | Performed by: NURSE PRACTITIONER

## 2024-03-22 PROCEDURE — 36430 TRANSFUSION BLD/BLD COMPNT: CPT | Mod: GC

## 2024-03-22 PROCEDURE — P9016 RBC LEUKOCYTES REDUCED: HCPCS

## 2024-03-22 PROCEDURE — 94002 VENT MGMT INPAT INIT DAY: CPT

## 2024-03-22 PROCEDURE — 2500000004 HC RX 250 GENERAL PHARMACY W/ HCPCS (ALT 636 FOR OP/ED): Mod: JZ | Performed by: STUDENT IN AN ORGANIZED HEALTH CARE EDUCATION/TRAINING PROGRAM

## 2024-03-22 PROCEDURE — 36620 INSERTION CATHETER ARTERY: CPT

## 2024-03-22 PROCEDURE — 3700000001 HC GENERAL ANESTHESIA TIME - INITIAL BASE CHARGE: Performed by: THORACIC SURGERY (CARDIOTHORACIC VASCULAR SURGERY)

## 2024-03-22 DEVICE — HEAD, CROSS CLAMP, SMALL, DETACHABLE: Type: IMPLANTABLE DEVICE | Site: CHEST | Status: NON-FUNCTIONAL

## 2024-03-22 DEVICE — ST. JUDE MEDICAL PORCINE MITRAL BIOPROSTHETIC HEART VALVE
Type: IMPLANTABLE DEVICE | Site: HEART | Status: FUNCTIONAL
Brand: EPIC™

## 2024-03-22 RX ORDER — DEXTROSE 50 % IN WATER (D50W) INTRAVENOUS SYRINGE
25
Status: DISCONTINUED | OUTPATIENT
Start: 2024-03-22 | End: 2024-04-01

## 2024-03-22 RX ORDER — ATORVASTATIN CALCIUM 80 MG/1
80 TABLET, FILM COATED ORAL NIGHTLY
Status: DISCONTINUED | OUTPATIENT
Start: 2024-03-22 | End: 2024-04-01 | Stop reason: HOSPADM

## 2024-03-22 RX ORDER — OXYCODONE HYDROCHLORIDE 5 MG/1
5 TABLET ORAL EVERY 4 HOURS PRN
Status: DISCONTINUED | OUTPATIENT
Start: 2024-03-22 | End: 2024-03-25

## 2024-03-22 RX ORDER — INSULIN LISPRO 100 [IU]/ML
0-15 INJECTION, SOLUTION INTRAVENOUS; SUBCUTANEOUS EVERY 4 HOURS
Status: DISCONTINUED | OUTPATIENT
Start: 2024-03-22 | End: 2024-03-24

## 2024-03-22 RX ORDER — FUROSEMIDE 10 MG/ML
INJECTION INTRAMUSCULAR; INTRAVENOUS AS NEEDED
Status: DISCONTINUED | OUTPATIENT
Start: 2024-03-22 | End: 2024-03-22

## 2024-03-22 RX ORDER — POTASSIUM CHLORIDE 14.9 MG/ML
20 INJECTION INTRAVENOUS EVERY 6 HOURS PRN
Status: DISCONTINUED | OUTPATIENT
Start: 2024-03-22 | End: 2024-03-24

## 2024-03-22 RX ORDER — VANCOMYCIN HYDROCHLORIDE 1 G/20ML
INJECTION, POWDER, LYOPHILIZED, FOR SOLUTION INTRAVENOUS DAILY PRN
Status: DISCONTINUED | OUTPATIENT
Start: 2024-03-22 | End: 2024-03-25

## 2024-03-22 RX ORDER — MAGNESIUM SULFATE HEPTAHYDRATE 40 MG/ML
4 INJECTION, SOLUTION INTRAVENOUS EVERY 6 HOURS PRN
Status: DISCONTINUED | OUTPATIENT
Start: 2024-03-22 | End: 2024-03-24

## 2024-03-22 RX ORDER — SODIUM CHLORIDE, SODIUM GLUCONATE, SODIUM ACETATE, POTASSIUM CHLORIDE AND MAGNESIUM CHLORIDE 30; 37; 368; 526; 502 MG/100ML; MG/100ML; MG/100ML; MG/100ML; MG/100ML
INJECTION, SOLUTION INTRAVENOUS CONTINUOUS PRN
Status: DISCONTINUED | OUTPATIENT
Start: 2024-03-22 | End: 2024-03-22

## 2024-03-22 RX ORDER — HYDROMORPHONE HYDROCHLORIDE 1 MG/ML
0.2 INJECTION, SOLUTION INTRAMUSCULAR; INTRAVENOUS; SUBCUTANEOUS
Status: DISCONTINUED | OUTPATIENT
Start: 2024-03-22 | End: 2024-03-22

## 2024-03-22 RX ORDER — LIDOCAINE 560 MG/1
1 PATCH PERCUTANEOUS; TOPICAL; TRANSDERMAL EVERY 24 HOURS
Status: DISCONTINUED | OUTPATIENT
Start: 2024-03-22 | End: 2024-03-25

## 2024-03-22 RX ORDER — ACETAMINOPHEN 10 MG/ML
1000 INJECTION, SOLUTION INTRAVENOUS ONCE
Status: COMPLETED | OUTPATIENT
Start: 2024-03-22 | End: 2024-03-22

## 2024-03-22 RX ORDER — HEPARIN SODIUM 1000 [USP'U]/ML
INJECTION, SOLUTION INTRAVENOUS; SUBCUTANEOUS AS NEEDED
Status: DISCONTINUED | OUTPATIENT
Start: 2024-03-22 | End: 2024-03-22

## 2024-03-22 RX ORDER — HYDROMORPHONE HYDROCHLORIDE 1 MG/ML
0.2 INJECTION, SOLUTION INTRAMUSCULAR; INTRAVENOUS; SUBCUTANEOUS
Status: DISCONTINUED | OUTPATIENT
Start: 2024-03-22 | End: 2024-03-23

## 2024-03-22 RX ORDER — SODIUM CHLORIDE, SODIUM LACTATE, POTASSIUM CHLORIDE, CALCIUM CHLORIDE 600; 310; 30; 20 MG/100ML; MG/100ML; MG/100ML; MG/100ML
30 INJECTION, SOLUTION INTRAVENOUS CONTINUOUS
Status: DISCONTINUED | OUTPATIENT
Start: 2024-03-22 | End: 2024-03-23

## 2024-03-22 RX ORDER — PROPOFOL 10 MG/ML
INJECTION, EMULSION INTRAVENOUS AS NEEDED
Status: DISCONTINUED | OUTPATIENT
Start: 2024-03-22 | End: 2024-03-22

## 2024-03-22 RX ORDER — ALBUMIN HUMAN 50 G/1000ML
25 SOLUTION INTRAVENOUS ONCE
Status: COMPLETED | OUTPATIENT
Start: 2024-03-22 | End: 2024-03-22

## 2024-03-22 RX ORDER — NOREPINEPHRINE BITARTRATE/D5W 8 MG/250ML
0-1 PLASTIC BAG, INJECTION (ML) INTRAVENOUS CONTINUOUS
Status: DISCONTINUED | OUTPATIENT
Start: 2024-03-22 | End: 2024-03-24

## 2024-03-22 RX ORDER — POTASSIUM CHLORIDE 20 MEQ/1
20 TABLET, EXTENDED RELEASE ORAL EVERY 6 HOURS PRN
Status: DISCONTINUED | OUTPATIENT
Start: 2024-03-22 | End: 2024-03-24

## 2024-03-22 RX ORDER — AMOXICILLIN 250 MG
2 CAPSULE ORAL 2 TIMES DAILY
Status: DISCONTINUED | OUTPATIENT
Start: 2024-03-22 | End: 2024-04-01 | Stop reason: HOSPADM

## 2024-03-22 RX ORDER — EPINEPHRINE HCL IN DEXTROSE 5% 4MG/250ML
0-2 PLASTIC BAG, INJECTION (ML) INTRAVENOUS CONTINUOUS
Status: DISCONTINUED | OUTPATIENT
Start: 2024-03-22 | End: 2024-03-22

## 2024-03-22 RX ORDER — POTASSIUM CHLORIDE 1.5 G/1.58G
40 POWDER, FOR SOLUTION ORAL EVERY 6 HOURS PRN
Status: DISCONTINUED | OUTPATIENT
Start: 2024-03-22 | End: 2024-03-24

## 2024-03-22 RX ORDER — IPRATROPIUM BROMIDE AND ALBUTEROL SULFATE 2.5; .5 MG/3ML; MG/3ML
3 SOLUTION RESPIRATORY (INHALATION)
Status: DISCONTINUED | OUTPATIENT
Start: 2024-03-22 | End: 2024-03-23

## 2024-03-22 RX ORDER — CALCIUM GLUCONATE 20 MG/ML
2 INJECTION, SOLUTION INTRAVENOUS EVERY 6 HOURS PRN
Status: DISCONTINUED | OUTPATIENT
Start: 2024-03-22 | End: 2024-03-24

## 2024-03-22 RX ORDER — DIPHENHYDRAMINE HYDROCHLORIDE 50 MG/ML
12.5 INJECTION INTRAMUSCULAR; INTRAVENOUS ONCE
Status: COMPLETED | OUTPATIENT
Start: 2024-03-22 | End: 2024-03-22

## 2024-03-22 RX ORDER — LIDOCAINE HYDROCHLORIDE 20 MG/ML
INJECTION, SOLUTION INFILTRATION; PERINEURAL AS NEEDED
Status: DISCONTINUED | OUTPATIENT
Start: 2024-03-22 | End: 2024-03-22

## 2024-03-22 RX ORDER — FENTANYL CITRATE 50 UG/ML
INJECTION, SOLUTION INTRAMUSCULAR; INTRAVENOUS AS NEEDED
Status: DISCONTINUED | OUTPATIENT
Start: 2024-03-22 | End: 2024-03-22

## 2024-03-22 RX ORDER — ACETAMINOPHEN 325 MG/1
650 TABLET ORAL EVERY 6 HOURS
Status: DISCONTINUED | OUTPATIENT
Start: 2024-03-22 | End: 2024-04-01 | Stop reason: HOSPADM

## 2024-03-22 RX ORDER — PHENYLEPHRINE HCL IN 0.9% NACL 1 MG/10 ML
SYRINGE (ML) INTRAVENOUS AS NEEDED
Status: DISCONTINUED | OUTPATIENT
Start: 2024-03-22 | End: 2024-03-22

## 2024-03-22 RX ORDER — CALCIUM GLUCONATE 20 MG/ML
1 INJECTION, SOLUTION INTRAVENOUS EVERY 6 HOURS PRN
Status: DISCONTINUED | OUTPATIENT
Start: 2024-03-22 | End: 2024-03-24

## 2024-03-22 RX ORDER — ROCURONIUM BROMIDE 10 MG/ML
INJECTION, SOLUTION INTRAVENOUS AS NEEDED
Status: DISCONTINUED | OUTPATIENT
Start: 2024-03-22 | End: 2024-03-22

## 2024-03-22 RX ORDER — METHADONE HYDROCHLORIDE 10 MG/ML
15 INJECTION, SOLUTION INTRAMUSCULAR; INTRAVENOUS; SUBCUTANEOUS ONCE
Status: COMPLETED | OUTPATIENT
Start: 2024-03-22 | End: 2024-03-22

## 2024-03-22 RX ORDER — METOPROLOL TARTRATE 1 MG/ML
INJECTION, SOLUTION INTRAVENOUS AS NEEDED
Status: DISCONTINUED | OUTPATIENT
Start: 2024-03-22 | End: 2024-03-22

## 2024-03-22 RX ORDER — SODIUM CHLORIDE 9 MG/ML
1000 INJECTION, SOLUTION INTRAVENOUS ONCE AS NEEDED
Status: DISCONTINUED | OUTPATIENT
Start: 2024-03-22 | End: 2024-03-24

## 2024-03-22 RX ORDER — DEXMEDETOMIDINE HYDROCHLORIDE 4 UG/ML
.1-1.5 INJECTION, SOLUTION INTRAVENOUS CONTINUOUS
Status: DISCONTINUED | OUTPATIENT
Start: 2024-03-22 | End: 2024-03-22

## 2024-03-22 RX ORDER — POTASSIUM CHLORIDE 29.8 MG/ML
40 INJECTION INTRAVENOUS EVERY 6 HOURS PRN
Status: DISCONTINUED | OUTPATIENT
Start: 2024-03-22 | End: 2024-03-24

## 2024-03-22 RX ORDER — NAPROXEN SODIUM 220 MG/1
81 TABLET, FILM COATED ORAL DAILY
Status: DISCONTINUED | OUTPATIENT
Start: 2024-03-23 | End: 2024-03-24

## 2024-03-22 RX ORDER — HYDRALAZINE HYDROCHLORIDE 20 MG/ML
5 INJECTION INTRAMUSCULAR; INTRAVENOUS EVERY 4 HOURS PRN
Status: DISCONTINUED | OUTPATIENT
Start: 2024-03-22 | End: 2024-03-24

## 2024-03-22 RX ORDER — ACETAMINOPHEN 650 MG/1
650 SUPPOSITORY RECTAL EVERY 6 HOURS
Status: DISCONTINUED | OUTPATIENT
Start: 2024-03-22 | End: 2024-03-24

## 2024-03-22 RX ORDER — POTASSIUM CHLORIDE 1.5 G/1.58G
20 POWDER, FOR SOLUTION ORAL EVERY 6 HOURS PRN
Status: DISCONTINUED | OUTPATIENT
Start: 2024-03-22 | End: 2024-03-24

## 2024-03-22 RX ORDER — GLYCOPYRROLATE 0.2 MG/ML
INJECTION INTRAMUSCULAR; INTRAVENOUS AS NEEDED
Status: DISCONTINUED | OUTPATIENT
Start: 2024-03-22 | End: 2024-03-22

## 2024-03-22 RX ORDER — PROPOFOL 10 MG/ML
0-50 INJECTION, EMULSION INTRAVENOUS CONTINUOUS
Status: DISCONTINUED | OUTPATIENT
Start: 2024-03-22 | End: 2024-03-22

## 2024-03-22 RX ORDER — TALC
3 POWDER (GRAM) TOPICAL NIGHTLY
Status: DISCONTINUED | OUTPATIENT
Start: 2024-03-22 | End: 2024-04-01 | Stop reason: HOSPADM

## 2024-03-22 RX ORDER — SODIUM CHLORIDE, SODIUM LACTATE, POTASSIUM CHLORIDE, CALCIUM CHLORIDE 600; 310; 30; 20 MG/100ML; MG/100ML; MG/100ML; MG/100ML
5 INJECTION, SOLUTION INTRAVENOUS CONTINUOUS
Status: DISCONTINUED | OUTPATIENT
Start: 2024-03-22 | End: 2024-03-24

## 2024-03-22 RX ORDER — NITROGLYCERIN 40 MG/100ML
INJECTION INTRAVENOUS AS NEEDED
Status: DISCONTINUED | OUTPATIENT
Start: 2024-03-22 | End: 2024-03-22

## 2024-03-22 RX ORDER — MIDAZOLAM HYDROCHLORIDE 1 MG/ML
INJECTION INTRAMUSCULAR; INTRAVENOUS AS NEEDED
Status: DISCONTINUED | OUTPATIENT
Start: 2024-03-22 | End: 2024-03-22

## 2024-03-22 RX ORDER — TRAZODONE HYDROCHLORIDE 50 MG/1
50 TABLET ORAL NIGHTLY
Status: DISCONTINUED | OUTPATIENT
Start: 2024-03-22 | End: 2024-04-01 | Stop reason: HOSPADM

## 2024-03-22 RX ORDER — MAGNESIUM SULFATE HEPTAHYDRATE 40 MG/ML
2 INJECTION, SOLUTION INTRAVENOUS EVERY 6 HOURS PRN
Status: DISCONTINUED | OUTPATIENT
Start: 2024-03-22 | End: 2024-03-24

## 2024-03-22 RX ORDER — VANCOMYCIN HYDROCHLORIDE 1 G/20ML
INJECTION, POWDER, LYOPHILIZED, FOR SOLUTION INTRAVENOUS AS NEEDED
Status: DISCONTINUED | OUTPATIENT
Start: 2024-03-22 | End: 2024-03-22

## 2024-03-22 RX ORDER — DEXMEDETOMIDINE HYDROCHLORIDE 4 UG/ML
0.3 INJECTION, SOLUTION INTRAVENOUS CONTINUOUS
Status: DISCONTINUED | OUTPATIENT
Start: 2024-03-22 | End: 2024-03-23

## 2024-03-22 RX ORDER — MAGNESIUM SULFATE HEPTAHYDRATE 500 MG/ML
INJECTION, SOLUTION INTRAMUSCULAR; INTRAVENOUS AS NEEDED
Status: DISCONTINUED | OUTPATIENT
Start: 2024-03-22 | End: 2024-03-22

## 2024-03-22 RX ORDER — POTASSIUM CHLORIDE 20 MEQ/1
40 TABLET, EXTENDED RELEASE ORAL EVERY 6 HOURS PRN
Status: DISCONTINUED | OUTPATIENT
Start: 2024-03-22 | End: 2024-03-24

## 2024-03-22 RX ORDER — DEXMEDETOMIDINE HYDROCHLORIDE 4 UG/ML
INJECTION, SOLUTION INTRAVENOUS CONTINUOUS PRN
Status: DISCONTINUED | OUTPATIENT
Start: 2024-03-22 | End: 2024-03-22

## 2024-03-22 RX ORDER — POLYETHYLENE GLYCOL 3350 17 G/17G
17 POWDER, FOR SOLUTION ORAL 2 TIMES DAILY
Status: DISCONTINUED | OUTPATIENT
Start: 2024-03-22 | End: 2024-03-29

## 2024-03-22 RX ORDER — PANTOPRAZOLE SODIUM 40 MG/1
40 TABLET, DELAYED RELEASE ORAL
Status: DISCONTINUED | OUTPATIENT
Start: 2024-03-23 | End: 2024-03-24

## 2024-03-22 RX ORDER — PROTAMINE SULFATE 10 MG/ML
INJECTION, SOLUTION INTRAVENOUS AS NEEDED
Status: DISCONTINUED | OUTPATIENT
Start: 2024-03-22 | End: 2024-03-22

## 2024-03-22 RX ORDER — PANTOPRAZOLE SODIUM 40 MG/10ML
40 INJECTION, POWDER, LYOPHILIZED, FOR SOLUTION INTRAVENOUS
Status: DISCONTINUED | OUTPATIENT
Start: 2024-03-23 | End: 2024-03-24

## 2024-03-22 RX ADMIN — SODIUM CHLORIDE 2 UNITS/HR: 9 INJECTION, SOLUTION INTRAVENOUS at 10:03

## 2024-03-22 RX ADMIN — ACETAMINOPHEN 1000 MG: 10 INJECTION, SOLUTION INTRAVENOUS at 11:55

## 2024-03-22 RX ADMIN — METHADONE HYDROCHLORIDE 10 MG: 10 INJECTION, SOLUTION INTRAMUSCULAR; INTRAVENOUS; SUBCUTANEOUS at 08:16

## 2024-03-22 RX ADMIN — DEXMEDETOMIDINE HYDROCHLORIDE 0.3 MCG/KG/HR: 400 INJECTION INTRAVENOUS at 23:45

## 2024-03-22 RX ADMIN — SODIUM CHLORIDE, POTASSIUM CHLORIDE, SODIUM LACTATE AND CALCIUM CHLORIDE 5 ML/HR: 600; 310; 30; 20 INJECTION, SOLUTION INTRAVENOUS at 13:00

## 2024-03-22 RX ADMIN — POLYETHYLENE GLYCOL 3350 17 G: 17 POWDER, FOR SOLUTION ORAL at 21:35

## 2024-03-22 RX ADMIN — HYDROMORPHONE HYDROCHLORIDE 0.2 MG: 1 INJECTION, SOLUTION INTRAMUSCULAR; INTRAVENOUS; SUBCUTANEOUS at 20:26

## 2024-03-22 RX ADMIN — AMPICILLIN SODIUM 2 G: 2 INJECTION, POWDER, FOR SOLUTION INTRAMUSCULAR; INTRAVENOUS at 00:08

## 2024-03-22 RX ADMIN — SODIUM CHLORIDE, POTASSIUM CHLORIDE, SODIUM LACTATE AND CALCIUM CHLORIDE 30 ML/HR: 600; 310; 30; 20 INJECTION, SOLUTION INTRAVENOUS at 13:00

## 2024-03-22 RX ADMIN — DIPHENHYDRAMINE HYDROCHLORIDE 12.5 MG: 50 INJECTION INTRAMUSCULAR; INTRAVENOUS at 05:33

## 2024-03-22 RX ADMIN — ALBUMIN HUMAN 25 G: 0.05 INJECTION, SOLUTION INTRAVENOUS at 22:26

## 2024-03-22 RX ADMIN — SENNOSIDES AND DOCUSATE SODIUM 2 TABLET: 8.6; 5 TABLET ORAL at 21:41

## 2024-03-22 RX ADMIN — MAGNESIUM SULFATE HEPTAHYDRATE 2 G: 500 INJECTION, SOLUTION INTRAMUSCULAR; INTRAVENOUS at 11:35

## 2024-03-22 RX ADMIN — MELATONIN 3 MG: 3 TAB ORAL at 21:35

## 2024-03-22 RX ADMIN — AMPICILLIN SODIUM 2 G: 2 INJECTION, POWDER, FOR SOLUTION INTRAMUSCULAR; INTRAVENOUS at 17:18

## 2024-03-22 RX ADMIN — HEPARIN SODIUM 26000 UNITS: 1000 INJECTION INTRAVENOUS; SUBCUTANEOUS at 09:05

## 2024-03-22 RX ADMIN — ROCURONIUM BROMIDE 30 MG: 10 INJECTION INTRAVENOUS at 08:46

## 2024-03-22 RX ADMIN — IPRATROPIUM BROMIDE AND ALBUTEROL SULFATE 3 ML: .5; 3 SOLUTION RESPIRATORY (INHALATION) at 21:17

## 2024-03-22 RX ADMIN — METHADONE HYDROCHLORIDE 5 MG: 10 INJECTION, SOLUTION INTRAMUSCULAR; INTRAVENOUS; SUBCUTANEOUS at 10:56

## 2024-03-22 RX ADMIN — ROCURONIUM BROMIDE 30 MG: 10 INJECTION INTRAVENOUS at 10:42

## 2024-03-22 RX ADMIN — MIDAZOLAM HYDROCHLORIDE 1 MG: 1 INJECTION, SOLUTION INTRAMUSCULAR; INTRAVENOUS at 07:31

## 2024-03-22 RX ADMIN — PROPOFOL 30 MG: 10 INJECTION, EMULSION INTRAVENOUS at 08:41

## 2024-03-22 RX ADMIN — IPRATROPIUM BROMIDE AND ALBUTEROL SULFATE 3 ML: .5; 3 SOLUTION RESPIRATORY (INHALATION) at 13:53

## 2024-03-22 RX ADMIN — AMPICILLIN SODIUM 2 G: 2 INJECTION, POWDER, FOR SOLUTION INTRAMUSCULAR; INTRAVENOUS at 08:15

## 2024-03-22 RX ADMIN — NITROGLYCERIN 100 MCG: 10 INJECTION INTRAVENOUS at 10:53

## 2024-03-22 RX ADMIN — NITROGLYCERIN 100 MCG: 10 INJECTION INTRAVENOUS at 09:02

## 2024-03-22 RX ADMIN — CEFTRIAXONE SODIUM 2 G: 2 INJECTION, SOLUTION INTRAVENOUS at 08:15

## 2024-03-22 RX ADMIN — Medication: at 20:00

## 2024-03-22 RX ADMIN — METOPROLOL TARTRATE 2 MG: 5 INJECTION, SOLUTION INTRAVENOUS at 08:52

## 2024-03-22 RX ADMIN — SODIUM CHLORIDE 990 MG: 9 INJECTION, SOLUTION INTRAVENOUS at 08:15

## 2024-03-22 RX ADMIN — NITROGLYCERIN 100 MCG: 10 INJECTION INTRAVENOUS at 08:34

## 2024-03-22 RX ADMIN — ALBUMIN HUMAN 25 G: 0.05 INJECTION, SOLUTION INTRAVENOUS at 15:14

## 2024-03-22 RX ADMIN — AMPICILLIN SODIUM 2 G: 2 INJECTION, POWDER, FOR SOLUTION INTRAMUSCULAR; INTRAVENOUS at 20:09

## 2024-03-22 RX ADMIN — FUROSEMIDE 5 MG: 10 INJECTION, SOLUTION INTRAVENOUS at 11:18

## 2024-03-22 RX ADMIN — NITROGLYCERIN 100 MCG: 10 INJECTION INTRAVENOUS at 08:16

## 2024-03-22 RX ADMIN — SODIUM CHLORIDE, SODIUM GLUCONATE, SODIUM ACETATE, POTASSIUM CHLORIDE AND MAGNESIUM CHLORIDE: 526; 502; 368; 37; 30 INJECTION, SOLUTION INTRAVENOUS at 08:10

## 2024-03-22 RX ADMIN — PROPOFOL 100 MG: 10 INJECTION, EMULSION INTRAVENOUS at 07:44

## 2024-03-22 RX ADMIN — GLYCOPYRROLATE 0.2 MG: 0.2 INJECTION INTRAMUSCULAR; INTRAVENOUS at 07:44

## 2024-03-22 RX ADMIN — FENTANYL CITRATE 100 MCG: 50 INJECTION, SOLUTION INTRAMUSCULAR; INTRAVENOUS at 09:08

## 2024-03-22 RX ADMIN — FIBRINOGEN HUMAN 1091 MG: 1300 INJECTION, POWDER, LYOPHILIZED, FOR SOLUTION INTRAVENOUS at 11:38

## 2024-03-22 RX ADMIN — ROCURONIUM BROMIDE 20 MG: 10 INJECTION INTRAVENOUS at 09:13

## 2024-03-22 RX ADMIN — FENTANYL CITRATE 100 MCG: 50 INJECTION, SOLUTION INTRAMUSCULAR; INTRAVENOUS at 07:44

## 2024-03-22 RX ADMIN — LIDOCAINE HYDROCHLORIDE 90 MG: 20 INJECTION, SOLUTION INFILTRATION; PERINEURAL at 07:44

## 2024-03-22 RX ADMIN — CEFTRIAXONE SODIUM 2 G: 2 INJECTION, SOLUTION INTRAVENOUS at 20:09

## 2024-03-22 RX ADMIN — PROTAMINE SULFATE 30 MG: 10 INJECTION, SOLUTION INTRAVENOUS at 11:04

## 2024-03-22 RX ADMIN — NITROGLYCERIN 100 MCG: 10 INJECTION INTRAVENOUS at 08:44

## 2024-03-22 RX ADMIN — HEPARIN SODIUM 260 ML: 1000 INJECTION, SOLUTION INTRAVENOUS; SUBCUTANEOUS at 08:03

## 2024-03-22 RX ADMIN — HEPARIN SODIUM 5000 UNITS: 1000 INJECTION INTRAVENOUS; SUBCUTANEOUS at 09:19

## 2024-03-22 RX ADMIN — HYDROMORPHONE HYDROCHLORIDE 0.2 MG: 1 INJECTION, SOLUTION INTRAMUSCULAR; INTRAVENOUS; SUBCUTANEOUS at 16:14

## 2024-03-22 RX ADMIN — NITROGLYCERIN 100 MCG: 10 INJECTION INTRAVENOUS at 07:55

## 2024-03-22 RX ADMIN — ATORVASTATIN CALCIUM 80 MG: 80 TABLET, FILM COATED ORAL at 21:35

## 2024-03-22 RX ADMIN — TRAZODONE HYDROCHLORIDE 50 MG: 50 TABLET ORAL at 21:35

## 2024-03-22 RX ADMIN — HYDROMORPHONE HYDROCHLORIDE 0.2 MG: 1 INJECTION, SOLUTION INTRAMUSCULAR; INTRAVENOUS; SUBCUTANEOUS at 13:13

## 2024-03-22 RX ADMIN — PROTAMINE SULFATE 300 MG: 10 INJECTION, SOLUTION INTRAVENOUS at 10:53

## 2024-03-22 RX ADMIN — DEXMEDETOMIDINE HYDROCHLORIDE 0.3 MCG/KG/HR: 4 INJECTION, SOLUTION INTRAVENOUS at 11:18

## 2024-03-22 RX ADMIN — MIDAZOLAM HYDROCHLORIDE 1 MG: 1 INJECTION, SOLUTION INTRAMUSCULAR; INTRAVENOUS at 07:20

## 2024-03-22 RX ADMIN — Medication 40 MCG: at 07:44

## 2024-03-22 RX ADMIN — OXYCODONE HYDROCHLORIDE 5 MG: 5 TABLET ORAL at 23:14

## 2024-03-22 RX ADMIN — FENTANYL CITRATE 100 MCG: 50 INJECTION, SOLUTION INTRAMUSCULAR; INTRAVENOUS at 08:36

## 2024-03-22 RX ADMIN — VANCOMYCIN HYDROCHLORIDE 1000 MG: 1 INJECTION, POWDER, LYOPHILIZED, FOR SOLUTION INTRAVENOUS at 08:15

## 2024-03-22 RX ADMIN — SODIUM CHLORIDE, POTASSIUM CHLORIDE, SODIUM LACTATE AND CALCIUM CHLORIDE 5 ML/HR: 600; 310; 30; 20 INJECTION, SOLUTION INTRAVENOUS at 18:57

## 2024-03-22 RX ADMIN — AMPICILLIN SODIUM 2 G: 2 INJECTION, POWDER, FOR SOLUTION INTRAMUSCULAR; INTRAVENOUS at 04:06

## 2024-03-22 RX ADMIN — AMPICILLIN SODIUM 2 G: 2 INJECTION, POWDER, FOR SOLUTION INTRAMUSCULAR; INTRAVENOUS at 11:48

## 2024-03-22 RX ADMIN — DEXMEDETOMIDINE HYDROCHLORIDE 0.3 MCG/KG/HR: 400 INJECTION INTRAVENOUS at 19:35

## 2024-03-22 RX ADMIN — METOPROLOL TARTRATE 3 MG: 5 INJECTION, SOLUTION INTRAVENOUS at 08:36

## 2024-03-22 RX ADMIN — SODIUM CHLORIDE, SODIUM GLUCONATE, SODIUM ACETATE, POTASSIUM CHLORIDE AND MAGNESIUM CHLORIDE: 30; 37; 368; 526; 502 INJECTION, SOLUTION INTRAVENOUS at 07:19

## 2024-03-22 RX ADMIN — PROPOFOL 35 MCG/KG/MIN: 10 INJECTION, EMULSION INTRAVENOUS at 14:13

## 2024-03-22 RX ADMIN — NITROGLYCERIN 100 MCG: 10 INJECTION INTRAVENOUS at 08:47

## 2024-03-22 RX ADMIN — ROCURONIUM BROMIDE 50 MG: 10 INJECTION INTRAVENOUS at 07:44

## 2024-03-22 RX ADMIN — AMPICILLIN SODIUM 2 G: 2 INJECTION, POWDER, FOR SOLUTION INTRAMUSCULAR; INTRAVENOUS at 23:45

## 2024-03-22 ASSESSMENT — PAIN - FUNCTIONAL ASSESSMENT
PAIN_FUNCTIONAL_ASSESSMENT: 0-10

## 2024-03-22 ASSESSMENT — PAIN SCALES - GENERAL
PAINLEVEL_OUTOF10: 5 - MODERATE PAIN
PAINLEVEL_OUTOF10: 2
PAINLEVEL_OUTOF10: 6
PAINLEVEL_OUTOF10: 3
PAIN_LEVEL: 0
PAINLEVEL_OUTOF10: 7

## 2024-03-22 NOTE — CONSULTS
"Vancomycin Dosing by Pharmacy- INITIAL    Fannie Watson is a 78 y.o. year old female who Pharmacy has been consulted for vancomycin dosing for other surgical ppx . Based on the patient's indication and renal status this patient will be dosed based on a goal AUC of 400-600.     Renal function is currently stable.    Visit Vitals  /70   Pulse 77   Temp 36 °C (96.8 °F)   Resp 16        Lab Results   Component Value Date    CREATININE 0.49 (L) 03/21/2024    CREATININE 0.48 (L) 03/20/2024    CREATININE 0.43 (L) 03/19/2024    CREATININE 0.58 03/17/2024        Patient weight is No results found for: \"PTWEIGHT\"    No results found for: \"CULTURE\"     I/O last 3 completed shifts:  In: 480 (7.3 mL/kg) [P.O.:480]  Out: 1850 (28 mL/kg) [Urine:1850 (0.8 mL/kg/hr)]  Weight: 66.1 kg   [unfilled]    Lab Results   Component Value Date    PATIENTTEMP 37.0 03/22/2024    PATIENTTEMP 37.0 03/22/2024    PATIENTTEMP 37.0 03/22/2024          Assessment/Plan     Patient will not be given a loading dose.  Will initiate vancomycin maintenance,  1750 mg every 24 hours.    This dosing regimen is predicted by [employer] to result in the following pharmacokinetic parameters:  Regimen: 1750 mg IV every 24 hours.  Start time: 08:15 on 03/23/2024  Exposure target: AUC24 (range)400-600 mg/L.hr   AUC24,ss: 442 mg/L.hr  Probability of AUC24 > 400: 61 %  Ctrough,ss: 10.2 mg/L  Probability of Ctrough,ss > 20: 11 %  Probability of nephrotoxicity (Lodise UNA 2009): 6 %      Follow-up level will be ordered on am labs at 3/23 unless clinically indicated sooner.  Will continue to monitor renal function daily while on vancomycin and order serum creatinine at least every 48 hours if not already ordered.  Follow for continued vancomycin needs, clinical response, and signs/symptoms of toxicity.       Itzel Arora RPh       "

## 2024-03-22 NOTE — OP NOTE
Pre-operative Diagnosis:  Pre-Op Diagnosis Codes:     * Endocarditis, unspecified chronicity, unspecified endocarditis type [I38]    Post-operative Diagnosis:  Post-op Diagnosis     * Endocarditis, unspecified chronicity, unspecified endocarditis type [I38]     Surgeon:      * Deangelo Chaudhari - Primary     Assistants:   Varghese Bailey    Anesthesia:    Procedure(s) and Anesthesia Type:     * Mitral Valve Replacement Mini - General    Anesthesia Staff:  Anesthesiologist: Immanuel Aguilar MD  Anesthesia Resident: Rafi Johnston MD  Perfusionist: Colton Clemens    OPERATION/PROCEDURE:  1. Minimally invasive mitral valve replacement (31mm Epic bioprosthesis)  2. Exploration of the right femoral vein and artery for cannulation for cardiopulmonary bypass.      INDICATION:  Fannie Watson is a 78 y.o. female presenting with h/o HTN, HLD, COPD, 3/1 s/p MIS L4-5 TLIF, p/w AMS, 3/5 CTH trace R sylvian SAH. 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 Patient was transferred from [place] in [place]. She was admitted there on 3/1/24 for an elective L4-L5 decompression laminectomy and fusion. Due to confusion CT head was completed and showed SAH. She was then transferred to  on 3/5 with concern for SAH.   The patient has been managed conservatively for her SAH. CTA was negative, MRI negative, MRI L spine was negative. EP was consulted for new Afib. EP recommended an echo be done given the new afib. The TTE was concerning for a MV mass. 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   A CROW was obtained to better visualize the mass/vegetation and mitral valve. CROW showed a large mobile vegetation on the MV and mod/severe MR. The risks and benefits were discussed with her and her family in detail a well as discussion with the neuro team. She agreed to proceed.    FINDINGS:  On CROW, the heart function was preserved.  The CROW also confirmed the diagnosis.      PROCEDURE IN DETAIL:  With the patient supine with the right side slightly elevated on the operating table, the skin was prepped and draped.  Small groin crease incision was made overlying the right femoral artery and vein.  These were cannulated using a 17-Montserratian arterial and 23-Montserratian venous cannula.  These were performed using CROW guidance. Right mini thoracotomy was made.  The right lung was deflated and cardiopulmonary bypass was started after full anticoagulation. The pericardium was opened and the aortic DeTach cross-clamp was applied and DelNido cardioplegia was infused antegradely into the aortic root, resulting in prompt electromechanical arrest of the heart.     Left atriotomy was performed following dissection of Sondergaard groove.  Examination of the mitral valve revealed vegetations on both the posterior and anterior leaflets - larger on the posterior with leaflet destruction at the P2 / P3 area. We proceeded with replacement with 31mm Epic bioprosthesis using 12 pledgeted horizontal mattress 2/0 Ethibond sutures. These were secured using CorKnot.  Left atriotomy was then closed using continuous 3-0 Prolene sutures.     Bipolar right ventricular pacing wires were applied.  Following de-airing with the aid of CO2, the aortic cross-clamp was removed.  The heart resumed activity in sinus rhythm.  Cardiopulmonary bypass was weaned uneventfully and CROW demonstrated excellent result.     Protamine was administered to reverse systemic anticoagulation and hemostasis was secured.  One Vikash drain was inserted into the pleural cavity and the wound was closed using vicryl and subcuticular skin closure.     After decannulation, the femoral artery and vein were repaired using 5-0 Prolene sutures.  The groin wound was closed in layers using Vicryl and subcuticular skin closure.     The patient remained stable and was transferred to the Cardiothoracic Intensive Care Unit in a stable cardiorespiratory condition.   I was available for all aspects of the procedure preoperatively and postoperatively.    Deangelo Chaudhari MD

## 2024-03-22 NOTE — ANESTHESIA PROCEDURE NOTES
Central Venous Line:    Date/Time: 3/22/2024 8:03 AM    A central venous line was placed  for the following indication(s): central venous access and CVP monitoring.  Staffing  Performed: resident   Authorized by: Immanuel Aguilar MD    Performed by: Rafi Johnston MD    Sterility preparation included the following: provider hand hygiene performed prior to central venous catheter insertion, all 5 sterile barriers used (gloves, gown, cap, mask, large sterile drape) during central venous catheter insertion, antiseptic used during central venous catheter insertion and skin prep agent completely dried prior to procedure.  The patient was placed in Trendelenburg position.    Right internal jugular vein was prepped.    The site was prepped with Chlorhexidine.  Size: 8.5 Fr (8 Fr)   Length: 20  Number of Lumens: double lumen      During the procedure, the following specific steps were taken: target vein identified, needle advanced into vein and blood aspirated and guidewire advanced into vein.  Seldinger technique used.  Procedure performed using ultrasound guidance.  Sterile gel and probe cover used in ultrasound-guided central venous catheter insertion.    Intravenous verification was obtained by ultrasound, venous blood return and manometry.      Post insertion care included: all ports aspirated, all ports flushed easily, guidewire removed intact, Biopatch applied, line sutured in place and dressing applied.    During the procedure the patient experienced: patient tolerated procedure well with no complications.

## 2024-03-22 NOTE — CARE PLAN
The patient's goals for the shift include To get Benadryl ordered for skin itchiness    The clinical goals for the shift include Pt remains safe throughout shift    Over the shift, the patient was ordered Benadryl for skin itchiness.  Patient was medicated for back pain as ordered with some relief.  Patient is therapeutic on Heparin.  Patient remained safe free from falls and injury this shift.  Patient ambulated to bathroom multiple times today with minimal assistance.        Problem: Psychosocial Needs  Goal: Collaborate with me, my family, and caregiver to identify my specific goals  Recent Flowsheet Documentation  Taken 3/21/2024 0745 by Sera Garcia RN  Cultural Requests During Hospitalization: none  Spiritual Requests During Hospitalization: none     Problem: General Stroke  Goal: Establish a mutual long term goal with patient by discharge  Outcome: Progressing  Goal: Demonstrate improvement in neurological exam throughout the shift  Outcome: Progressing  Goal: Maintain BP within ordered limits throughout shift  Outcome: Progressing  Goal: Participate in treatment (ie., meds, therapy) throughout shift  Outcome: Progressing  Goal: No symptoms of aspiration throughout shift  Outcome: Progressing  Goal: No symptoms of hemorrhage throughout shift  Outcome: Progressing  Goal: Tolerate enteral feeding throughout shift  Outcome: Progressing  Goal: Decreased nausea/vomiting throughout shift  Outcome: Progressing  Goal: Controlled blood glucose throughout shift  Outcome: Progressing  Goal: Out of bed three times today  Outcome: Progressing     Problem: Skin  Goal: Decreased wound size/increased tissue granulation at next dressing change  Recent Flowsheet Documentation  Taken 3/21/2024 2111 by Sera Garcia RN  Decreased wound size/increased tissue granulation at next dressing change:   Promote sleep for wound healing   Protective dressings over bony prominences  Goal: Participates in plan/prevention/treatment  measures  Recent Flowsheet Documentation  Taken 3/21/2024 2111 by Sera Garcia RN  Participates in plan/prevention/treatment measures:   Elevate heels   Increase activity/out of bed for meals   Discuss with provider PT/OT consult  Goal: Prevent/manage excess moisture  Recent Flowsheet Documentation  Taken 3/21/2024 2111 by Sera Garcia RN  Prevent/manage excess moisture:   Cleanse incontinence/protect with barrier cream   Monitor for/manage infection if present   Moisturize dry skin   Follow provider orders for dressing changes  Goal: Prevent/minimize sheer/friction injuries  Recent Flowsheet Documentation  Taken 3/21/2024 2111 by Sera Garcia RN  Prevent/minimize sheer/friction injuries:   Increase activity/out of bed for meals   Turn/reposition every 2 hours/use positioning/transfer devices   Use pull sheet   HOB 30 degrees or less  Goal: Promote/optimize nutrition  Recent Flowsheet Documentation  Taken 3/21/2024 2111 by Sera Garcia RN  Promote/optimize nutrition:   Consume > 50% meals/supplements   Monitor/record intake including meals   Offer water/supplements/favorite foods  Goal: Promote skin healing  Recent Flowsheet Documentation  Taken 3/21/2024 2111 by Sera Garcia RN  Promote skin healing:   Assess skin/pad under line(s)/device(s)   Ensure correct size (line/device) and apply per  instructions   Protective dressings over bony prominences   Turn/reposition every 2 hours/use positioning/transfer devices     Problem: Skin  Goal: Decreased wound size/increased tissue granulation at next dressing change  Outcome: Progressing  Flowsheets (Taken 3/21/2024 2111)  Decreased wound size/increased tissue granulation at next dressing change:   Promote sleep for wound healing   Protective dressings over bony prominences  Goal: Participates in plan/prevention/treatment measures  Outcome: Progressing  Flowsheets (Taken 3/21/2024 2111)  Participates in plan/prevention/treatment measures:    Elevate heels   Increase activity/out of bed for meals   Discuss with provider PT/OT consult  Goal: Prevent/manage excess moisture  Outcome: Progressing  Flowsheets (Taken 3/21/2024 2111)  Prevent/manage excess moisture:   Cleanse incontinence/protect with barrier cream   Monitor for/manage infection if present   Moisturize dry skin   Follow provider orders for dressing changes  Goal: Prevent/minimize sheer/friction injuries  Outcome: Progressing  Flowsheets (Taken 3/21/2024 2111)  Prevent/minimize sheer/friction injuries:   Increase activity/out of bed for meals   Turn/reposition every 2 hours/use positioning/transfer devices   Use pull sheet   HOB 30 degrees or less  Goal: Promote/optimize nutrition  Outcome: Progressing  Flowsheets (Taken 3/21/2024 2111)  Promote/optimize nutrition:   Consume > 50% meals/supplements   Monitor/record intake including meals   Offer water/supplements/favorite foods  Goal: Promote skin healing  Outcome: Progressing  Flowsheets (Taken 3/21/2024 2111)  Promote skin healing:   Assess skin/pad under line(s)/device(s)   Ensure correct size (line/device) and apply per  instructions   Protective dressings over bony prominences   Turn/reposition every 2 hours/use positioning/transfer devices     Problem: Fall/Injury  Goal: Not fall by end of shift  Outcome: Progressing  Goal: Be free from injury by end of the shift  Outcome: Progressing  Goal: Verbalize understanding of personal risk factors for fall in the hospital  Outcome: Progressing  Goal: Verbalize understanding of risk factor reduction measures to prevent injury from fall in the home  Outcome: Progressing  Goal: Use assistive devices by end of the shift  Outcome: Progressing  Goal: Pace activities to prevent fatigue by end of the shift  Outcome: Progressing     Problem: Pain  Goal: Takes deep breaths with improved pain control throughout the shift  Outcome: Progressing  Goal: Turns in bed with improved pain control  throughout the shift  Outcome: Progressing  Goal: Walks with improved pain control throughout the shift  Outcome: Progressing  Goal: Performs ADL's with improved pain control throughout shift  Outcome: Progressing  Goal: Participates in PT with improved pain control throughout the shift  Outcome: Progressing  Goal: Free from opioid side effects throughout the shift  Outcome: Progressing  Goal: Free from acute confusion related to pain meds throughout the shift  Outcome: Progressing     Problem: Pain - Adult  Goal: Verbalizes/displays adequate comfort level or baseline comfort level  Outcome: Progressing     Problem: Safety - Adult  Goal: Free from fall injury  Outcome: Progressing     Problem: Discharge Planning  Goal: Discharge to home or other facility with appropriate resources  Outcome: Progressing     Problem: Chronic Conditions and Co-morbidities  Goal: Patient's chronic conditions and co-morbidity symptoms are monitored and maintained or improved  Outcome: Progressing

## 2024-03-22 NOTE — BRIEF OP NOTE
Date: 3/5/2024 - 3/22/2024  OR Location: [facility] OR    Name: Fannie Watson, : 1945, Age: 78 y.o., MRN: [id number], Sex: female    Diagnosis  Pre-op Diagnosis     * Endocarditis, unspecified chronicity, unspecified endocarditis type [I38] Post-op Diagnosis     * Endocarditis, unspecified chronicity, unspecified endocarditis type [I38]     Procedures  Mitral Valve Replacement Mini  93685 - NM REPLACEMENT MITRAL VALVE W/CARDIOPULMONARY BYP    Cutdown right groin  Right femoral artery & right femoral vein cannulation  Right mini thoracotomy  Mitral valve replacement using 31 Epic mitral bioprosthetic valve with cor knots  Primary repair of right femoral artery & right femoral vein     Chest Tubes/Drains: right pleural x1       Temporary Pacing Wires: ventricular    Permanent pacer/ICD: No    Groin Cutdown performed by: Tiffany BLEDSOE    Groin Closure performed by: Lisa SA-C    Chest Closure performed by: Tiffany WARDA & Lisa SA-C    Cardio Pulmonary Bypass Time: 96min  Cross-clamp Time: 69min  Circulatory Arrest: No Time:     Is patient candidate for Emergency Re-sternotomy? No    Surgeons      * Deangelo Chaudhari - Primary    Resident/Fellow/Other Assistant:  Surgeon(s) and Role:  Tiffany WARDA  Lisa SA-C      Procedure Summary  Anesthesia: General  ASA: III  Anesthesia Staff: Anesthesiologist: Immanuel Aguilar MD  Anesthesia Resident: Rafi Johnston MD  Perfusionist: Colton Clemens  Estimated Blood Loss: 250mL  Intra-op Medications:   Administrations occurring from 0715 to 1200 on 24:   Medication Name Total Dose   ampicillin (Omnipen) 2 g in sodium chloride 0.9 % 100 mL  mL   cefTRIAXone (Rocephin) 2 g IV in dextrose 5% 50 mL 50 mL   metoprolol tartrate (Lopressor) tablet 12.5 mg Cannot be calculated   polyethylene glycol (Glycolax, Miralax) packet 17 g Cannot be calculated   sennosides-docusate sodium (Malika-Colace) 8.6-50 mg per tablet 2 tablet Cannot be calculated   methadone  (Dolophine) injection 15 mg 15 mg              Anesthesia Record               Intraprocedure I/O Totals          Intake    PLASMA 600.00 mL    PRBC 700.00 mL    Dexmedetomidine 0.00 mL    The total shown is the total volume documented since Anesthesia Start was filed.    plasma-lyte-A IV solution 1500.00 mL    Tranexamic Acid 0.00 mL    The total shown is the total volume documented since Anesthesia Start was filed.    Insulin Drip 0.00 mL    The total shown is the total volume documented since Anesthesia Start was filed.    ampicillin (Omnipen) 2 g in sodium chloride 0.9 % 100 mL .00 mL    perfusion prime builder 260.00 mL    Cell Saver 225 mL    Total Intake 3385 mL       Output    Urine 1210 mL    Total Output 1210 mL       Net    Net Volume 2175 mL          Specimen:   ID Type Source Tests Collected by Time   1 : mitral valve Tissue HEART VALVE - MITRAL SURGICAL PATHOLOGY EXAM Deangelo Chaudhari MD 3/22/2024 1000        Staff:   Circulator: Ana Brandon RN  Relief Circulator: Linette Sotelo RN  Scrub Person: Yeni Sánchez          Findings: *    Complications:  None; patient tolerated the procedure well.     Disposition: ICU - intubated and hemodynamically stable.  Condition: stable  Specimens Collected:   ID Type Source Tests Collected by Time   1 : mitral valve Tissue HEART VALVE - MITRAL SURGICAL PATHOLOGY EXAM Deangelo Chaudhari MD 3/22/2024 1000     Attending Attestation: I was present and scrubbed for the key portions of the procedure.    Deangelo Chaudhari  Phone Number: [phone]

## 2024-03-22 NOTE — ANESTHESIA PROCEDURE NOTES
Airway  Date/Time: 3/22/2024 7:47 AM  Urgency: elective    Airway not difficult    Staffing  Performed: resident   Authorized by: Immanuel Aguilar MD    Performed by: Rafi Johnston MD  Patient location during procedure: OR    Indications and Patient Condition  Indications for airway management: anesthesia  Spontaneous ventilation: present  Sedation level: deep  Preoxygenated: yes  Patient position: sniffing  Mask difficulty assessment: 2 - vent by mask + OA or adjuvant +/- NMBA  Planned trial extubation    Final Airway Details  Final airway type: endotracheal airway      Successful airway: ETT - double lumen left  Cuffed: yes   Successful intubation technique: direct laryngoscopy  Facilitating devices/methods: intubating stylet  Endotracheal tube insertion site: oral  Blade: Maria Elena  Blade size: #3  ETT DL size (fr): 37  Cormack-Lehane Classification: grade I - full view of glottis  Placement verified by: chest auscultation, bronchoscopy and capnometry   Measured from: teeth  ETT to teeth (cm): 27  Number of attempts at approach: 1  Number of other approaches attempted: 0

## 2024-03-22 NOTE — ANESTHESIA PROCEDURE NOTES
Peripheral IV  Date/Time: 3/22/2024 8:03 AM      Placement  Needle size: 18 G  Laterality: right  Location: hand  Local anesthetic: none  Site prep: chlorhexidine  Technique: anatomical landmarks  Attempts: 1

## 2024-03-22 NOTE — H&P
CTICU History & Physical    Subjective   HPI:  Fannie Watson is a 78 year old F with PMH significant for HTN, HLD, COPD, spondylolisthesis of lumbar lesion s/p L4-L5 decompression laminectomy and fusion 03/01/2024 who initially presented to OSH on 03/05 with altered mental status found to have trace R sylvian subarachnoid hemorrhage and transferred to [facility] for further management. While admitted to the NSU patient went into paroxysmal A fib with RVR. TTE done on 03/07 showed an anterior mobile mass on mitral valve leaflet with moderate mitral valve regurgitation. Follow up CROW showed a 2.5 cm mobile vegetation on the mitral valve with moderate mitral regurgitation. Blood cultures drawn on 03/07 were positive for E. Faecium. She had an MRI of her spine on 03/14 that showed an enlarging fluid collection that was drained by IR due to concern for abscess as source of infection vs possible discitis/osteomyelitis in the spine post-operatively.    Of note, per chart review patient has had multiple presentations since December 2023 for various symptoms including chest pain, chest tightness, shortness of breath and worsening back pain. She also had some dysphagia and decreased appetite for upwards of a month, as well as a cough that . 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 She had a L4-L5 TLIF on 03/01 at OSH.    She is now s/p Mitral valve replacement with Dr. Sunshine on 03/22/2024    Cardiac Testing:     CROW 03/08/2024   1. Left ventricular systolic function is normal with a 65-70% estimated ejection fraction.   2. There is an elongated (2.5 x 0.3 cm), filamentous mass that is likely a vegetation attached to the atrial side of the posterior leaflet. There is a resultant eccentric mitral regurgitation that is anteriorly directed. Using PISA, ERO: 0.34 cmï¿½ with a regurg volume of 48.62 ml/beat. Although difficult to see, there does not appear  to be a perforation. There is severe blunting of all pulmonary vein inflow with early temporary reversal in the right inferior pulmonary vein.   3. Moderate to severe mitral valve regurgitation.   4. The pulmonary artery is not well visualized.    TTE 03/07/2024:  1. Left ventricular systolic function is hyperdynamic with a 70-75% estimated ejection fraction.   2. Spectral Doppler shows an impaired relaxation pattern of left ventricular diastolic filling.   3. Anterior leaflet mobile mass, consider vegetation, consider CROW.   4. Mild to moderate mitral valve regurgitation.    [facility] 03/14/2024:  1. Minimal, nonobstructive coronary artery disease in a left dominant system.   2. Normal left heart filling pressures.     Procedure/Surgeon: Mitral valve replacement with Dr. Chaudhari  Frontliner/Anesthesia: Dr. Rafi Johnston/ Dr. Immanuel Aguilar  Out of OR Time (document on ventilator card): 1226     OR Course/Issues: None     CPB time: 96  Cross clamp time: 69  Circ arrest time: None  Echo Pre/Post: 55-60% pre and post  Chest Tubes/Drains: 1 right pleural chest tube   Temporary wires location/setting: V pacing wires VVI @ 50,      Fluids  Crystalloid: 2L  Colloid: None  Cellsaver: 500 cc  Products: 2 u pRBCs, 2n FFP, 1 g riastap  EBL: 250 mL  UOP: 1.2L     Anesthesia  Intubation: Blade 3, Grade IIa,   Intravenous Access: PIV, R IJ Mac w/ Mini MAC   AICD: No  PPM: No  Regional anesthesia: SS SAP   Benzodiazepine dose/last administration: 2 mgmg midazolam total  Opioid dose/last administration: 15 mg methadone last dose 1056. 300mcg fentanyl total  NMB dose/last administration: 130mg rocuronium total  TOF/ reversal given: Samuel + glyco given in OR  Antibiotic time: Ceftriaxone 2g 0815, ampicillin 4g 1138  Temperature on admission to ICU: 35.9    Past Medical History:   Diagnosis Date    COPD (chronic obstructive pulmonary disease) (CMS/HCC)     GERD (gastroesophageal reflux disease)     Hypertension      Past Surgical History:    Procedure Laterality Date    CARDIAC CATHETERIZATION N/A 3/14/2024    Procedure: Left Heart Cath;  Surgeon: David Hernandes MD;  Location: [place] [address] Cardiac Cath Lab;  Service: Cardiovascular;  Laterality: N/A;     No medications prior to admission.     Coconut, Codeine, and Penicillins  Social History     Tobacco Use    Smoking status: Former     Types: Cigarettes     Quit date:      Years since quittin.2   Substance Use Topics    Alcohol use: Never    Drug use: Never     No family history on file.    Review of Systems:  Review of Systems   Unable to perform ROS: Intubated        Objective   Vitals:  Most Recent:  Vitals:    24 0652   BP: 152/70   Pulse: 75   Resp: 16   Temp: 36 °C (96.8 °F)   SpO2: 96%       24hr Min/Max:  Temp  Min: 35.8 °C (96.4 °F)  Max: 36.2 °C (97.1 °F)  Pulse  Min: 74  Max: 92  BP  Min: 122/62  Max: 152/70  Resp  Min: 16  Max: 24  SpO2  Min: 95 %  Max: 96 %    I/O:  I/O last 2 completed shifts:  In: 480 (7.3 mL/kg) [P.O.:480]  Out: 1850 (28 mL/kg) [Urine:1850 (1.2 mL/kg/hr)]  Weight: 66.1 kg     LDA:  CVC 24 Double lumen Right Internal jugular (Active)   Placement Date/Time: 24 (c) 0803   Hand Hygiene Performed Prior to CVC Insertion: Yes  Site Prep: Chlorhexidine   Site Prep Agent has Completely Dried Before Insertion: Yes  All 5 Sterile Barriers Used (Gloves, Gown, Cap, Mask, Large Sterile Torri...   Number of days: 0       Arterial Line 24 Left Brachial (Active)   Placement Date/Time: 24 (c) 0736   Size: 20 G  Orientation: Left  Location: Brachial  Securement Method: Transparent dressing  Patient Tolerance: Tolerated well   Number of days: 0       Pacer Wires (Active)   Placement Date: 24   Placed by: Sally Leger  Type of Pacing Wires: Ventricular   Number of days: 0       ETT  37 Fr (Active)   Placement Date/Time: 24 (c) 0710   Mask Ventilation: Vent by mask + OA or adjuvant +/- NMBA  Technique: Direct laryngoscopy  ETT Type: ETT -  double lumen left  Double Lumen Tube Size: 37 Fr  Cuffed: Yes  Laryngoscope: Maria Elena  Blade Size: 3  L...   Number of days: 0       Urethral Catheter Temperature probe 14 Fr. (Active)   Placement Date/Time: 03/22/24 0800   Placed by: Nav LEON  Hand Hygiene Completed: Yes  Catheter Type: Temperature probe  Tube Size (Fr.): 14 Fr.  Catheter Balloon Size: 5 mL  Urine Returned: Yes   Number of days: 0       Physical Exam:   Physical Exam  Constitutional:       Interventions: She is sedated, intubated and restrained.   Eyes:      General: No scleral icterus.     Pupils: Pupils are equal, round, and reactive to light.   Neck:      Comments: R IJ catheter sutured in place.  Cardiovascular:      Rate and Rhythm: Normal rate and regular rhythm.      Pulses: Normal pulses.      Heart sounds: No murmur heard.  Pulmonary:      Effort: She is intubated.      Breath sounds: No wheezing or rales.   Abdominal:      Palpations: Abdomen is soft.      Tenderness: There is no abdominal tenderness.   Musculoskeletal:      Right lower leg: No edema.      Left lower leg: No edema.   Skin:     General: Skin is warm.      Findings: No rash.   Neurological:      Comments: Sedated and intubated on propofol and precedex              Lab Review:  Results from last 7 days   Lab Units 03/21/24  0728   WBC AUTO x10*3/uL 9.5   HEMOGLOBIN g/dL 10.0*   HEMATOCRIT % 31.8*   PLATELETS AUTO x10*3/uL 444     Results from last 7 days   Lab Units 03/21/24  0728   SODIUM mmol/L 136   POTASSIUM mmol/L 4.2   CHLORIDE mmol/L 98   CO2 mmol/L 28   BUN mg/dL 6   CREATININE mg/dL 0.49*   CALCIUM mg/dL 9.3   GLUCOSE mg/dL 89     Results from last 7 days   Lab Units 03/21/24  0728   MAGNESIUM mg/dL 2.16     Results from last 7 days   Lab Units 03/22/24  1012   POCT PH, ARTERIAL pH 7.43*   POCT PCO2, ARTERIAL mm Hg 40   POCT PO2, ARTERIAL mm Hg 314*   POCT HCO3 CALCULATED, ARTERIAL mmol/L 26.5*   POCT BASE EXCESS, ARTERIAL mmol/L 2.0       Most recent labs and  imaging reviewed.    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 Sunshine on 03/22/2024    Plan:  NEURO:  Presented with R Sylvian SAH. Per chart review patient has normal neurological exam. Has PMH of low back pain on lyrica. Patient is intubated and sedated on propofol and precedex infusions. Arrived to CTICU reversed. On propofol of 50 and precedex of 0.2. Acute post operative pain.   -->  - Serial neuro and pain assessments   - Wean propofol and precedex towards intubation  - NMB reversal done in OR  - Scheduled Tylenol   - PRN oxycodone  - PRN dilaudid for pain   - Lidocaine patches   - PT Consult, OOB to chair as tolerated, chair position if not tolerated   - CAM ICU score qshift  - Sleep/wake cycle hygiene  - Hold home lyrica  - Low threshold for CT head imaging if change in neuro exam given hx of SAH     CV:  Patient has a history of HTN, HLD. Had 2.5 cm Mitral valve vegetation with mod/severe mitral regurgitation. Is now status post R mini thoracotomy and Mitral valve replacement. Pre/Post EF: 55-60% pre and post Arrived to CTICU on no pressors. V epicardial wires set VVI @ 50.-->  - Maintain goal MAP 70-90  - Volume resuscitate as clinically indicated  - Maintain epicardial wires set VVI  @ 50  - ASA tomorrow  - PRN hydral for MAPs >90  - Hold metoprolol post-operatively     PULM:  Hx of COPD on home albuterol, not on home O2.  Currently intubated on ventilator.  Chest tubes 1 right pleural leobardo .-->  - follow up post op CXR  - wean ventilator settings towards CPAP & extubation   - Wean FiO2 maintaining SpO2 >92%.   - IS q1h and OOB to chair when extubated  - Chest tubes to wall suction.  - Scheduled DuoNebs for Hx of COPD    GI:  Hx of GERD on home famotidine .  OG in place.-->  - Continue PPI after extubation given history of GERD  - NPO, will perform bedside swallow eval post extubation   - Colace/senna BID and miralax BID     :  CSA-LINWOOD Risk Score medium.  No history of renal disease, baseline creatinine 0.4-0.5. Creatinine stable post-op. Servin in place and making adequate UOP. -->  - Continue servin catheter for strict I/Os.  - Goal UOP 0.5ml/kg/hr  - RFP as clinically indicated  - Fluid resuscitation as needed  - Replete electrolytes per CTICU protocol     ENDO:  No PMH  A1c: 5.5-->  - Maintain BG <180, insulin per CTICU protocol     HEME:  Received 2 units pRBCs and 2 units FFP, 500 cell saver while intra-op due to Hgb down to 6s. Was previously on heparin drip pre-op, will hold AC POD #0. Acute blood loss anemia and thrombocytopenia.-->    - Monitor drain output volume and characteristics  - CBC, coags, and fibrinogen post op and as clinically indicated  - SQH tomorrow   - SCDs for DVT prophylaxis.  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 Started on ceftriaxone and ampicillin. Will redo MRSA swab today-->  - Trend temp q4h  - Monitor surgical sites for s/s infection  - On ceftriaxone and ampicillin prior to procedure for endocarditis, will continue post op  - Started on vancomycin for MRSA prophylaxis pending MRSA swab  - ID previously following, will reengage to establish timeline for Abx  - Will likely need PICC line for long term Abx treatment      Skin:  No active skin issues.  - Has surgical wound overlying lumbar  spine   - preventative Mepilex dressings in place on sacrum and heels  - change preventative Mepilex weekly or more frequently as indicated (when moist/soiled)   - every shift skin assessment per nursing and weekly ICU skin rounds  - moisture barrier to be applied with felton care  - active skin problems addressed with nursing on daily rounds     Proph:  SCDs  PPI     G:  Line  Right IJ MAC w Minimac placed 03/22/2024  Left brachial a-line placed 03/22/2024  Munoz    F: Family: will update at bedside postoperatively.     A,B,C,D,E,F,G: reviewed    I personally spent 60 minutes of critical care time directly and personally managing the patient exclusive of separately billable procedures.     Dispo: CTICU care for now.    CTICU TEAM PHONE [phone]

## 2024-03-22 NOTE — CARE PLAN
The patient's goals for the shift include Pt is s/p MVR via thorocotomy. Extubtae within 4 hours of arrival to ICU.    The clinical goals for the shift include Maintain hemodynamic stability while weaning sedation. Fluid resuscitate as needed to keep MAP 70-80    Over the shift, the patient did not make progress toward the following goals. Barriers to progression include Pt required LR and albumin for volume resuscitation during recovery.. Recommendations to address these barriers include .

## 2024-03-22 NOTE — ANESTHESIA POSTPROCEDURE EVALUATION
Patient: Fannie Watson    Procedure Summary       Date: 03/22/24 Room / Location: [facility] OR 18 / [facility]    Anesthesia Start: 0710 Anesthesia Stop: 1244    Procedure: Mitral Valve Replacement Mini (Right) Diagnosis:       Endocarditis, unspecified chronicity, unspecified endocarditis type      (Endocarditis, unspecified chronicity, unspecified endocarditis type [I38])    Surgeons: Deangelo Chaudhari MD Responsible Provider: Immanuel Aguilar MD    Anesthesia Type: general ASA Status: 3            Anesthesia Type: general    Vitals Value Taken Time   /66 03/22/24 1233   Temp 35.9 °C (96.6 °F) 03/22/24 1233   Pulse 76 03/22/24 1243   Resp 16 03/22/24 1243   SpO2 100 % 03/22/24 1243   Vitals shown include unvalidated device data.    Anesthesia Post Evaluation    Patient location during evaluation: ICU  Patient participation: complete - patient cannot participate  Level of consciousness: sedated  Pain score: 0  Pain management: adequate  Multimodal analgesia pain management approach  Airway patency: patent  Cardiovascular status: acceptable and hemodynamically stable  Respiratory status: acceptable, ETT, ventilator and intubated  Hydration status: acceptable  Postoperative Nausea and Vomiting: none        There were no known notable events for this encounter.

## 2024-03-22 NOTE — ANESTHESIA PROCEDURE NOTES
Arterial Line:    Date/Time: 3/22/2024 7:30 AM    Staffing  Performed: attending   Authorized by: Immanuel Aguilar MD    Performed by: Rafi Johnston MD    An arterial line was placed. Procedure performed using ultrasound guidance.in the OR for the following indication(s): continuous blood pressure monitoring and blood sampling needed.    A 20 gauge (size), 1 and 3/4 inch (length), Angiocath (type) catheter was placed into the Left brachial artery, secured by Tegaderm,   Seldinger technique used.  Events:  patient tolerated procedure well with no complications.

## 2024-03-22 NOTE — PROGRESS NOTES
1/1 CTICU    CT SURGERY  @ [facility] (17d) -  3/22 MVR with Dr. Chaudhari pod-0    DC PLAN  TBD - Care Transitions is following to develop a safe & supportive discharge plan in collaboration with multidisciplinary team (&) patient/family/significant others.      PAYOR  Medicare     ID   Antibiotics:currently on Ceftriaxone 2g q12h 3/11- ____  & ampicillin 3/11- ____ end dates    PT/OT  Day before OR - low intensity (19/18)     COMPLETED  (X) Daily ongoing review of patient via chart and/or (M-F) IDT rounds  (X) 03/22 - Patient new to author: MAXIMO Bowling (LSW, MSW)

## 2024-03-22 NOTE — CONSULTS
Consults  Acute Pain Service    Fannie Watson is a 78 y.o. year old female patient who presents for MVR with Dr. Chaudhari on 3/22/24. Acute Pain consulted for block for postoperative pain control.     Anticipated Postop Pain Issues -   Palliative: typically relieved with IV analgesics and regional local anesthetics  Provocative: typically with movement  Quality: typically burning and aching  Radiation: typically none  Severity: typically severe 8-10/10  Timing: typically constant    Past Medical History:   Diagnosis Date    COPD (chronic obstructive pulmonary disease) (CMS/[facility])     GERD (gastroesophageal reflux disease)     Hypertension         Past Surgical History:   Procedure Laterality Date    CARDIAC CATHETERIZATION N/A 3/14/2024    Procedure: Left Heart Cath;  Surgeon: David Hernandes MD;  Location: [place] [address] Cardiac Cath [facility];  Service: Cardiovascular;  Laterality: N/A;        No family history on file.     Social History     Socioeconomic History    Marital status:      Spouse name: Not on file    Number of children: Not on file    Years of education: Not on file    Highest education level: Not on file   Occupational History    Not on file   Tobacco Use    Smoking status: Former     Types: Cigarettes     Quit date:      Years since quittin.2    Smokeless tobacco: Not on file   Substance and Sexual Activity    Alcohol use: Never    Drug use: Never    Sexual activity: Not on file   Other Topics Concern    Not on file   Social History Narrative    Not on file     Social Determinants of Health     Financial Resource Strain: Patient Declined (3/12/2024)    Overall Financial Resource Strain (CARDIA)     Difficulty of Paying Living Expenses: Patient declined   Food Insecurity: Unknown (3/7/2024)    Hunger Vital Sign     Worried About Running Out of Food in the Last Year: Not on file     Ran Out of Food in the Last Year: Never true   Transportation Needs: Patient Declined (3/12/2024)     PRAPARE - Transportation     Lack of Transportation (Medical): Patient declined     Lack of Transportation (Non-Medical): Patient declined   Physical Activity: Not on file   Stress: Not on file   Social Connections: Not on file   Intimate Partner Violence: Not At Risk (3/7/2024)    Humiliation, Afraid, Rape, and Kick questionnaire     Fear of Current or Ex-Partner: No     Emotionally Abused: No     Physically Abused: No     Sexually Abused: No   Housing Stability: Patient Declined (3/12/2024)    Housing Stability Vital Sign     Unable to Pay for Housing in the Last Year: Patient declined     Number of Places Lived in the Last Year: 1     Unstable Housing in the Last Year: Patient declined        Allergies   Allergen Reactions    Coconut Unknown    Codeine Unknown    Penicillins Rash         Review of Systems  Gen: No fatigue, anorexia, insomnia, fever.   Eyes: No vision loss, double vision, drainage, eye pain.   ENT: No pharyngitis, dry mouth, no hearing changes or ear discharge  Cardiac: No chest pain, palpitations, syncope, near syncope.   Pulmonary: No shortness of breath, cough, hemoptysis.   Heme/lymph: No swollen glands, fever, bleeding.   GI: No abdominal pain, change in bowel habits, melena, hematemesis, hematochezia, nausea, vomiting, diarrhea.   : No discharge, dysuria, frequency, urgency, hematuria.  Endo: No polyuria or weight loss.   Musculoskeletal: Negative for any pain or loss of ROM/weakness  Skin: No rashes or lesions  Neuro: Normal speech, no numbness or weakness. No gait difficulties  Review of systems is otherwise negative unless stated above or in history of present illness.    Physical Exam:  Constitutional:  no distress, alert and cooperative  Eyes: clear sclera  Head/Neck: No apparent injury, trachea midline  Respiratory/Thorax: Patent airways, thorax symmetric, breathing comfortably  Cardiovascular: no pitting edema  Gastrointestinal: Nondistended  Musculoskeletal: ROM  intact  Extremities: no clubbing  Neurological: alert, atkinson x4  Psychological: Appropriate affect    Results for orders placed or performed during the hospital encounter of 03/05/24 (from the past 24 hour(s))   Blood Gas Arterial Full Panel Unsolicited   Result Value Ref Range    POCT pH, Arterial 7.47 (H) 7.38 - 7.42 pH    POCT pCO2, Arterial 39 38 - 42 mm Hg    POCT pO2, Arterial 92 85 - 95 mm Hg    POCT SO2, Arterial 99 94 - 100 %    POCT Oxy Hemoglobin, Arterial 96.6 94.0 - 98.0 %    POCT Hematocrit Calculated, Arterial 31.0 (L) 36.0 - 46.0 %    POCT Sodium, Arterial 134 (L) 136 - 145 mmol/L    POCT Potassium, Arterial 4.3 3.5 - 5.3 mmol/L    POCT Chloride, Arterial 101 98 - 107 mmol/L    POCT Ionized Calcium, Arterial 1.20 1.10 - 1.33 mmol/L    POCT Glucose, Arterial 104 (H) 74 - 99 mg/dL    POCT Lactate, Arterial 1.5 0.4 - 2.0 mmol/L    POCT Base Excess, Arterial 4.4 (H) -2.0 - 3.0 mmol/L    POCT HCO3 Calculated, Arterial 28.4 (H) 22.0 - 26.0 mmol/L    POCT Hemoglobin, Arterial 10.2 (L) 12.0 - 16.0 g/dL    POCT Anion Gap, Arterial 9 (L) 10 - 25 mmo/L    Patient Temperature 37.0 degrees Celsius    FiO2 30 %   Coox Panel, Arterial Unsolicited   Result Value Ref Range    POCT Hemoglobin, Arterial 10.2 (L) 12.0 - 16.0 g/dL    POCT Oxy Hemoglobin, Arterial 96.6 94.0 - 98.0 %    POCT Carboxyhemoglobin, Arterial 1.2 %    POCT Methemoglobin, Arterial 1.0 0.0 - 1.5 %    POCT Deoxy Hemoglobin, Arterial 1.3 0.0 - 5.0 %   Blood Gas Arterial Full Panel Unsolicited   Result Value Ref Range    POCT pH, Arterial 7.36 (L) 7.38 - 7.42 pH    POCT pCO2, Arterial 50 (H) 38 - 42 mm Hg    POCT pO2, Arterial 87 85 - 95 mm Hg    POCT SO2, Arterial 98 94 - 100 %    POCT Oxy Hemoglobin, Arterial 96.1 94.0 - 98.0 %    POCT Hematocrit Calculated, Arterial 28.0 (L) 36.0 - 46.0 %    POCT Sodium, Arterial 133 (L) 136 - 145 mmol/L    POCT Potassium, Arterial 4.1 3.5 - 5.3 mmol/L    POCT Chloride, Arterial 99 98 - 107 mmol/L    POCT Ionized  Calcium, Arterial 1.12 1.10 - 1.33 mmol/L    POCT Glucose, Arterial 181 (H) 74 - 99 mg/dL    POCT Lactate, Arterial 1.3 0.4 - 2.0 mmol/L    POCT Base Excess, Arterial 2.2 -2.0 - 3.0 mmol/L    POCT HCO3 Calculated, Arterial 28.2 (H) 22.0 - 26.0 mmol/L    POCT Hemoglobin, Arterial 9.4 (L) 12.0 - 16.0 g/dL    POCT Anion Gap, Arterial 10 10 - 25 mmo/L    Patient Temperature 37.0 degrees Celsius    FiO2 90 %   Coox Panel, Arterial Unsolicited   Result Value Ref Range    POCT Hemoglobin, Arterial 9.4 (L) 12.0 - 16.0 g/dL    POCT Oxy Hemoglobin, Arterial 96.1 94.0 - 98.0 %    POCT Carboxyhemoglobin, Arterial 1.0 %    POCT Methemoglobin, Arterial 0.7 0.0 - 1.5 %    POCT Deoxy Hemoglobin, Arterial 2.2 0.0 - 5.0 %   Blood Gas Arterial Full Panel Unsolicited   Result Value Ref Range    POCT pH, Arterial 7.37 (L) 7.38 - 7.42 pH    POCT pCO2, Arterial 40 38 - 42 mm Hg    POCT pO2, Arterial 439 (H) 85 - 95 mm Hg    POCT SO2, Arterial 100 94 - 100 %    POCT Oxy Hemoglobin, Arterial 98.1 (H) 94.0 - 98.0 %    POCT Hematocrit Calculated, Arterial 23.0 (L) 36.0 - 46.0 %    POCT Sodium, Arterial 132 (L) 136 - 145 mmol/L    POCT Potassium, Arterial 4.5 3.5 - 5.3 mmol/L    POCT Chloride, Arterial 100 98 - 107 mmol/L    POCT Ionized Calcium, Arterial 0.97 (L) 1.10 - 1.33 mmol/L    POCT Glucose, Arterial 159 (H) 74 - 99 mg/dL    POCT Lactate, Arterial 1.5 0.4 - 2.0 mmol/L    POCT Base Excess, Arterial -2.0 -2.0 - 3.0 mmol/L    POCT HCO3 Calculated, Arterial 23.1 22.0 - 26.0 mmol/L    POCT Hemoglobin, Arterial 7.6 (L) 12.0 - 16.0 g/dL    POCT Anion Gap, Arterial 13 10 - 25 mmo/L    Patient Temperature 37.0 degrees Celsius    FiO2 85 %   Coox Panel, Arterial Unsolicited   Result Value Ref Range    POCT Hemoglobin, Arterial 7.6 (L) 12.0 - 16.0 g/dL    POCT Oxy Hemoglobin, Arterial 98.1 (H) 94.0 - 98.0 %    POCT Carboxyhemoglobin, Arterial 0.8 %    POCT Methemoglobin, Arterial 0.9 0.0 - 1.5 %    POCT Deoxy Hemoglobin, Arterial 0.1 0.0 - 5.0 %    Blood Gas Venous Full Panel Unsolicited   Result Value Ref Range    POCT pH, Venous 7.37 7.33 - 7.43 pH    POCT pCO2, Venous 46 41 - 51 mm Hg    POCT pO2, Venous 55 (H) 35 - 45 mm Hg    POCT SO2, Venous 88 (H) 45 - 75 %    POCT Oxy Hemoglobin, Venous 86.4 (H) 45.0 - 75.0 %    POCT Hematocrit Calculated, Venous 23.0 (L) 36.0 - 46.0 %    POCT Sodium, Venous 133 (L) 136 - 145 mmol/L    POCT Potassium, Venous 4.6 3.5 - 5.3 mmol/L    POCT Chloride, Venous 100 98 - 107 mmol/L    POCT Ionized Calicum, Venous 1.02 (L) 1.10 - 1.33 mmol/L    POCT Glucose, Venous 160 (H) 74 - 99 mg/dL    POCT Lactate, Venous 1.6 0.4 - 2.0 mmol/L    POCT Base Excess, Venous 1.1 -2.0 - 3.0 mmol/L    POCT HCO3 Calculated, Venous 26.6 (H) 22.0 - 26.0 mmol/L    POCT Hemoglobin, Venous 7.7 (L) 12.0 - 16.0 g/dL    POCT Anion Gap, Venous 11.0 10.0 - 25.0 mmol/L    Patient Temperature 37.0 degrees Celsius    FiO2 85 %   Coox Panel, Venous Unsolicited   Result Value Ref Range    POCT Carboxyhemoglobin, Venous 1.2 %    POCT Methemoglobin, Venous 0.5 0.0 - 1.5 %   Blood Gas Arterial Full Panel Unsolicited   Result Value Ref Range    POCT pH, Arterial 7.36 (L) 7.38 - 7.42 pH    POCT pCO2, Arterial 42 38 - 42 mm Hg    POCT pO2, Arterial 442 (H) 85 - 95 mm Hg    POCT SO2, Arterial 100 94 - 100 %    POCT Oxy Hemoglobin, Arterial 98.0 94.0 - 98.0 %    POCT Hematocrit Calculated, Arterial 26.0 (L) 36.0 - 46.0 %    POCT Sodium, Arterial 134 (L) 136 - 145 mmol/L    POCT Potassium, Arterial 6.0 (H) 3.5 - 5.3 mmol/L    POCT Chloride, Arterial 98 98 - 107 mmol/L    POCT Ionized Calcium, Arterial 0.88 (L) 1.10 - 1.33 mmol/L    POCT Glucose, Arterial 149 (H) 74 - 99 mg/dL    POCT Lactate, Arterial 3.0 (H) 0.4 - 2.0 mmol/L    POCT Base Excess, Arterial -1.7 -2.0 - 3.0 mmol/L    POCT HCO3 Calculated, Arterial 23.7 22.0 - 26.0 mmol/L    POCT Hemoglobin, Arterial 8.8 (L) 12.0 - 16.0 g/dL    POCT Anion Gap, Arterial 18 10 - 25 mmo/L    Patient Temperature 37.0 degrees  Celsius    FiO2 85 %   Coox Panel, Arterial Unsolicited   Result Value Ref Range    POCT Hemoglobin, Arterial 8.8 (L) 12.0 - 16.0 g/dL    POCT Oxy Hemoglobin, Arterial 98.0 94.0 - 98.0 %    POCT Carboxyhemoglobin, Arterial 1.0 %    POCT Methemoglobin, Arterial 1.0 0.0 - 1.5 %    POCT Deoxy Hemoglobin, Arterial 0.0 0.0 - 5.0 %   Blood Gas Arterial Full Panel Unsolicited   Result Value Ref Range    POCT pH, Arterial 7.43 (H) 7.38 - 7.42 pH    POCT pCO2, Arterial 41 38 - 42 mm Hg    POCT pO2, Arterial 405 (H) 85 - 95 mm Hg    POCT SO2, Arterial 100 94 - 100 %    POCT Oxy Hemoglobin, Arterial 98.0 94.0 - 98.0 %    POCT Hematocrit Calculated, Arterial 27.0 (L) 36.0 - 46.0 %    POCT Sodium, Arterial 135 (L) 136 - 145 mmol/L    POCT Potassium, Arterial 5.4 (H) 3.5 - 5.3 mmol/L    POCT Chloride, Arterial 100 98 - 107 mmol/L    POCT Ionized Calcium, Arterial 0.89 (L) 1.10 - 1.33 mmol/L    POCT Glucose, Arterial 176 (H) 74 - 99 mg/dL    POCT Lactate, Arterial 2.9 (H) 0.4 - 2.0 mmol/L    POCT Base Excess, Arterial 2.6 -2.0 - 3.0 mmol/L    POCT HCO3 Calculated, Arterial 27.2 (H) 22.0 - 26.0 mmol/L    POCT Hemoglobin, Arterial 9.1 (L) 12.0 - 16.0 g/dL    POCT Anion Gap, Arterial 13 10 - 25 mmo/L    Patient Temperature 37.0 degrees Celsius    FiO2 85 %   Coox Panel, Arterial Unsolicited   Result Value Ref Range    POCT Hemoglobin, Arterial 9.1 (L) 12.0 - 16.0 g/dL    POCT Oxy Hemoglobin, Arterial 98.0 94.0 - 98.0 %    POCT Carboxyhemoglobin, Arterial 0.9 %    POCT Methemoglobin, Arterial 1.0 0.0 - 1.5 %    POCT Deoxy Hemoglobin, Arterial 0.1 0.0 - 5.0 %   Prepare Plasma: 2 Units   Result Value Ref Range    PRODUCT CODE E8735M91     Unit Number [id number]     Unit ABO A     Unit RH NEG     Dispense Status TR     Blood Expiration Date March 26, 2024 08:42 EDT     PRODUCT BLOOD TYPE 0600     UNIT VOLUME 314     PRODUCT CODE F1356W14     Unit Number [id number]     Unit ABO B     Unit RH NEG     Dispense Status TR     Blood  Expiration Date March 24, 2024 07:09 EDT     PRODUCT BLOOD TYPE 1700     UNIT VOLUME 205    Blood Gas Arterial Full Panel Unsolicited   Result Value Ref Range    POCT pH, Arterial 7.43 (H) 7.38 - 7.42 pH    POCT pCO2, Arterial 40 38 - 42 mm Hg    POCT pO2, Arterial 314 (H) 85 - 95 mm Hg    POCT SO2, Arterial 100 94 - 100 %    POCT Oxy Hemoglobin, Arterial 97.7 94.0 - 98.0 %    POCT Hematocrit Calculated, Arterial 28.0 (L) 36.0 - 46.0 %    POCT Sodium, Arterial 134 (L) 136 - 145 mmol/L    POCT Potassium, Arterial 4.9 3.5 - 5.3 mmol/L    POCT Chloride, Arterial 100 98 - 107 mmol/L    POCT Ionized Calcium, Arterial 0.95 (L) 1.10 - 1.33 mmol/L    POCT Glucose, Arterial 167 (H) 74 - 99 mg/dL    POCT Lactate, Arterial 2.7 (H) 0.4 - 2.0 mmol/L    POCT Base Excess, Arterial 2.0 -2.0 - 3.0 mmol/L    POCT HCO3 Calculated, Arterial 26.5 (H) 22.0 - 26.0 mmol/L    POCT Hemoglobin, Arterial 9.3 (L) 12.0 - 16.0 g/dL    POCT Anion Gap, Arterial 12 10 - 25 mmo/L    Patient Temperature 37.0 degrees Celsius    FiO2 75 %   Coox Panel, Arterial Unsolicited   Result Value Ref Range    POCT Hemoglobin, Arterial 9.3 (L) 12.0 - 16.0 g/dL    POCT Oxy Hemoglobin, Arterial 97.7 94.0 - 98.0 %    POCT Carboxyhemoglobin, Arterial 0.9 %    POCT Methemoglobin, Arterial 0.9 0.0 - 1.5 %    POCT Deoxy Hemoglobin, Arterial 0.5 0.0 - 5.0 %   Blood Gas Arterial Full Panel Unsolicited   Result Value Ref Range    POCT pH, Arterial 7.40 7.38 - 7.42 pH    POCT pCO2, Arterial 42 38 - 42 mm Hg    POCT pO2, Arterial 308 (H) 85 - 95 mm Hg    POCT SO2, Arterial 100 94 - 100 %    POCT Oxy Hemoglobin, Arterial 97.9 94.0 - 98.0 %    POCT Hematocrit Calculated, Arterial 29.0 (L) 36.0 - 46.0 %    POCT Sodium, Arterial 135 (L) 136 - 145 mmol/L    POCT Potassium, Arterial 4.8 3.5 - 5.3 mmol/L    POCT Chloride, Arterial 100 98 - 107 mmol/L    POCT Ionized Calcium, Arterial 0.99 (L) 1.10 - 1.33 mmol/L    POCT Glucose, Arterial 141 (H) 74 - 99 mg/dL    POCT Lactate,  Arterial 3.0 (H) 0.4 - 2.0 mmol/L    POCT Base Excess, Arterial 1.0 -2.0 - 3.0 mmol/L    POCT HCO3 Calculated, Arterial 26.0 22.0 - 26.0 mmol/L    POCT Hemoglobin, Arterial 9.7 (L) 12.0 - 16.0 g/dL    POCT Anion Gap, Arterial 14 10 - 25 mmo/L    Patient Temperature 37.0 degrees Celsius    FiO2 75 %   Coox Panel, Arterial Unsolicited   Result Value Ref Range    POCT Hemoglobin, Arterial 9.7 (L) 12.0 - 16.0 g/dL    POCT Oxy Hemoglobin, Arterial 97.9 94.0 - 98.0 %    POCT Carboxyhemoglobin, Arterial 1.2 %    POCT Methemoglobin, Arterial 0.9 0.0 - 1.5 %    POCT Deoxy Hemoglobin, Arterial 0.0 0.0 - 5.0 %   Blood Gas Arterial Full Panel Unsolicited   Result Value Ref Range    POCT pH, Arterial 7.31 (L) 7.38 - 7.42 pH    POCT pCO2, Arterial 53 (H) 38 - 42 mm Hg    POCT pO2, Arterial 151 (H) 85 - 95 mm Hg    POCT SO2, Arterial 100 94 - 100 %    POCT Oxy Hemoglobin, Arterial 98.2 (H) 94.0 - 98.0 %    POCT Hematocrit Calculated, Arterial 28.0 (L) 36.0 - 46.0 %    POCT Sodium, Arterial 135 (L) 136 - 145 mmol/L    POCT Potassium, Arterial 4.3 3.5 - 5.3 mmol/L    POCT Chloride, Arterial 102 98 - 107 mmol/L    POCT Ionized Calcium, Arterial 1.33 1.10 - 1.33 mmol/L    POCT Glucose, Arterial 123 (H) 74 - 99 mg/dL    POCT Lactate, Arterial 2.8 (H) 0.4 - 2.0 mmol/L    POCT Base Excess, Arterial 0.0 -2.0 - 3.0 mmol/L    POCT HCO3 Calculated, Arterial 26.7 (H) 22.0 - 26.0 mmol/L    POCT Hemoglobin, Arterial 9.2 (L) 12.0 - 16.0 g/dL    POCT Anion Gap, Arterial 11 10 - 25 mmo/L    Patient Temperature 37.0 degrees Celsius    FiO2 100 %   Coox Panel, Arterial Unsolicited   Result Value Ref Range    POCT Hemoglobin, Arterial 9.2 (L) 12.0 - 16.0 g/dL    POCT Oxy Hemoglobin, Arterial 98.2 (H) 94.0 - 98.0 %    POCT Carboxyhemoglobin, Arterial 1.1 %    POCT Methemoglobin, Arterial 0.5 0.0 - 1.5 %    POCT Deoxy Hemoglobin, Arterial 0.1 0.0 - 5.0 %   Blood Gas Arterial Full Panel   Result Value Ref Range    POCT pH, Arterial 7.44 (H) 7.38 - 7.42  pH    POCT pCO2, Arterial 42 38 - 42 mm Hg    POCT pO2, Arterial 107 (H) 85 - 95 mm Hg    POCT SO2, Arterial 99 94 - 100 %    POCT Oxy Hemoglobin, Arterial 96.6 94.0 - 98.0 %    POCT Hematocrit Calculated, Arterial 34.0 (L) 36.0 - 46.0 %    POCT Sodium, Arterial 136 136 - 145 mmol/L    POCT Potassium, Arterial 4.1 3.5 - 5.3 mmol/L    POCT Chloride, Arterial 99 98 - 107 mmol/L    POCT Ionized Calcium, Arterial 1.15 1.10 - 1.33 mmol/L    POCT Glucose, Arterial 107 (H) 74 - 99 mg/dL    POCT Lactate, Arterial 1.0 0.4 - 2.0 mmol/L    POCT Base Excess, Arterial 3.9 (H) -2.0 - 3.0 mmol/L    POCT HCO3 Calculated, Arterial 28.5 (H) 22.0 - 26.0 mmol/L    POCT Hemoglobin, Arterial 11.3 (L) 12.0 - 16.0 g/dL    POCT Anion Gap, Arterial 13 10 - 25 mmo/L    Patient Temperature 37.0 degrees Celsius    FiO2 50 %        Plan:    - Right SAP single shot nerve block performed intraoperatively by anesthesia team on 3/22/24  - Acute pain service will follow POD1  - Rest of pain management per primary team    Acute Pain Resident  pg [phone] ph [phone]

## 2024-03-22 NOTE — ANESTHESIA PREPROCEDURE EVALUATION
Patient: Fannie Watson    Procedure Information       Anesthesia Start Date/Time: 03/22/24 0554    Procedure: Mitral Valve Replacement Mini (Right) - first case    Location: [facility] OR 18 / [facility] OR    Surgeons: Deangelo Chaudhari MD            Relevant Problems   Anesthesia (within normal limits)      Cardiovascular   (+) HTN (hypertension)   (+) Hyperlipidemia   (+) MI (myocardial infarction) (CMS/HCC)      Endocrine (within normal limits)      GI (within normal limits)      /Renal (within normal limits)      Neuro/Psych   (+) Anxiety      Pulmonary   (+) Chronic obstructive pulmonary disease (CMS/HCC)      GI/Hepatic (within normal limits)      Hematology (within normal limits)  SAH      Musculoskeletal   (+) Chronic low back pain   (+) Chronic neck pain   (+) Lumbar disc disease      Eyes, Ears, Nose, and Throat (within normal limits)      Infectious Disease (within normal limits)      Circulatory   (+) Endocarditis       Clinical information reviewed:    Allergies  Meds   Med Hx  Surg Hx   Fam Hx  Soc Hx        NPO Detail:  No data recorded     Physical Exam    Airway  Mallampati: II  TM distance: >3 FB  Neck ROM: full     Cardiovascular   Rhythm: regular  Rate: normal     Dental    Pulmonary   Breath sounds clear to auscultation     Abdominal            Anesthesia Plan    History of general anesthesia?: yes  History of complications of general anesthesia?: no    ASA 3     general     intravenous induction   Postoperative administration of opioids is intended.  Trial extubation is planned.  Anesthetic plan and risks discussed with patient.  Use of blood products discussed with patient who consented to blood products.    Plan discussed with attending.

## 2024-03-22 NOTE — ANESTHESIA PROCEDURE NOTES
Peripheral Block    Patient location during procedure: pre-op  Reason for block: post-op pain management  Staffing  Performed: resident   Authorized by: Immanuel Aguilar MD    Performed by: Rafi Johnston MD  Preanesthetic Checklist  Completed: patient identified, IV checked, site marked, risks and benefits discussed, surgical consent, monitors and equipment checked, pre-op evaluation and timeout performed   Timeout performed at:   Peripheral Block  Patient position: laying flat  Prep: ChloraPrep  Patient monitoring: heart rate and continuous pulse ox  Block type: serratus anterior  Laterality: right  Injection technique: single-shot  Guidance: ultrasound guided  Local infiltration: lidocaine  Needle  Needle type: HuoBiy   Needle gauge: 26 G  Needle length: 8 cm  Needle localization: ultrasound guidance     image stored in chart  Assessment  Injection assessment: negative aspiration for heme, no paresthesia on injection, incremental injection and local visualized surrounding nerve on ultrasound  Additional Notes  Serratus anterior plane nerve block: Informed consent obtained.  Risk, benefits, alternatives discussed.  ASA monitors placed, timeout performed.  Patient positioned, prepped with chlorhexidine, and draped with sterile towels.  Ultrasound guidance was used to visualize the needle throughout duration of the procedure.  Aspiration was negative.  A total of 50 cc of 0.5% ropivacaine, dexamethasone 4 mg, and 1:200,000 epinephrine, 20mcg dexmedetomidine was injected on the R side. Patient tolerated procedure well.

## 2024-03-23 ENCOUNTER — APPOINTMENT (OUTPATIENT)
Dept: RADIOLOGY | Facility: HOSPITAL | Age: 79
DRG: 981 | End: 2024-03-23
Payer: MEDICARE

## 2024-03-23 LAB
ALBUMIN SERPL BCP-MCNC: 3.8 G/DL (ref 3.4–5)
ALBUMIN SERPL BCP-MCNC: 4.2 G/DL (ref 3.4–5)
ANION GAP BLDA CALCULATED.4IONS-SCNC: 12 MMO/L (ref 10–25)
ANION GAP BLDA CALCULATED.4IONS-SCNC: 9 MMO/L (ref 10–25)
ANION GAP SERPL CALC-SCNC: 13 MMOL/L (ref 10–20)
ANION GAP SERPL CALC-SCNC: 16 MMOL/L (ref 10–20)
BASE EXCESS BLDA CALC-SCNC: 2.2 MMOL/L (ref -2–3)
BASE EXCESS BLDA CALC-SCNC: 4.1 MMOL/L (ref -2–3)
BLOOD EXPIRATION DATE: NORMAL
BODY TEMPERATURE: 37 DEGREES CELSIUS
BODY TEMPERATURE: 37 DEGREES CELSIUS
BUN SERPL-MCNC: 10 MG/DL (ref 6–23)
BUN SERPL-MCNC: 7 MG/DL (ref 6–23)
CA-I BLD-SCNC: 1.12 MMOL/L (ref 1.1–1.33)
CA-I BLD-SCNC: 1.15 MMOL/L (ref 1.1–1.33)
CA-I BLD-SCNC: 1.22 MMOL/L (ref 1.1–1.33)
CA-I BLDA-SCNC: 1.13 MMOL/L (ref 1.1–1.33)
CA-I BLDA-SCNC: 1.19 MMOL/L (ref 1.1–1.33)
CALCIUM SERPL-MCNC: 8.8 MG/DL (ref 8.6–10.6)
CALCIUM SERPL-MCNC: 9 MG/DL (ref 8.6–10.6)
CHLORIDE BLDA-SCNC: 103 MMOL/L (ref 98–107)
CHLORIDE BLDA-SCNC: 96 MMOL/L (ref 98–107)
CHLORIDE SERPL-SCNC: 100 MMOL/L (ref 98–107)
CHLORIDE SERPL-SCNC: 95 MMOL/L (ref 98–107)
CO2 SERPL-SCNC: 25 MMOL/L (ref 21–32)
CO2 SERPL-SCNC: 27 MMOL/L (ref 21–32)
CREAT SERPL-MCNC: 0.4 MG/DL (ref 0.5–1.05)
CREAT SERPL-MCNC: 0.56 MG/DL (ref 0.5–1.05)
DISPENSE STATUS: NORMAL
EGFRCR SERPLBLD CKD-EPI 2021: >90 ML/MIN/1.73M*2
EGFRCR SERPLBLD CKD-EPI 2021: >90 ML/MIN/1.73M*2
ERYTHROCYTE [DISTWIDTH] IN BLOOD BY AUTOMATED COUNT: 16 % (ref 11.5–14.5)
ERYTHROCYTE [DISTWIDTH] IN BLOOD BY AUTOMATED COUNT: 16.6 % (ref 11.5–14.5)
ERYTHROCYTE [DISTWIDTH] IN BLOOD BY AUTOMATED COUNT: 16.7 % (ref 11.5–14.5)
GLUCOSE BLD MANUAL STRIP-MCNC: 100 MG/DL (ref 74–99)
GLUCOSE BLD MANUAL STRIP-MCNC: 102 MG/DL (ref 74–99)
GLUCOSE BLD MANUAL STRIP-MCNC: 122 MG/DL (ref 74–99)
GLUCOSE BLD MANUAL STRIP-MCNC: 124 MG/DL (ref 74–99)
GLUCOSE BLD MANUAL STRIP-MCNC: 83 MG/DL (ref 74–99)
GLUCOSE BLD MANUAL STRIP-MCNC: 95 MG/DL (ref 74–99)
GLUCOSE BLDA-MCNC: 126 MG/DL (ref 74–99)
GLUCOSE BLDA-MCNC: 135 MG/DL (ref 74–99)
GLUCOSE SERPL-MCNC: 131 MG/DL (ref 74–99)
GLUCOSE SERPL-MCNC: 135 MG/DL (ref 74–99)
HCO3 BLDA-SCNC: 26.5 MMOL/L (ref 22–26)
HCO3 BLDA-SCNC: 26.7 MMOL/L (ref 22–26)
HCT VFR BLD AUTO: 24.9 % (ref 36–46)
HCT VFR BLD AUTO: 26.5 % (ref 36–46)
HCT VFR BLD AUTO: 29.9 % (ref 36–46)
HCT VFR BLD EST: 26 % (ref 36–46)
HCT VFR BLD EST: 30 % (ref 36–46)
HGB BLD-MCNC: 8.2 G/DL (ref 12–16)
HGB BLD-MCNC: 8.6 G/DL (ref 12–16)
HGB BLD-MCNC: 9.3 G/DL (ref 12–16)
HGB BLDA-MCNC: 10 G/DL (ref 12–16)
HGB BLDA-MCNC: 8.6 G/DL (ref 12–16)
INHALED O2 CONCENTRATION: 40 %
INHALED O2 CONCENTRATION: 40 %
LACTATE BLDA-SCNC: 1 MMOL/L (ref 0.4–2)
LACTATE BLDA-SCNC: 1.9 MMOL/L (ref 0.4–2)
MAGNESIUM SERPL-MCNC: 1.88 MG/DL (ref 1.6–2.4)
MAGNESIUM SERPL-MCNC: 2.12 MG/DL (ref 1.6–2.4)
MCH RBC QN AUTO: 26 PG (ref 26–34)
MCH RBC QN AUTO: 27.4 PG (ref 26–34)
MCH RBC QN AUTO: 27.4 PG (ref 26–34)
MCHC RBC AUTO-ENTMCNC: 31.1 G/DL (ref 32–36)
MCHC RBC AUTO-ENTMCNC: 32.5 G/DL (ref 32–36)
MCHC RBC AUTO-ENTMCNC: 32.9 G/DL (ref 32–36)
MCV RBC AUTO: 83 FL (ref 80–100)
MCV RBC AUTO: 84 FL (ref 80–100)
MCV RBC AUTO: 84 FL (ref 80–100)
NRBC BLD-RTO: 0 /100 WBCS (ref 0–0)
OXYHGB MFR BLDA: 92.9 % (ref 94–98)
OXYHGB MFR BLDA: 97.9 % (ref 94–98)
PCO2 BLDA: 32 MM HG (ref 38–42)
PCO2 BLDA: 39 MM HG (ref 38–42)
PH BLDA: 7.44 PH (ref 7.38–7.42)
PH BLDA: 7.53 PH (ref 7.38–7.42)
PHOSPHATE SERPL-MCNC: 3.4 MG/DL (ref 2.5–4.9)
PHOSPHATE SERPL-MCNC: 3.5 MG/DL (ref 2.5–4.9)
PLATELET # BLD AUTO: 170 X10*3/UL (ref 150–450)
PLATELET # BLD AUTO: 191 X10*3/UL (ref 150–450)
PLATELET # BLD AUTO: 200 X10*3/UL (ref 150–450)
PO2 BLDA: 141 MM HG (ref 85–95)
PO2 BLDA: 68 MM HG (ref 85–95)
POTASSIUM BLDA-SCNC: 4.2 MMOL/L (ref 3.5–5.3)
POTASSIUM BLDA-SCNC: 5 MMOL/L (ref 3.5–5.3)
POTASSIUM SERPL-SCNC: 3.6 MMOL/L (ref 3.5–5.3)
POTASSIUM SERPL-SCNC: 4.1 MMOL/L (ref 3.5–5.3)
PRODUCT BLOOD TYPE: 9500
PRODUCT CODE: NORMAL
RBC # BLD AUTO: 2.99 X10*6/UL (ref 4–5.2)
RBC # BLD AUTO: 3.14 X10*6/UL (ref 4–5.2)
RBC # BLD AUTO: 3.58 X10*6/UL (ref 4–5.2)
SAO2 % BLDA: 100 % (ref 94–100)
SAO2 % BLDA: 96 % (ref 94–100)
SODIUM BLDA-SCNC: 127 MMOL/L (ref 136–145)
SODIUM BLDA-SCNC: 137 MMOL/L (ref 136–145)
SODIUM SERPL-SCNC: 132 MMOL/L (ref 136–145)
SODIUM SERPL-SCNC: 136 MMOL/L (ref 136–145)
UNIT ABO: NORMAL
UNIT NUMBER: NORMAL
UNIT RH: NORMAL
UNIT VOLUME: 262
UNIT VOLUME: 350
VANCOMYCIN SERPL-MCNC: 4.4 UG/ML (ref 5–20)
WBC # BLD AUTO: 13.2 X10*3/UL (ref 4.4–11.3)
WBC # BLD AUTO: 18.8 X10*3/UL (ref 4.4–11.3)
WBC # BLD AUTO: 19 X10*3/UL (ref 4.4–11.3)
XM INTEP: NORMAL

## 2024-03-23 PROCEDURE — 84132 ASSAY OF SERUM POTASSIUM: CPT | Performed by: NURSE PRACTITIONER

## 2024-03-23 PROCEDURE — 2020000001 HC ICU ROOM DAILY

## 2024-03-23 PROCEDURE — 71045 X-RAY EXAM CHEST 1 VIEW: CPT

## 2024-03-23 PROCEDURE — 2500000001 HC RX 250 WO HCPCS SELF ADMINISTERED DRUGS (ALT 637 FOR MEDICARE OP): Performed by: NURSE PRACTITIONER

## 2024-03-23 PROCEDURE — 97164 PT RE-EVAL EST PLAN CARE: CPT | Mod: GP

## 2024-03-23 PROCEDURE — 2500000001 HC RX 250 WO HCPCS SELF ADMINISTERED DRUGS (ALT 637 FOR MEDICARE OP): Performed by: STUDENT IN AN ORGANIZED HEALTH CARE EDUCATION/TRAINING PROGRAM

## 2024-03-23 PROCEDURE — 94640 AIRWAY INHALATION TREATMENT: CPT

## 2024-03-23 PROCEDURE — 2500000005 HC RX 250 GENERAL PHARMACY W/O HCPCS: Performed by: NURSE PRACTITIONER

## 2024-03-23 PROCEDURE — 2500000002 HC RX 250 W HCPCS SELF ADMINISTERED DRUGS (ALT 637 FOR MEDICARE OP, ALT 636 FOR OP/ED): Performed by: STUDENT IN AN ORGANIZED HEALTH CARE EDUCATION/TRAINING PROGRAM

## 2024-03-23 PROCEDURE — 2500000004 HC RX 250 GENERAL PHARMACY W/ HCPCS (ALT 636 FOR OP/ED): Performed by: STUDENT IN AN ORGANIZED HEALTH CARE EDUCATION/TRAINING PROGRAM

## 2024-03-23 PROCEDURE — 2500000004 HC RX 250 GENERAL PHARMACY W/ HCPCS (ALT 636 FOR OP/ED)

## 2024-03-23 PROCEDURE — 80202 ASSAY OF VANCOMYCIN: CPT | Performed by: NURSE PRACTITIONER

## 2024-03-23 PROCEDURE — 99232 SBSQ HOSP IP/OBS MODERATE 35: CPT | Performed by: INTERNAL MEDICINE

## 2024-03-23 PROCEDURE — 82947 ASSAY GLUCOSE BLOOD QUANT: CPT

## 2024-03-23 PROCEDURE — 2500000001 HC RX 250 WO HCPCS SELF ADMINISTERED DRUGS (ALT 637 FOR MEDICARE OP)

## 2024-03-23 PROCEDURE — 2500000004 HC RX 250 GENERAL PHARMACY W/ HCPCS (ALT 636 FOR OP/ED): Performed by: NURSE PRACTITIONER

## 2024-03-23 PROCEDURE — 97530 THERAPEUTIC ACTIVITIES: CPT | Mod: GP

## 2024-03-23 PROCEDURE — 83735 ASSAY OF MAGNESIUM: CPT | Performed by: NURSE PRACTITIONER

## 2024-03-23 PROCEDURE — 99291 CRITICAL CARE FIRST HOUR: CPT | Performed by: STUDENT IN AN ORGANIZED HEALTH CARE EDUCATION/TRAINING PROGRAM

## 2024-03-23 PROCEDURE — 2500000004 HC RX 250 GENERAL PHARMACY W/ HCPCS (ALT 636 FOR OP/ED): Mod: JZ

## 2024-03-23 PROCEDURE — P9045 ALBUMIN (HUMAN), 5%, 250 ML: HCPCS | Mod: JZ | Performed by: STUDENT IN AN ORGANIZED HEALTH CARE EDUCATION/TRAINING PROGRAM

## 2024-03-23 PROCEDURE — 2500000005 HC RX 250 GENERAL PHARMACY W/O HCPCS: Performed by: STUDENT IN AN ORGANIZED HEALTH CARE EDUCATION/TRAINING PROGRAM

## 2024-03-23 PROCEDURE — 71045 X-RAY EXAM CHEST 1 VIEW: CPT | Performed by: STUDENT IN AN ORGANIZED HEALTH CARE EDUCATION/TRAINING PROGRAM

## 2024-03-23 PROCEDURE — P9047 ALBUMIN (HUMAN), 25%, 50ML: HCPCS | Mod: JZ

## 2024-03-23 PROCEDURE — 37799 UNLISTED PX VASCULAR SURGERY: CPT | Performed by: NURSE PRACTITIONER

## 2024-03-23 PROCEDURE — A4217 STERILE WATER/SALINE, 500 ML: HCPCS | Performed by: NURSE PRACTITIONER

## 2024-03-23 PROCEDURE — 82330 ASSAY OF CALCIUM: CPT | Performed by: NURSE PRACTITIONER

## 2024-03-23 PROCEDURE — 99231 SBSQ HOSP IP/OBS SF/LOW 25: CPT | Performed by: STUDENT IN AN ORGANIZED HEALTH CARE EDUCATION/TRAINING PROGRAM

## 2024-03-23 PROCEDURE — 2500000002 HC RX 250 W HCPCS SELF ADMINISTERED DRUGS (ALT 637 FOR MEDICARE OP, ALT 636 FOR OP/ED): Mod: MUE | Performed by: STUDENT IN AN ORGANIZED HEALTH CARE EDUCATION/TRAINING PROGRAM

## 2024-03-23 PROCEDURE — 85027 COMPLETE CBC AUTOMATED: CPT | Performed by: NURSE PRACTITIONER

## 2024-03-23 RX ORDER — ALBUMIN HUMAN 250 G/1000ML
SOLUTION INTRAVENOUS
Status: COMPLETED
Start: 2024-03-23 | End: 2024-03-23

## 2024-03-23 RX ORDER — ALBUMIN HUMAN 50 G/1000ML
12.5 SOLUTION INTRAVENOUS ONCE
Status: DISCONTINUED | OUTPATIENT
Start: 2024-03-23 | End: 2024-03-23

## 2024-03-23 RX ORDER — IPRATROPIUM BROMIDE AND ALBUTEROL SULFATE 2.5; .5 MG/3ML; MG/3ML
3 SOLUTION RESPIRATORY (INHALATION) 3 TIMES DAILY
Status: DISCONTINUED | OUTPATIENT
Start: 2024-03-23 | End: 2024-03-27

## 2024-03-23 RX ORDER — OXYCODONE HYDROCHLORIDE 5 MG/1
10 TABLET ORAL EVERY 6 HOURS PRN
Status: DISCONTINUED | OUTPATIENT
Start: 2024-03-23 | End: 2024-03-25

## 2024-03-23 RX ORDER — ALBUMIN HUMAN 50 G/1000ML
12.5 SOLUTION INTRAVENOUS ONCE
Status: COMPLETED | OUTPATIENT
Start: 2024-03-23 | End: 2024-03-23

## 2024-03-23 RX ORDER — HYDROMORPHONE HYDROCHLORIDE 1 MG/ML
0.2 INJECTION, SOLUTION INTRAMUSCULAR; INTRAVENOUS; SUBCUTANEOUS
Status: DISCONTINUED | OUTPATIENT
Start: 2024-03-23 | End: 2024-03-24

## 2024-03-23 RX ORDER — ALBUMIN HUMAN 50 G/1000ML
25 SOLUTION INTRAVENOUS ONCE
Status: COMPLETED | OUTPATIENT
Start: 2024-03-23 | End: 2024-03-23

## 2024-03-23 RX ORDER — HEPARIN SODIUM 5000 [USP'U]/ML
5000 INJECTION, SOLUTION INTRAVENOUS; SUBCUTANEOUS EVERY 8 HOURS
Status: DISCONTINUED | OUTPATIENT
Start: 2024-03-23 | End: 2024-04-01 | Stop reason: HOSPADM

## 2024-03-23 RX ORDER — ONDANSETRON HYDROCHLORIDE 2 MG/ML
4 INJECTION, SOLUTION INTRAVENOUS EVERY 6 HOURS PRN
Status: DISCONTINUED | OUTPATIENT
Start: 2024-03-23 | End: 2024-04-01

## 2024-03-23 RX ADMIN — ONDANSETRON 4 MG: 2 INJECTION INTRAMUSCULAR; INTRAVENOUS at 19:54

## 2024-03-23 RX ADMIN — HYDROMORPHONE HYDROCHLORIDE 0.2 MG: 1 INJECTION, SOLUTION INTRAMUSCULAR; INTRAVENOUS; SUBCUTANEOUS at 16:56

## 2024-03-23 RX ADMIN — Medication: at 20:00

## 2024-03-23 RX ADMIN — POLYETHYLENE GLYCOL 3350 17 G: 17 POWDER, FOR SOLUTION ORAL at 20:01

## 2024-03-23 RX ADMIN — ATORVASTATIN CALCIUM 80 MG: 80 TABLET, FILM COATED ORAL at 20:01

## 2024-03-23 RX ADMIN — ACETAMINOPHEN 650 MG: 325 TABLET ORAL at 08:47

## 2024-03-23 RX ADMIN — IPRATROPIUM BROMIDE AND ALBUTEROL SULFATE 3 ML: .5; 3 SOLUTION RESPIRATORY (INHALATION) at 21:25

## 2024-03-23 RX ADMIN — POTASSIUM CHLORIDE 40 MEQ: 400 INJECTION, SOLUTION INTRAVENOUS at 15:07

## 2024-03-23 RX ADMIN — AMPICILLIN SODIUM 2 G: 2 INJECTION, POWDER, FOR SOLUTION INTRAMUSCULAR; INTRAVENOUS at 12:56

## 2024-03-23 RX ADMIN — POLYETHYLENE GLYCOL 3350 17 G: 17 POWDER, FOR SOLUTION ORAL at 08:39

## 2024-03-23 RX ADMIN — MELATONIN 3 MG: 3 TAB ORAL at 20:01

## 2024-03-23 RX ADMIN — AMPICILLIN SODIUM 2 G: 2 INJECTION, POWDER, FOR SOLUTION INTRAMUSCULAR; INTRAVENOUS at 17:17

## 2024-03-23 RX ADMIN — OXYCODONE HYDROCHLORIDE 10 MG: 5 TABLET ORAL at 13:07

## 2024-03-23 RX ADMIN — Medication 2 L/MIN: at 15:00

## 2024-03-23 RX ADMIN — HEPARIN SODIUM 5000 UNITS: 5000 INJECTION INTRAVENOUS; SUBCUTANEOUS at 19:20

## 2024-03-23 RX ADMIN — AMPICILLIN SODIUM 2 G: 2 INJECTION, POWDER, FOR SOLUTION INTRAMUSCULAR; INTRAVENOUS at 08:40

## 2024-03-23 RX ADMIN — HYDROMORPHONE HYDROCHLORIDE 0.2 MG: 1 INJECTION, SOLUTION INTRAMUSCULAR; INTRAVENOUS; SUBCUTANEOUS at 11:07

## 2024-03-23 RX ADMIN — PANTOPRAZOLE SODIUM 40 MG: 40 TABLET, DELAYED RELEASE ORAL at 08:47

## 2024-03-23 RX ADMIN — LIDOCAINE 1 PATCH: 4 PATCH TOPICAL at 12:56

## 2024-03-23 RX ADMIN — SENNOSIDES AND DOCUSATE SODIUM 2 TABLET: 8.6; 5 TABLET ORAL at 08:40

## 2024-03-23 RX ADMIN — HEPARIN SODIUM 5000 UNITS: 5000 INJECTION INTRAVENOUS; SUBCUTANEOUS at 11:09

## 2024-03-23 RX ADMIN — IPRATROPIUM BROMIDE AND ALBUTEROL SULFATE 3 ML: .5; 3 SOLUTION RESPIRATORY (INHALATION) at 09:16

## 2024-03-23 RX ADMIN — ACETAMINOPHEN 650 MG: 325 TABLET ORAL at 19:20

## 2024-03-23 RX ADMIN — Medication: at 05:30

## 2024-03-23 RX ADMIN — ALBUMIN HUMAN 25 G: 0.25 SOLUTION INTRAVENOUS at 06:13

## 2024-03-23 RX ADMIN — SODIUM CHLORIDE, POTASSIUM CHLORIDE, SODIUM LACTATE AND CALCIUM CHLORIDE 250 ML: 600; 310; 30; 20 INJECTION, SOLUTION INTRAVENOUS at 17:52

## 2024-03-23 RX ADMIN — AMPICILLIN SODIUM 2 G: 2 INJECTION, POWDER, FOR SOLUTION INTRAMUSCULAR; INTRAVENOUS at 19:54

## 2024-03-23 RX ADMIN — ASPIRIN 81 MG CHEWABLE TABLET 81 MG: 81 TABLET CHEWABLE at 08:40

## 2024-03-23 RX ADMIN — OXYCODONE HYDROCHLORIDE 10 MG: 5 TABLET ORAL at 17:59

## 2024-03-23 RX ADMIN — CEFTRIAXONE SODIUM 2 G: 2 INJECTION, SOLUTION INTRAVENOUS at 08:39

## 2024-03-23 RX ADMIN — SODIUM CHLORIDE, POTASSIUM CHLORIDE, SODIUM LACTATE AND CALCIUM CHLORIDE 250 ML: 600; 310; 30; 20 INJECTION, SOLUTION INTRAVENOUS at 15:01

## 2024-03-23 RX ADMIN — MAGNESIUM SULFATE HEPTAHYDRATE 2 G: 40 INJECTION, SOLUTION INTRAVENOUS at 15:07

## 2024-03-23 RX ADMIN — ALBUMIN HUMAN 25 G: 0.05 INJECTION, SOLUTION INTRAVENOUS at 05:47

## 2024-03-23 RX ADMIN — ACETAMINOPHEN 650 MG: 325 TABLET ORAL at 00:05

## 2024-03-23 RX ADMIN — OXYCODONE HYDROCHLORIDE 5 MG: 5 TABLET ORAL at 09:06

## 2024-03-23 RX ADMIN — CEFTRIAXONE SODIUM 2 G: 2 INJECTION, SOLUTION INTRAVENOUS at 19:54

## 2024-03-23 RX ADMIN — VANCOMYCIN HYDROCHLORIDE 1750 MG: 5 INJECTION, POWDER, LYOPHILIZED, FOR SOLUTION INTRAVENOUS at 08:42

## 2024-03-23 RX ADMIN — SENNOSIDES AND DOCUSATE SODIUM 2 TABLET: 8.6; 5 TABLET ORAL at 20:01

## 2024-03-23 RX ADMIN — ACETAMINOPHEN 650 MG: 325 TABLET ORAL at 12:56

## 2024-03-23 RX ADMIN — IPRATROPIUM BROMIDE AND ALBUTEROL SULFATE 3 ML: .5; 3 SOLUTION RESPIRATORY (INHALATION) at 14:56

## 2024-03-23 RX ADMIN — ALBUMIN HUMAN 12.5 G: 0.05 INJECTION, SOLUTION INTRAVENOUS at 04:44

## 2024-03-23 RX ADMIN — TRAZODONE HYDROCHLORIDE 50 MG: 50 TABLET ORAL at 20:01

## 2024-03-23 RX ADMIN — ALBUMIN HUMAN 12.5 G: 0.05 INJECTION, SOLUTION INTRAVENOUS at 03:43

## 2024-03-23 RX ADMIN — SODIUM CHLORIDE, POTASSIUM CHLORIDE, SODIUM LACTATE AND CALCIUM CHLORIDE 500 ML: 600; 310; 30; 20 INJECTION, SOLUTION INTRAVENOUS at 03:44

## 2024-03-23 RX ADMIN — AMPICILLIN SODIUM 2 G: 2 INJECTION, POWDER, FOR SOLUTION INTRAMUSCULAR; INTRAVENOUS at 03:43

## 2024-03-23 RX ADMIN — BENZOCAINE AND MENTHOL 1 LOZENGE: 15; 3.6 LOZENGE ORAL at 09:26

## 2024-03-23 ASSESSMENT — ENCOUNTER SYMPTOMS
RESPIRATORY NEGATIVE: 1
HEMATOLOGIC/LYMPHATIC NEGATIVE: 1
GASTROINTESTINAL NEGATIVE: 1
EYES NEGATIVE: 1
PSYCHIATRIC NEGATIVE: 1
MUSCULOSKELETAL NEGATIVE: 1
ENDOCRINE NEGATIVE: 1
CONSTITUTIONAL NEGATIVE: 1
CARDIOVASCULAR NEGATIVE: 1
NEUROLOGICAL NEGATIVE: 1
ALLERGIC/IMMUNOLOGIC NEGATIVE: 1

## 2024-03-23 ASSESSMENT — PAIN DESCRIPTION - LOCATION
LOCATION: CHEST
LOCATION: BACK
LOCATION: CHEST
LOCATION: CHEST

## 2024-03-23 ASSESSMENT — PAIN - FUNCTIONAL ASSESSMENT
PAIN_FUNCTIONAL_ASSESSMENT: 0-10

## 2024-03-23 ASSESSMENT — PAIN DESCRIPTION - ORIENTATION: ORIENTATION: RIGHT

## 2024-03-23 ASSESSMENT — PAIN SCALES - GENERAL
PAINLEVEL_OUTOF10: 2
PAINLEVEL_OUTOF10: 2
PAINLEVEL_OUTOF10: 9
PAINLEVEL_OUTOF10: 0 - NO PAIN
PAINLEVEL_OUTOF10: 6
PAINLEVEL_OUTOF10: 8
PAINLEVEL_OUTOF10: 8
PAINLEVEL_OUTOF10: 10 - WORST POSSIBLE PAIN
PAINLEVEL_OUTOF10: 8

## 2024-03-23 ASSESSMENT — COGNITIVE AND FUNCTIONAL STATUS - GENERAL
STANDING UP FROM CHAIR USING ARMS: A LITTLE
TURNING FROM BACK TO SIDE WHILE IN FLAT BAD: A LITTLE
MOBILITY SCORE: 18
WALKING IN HOSPITAL ROOM: A LITTLE
CLIMB 3 TO 5 STEPS WITH RAILING: A LITTLE
MOVING TO AND FROM BED TO CHAIR: A LITTLE
MOVING FROM LYING ON BACK TO SITTING ON SIDE OF FLAT BED WITH BEDRAILS: A LITTLE

## 2024-03-23 ASSESSMENT — ACTIVITIES OF DAILY LIVING (ADL): ADL_ASSISTANCE: INDEPENDENT

## 2024-03-23 NOTE — PROGRESS NOTES
Fannie Watson is a 78 y.o. female on day 18 of admission presenting with SAH (subarachnoid hemorrhage) (CMS/[facility]).    Subjective   Interval History:   Afebrile  Doing well after surgery  Sitting up and having breakfast        Review of Systems   Constitutional: Negative.    HENT: Negative.     Eyes: Negative.    Respiratory: Negative.     Cardiovascular: Negative.    Gastrointestinal: Negative.    Endocrine: Negative.    Genitourinary: Negative.    Musculoskeletal: Negative.    Skin: Negative.    Allergic/Immunologic: Negative.    Neurological: Negative.    Hematological: Negative.    Psychiatric/Behavioral: Negative.         Objective   Range of Vitals (last 24 hours)  Heart Rate:  [67-86]   Temp:  [36.1 °C (97 °F)-36.6 °C (97.9 °F)]   Resp:  [0-39]   SpO2:  [95 %-100 %]   Daily Weight  03/22/24 : 66.1 kg (145 lb 11.6 oz)    Body mass index is 26.82 kg/m².    Physical Exam  Vitals reviewed.   Constitutional:       Appearance: Normal appearance.   HENT:      Head: Normocephalic and atraumatic.      Mouth/Throat:      Mouth: Mucous membranes are moist.      Pharynx: Oropharynx is clear.   Cardiovascular:      Rate and Rhythm: Normal rate and regular rhythm.   Pulmonary:      Effort: Pulmonary effort is normal.   Abdominal:      General: Abdomen is flat. Bowel sounds are normal.      Palpations: Abdomen is soft.   Musculoskeletal:         General: Normal range of motion.      Cervical back: Normal range of motion.   Skin:     General: Skin is warm.   Neurological:      General: No focal deficit present.      Mental Status: She is alert and oriented to person, place, and time.   Psychiatric:         Mood and Affect: Mood normal.         Behavior: Behavior normal.         Thought Content: Thought content normal.         Judgment: Judgment normal.         Antibiotics  labetaloL (Normodyne,Trandate) injection 10 mg  hydrALAZINE (Apresoline) injection 10 mg  niCARdipine (Cardene) 40 mg in sodium chloride 200 mL (0.2 mg/mL)  infusion (premix)  niMODipine (Nimotop) capsule 60 mg  niMODipine (Nymalize) liquid 60 mg  dexAMETHasone (Decadron) injection 2 mg  levETIRAcetam in NaCl (iso-os) (Keppra)  mg  sennosides-docusate sodium (Malika-Colace) 8.6-50 mg per tablet 2 tablet  heparin (porcine) injection 5,000 Units  iohexol (OMNIPaque) 350 mg iodine/mL solution 80 mL  sodium chloride 0.9% infusion  sodium chloride 0.9 % bolus 1,000 mL  oxyCODONE (Roxicodone) immediate release tablet 5 mg  acetaminophen (Tylenol) tablet 650 mg  oxyCODONE (Roxicodone) immediate release tablet 10 mg  HYDROmorphone (Dilaudid) injection 0.2 mg  lidocaine 2% jelly 1 Application  LORazepam (Ativan) injection 1 mg  LORazepam (Ativan) injection 1 mg  LORazepam (Ativan) injection  - Omnicell Override Pull  sodium chloride 0.9 % bolus 500 mL  sodium chloride 0.9 % bolus 500 mL  metoprolol tartrate (Lopressor) injection 5 mg  sodium chloride 0.9% infusion  perflutren lipid microspheres (Definity) injection 0.5-10 mL of dilution  sulfur hexafluoride microsphr (Lumason) injection 24.28 mg  perflutren protein A microsphere (Optison) injection 0.5 mL  metoprolol tartrate (Lopressor) tablet 12.5 mg  vancomycin (Vancocin) placeholder  cefepime (Maxipime) IV 2 g  vancomycin (Vancocin) 1.25 g in dextrose 5 % in water (D5W) 250 mL IV  potassium phosphates 15 mmol in dextrose 5 % in water (D5W) 250 mL IV  fentaNYL PF (Sublimaze) injection  - Omnicell Override Pull  midazolam (Versed) injection  - Omnicell Override Pull  benzocaine (Hurricaine) 20 % mouth spray  lidocaine (Xylocaine) 2 % mouth solution  midazolam (Versed) injection  fentaNYL PF (Sublimaze) injection  sodium chloride (Hypertonic) 3% bolus 250 mL  midazolam (Versed) injection  - Omnicell Override Pull  fentaNYL PF (Sublimaze) injection  - Omnicell Override Pull  fentaNYL PF (Sublimaze) injection  midazolam (Versed) injection  fentaNYL PF (Sublimaze) injection  midazolam (Versed) injection  DAPTOmycin (Cubicin)  700 mg in sodium chloride 0.9% 50 mL IV  hydrALAZINE (Apresoline) injection 10 mg  labetaloL (Normodyne,Trandate) injection 10 mg  niCARdipine (Cardene) 40 mg in sodium chloride 200 mL (0.2 mg/mL) infusion (premix)  iohexol (OMNIPaque) 350 mg iodine/mL solution 90 mL  sodium chloride 0.9 % bolus 500 mL  ampicillin (Omnipen) 2 g in sodium chloride 0.9 % 100 mL IV  cefTRIAXone (Rocephin) 2 g IV in dextrose 5% 50 mL  ipratropium-albuteroL (Duo-Neb) 0.5-2.5 mg/3 mL nebulizer solution 3 mL  heparin 25,000 Units in dextrose 5% 250 mL (100 Units/mL) infusion (premix)  heparin (porcine) injection 2,000-4,000 Units  ondansetron (Zofran) tablet 4 mg  benzocaine-menthol (Cepastat Sore Throat) 15-3.6 mg lozenge 1 lozenge  benzonatate (Tessalon) capsule 100 mg  guaiFENesin (Mucinex) 12 hr tablet 600 mg  furosemide (Lasix) tablet 20 mg  mupirocin (Bactroban) 2 % ointment  polyethylene glycol (Glycolax, Miralax) packet 17 g  ondansetron (Zofran) tablet 4 mg  ondansetron (Zofran) injection 4 mg  ondansetron (Zofran) injection 4 mg  bisacodyl (Dulcolax) suppository 10 mg  furosemide (Lasix) tablet 20 mg  potassium chloride 20 mEq in 100 mL IV premix  potassium chloride CR (Klor-Con M20) ER tablet 20 mEq  magnesium sulfate IV 2 g  lidocaine PF (Xylocaine) injection  - Omnicell Override Pull  verapamil (Isoptin) injection  - Omnicell Override Pull  heparin injection  - Omnicell Override Pull  midazolam (Versed) injection  - Omnicell Override Pull  fentaNYL PF (Sublimaze) injection  - Omnicell Override Pull  midazolam (Versed) injection  fentaNYL PF (Sublimaze) injection  lidocaine (Xylocaine) 20 mg/mL (2 %) injection  verapamil (Isoptin) injection  iohexol (OMNIPaque) 350 mg iodine/mL solution  sodium chloride 0.9% infusion  sodium chloride 0.9% infusion  gadoterate meglumine (Dotarem) 0.5 mmol/mL contrast injection 13 mL  furosemide (Lasix) tablet 40 mg  bisacodyl (Dulcolax) suppository 10 mg  furosemide (Lasix) tablet 40  mg  furosemide (Lasix) tablet 40 mg  furosemide (Lasix) tablet 40 mg  ondansetron (Zofran) injection 4 mg  ondansetron (Zofran) injection  - Omnicell Override Pull  midazolam (Versed) injection  - Omnicell Override Pull  fentaNYL PF (Sublimaze) injection  - Omnicell Override Pull  midazolam (Versed) injection  fentaNYL PF (Sublimaze) injection  chlorhexidine (Peridex) 0.12 % solution 15 mL  heparin 25,000 Units in dextrose 5% 250 mL (100 Units/mL) infusion (premix)  furosemide (Lasix) tablet 20 mg  furosemide (Lasix) tablet 20 mg  bismuth subsalicylate (Pepto Bismol) chewable tablet 524 mg  bismuth subsalicylate (Pepto Bismol) 262 mg/15 mL suspension 524 mg  potassium chloride CR (Klor-Con M20) ER tablet 20 mEq  melatonin tablet 3 mg  melatonin tablet 3 mg  hydrOXYzine HCL (Atarax) tablet 10 mg  diphenhydrAMINE (BENADryl) capsule 50 mg  diphenhydrAMINE (BENADryl) capsule 25 mg  diphenhydrAMINE (BENADryl) injection 12.5 mg  methadone (Dolophine) injection 15 mg  electrolyte-A (Plasmalyte-A) solution  - Omnicell Override Pull  lidocaine (cardiac) (Xylocaine) injection  - Omnicell Override Pull  heparin injection  - Omnicell Override Pull  rocuronium (ZeMuron) injection  - Omnicell Override Pull  esmolol (Brevibloc) injection  - Omnicell Override Pull  nitroglycerin in 5 % dextrose (Tridil) infusion  - Omnicell Override Pull  fentaNYL PF (Sublimaze) injection  - Omnicell Override Pull  propofol (Diprivan) injection  - Omnicell Override Pull  midazolam (Versed) injection  - Omnicell Override Pull  propofol (Diprivan) injection  - Omnicell Override Pull  isoflurane inhalation liquid  - Omnicell Override Pull  protamine injection  - Omnicell Override Pull  furosemide (Lasix) injection  - Omnicell Override Pull  magnesium sulfate injection  - Omnicell Override Pull  sugammadex (Bridion) injection  - Omnicell Override Pull  acetaminophen (Ofirmev) injection 1,000 mg  lactated Ringer's infusion  lactated Ringer's  infusion  acetaminophen (Tylenol) tablet 650 mg  acetaminophen (Tylenol) suppository 650 mg  lidocaine 4 % patch 1 patch  HYDROmorphone (Dilaudid) injection 0.2 mg  atorvastatin (Lipitor) tablet 80 mg  aspirin chewable tablet 81 mg  polyethylene glycol (Glycolax, Miralax) packet 17 g  propofol (Diprivan) infusion  pantoprazole (ProtoNix) EC tablet 40 mg  pantoprazole (ProtoNix) injection 40 mg  oxyCODONE (Roxicodone) immediate release tablet 5 mg  sennosides-docusate sodium (Malika-Colace) 8.6-50 mg per tablet 2 tablet  insulin lispro (HumaLOG) injection 0-15 Units  dextrose 50 % injection 25 g  glucagon (Glucagen) injection 1 mg  EPINEPHrine (Adrenalin) 4 mg in dextrose 5% 250 mL (16 mcg/mL) infusion (premix)  norepinephrine (Levophed) 8 mg in dextrose 5% 250 mL (0.032 mg/mL) infusion (premix)  potassium chloride CR (Klor-Con M20) ER tablet 20 mEq  potassium chloride (Klor-Con) packet 20 mEq  potassium chloride CR (Klor-Con M20) ER tablet 40 mEq  potassium chloride (Klor-Con) packet 40 mEq  potassium chloride 20 mEq in 100 mL IV premix  potassium chloride 40 mEq in 100 mL IV premix  magnesium sulfate IV 2 g  magnesium sulfate IV 4 g  calcium gluconate in NS IV 1 g  calcium gluconate in NS IV 2 g  ipratropium-albuteroL (Duo-Neb) 0.5-2.5 mg/3 mL nebulizer solution 3 mL  oxygen (O2) therapy  vancomycin (Vancocin) pharmacy to dose - pharmacy monitoring  vancomycin (Vancocin) in % 5 dextrose 500 mL IV 1,750 mg  hydrALAZINE (Apresoline) injection 5 mg  HYDROmorphone (Dilaudid) injection 0.2 mg  dexmedeTOMIDine (Precedex) 400 mcg in 100 mL (4 mcg/mL) sodium chloride 0.9% infusion  albumin human 5 % infusion 25 g  melatonin tablet 3 mg  traZODone (Desyrel) tablet 50 mg  dexmedeTOMIDine (Precedex) 400 mcg in 100 mL (4 mcg/mL) sodium chloride 0.9% infusion  albumin human 5 % infusion 25 g  ipratropium-albuteroL (Duo-Neb) 0.5-2.5 mg/3 mL nebulizer solution 3 mL  albumin human 5 % infusion 12.5 g  lactated Ringer's bolus 500  "mL  albumin human 5 % infusion 12.5 g  oxygen (O2) therapy  albumin human 5 % infusion 25 g  albumin human solution  - Omnicell Override Pull  benzocaine-menthol (Cepastat Sore Throat) 15-3.6 mg lozenge 1 lozenge  ondansetron (Zofran) injection 4 mg  oxyCODONE (Roxicodone) immediate release tablet 10 mg  HYDROmorphone (Dilaudid) injection 0.2 mg  heparin (porcine) injection 5,000 Units  lactated Ringer's bolus 250 mL      Relevant Results  Labs  Results from last 72 hours   Lab Units 03/23/24  1309 03/22/24 2349 03/22/24  1251 03/21/24  0728   WBC AUTO x10*3/uL 19.0* 13.2* 18.2* 9.5   HEMOGLOBIN g/dL 8.6* 9.3* 10.2* 10.0*   HEMATOCRIT % 26.5* 29.9* 31.0* 31.8*   PLATELETS AUTO x10*3/uL 191 200 199 444   NEUTROS PCT AUTO %  --   --   --  58.0   LYMPHS PCT AUTO %  --   --   --  28.9   MONOS PCT AUTO %  --   --   --  8.5   EOS PCT AUTO %  --   --   --  1.8     Results from last 72 hours   Lab Units 03/23/24  1309 03/22/24 2349 03/22/24  1251   SODIUM mmol/L 132* 136 140   POTASSIUM mmol/L 3.6 4.1 4.0   CHLORIDE mmol/L 95* 100 100   CO2 mmol/L 25 27 29   BUN mg/dL 10 7 5*   CREATININE mg/dL 0.56 0.40* 0.47*   GLUCOSE mg/dL 131* 135* 96   CALCIUM mg/dL 9.0 8.8 8.6   ANION GAP mmol/L 16 13 15   EGFR mL/min/1.73m*2 >90 >90 >90   PHOSPHORUS mg/dL 3.4 3.5 3.9     Results from last 72 hours   Lab Units 03/23/24  1309 03/22/24 2349 03/22/24  1251   ALBUMIN g/dL 4.2 3.8 3.2*     Estimated Creatinine Clearance: 76 mL/min (by C-G formula based on SCr of 0.56 mg/dL).  No results found for: \"CRP\"  Microbiology  Susceptibility data from last 14 days.  Collected Specimen Info Organism   03/10/24 Blood culture from Peripheral Venipuncture Enterococcus faecium   03/10/24 Blood culture from Peripheral Venipuncture Enterococcus faecium   03/09/24 Blood culture from Peripheral Venipuncture Enterococcus faecium   03/09/24 Blood culture from Peripheral Venipuncture Enterococcus faecium       Imaging          Assessment/Plan   Fannie " Walter is a 78 y.o. female with h/o HTN, HLD, COPD, spondylolisthesis L4-L5, admitted to [facility] on 3/1 for L4-L5 decompression and fusion, transferred to [facility] on 3/5 with encephalopathy and subarachnoid hemorrhage.   MRI lumbar spine 3/6 showed heterogenous fluid collection within hemilaminectomy bed measuring 2.7 x 2.0 x 2.9 cm felt to represent seroma/hematoma.  Found to have bacteremia with Enterococcus faecium 3/7, blood cultures were positive through 3/10.  First negative cultures on 3/11, have remained negative since then.  TTE done on 3/7 showed possible vegetation on mitral valve, CROW done on 3/7 showed elongated 2.5 x 0.3 cm filamentous mass on mitral valve with moderate to severe mitral valve regurgitation.  Repeat MRI on 3/14 with interval enlargement of heterogenous fluid collection with thin rim enhancement measuring 5.1 x 1.8 x 2.4 cm.  Also noted enhancement along the inferior endplate of L3-possibly degenerative changes versus infectious/inflammatory disease.  Cardiac surgery consulted, plan for mitral valve replacement pending evaluation of lumbar surgical site fluid collection.        Given notable interval enlargement of lumbar fluid collection, there is concern for infection.  While it is heartening Ms. Watson's blood cultures have remained negative since 3/11, additional nidus of infection should be ruled out, such as postsurgical lumbar abscess/infected hematoma.  Unfortunately this cannot be definitively ascertained via imaging.     This is especially important given planned cardiac surgical procedure for mitral valve replacement, and every reasonable measure should be taken to mitigate risk of new valve infection.     UPDATE 3/22/2024  S/p OPERATION/PROCEDURE:  1. Minimally invasive mitral valve replacement (31mm Epic bioprosthesis)  2. Exploration of the right femoral vein and artery for cannulation for cardiopulmonary bypass.     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      Jesse Cruz MD MPH

## 2024-03-23 NOTE — CARE PLAN
Problem: Skin  Goal: Decreased wound size/increased tissue granulation at next dressing change  Outcome: Progressing  Goal: Participates in plan/prevention/treatment measures  Outcome: Progressing  Goal: Prevent/manage excess moisture  Outcome: Progressing  Goal: Prevent/minimize sheer/friction injuries  Outcome: Progressing  Goal: Promote/optimize nutrition  Outcome: Progressing  Goal: Promote skin healing  Outcome: Progressing     Problem: Fall/Injury  Goal: Not fall by end of shift  Outcome: Progressing  Goal: Be free from injury by end of the shift  Outcome: Progressing  Goal: Verbalize understanding of personal risk factors for fall in the hospital  Outcome: Progressing  Goal: Verbalize understanding of risk factor reduction measures to prevent injury from fall in the home  Outcome: Progressing  Goal: Use assistive devices by end of the shift  Outcome: Progressing  Goal: Pace activities to prevent fatigue by end of the shift  Outcome: Progressing     Problem: Pain  Goal: Takes deep breaths with improved pain control throughout the shift  Outcome: Progressing  Goal: Turns in bed with improved pain control throughout the shift  Outcome: Progressing  Goal: Walks with improved pain control throughout the shift  Outcome: Progressing  Goal: Performs ADL's with improved pain control throughout shift  Outcome: Progressing  Goal: Participates in PT with improved pain control throughout the shift  Outcome: Progressing  Goal: Free from opioid side effects throughout the shift  Outcome: Progressing  Goal: Free from acute confusion related to pain meds throughout the shift  Outcome: Progressing     Problem: Safety - Adult  Goal: Free from fall injury  Outcome: Progressing     Problem: DVT/VTE Prevention/Activity  Goal: No decrease in circulation/sensation  Outcome: Progressing  Goal: Prevent skin breakdown  Outcome: Progressing  Goal: Return to preop oxygenation status  Outcome: Progressing  Goal: Tolerates optimal  activity  Outcome: Progressing  Goal: Increase self care and/or family involvement in 24 hrs.  Outcome: Progressing     Problem: Wound care/infection prevention  Goal: No signs of infection in 24 hrs.  Outcome: Progressing  Goal: No unexpected bleeding from incision this shift  Outcome: Progressing

## 2024-03-23 NOTE — PROGRESS NOTES
Acute Pain Service    Postop Pain HPI -   Palliative: relieved with IV analgesics and regional local anesthetics  Provocative: movement  Quality:  burning and aching  Radiation:  none  Severity:  8/10 secondary to chest tube pain, no incisional pain  Timing: constant    24-HOUR OPIOID CONSUMPTION:  Hydromorphone 0.2mg x3  Oxycodone 5mg x1    Scheduled medications  acetaminophen, 650 mg, oral, q6h   Or  acetaminophen, 650 mg, rectal, q6h  ampicillin, 2 g, intravenous, q4h  aspirin, 81 mg, oral, Daily  atorvastatin, 80 mg, oral, Nightly  cefTRIAXone, 2 g, intravenous, q12h  heparin (porcine), 5,000 Units, subcutaneous, q8h  insulin lispro, 0-15 Units, subcutaneous, q4h  ipratropium-albuteroL, 3 mL, nebulization, TID  lidocaine, 1 patch, transdermal, q24h  melatonin, 3 mg, oral, Nightly  oxygen, , inhalation, Continuous - Inhalation  pantoprazole, 40 mg, oral, Daily before breakfast   Or  pantoprazole, 40 mg, intravenous, Daily before breakfast  polyethylene glycol, 17 g, oral, BID  sennosides-docusate sodium, 2 tablet, oral, BID  traZODone, 50 mg, oral, Nightly  vancomycin, 1,750 mg, intravenous, q24h      Continuous medications  dexmedeTOMIDine, 0.3 mcg/kg/hr, Last Rate: Stopped (03/23/24 0544)  lactated Ringer's, 30 mL/hr, Last Rate: 30 mL/hr (03/23/24 0900)  lactated Ringer's, 5 mL/hr, Last Rate: 5 mL/hr (03/23/24 0900)  norepinephrine, 0-1 mcg/kg/min, Last Rate: 0.02 mcg/kg/min (03/23/24 0938)      PRN medications  PRN medications: benzocaine-menthol, calcium gluconate, calcium gluconate, dextrose **OR** glucagon, hydrALAZINE, HYDROmorphone, magnesium sulfate, magnesium sulfate, ondansetron, oxyCODONE, oxyCODONE, potassium chloride CR **OR** potassium chloride, potassium chloride CR **OR** potassium chloride, potassium chloride, potassium chloride, sodium chloride 0.9%, vancomycin     Physical Exam:  Constitutional:  no distress, alert and cooperative  Eyes: clear sclera  Head/Neck: No apparent injury, trachea  midline  Respiratory/Thorax: Patent airways, thorax symmetric, breathing comfortably  Cardiovascular: no pitting edema  Gastrointestinal: Nondistended  Musculoskeletal: ROM intact  Extremities: no clubbing  Neurological: alert, atkinson x4  Psychological: Appropriate affect    Results for orders placed or performed during the hospital encounter of 03/05/24 (from the past 24 hour(s))   Blood Gas Arterial Full Panel Unsolicited   Result Value Ref Range    POCT pH, Arterial 7.31 (L) 7.38 - 7.42 pH    POCT pCO2, Arterial 53 (H) 38 - 42 mm Hg    POCT pO2, Arterial 151 (H) 85 - 95 mm Hg    POCT SO2, Arterial 100 94 - 100 %    POCT Oxy Hemoglobin, Arterial 98.2 (H) 94.0 - 98.0 %    POCT Hematocrit Calculated, Arterial 28.0 (L) 36.0 - 46.0 %    POCT Sodium, Arterial 135 (L) 136 - 145 mmol/L    POCT Potassium, Arterial 4.3 3.5 - 5.3 mmol/L    POCT Chloride, Arterial 102 98 - 107 mmol/L    POCT Ionized Calcium, Arterial 1.33 1.10 - 1.33 mmol/L    POCT Glucose, Arterial 123 (H) 74 - 99 mg/dL    POCT Lactate, Arterial 2.8 (H) 0.4 - 2.0 mmol/L    POCT Base Excess, Arterial 0.0 -2.0 - 3.0 mmol/L    POCT HCO3 Calculated, Arterial 26.7 (H) 22.0 - 26.0 mmol/L    POCT Hemoglobin, Arterial 9.2 (L) 12.0 - 16.0 g/dL    POCT Anion Gap, Arterial 11 10 - 25 mmo/L    Patient Temperature 37.0 degrees Celsius    FiO2 100 %   Coox Panel, Arterial Unsolicited   Result Value Ref Range    POCT Hemoglobin, Arterial 9.2 (L) 12.0 - 16.0 g/dL    POCT Oxy Hemoglobin, Arterial 98.2 (H) 94.0 - 98.0 %    POCT Carboxyhemoglobin, Arterial 1.1 %    POCT Methemoglobin, Arterial 0.5 0.0 - 1.5 %    POCT Deoxy Hemoglobin, Arterial 0.1 0.0 - 5.0 %   Calcium, Ionized   Result Value Ref Range    POCT Calcium, Ionized 1.09 (L) 1.1 - 1.33 mmol/L   Magnesium   Result Value Ref Range    Magnesium 3.35 (H) 1.60 - 2.40 mg/dL   Coagulation Screen   Result Value Ref Range    Protime 14.6 (H) 9.8 - 12.8 seconds    INR 1.3 (H) 0.9 - 1.1    aPTT 29 27 - 38 seconds   Fibrinogen    Result Value Ref Range    Fibrinogen 328 200 - 400 mg/dL   CBC   Result Value Ref Range    WBC 18.2 (H) 4.4 - 11.3 x10*3/uL    nRBC 0.0 0.0 - 0.0 /100 WBCs    RBC 3.76 (L) 4.00 - 5.20 x10*6/uL    Hemoglobin 10.2 (L) 12.0 - 16.0 g/dL    Hematocrit 31.0 (L) 36.0 - 46.0 %    MCV 82 80 - 100 fL    MCH 27.1 26.0 - 34.0 pg    MCHC 32.9 32.0 - 36.0 g/dL    RDW 15.9 (H) 11.5 - 14.5 %    Platelets 199 150 - 450 x10*3/uL   Renal Function Panel   Result Value Ref Range    Glucose 96 74 - 99 mg/dL    Sodium 140 136 - 145 mmol/L    Potassium 4.0 3.5 - 5.3 mmol/L    Chloride 100 98 - 107 mmol/L    Bicarbonate 29 21 - 32 mmol/L    Anion Gap 15 10 - 20 mmol/L    Urea Nitrogen 5 (L) 6 - 23 mg/dL    Creatinine 0.47 (L) 0.50 - 1.05 mg/dL    eGFR >90 >60 mL/min/1.73m*2    Calcium 8.6 8.6 - 10.6 mg/dL    Phosphorus 3.9 2.5 - 4.9 mg/dL    Albumin 3.2 (L) 3.4 - 5.0 g/dL   Blood Gas Arterial Full Panel   Result Value Ref Range    POCT pH, Arterial 7.44 (H) 7.38 - 7.42 pH    POCT pCO2, Arterial 42 38 - 42 mm Hg    POCT pO2, Arterial 107 (H) 85 - 95 mm Hg    POCT SO2, Arterial 99 94 - 100 %    POCT Oxy Hemoglobin, Arterial 96.6 94.0 - 98.0 %    POCT Hematocrit Calculated, Arterial 34.0 (L) 36.0 - 46.0 %    POCT Sodium, Arterial 136 136 - 145 mmol/L    POCT Potassium, Arterial 4.1 3.5 - 5.3 mmol/L    POCT Chloride, Arterial 99 98 - 107 mmol/L    POCT Ionized Calcium, Arterial 1.15 1.10 - 1.33 mmol/L    POCT Glucose, Arterial 107 (H) 74 - 99 mg/dL    POCT Lactate, Arterial 1.0 0.4 - 2.0 mmol/L    POCT Base Excess, Arterial 3.9 (H) -2.0 - 3.0 mmol/L    POCT HCO3 Calculated, Arterial 28.5 (H) 22.0 - 26.0 mmol/L    POCT Hemoglobin, Arterial 11.3 (L) 12.0 - 16.0 g/dL    POCT Anion Gap, Arterial 13 10 - 25 mmo/L    Patient Temperature 37.0 degrees Celsius    FiO2 50 %   Blood Gas Arterial Full Panel   Result Value Ref Range    POCT pH, Arterial 7.45 (H) 7.38 - 7.42 pH    POCT pCO2, Arterial 42 38 - 42 mm Hg    POCT pO2, Arterial 118 (H) 85 - 95  mm Hg    POCT SO2, Arterial 99 94 - 100 %    POCT Oxy Hemoglobin, Arterial 97.3 94.0 - 98.0 %    POCT Hematocrit Calculated, Arterial 31.0 (L) 36.0 - 46.0 %    POCT Sodium, Arterial 136 136 - 145 mmol/L    POCT Potassium, Arterial 4.1 3.5 - 5.3 mmol/L    POCT Chloride, Arterial 100 98 - 107 mmol/L    POCT Ionized Calcium, Arterial 1.09 (L) 1.10 - 1.33 mmol/L    POCT Glucose, Arterial 154 (H) 74 - 99 mg/dL    POCT Lactate, Arterial 0.9 0.4 - 2.0 mmol/L    POCT Base Excess, Arterial 4.7 (H) -2.0 - 3.0 mmol/L    POCT HCO3 Calculated, Arterial 29.2 (H) 22.0 - 26.0 mmol/L    POCT Hemoglobin, Arterial 10.3 (L) 12.0 - 16.0 g/dL    POCT Anion Gap, Arterial 11 10 - 25 mmo/L    Patient Temperature 37.0 degrees Celsius    FiO2 40 %   POCT GLUCOSE   Result Value Ref Range    POCT Glucose 136 (H) 74 - 99 mg/dL   Vancomycin   Result Value Ref Range    Vancomycin 4.4 (L) 5.0 - 20.0 ug/mL   Calcium, Ionized   Result Value Ref Range    POCT Calcium, Ionized 1.12 1.1 - 1.33 mmol/L   CBC   Result Value Ref Range    WBC 13.2 (H) 4.4 - 11.3 x10*3/uL    nRBC 0.0 0.0 - 0.0 /100 WBCs    RBC 3.58 (L) 4.00 - 5.20 x10*6/uL    Hemoglobin 9.3 (L) 12.0 - 16.0 g/dL    Hematocrit 29.9 (L) 36.0 - 46.0 %    MCV 84 80 - 100 fL    MCH 26.0 26.0 - 34.0 pg    MCHC 31.1 (L) 32.0 - 36.0 g/dL    RDW 16.0 (H) 11.5 - 14.5 %    Platelets 200 150 - 450 x10*3/uL   Magnesium   Result Value Ref Range    Magnesium 2.12 1.60 - 2.40 mg/dL   Renal Function Panel   Result Value Ref Range    Glucose 135 (H) 74 - 99 mg/dL    Sodium 136 136 - 145 mmol/L    Potassium 4.1 3.5 - 5.3 mmol/L    Chloride 100 98 - 107 mmol/L    Bicarbonate 27 21 - 32 mmol/L    Anion Gap 13 10 - 20 mmol/L    Urea Nitrogen 7 6 - 23 mg/dL    Creatinine 0.40 (L) 0.50 - 1.05 mg/dL    eGFR >90 >60 mL/min/1.73m*2    Calcium 8.8 8.6 - 10.6 mg/dL    Phosphorus 3.5 2.5 - 4.9 mg/dL    Albumin 3.8 3.4 - 5.0 g/dL   POCT GLUCOSE   Result Value Ref Range    POCT Glucose 136 (H) 74 - 99 mg/dL   Blood Gas  Arterial Full Panel   Result Value Ref Range    POCT pH, Arterial 7.53 (H) 7.38 - 7.42 pH    POCT pCO2, Arterial 32 (L) 38 - 42 mm Hg    POCT pO2, Arterial 141 (H) 85 - 95 mm Hg    POCT SO2, Arterial 100 94 - 100 %    POCT Oxy Hemoglobin, Arterial 97.9 94.0 - 98.0 %    POCT Hematocrit Calculated, Arterial 30.0 (L) 36.0 - 46.0 %    POCT Sodium, Arterial 137 136 - 145 mmol/L    POCT Potassium, Arterial 5.0 3.5 - 5.3 mmol/L    POCT Chloride, Arterial 103 98 - 107 mmol/L    POCT Ionized Calcium, Arterial 1.13 1.10 - 1.33 mmol/L    POCT Glucose, Arterial 135 (H) 74 - 99 mg/dL    POCT Lactate, Arterial 1.0 0.4 - 2.0 mmol/L    POCT Base Excess, Arterial 4.1 (H) -2.0 - 3.0 mmol/L    POCT HCO3 Calculated, Arterial 26.7 (H) 22.0 - 26.0 mmol/L    POCT Hemoglobin, Arterial 10.0 (L) 12.0 - 16.0 g/dL    POCT Anion Gap, Arterial 12 10 - 25 mmo/L    Patient Temperature 37.0 degrees Celsius    FiO2 40 %   POCT GLUCOSE   Result Value Ref Range    POCT Glucose 100 (H) 74 - 99 mg/dL   POCT GLUCOSE   Result Value Ref Range    POCT Glucose 95 74 - 99 mg/dL       Plan:     - Right SAP single shot nerve block performed intraoperatively by anesthesia team on 3/22/24  - Rest of pain management per primary team  - Will sign off, please call with any questions or concerns      Acute Pain Resident  pg [phone] ph [phone]

## 2024-03-23 NOTE — PROGRESS NOTES
CTICU Progress Note  Fannie Watson/[id number]    Admit Date: 3/5/2024  Hospital Length of Stay: 18   ICU Length of Stay: 22h   CT SURGEON: Fara    SUBJECTIVE:   Pt seen lying in bed, extubated on NC. Pt required 1.5L albumin 5% + 1L LR overnight for low MAPs. Pt still on 0.04 Levo. Will wean when able. Good UOP. Passed swallow, diet in place. OOB to chair today. Endorsing sig incisional/CT site pain. Will remove CT when appropriate. Also notes anxiety, was on precedex overnight. Will transition to PO meds. NAEON.     MEDICATIONS  Infusions:  dexmedeTOMIDine, Last Rate: Stopped (03/23/24 0544)  lactated Ringer's, Last Rate: 30 mL/hr (03/23/24 0900)  lactated Ringer's, Last Rate: 5 mL/hr (03/23/24 0900)  norepinephrine, Last Rate: 0.02 mcg/kg/min (03/23/24 0938)      Scheduled:  acetaminophen, 650 mg, q6h   Or  acetaminophen, 650 mg, q6h  ampicillin, 2 g, q4h  aspirin, 81 mg, Daily  atorvastatin, 80 mg, Nightly  cefTRIAXone, 2 g, q12h  heparin (porcine), 5,000 Units, q8h  insulin lispro, 0-15 Units, q4h  ipratropium-albuteroL, 3 mL, TID  lidocaine, 1 patch, q24h  melatonin, 3 mg, Nightly  oxygen, , Continuous - Inhalation  pantoprazole, 40 mg, Daily before breakfast   Or  pantoprazole, 40 mg, Daily before breakfast  polyethylene glycol, 17 g, BID  sennosides-docusate sodium, 2 tablet, BID  traZODone, 50 mg, Nightly  vancomycin, 1,750 mg, q24h      PRN:  benzocaine-menthol, 1 lozenge, q2h PRN  calcium gluconate, 1 g, q6h PRN  calcium gluconate, 2 g, q6h PRN  dextrose, 25 g, q15 min PRN   Or  glucagon, 1 mg, q15 min PRN  hydrALAZINE, 5 mg, q4h PRN  HYDROmorphone, 0.2 mg, q3h PRN  magnesium sulfate, 2 g, q6h PRN  magnesium sulfate, 4 g, q6h PRN  ondansetron, 4 mg, q6h PRN  oxyCODONE, 10 mg, q6h PRN  oxyCODONE, 5 mg, q4h PRN  potassium chloride CR, 20 mEq, q6h PRN   Or  potassium chloride, 20 mEq, q6h PRN  potassium chloride CR, 40 mEq, q6h PRN   Or  potassium chloride, 40 mEq, q6h PRN  potassium chloride, 20 mEq, q6h  "PRN  potassium chloride, 40 mEq, q6h PRN  sodium chloride 0.9%, 1,000 mL/hr, Once PRN  vancomycin, , Daily PRN        PHYSICAL EXAM:   Visit Vitals  /70   Pulse 77   Temp 36.4 °C (97.5 °F) (Temporal)   Resp 18   Ht 1.57 m (5' 1.81\")   Wt 66.1 kg (145 lb 11.6 oz)   LMP  (LMP Unknown)   SpO2 100%   BMI 26.82 kg/m²   OB Status Hysterectomy   Smoking Status Former   BSA 1.7 m²     Wt Readings from Last 5 Encounters:   03/22/24 66.1 kg (145 lb 11.6 oz)     INTAKE/OUTPUT:  I/O last 3 completed shifts:  In: 7744.2 (117.2 mL/kg) [P.O.:240; I.V.:830.9 (12.6 mL/kg); Blood:2275; IV Piggyback:3451.3]  Out: 5800 (87.7 mL/kg) [Urine:5450 (2.3 mL/kg/hr); Chest Tube:350]  Weight: 66.1 kg        LDA:  CVC 03/22/24 Double lumen Right Internal jugular (Active)   Placement Date/Time: 03/22/24 (c) 0803   Hand Hygiene Performed Prior to CVC Insertion: Yes  Site Prep: Chlorhexidine   Site Prep Agent has Completely Dried Before Insertion: Yes  All 5 Sterile Barriers Used (Gloves, Gown, Cap, Mask, Large Sterile Torri...   Number of days: 1       Arterial Line 03/22/24 Left Brachial (Active)   Placement Date/Time: 03/22/24 (c) 0730   Size: 20 G  Orientation: Left  Location: Brachial  Securement Method: Transparent dressing  Patient Tolerance: Tolerated well   Number of days: 1       Pacer Wires (Active)   Placement Date: 03/22/24   Placed by: Sally Leger  Type of Pacing Wires: Ventricular   Number of days: 1       ETT  37 Fr (Active)   Placement Date/Time: 03/22/24 (c) 0782   Mask Ventilation: Vent by mask + OA or adjuvant +/- NMBA  Technique: Direct laryngoscopy  ETT Type: ETT - double lumen left  Double Lumen Tube Size: 37 Fr  Cuffed: Yes  Laryngoscope: Maria Elena  Blade Size: 3  L...   Number of days: 1       Urethral Catheter Temperature probe 14 Fr. (Active)   Placement Date/Time: 03/22/24 0800   Placed by: Nav LEON  Hand Hygiene Completed: Yes  Catheter Type: Temperature probe  Tube Size (Fr.): 14 Fr.  Catheter Balloon Size: 5 mL  " Urine Returned: Yes   Number of days: 1       Chest Tube Right (Active)   Placement Date/Time: 03/22/24 1100   Earliest Known Present: 03/22/24  Chest Tube Orientation: Right  Chest Tube Drainage System: Suction   Number of days: 1         Vent settings:  Vent Mode: Pressure support  FiO2 (%):  [40 %-70 %] 40 %  S RR:  [16] 16  S VT:  [400 mL] 400 mL  PEEP/CPAP (cm H2O):  [0 cm H20-8 cm H20] 5 cm H20  MS SUP:  [0 cm H20-8 cm H20] 5 cm H20  MAP (cm H2O):  [12] 12    Physical Exam:   Constitutional: Elderly  female patient lying in ICU bed , in no acute distress  Neurological: A+Ox3, no focal deficits, CAM negative  Eyes: Anicteric sclera, clear  Respiratory/Thorax: Diminished, clear breath sounds bilaterally, symmetric chest expansion, 1 chest tube to -20 suction, serosanguinous drainage. V pacing wires present  Cardiovascular: NSR, hypoTN w/ Epi 0.04, no murmurs appreciated  Gastrointestinal: Abdomen soft, nontender, nondistended, bowel sounds present  Genitourinary: Voids  Extremities: Palpable radial pulses, 1+ pulses to bilateral PT/DP, no pitting edema  Skin: Warm and dry throughout, Rt thora incision stable - dressing clean, dry, and intact  Psych: Calm and cooperative   Images:     Invasive Hemodynamics:    Most Recent Range Past 24hrs   BP (Art) 102/52 Arterial Line BP 1  Min: 85/44  Max: 156/77   MAP(Art) 70 mmHg Arterial Line MAP 1 (mmHg)   Min: 56 mmHg  Max: 110 mmHg   RA/CVP   No data recorded   PA   No data recorded   PA(mean)   No data recorded   CO   No data recorded   CI   No data recorded   Mixed Venous   No data recorded   SVR    No data recorded   Daily Risk Screen:  Hemodynamic monitoring  critically ill patient who need accurate urinary output measurements    Assessment/Plan     Fannie Watson is a 78 year old F with PMH significant for HTN, HLD, COPD, spondylolisthesis of lumbar lesion s/p L4-L5 decompression laminectomy and fusion 03/01/2024 who initially presented to OSH on 03/05 with  altered mental status found to have trace R sylvian subarachnoid hemorrhage who was also found to have 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 Sunshine on 03/22/2024     Plan:  NEURO:  Presented with R Sylvian SAH. Per chart review patient has normal neurological exam. Has PMH of low back pain on lyrica.  Acute post operative pain. Pain 7/10 this AM. On Oxy 5/10. Aox4, CAM(-), no focal deficits, anxious mood.   - Serial neuro and pain assessments   - Scheduled Tylenol   - PRN oxycodone  - PRN dilaudid for pain   - Lidocaine patches   - PT Consult, OOB to chair as tolerated, chair position if not tolerated   - CAM ICU score qshift  - Sleep/wake cycle hygiene  - Hold home lyrica  - Low threshold for CT head imaging if change in neuro exam given hx of SAH     CV:  Patient has a history of HTN, HLD. Had 2.5 cm Mitral valve vegetation with mod/severe mitral regurgitation. Is now status post R mini thoracotomy and Mitral valve replacement. Pre/Post EF: 55-60% pre and post Arrived to CTICU on no pressors. V epicardial wires set VVI @ 50. Hypotensive overnight, volume resuscitation w/ 1.5L albumin+1L LR. Remains on Levo 0.04 to maintain MAPs>65.   - Maintain goal MAP 70-90  - Volume resuscitate as clinically indicated  - Maintain epicardial wires set VVI  @ 50  - Continue ASA, Statin  - Hold metoprolol post-operatively     PULM:  Hx of COPD on home albuterol, not on home O2.  Extubated and on NC. Chest tubes 1 right pleural leobardo .  - follow up post op CXR  - Wean FiO2 maintaining SpO2 >92%.   - IS q1h and OOB to chair when extubated  - Chest tube to wall suction.  - Scheduled DuoNebs for Hx of COPD     GI:  Hx of GERD on home famotidine. Passed swallow, cardiac diet ordered.   - Continue PPI after extubation given history of GERD  - Colace/senna BID and miralax BID     :  CSA-LINWOOD Risk Score medium.  No history of renal disease,  baseline creatinine 0.4-0.5. Creatinine stable post-op. Servin in place and making adequate UOP. -->  - Continue servin catheter for strict I/Os.  - Goal UOP 0.5ml/kg/hr  - RFP as clinically indicated  - Fluid resuscitation as needed  - Replete electrolytes per CTICU protocol     ENDO:  No PMH  A1c: 5.5. BG WNL, no insulin required.   - Maintain BG <180, insulin per CTICU protocol     HEME:  Received 2 units pRBCs and 2 units FFP, 500 cell saver while intra-op due to Hgb down to 6s. Was previously on heparin drip pre-op, will hold AC POD #0. Acute blood loss anemia and thrombocytopenia.  - Monitor drain output volume and characteristics  - CBC, coags, and fibrinogen post op and as clinically indicated  - Start SQH  - SCDs for DVT prophylaxis.  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 Started on ceftriaxone and ampicillin. Will redo MRSA swab today.   - Trend temp q4h  - Monitor surgical sites for s/s infection  - On ceftriaxone and ampicillin (on day 13) prior to procedure for endocarditis, will continue post op  - Started on vancomycin for MRSA prophylaxis pending MRSA swab, will continue for 48hrs   - ID previously following, will reengage to establish timeline for Abx  - Will likely need PICC line for long term Abx treatment      Skin:  No active skin issues.  - Has surgical wound overlying lumbar spine   - preventative Mepilex dressings in place on sacrum and heels  - change preventative Mepilex weekly or more frequently as indicated (when moist/soiled)   - every shift skin assessment per nursing and weekly ICU skin rounds  - moisture barrier to be applied with felton care  - active skin problems addressed with nursing on daily rounds     Proph:  SCDs  SQH  PPI     G:  Line  Right IJ MAC w [place] placed 03/22/2024  Left brachial a-line placed 03/22/2024  Servin placed  03/22/2024     F: Family: will update at bedside postoperatively.     Restraints: The indications and risks/benefits of non-violent/non self-destructive restraints were discussed and are indicated for the safety of the patient.    Code status: Full Code     A,B,C,D,E,F,G: reviewed     Dispo: CTICU care for now.    CTICU TEAM PHONE [phone]

## 2024-03-23 NOTE — PROGRESS NOTES
Physical Therapy    Physical Therapy Re-evaluation/Treatment    Patient Name: Fannie Watson  MRN: [id number]  Today's Date: 3/23/2024   Time Calculation  Start Time: 1000  Stop Time: 1031  Time Calculation (min): 31 min    Assessment/Plan   PT Assessment  PT Assessment Results: Decreased strength, Decreased endurance, Impaired balance, Decreased mobility  Rehab Prognosis: Excellent  Barriers to Discharge: medical acuity  Evaluation/Treatment Tolerance: Patient limited by pain  End of Session Communication: Bedside nurse  Assessment Comment: Patient grossly mobilizing at a CGA level with a FWW. Anticipate with continued therapy during hospital stay, will progress to modified independent level. Recommend low intensity therapy at discharge with family supervision during mobility as needed.  End of Session Patient Position: Up in chair, Alarm off, not on at start of session, Alarm off, caregiver present (Pt on bedpan, RN aware. Instructed Pt to press call light when complete)  IP OR SWING BED PT PLAN  Inpatient or Swing Bed: Inpatient  PT Plan  Treatment/Interventions: Bed mobility, Transfer training, Gait training, Stair training, Balance training, Strengthening, Endurance training, Range of motion, Therapeutic exercise, Therapeutic activity, Home exercise program  PT Plan: Skilled PT  PT Frequency: Daily  PT Discharge Recommendations: Low intensity level of continued care  Equipment Recommended upon Discharge: Wheeled walker  PT Recommended Transfer Status: Assist x1, Assistive device, Contact guard (FWW)  PT - OK to Discharge: Yes      Subjective   General Visit Information:  Reason for Referral: Pt with spondylolisthesis of lumbar lesion s/p L4-L5 decompression laminectomy and fusion 03/01/2024 who initially presented to OSH on 03/05 with altered mental status found to have trace R sylvian subarachnoid hemorrhage who was also found to have 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 Sunshine on 03/22/2024  Past Medical History Relevant to Rehab: HTN, HLD, COPD, 3/1 s/p MIS L4-5 TLIF  Prior to Session Communication: Bedside nurse  Patient Position Received: Bed, 3 rail up, Alarm off, not on at start of session  Family/Caregiver Present: Yes  Caregiver Feedback: spouse present and supportive  General Comment: Patient supine in bed upon arriva. Agreeable to session with max encouragement.   Home Living:  Home Living  Type of Home: House  Lives With: Spouse  Home Adaptive Equipment: Cane, Walker rolling or standard  Home Layout: Two level, Laundry in basement, Bed/bath upstairs, Able to live on main level with bedroom/bathroom  Home Access: Stairs to enter with rails  Entrance Stairs-Rails: Both  Entrance Stairs-Number of Steps: 4  Bathroom Shower/Tub: Tub/shower unit  Bathroom Toilet: Standard  Bathroom Equipment: Shower chair with back  Prior Level of Function:  Prior Function Per Pt/Caregiver Report  Level of [place]: Independent with ADLs and functional transfers, Independent with homemaking with ambulation  Receives Help From: Family  ADL Assistance: Independent  Homemaking Assistance: Independent  Ambulatory Assistance: Independent  Vocational: Retired  Leisure: plays with cat  Hand Dominance: Right  Prior Function Comments: drives (+), falls (-)  Precautions:  Precautions  Medical Precautions: Fall precautions, Cardiac precautions, Oxygen therapy device and L/min, Chest tube, Spinal precautions  Post-Surgical Precautions: Move in the Tube  Precautions Comment: SBP < 140, MAP 70-90  Vital Signs:   PRE:  BP: 14/51 (71)  SpO2:99  HR:82  RR:28  POST:  BP:103/82 (71)  SpO2:92  HR:84  RR: 20    Objective   Lines/Tubes/Drains:  CVC 03/22/24 Double lumen Right Internal jugular (Active)   Number of days: 1       Arterial Line 03/22/24 Left Brachial (Active)   Number of days: 1       Pacer Wires (Active)   Number of days: 1       ETT  37 Fr  (Active)   Number of days: 1       Urethral Catheter Temperature probe 14 Fr. (Active)   Number of days: 1       Chest Tube Right (Active)   Number of days: 1     Continuous Medications/Drips:  dexmedeTOMIDine, 0.3 mcg/kg/hr, Last Rate: Stopped (03/23/24 0544)  lactated Ringer's, 30 mL/hr, Last Rate: 30 mL/hr (03/23/24 0900)  lactated Ringer's, 5 mL/hr, Last Rate: 5 mL/hr (03/23/24 0900)  norepinephrine, 0-1 mcg/kg/min, Last Rate: 0.02 mcg/kg/min (03/23/24 0938)      Oxygen: 2 L via NC    Pain:  Pain Assessment  Pain Assessment: 0-10  Pain Score: 9  Pain Type: Acute pain  Pain Location:  (chest tube site)  Cognition:  Cognition  Overall Cognitive Status: Within Functional Limits  Orientation Level: Oriented X4      Extremity/Trunk Assessments:  Strength:  Strength Comments: BLE strength WFL       General Assessments:  Strength  Strength Comments: [facility] strength WFL      Activity Tolerance  Endurance: Tolerates 10 - 20 min exercise with multiple rests  Sensation  Light Touch: No apparent deficits     Static Sitting Balance  Static Sitting-Balance Support: Feet supported  Static Sitting-Level of Assistance: Close supervision  Static Standing Balance  Static Standing-Balance Support: Bilateral upper extremity supported  Static Standing-Level of Assistance: Contact guard  Static Standing-Comment/Number of Minutes: FWW    Functional Assessments:  Bed Mobility  Bed Mobility: Yes  Bed Mobility 1  Bed Mobility 1: Supine to sitting  Level of Assistance 1: Minimum assistance, Minimal verbal cues  Transfers  Transfer: Yes  Transfer 1  Transfer From 1: Sit to, Stand to  Transfer to 1: Stand, Sit  Technique 1: Sit to stand, Stand to sit  Transfer Device 1: Walker  Transfer Level of Assistance 1: Contact guard, Minimal verbal cues  Trials/Comments 1: cues for technqiue and prx    Treatments  Pt static sitting EOB performing therapeutic exercise (LAQ, HF, ankle pumps x1 BLEs) while therapist performed skilled ICU line management for  safe progression with mobility and educated Pt on spinal prx,  RPD/RPE scales and energy conservation techniques. Pt demo fair tolerance to upright activities and fair understanding of prx.      Transfers 2  Transfer From 2: Bed to  Transfer to 2: Chair with arms  Technique 2:  (stand step transfer (~4 JAMIE))  Transfer Device 2: Walker  Transfer Level of Assistance 2: Contact guard  Trials/Comments 2: cues for technqiue and prx       Outcome Measures:  [facility] Basic Mobility  Turning from your back to your side while in a flat bed without using bedrails: A little  Moving from lying on your back to sitting on the side of a flat bed without using bedrails: A little  Moving to and from bed to chair (including a wheelchair): A little  Standing up from a chair using your arms (e.g. wheelchair or bedside chair): A little  To walk in hospital room: A little  Climbing 3-5 steps with railing: A little  Basic Mobility - Total Score: 18           FSS-ICU  Ambulation: Walks <50 feet with any assistance x1 or walks any distance with assistance x2 people  Rolling: Minimal assistance (performs 75% or more of task)  Sitting: Supervision or set-up only  Transfer Sit-to-Stand: Minimal assistance (performs 75% or more of task)  Transfer Supine-to-Sit: Minimal assistance (performs 75% or more of task)  Total Score: 18                   Encounter Problems       Encounter Problems (Active)       PT Problem       Pt. will score 24/28 on Tinetti balance test.  (Progressing)       Start:  03/07/24    Expected End:  04/06/24            Patient will complete supine to sit and sit to supine Supervision  (Progressing)       Start:  03/07/24    Expected End:  04/06/24            Patient will complete stand to sit and sit to stand with Supervision with wheeled walker.  (Progressing)       Start:  03/07/24    Expected End:  04/06/24            Patient will ambulate >400' with Rolling Walker and Supervision  (Progressing)       Start:  03/07/24     Expected End:  04/06/24            Patient will ascend and descend 4 steps with 2 UE Support and Supervision  (Progressing)       Start:  03/07/24    Expected End:  04/06/24               Pain - Adult              Education Documentation  Precautions, taught by Delano Moreira PT at 3/23/2024  2:15 PM.  Learner: Patient  Readiness: Acceptance  Method: Explanation  Response: Verbalizes Understanding    Body Mechanics, taught by Delano Moreira PT at 3/23/2024  2:15 PM.  Learner: Patient  Readiness: Acceptance  Method: Explanation  Response: Verbalizes Understanding    Mobility Training, taught by Delano Moreira PT at 3/23/2024  2:15 PM.  Learner: Patient  Readiness: Acceptance  Method: Explanation  Response: Verbalizes Understanding    Education Comments  No comments found.            03/23/24 at 2:16 PM   DELANO OMREIRA PT   Rehab Office: [phone]

## 2024-03-24 ENCOUNTER — APPOINTMENT (OUTPATIENT)
Dept: RADIOLOGY | Facility: HOSPITAL | Age: 79
DRG: 981 | End: 2024-03-24
Payer: MEDICARE

## 2024-03-24 LAB
ACT BLD: 109 SEC (ref 96–152)
ACT BLD: 111 SEC (ref 96–152)
ACT BLD: 417 SEC (ref 96–152)
ACT BLD: 441 SEC (ref 96–152)
ACT BLD: 441 SEC (ref 96–152)
ACT BLD: 453 SEC (ref 96–152)
ACT BLD: 454 SEC (ref 96–152)
ACT BLD: 496 SEC (ref 96–152)
ACT BLD: 83 SEC (ref 96–152)
ALBUMIN SERPL BCP-MCNC: 3.6 G/DL (ref 3.4–5)
ALBUMIN SERPL BCP-MCNC: 3.7 G/DL (ref 3.4–5)
ALBUMIN SERPL BCP-MCNC: 3.8 G/DL (ref 3.4–5)
ALBUMIN SERPL BCP-MCNC: 4 G/DL (ref 3.4–5)
ANION GAP SERPL CALC-SCNC: 14 MMOL/L (ref 10–20)
ANION GAP SERPL CALC-SCNC: 14 MMOL/L (ref 10–20)
ANION GAP SERPL CALC-SCNC: 17 MMOL/L (ref 10–20)
ANION GAP SERPL CALC-SCNC: 18 MMOL/L (ref 10–20)
BUN SERPL-MCNC: 12 MG/DL (ref 6–23)
BUN SERPL-MCNC: 12 MG/DL (ref 6–23)
BUN SERPL-MCNC: 13 MG/DL (ref 6–23)
BUN SERPL-MCNC: 13 MG/DL (ref 6–23)
CA-I BLD-SCNC: 1.18 MMOL/L (ref 1.1–1.33)
CALCIUM SERPL-MCNC: 8.8 MG/DL (ref 8.6–10.6)
CALCIUM SERPL-MCNC: 8.9 MG/DL (ref 8.6–10.6)
CALCIUM SERPL-MCNC: 9 MG/DL (ref 8.6–10.6)
CALCIUM SERPL-MCNC: 9.1 MG/DL (ref 8.6–10.6)
CHLORIDE SERPL-SCNC: 94 MMOL/L (ref 98–107)
CHLORIDE SERPL-SCNC: 95 MMOL/L (ref 98–107)
CHLORIDE SERPL-SCNC: 97 MMOL/L (ref 98–107)
CHLORIDE SERPL-SCNC: 97 MMOL/L (ref 98–107)
CO2 SERPL-SCNC: 24 MMOL/L (ref 21–32)
CO2 SERPL-SCNC: 24 MMOL/L (ref 21–32)
CO2 SERPL-SCNC: 25 MMOL/L (ref 21–32)
CO2 SERPL-SCNC: 25 MMOL/L (ref 21–32)
CREAT SERPL-MCNC: 0.53 MG/DL (ref 0.5–1.05)
CREAT SERPL-MCNC: 0.53 MG/DL (ref 0.5–1.05)
CREAT SERPL-MCNC: 0.54 MG/DL (ref 0.5–1.05)
CREAT SERPL-MCNC: 0.58 MG/DL (ref 0.5–1.05)
EGFRCR SERPLBLD CKD-EPI 2021: >90 ML/MIN/1.73M*2
ERYTHROCYTE [DISTWIDTH] IN BLOOD BY AUTOMATED COUNT: 16.7 % (ref 11.5–14.5)
ERYTHROCYTE [DISTWIDTH] IN BLOOD BY AUTOMATED COUNT: 16.8 % (ref 11.5–14.5)
GLUCOSE BLD MANUAL STRIP-MCNC: 102 MG/DL (ref 74–99)
GLUCOSE BLD MANUAL STRIP-MCNC: 103 MG/DL (ref 74–99)
GLUCOSE BLD MANUAL STRIP-MCNC: 108 MG/DL (ref 74–99)
GLUCOSE BLD MANUAL STRIP-MCNC: 109 MG/DL (ref 74–99)
GLUCOSE BLD MANUAL STRIP-MCNC: 124 MG/DL (ref 74–99)
GLUCOSE SERPL-MCNC: 120 MG/DL (ref 74–99)
GLUCOSE SERPL-MCNC: 129 MG/DL (ref 74–99)
GLUCOSE SERPL-MCNC: 61 MG/DL (ref 74–99)
GLUCOSE SERPL-MCNC: 99 MG/DL (ref 74–99)
HCT VFR BLD AUTO: 25.6 % (ref 36–46)
HCT VFR BLD AUTO: 27.9 % (ref 36–46)
HGB BLD-MCNC: 8.2 G/DL (ref 12–16)
HGB BLD-MCNC: 8.9 G/DL (ref 12–16)
MAGNESIUM SERPL-MCNC: 1.92 MG/DL (ref 1.6–2.4)
MAGNESIUM SERPL-MCNC: 2.27 MG/DL (ref 1.6–2.4)
MCH RBC QN AUTO: 27 PG (ref 26–34)
MCH RBC QN AUTO: 27.4 PG (ref 26–34)
MCHC RBC AUTO-ENTMCNC: 31.9 G/DL (ref 32–36)
MCHC RBC AUTO-ENTMCNC: 32 G/DL (ref 32–36)
MCV RBC AUTO: 85 FL (ref 80–100)
MCV RBC AUTO: 86 FL (ref 80–100)
NRBC BLD-RTO: 0 /100 WBCS (ref 0–0)
NRBC BLD-RTO: 0 /100 WBCS (ref 0–0)
PHOSPHATE SERPL-MCNC: 3.4 MG/DL (ref 2.5–4.9)
PHOSPHATE SERPL-MCNC: 3.6 MG/DL (ref 2.5–4.9)
PHOSPHATE SERPL-MCNC: 3.6 MG/DL (ref 2.5–4.9)
PHOSPHATE SERPL-MCNC: 3.7 MG/DL (ref 2.5–4.9)
PLATELET # BLD AUTO: 172 X10*3/UL (ref 150–450)
PLATELET # BLD AUTO: 202 X10*3/UL (ref 150–450)
POTASSIUM SERPL-SCNC: 3.9 MMOL/L (ref 3.5–5.3)
POTASSIUM SERPL-SCNC: 3.9 MMOL/L (ref 3.5–5.3)
POTASSIUM SERPL-SCNC: 4.4 MMOL/L (ref 3.5–5.3)
POTASSIUM SERPL-SCNC: 4.5 MMOL/L (ref 3.5–5.3)
RBC # BLD AUTO: 2.99 X10*6/UL (ref 4–5.2)
RBC # BLD AUTO: 3.3 X10*6/UL (ref 4–5.2)
SODIUM SERPL-SCNC: 130 MMOL/L (ref 136–145)
SODIUM SERPL-SCNC: 132 MMOL/L (ref 136–145)
SODIUM SERPL-SCNC: 132 MMOL/L (ref 136–145)
SODIUM SERPL-SCNC: 133 MMOL/L (ref 136–145)
VANCOMYCIN SERPL-MCNC: 14.5 UG/ML (ref 5–20)
WBC # BLD AUTO: 16 X10*3/UL (ref 4.4–11.3)
WBC # BLD AUTO: 17.5 X10*3/UL (ref 4.4–11.3)

## 2024-03-24 PROCEDURE — 2500000005 HC RX 250 GENERAL PHARMACY W/O HCPCS: Performed by: NURSE PRACTITIONER

## 2024-03-24 PROCEDURE — 2500000001 HC RX 250 WO HCPCS SELF ADMINISTERED DRUGS (ALT 637 FOR MEDICARE OP): Performed by: NURSE PRACTITIONER

## 2024-03-24 PROCEDURE — 2500000004 HC RX 250 GENERAL PHARMACY W/ HCPCS (ALT 636 FOR OP/ED)

## 2024-03-24 PROCEDURE — 2500000004 HC RX 250 GENERAL PHARMACY W/ HCPCS (ALT 636 FOR OP/ED): Performed by: NURSE PRACTITIONER

## 2024-03-24 PROCEDURE — 2500000005 HC RX 250 GENERAL PHARMACY W/O HCPCS: Performed by: STUDENT IN AN ORGANIZED HEALTH CARE EDUCATION/TRAINING PROGRAM

## 2024-03-24 PROCEDURE — 2500000001 HC RX 250 WO HCPCS SELF ADMINISTERED DRUGS (ALT 637 FOR MEDICARE OP)

## 2024-03-24 PROCEDURE — 82330 ASSAY OF CALCIUM: CPT | Performed by: NURSE PRACTITIONER

## 2024-03-24 PROCEDURE — 94640 AIRWAY INHALATION TREATMENT: CPT

## 2024-03-24 PROCEDURE — 82947 ASSAY GLUCOSE BLOOD QUANT: CPT

## 2024-03-24 PROCEDURE — 71045 X-RAY EXAM CHEST 1 VIEW: CPT | Performed by: STUDENT IN AN ORGANIZED HEALTH CARE EDUCATION/TRAINING PROGRAM

## 2024-03-24 PROCEDURE — 71045 X-RAY EXAM CHEST 1 VIEW: CPT

## 2024-03-24 PROCEDURE — 1200000002 HC GENERAL ROOM WITH TELEMETRY DAILY

## 2024-03-24 PROCEDURE — 2500000002 HC RX 250 W HCPCS SELF ADMINISTERED DRUGS (ALT 637 FOR MEDICARE OP, ALT 636 FOR OP/ED): Mod: MUE | Performed by: STUDENT IN AN ORGANIZED HEALTH CARE EDUCATION/TRAINING PROGRAM

## 2024-03-24 PROCEDURE — A4217 STERILE WATER/SALINE, 500 ML: HCPCS | Performed by: NURSE PRACTITIONER

## 2024-03-24 PROCEDURE — 2500000004 HC RX 250 GENERAL PHARMACY W/ HCPCS (ALT 636 FOR OP/ED): Performed by: STUDENT IN AN ORGANIZED HEALTH CARE EDUCATION/TRAINING PROGRAM

## 2024-03-24 PROCEDURE — 37799 UNLISTED PX VASCULAR SURGERY: CPT | Performed by: NURSE PRACTITIONER

## 2024-03-24 PROCEDURE — 2500000002 HC RX 250 W HCPCS SELF ADMINISTERED DRUGS (ALT 637 FOR MEDICARE OP, ALT 636 FOR OP/ED): Performed by: STUDENT IN AN ORGANIZED HEALTH CARE EDUCATION/TRAINING PROGRAM

## 2024-03-24 PROCEDURE — 80069 RENAL FUNCTION PANEL: CPT | Mod: MUE | Performed by: NURSE PRACTITIONER

## 2024-03-24 PROCEDURE — 2500000002 HC RX 250 W HCPCS SELF ADMINISTERED DRUGS (ALT 637 FOR MEDICARE OP, ALT 636 FOR OP/ED): Mod: MUE | Performed by: NURSE PRACTITIONER

## 2024-03-24 PROCEDURE — 2500000002 HC RX 250 W HCPCS SELF ADMINISTERED DRUGS (ALT 637 FOR MEDICARE OP, ALT 636 FOR OP/ED): Performed by: NURSE PRACTITIONER

## 2024-03-24 PROCEDURE — 2500000002 HC RX 250 W HCPCS SELF ADMINISTERED DRUGS (ALT 637 FOR MEDICARE OP, ALT 636 FOR OP/ED): Mod: MUE

## 2024-03-24 PROCEDURE — 85027 COMPLETE CBC AUTOMATED: CPT | Performed by: NURSE PRACTITIONER

## 2024-03-24 PROCEDURE — 2500000001 HC RX 250 WO HCPCS SELF ADMINISTERED DRUGS (ALT 637 FOR MEDICARE OP): Performed by: STUDENT IN AN ORGANIZED HEALTH CARE EDUCATION/TRAINING PROGRAM

## 2024-03-24 PROCEDURE — 2500000002 HC RX 250 W HCPCS SELF ADMINISTERED DRUGS (ALT 637 FOR MEDICARE OP, ALT 636 FOR OP/ED)

## 2024-03-24 PROCEDURE — 99233 SBSQ HOSP IP/OBS HIGH 50: CPT | Performed by: STUDENT IN AN ORGANIZED HEALTH CARE EDUCATION/TRAINING PROGRAM

## 2024-03-24 PROCEDURE — 83735 ASSAY OF MAGNESIUM: CPT | Performed by: NURSE PRACTITIONER

## 2024-03-24 RX ORDER — PREGABALIN 75 MG/1
75 CAPSULE ORAL DAILY
COMMUNITY
Start: 2020-05-06

## 2024-03-24 RX ORDER — FUROSEMIDE 10 MG/ML
20 INJECTION INTRAMUSCULAR; INTRAVENOUS ONCE
Status: COMPLETED | OUTPATIENT
Start: 2024-03-24 | End: 2024-03-24

## 2024-03-24 RX ORDER — CETIRIZINE HYDROCHLORIDE 10 MG/1
10 TABLET ORAL DAILY
Status: ON HOLD | COMMUNITY
Start: 2023-10-23 | End: 2023-11-22 | Stop reason: WASHOUT

## 2024-03-24 RX ORDER — PREDNISONE 5 MG/1
5 TABLET ORAL DAILY
COMMUNITY
Start: 2018-12-30 | End: 2024-04-01 | Stop reason: HOSPADM

## 2024-03-24 RX ORDER — LISINOPRIL 20 MG/1
20 TABLET ORAL DAILY
COMMUNITY
End: 2024-04-01 | Stop reason: HOSPADM

## 2024-03-24 RX ORDER — FLUTICASONE FUROATE, UMECLIDINIUM BROMIDE AND VILANTEROL TRIFENATATE 100; 62.5; 25 UG/1; UG/1; UG/1
1 POWDER RESPIRATORY (INHALATION) DAILY
COMMUNITY

## 2024-03-24 RX ORDER — DIPHENHYDRAMINE HCL 25 MG
25 CAPSULE ORAL ONCE
Status: COMPLETED | OUTPATIENT
Start: 2024-03-24 | End: 2024-03-24

## 2024-03-24 RX ORDER — BISOPROLOL FUMARATE 10 MG/1
10 TABLET, FILM COATED ORAL DAILY
COMMUNITY
Start: 2022-07-05 | End: 2024-04-01 | Stop reason: HOSPADM

## 2024-03-24 RX ORDER — PREGABALIN 75 MG/1
75 CAPSULE ORAL 2 TIMES DAILY
Status: DISCONTINUED | OUTPATIENT
Start: 2024-03-24 | End: 2024-04-01 | Stop reason: HOSPADM

## 2024-03-24 RX ORDER — BENZONATATE 100 MG/1
100 CAPSULE ORAL 3 TIMES DAILY PRN
Status: DISCONTINUED | OUTPATIENT
Start: 2024-03-24 | End: 2024-04-01 | Stop reason: HOSPADM

## 2024-03-24 RX ORDER — HYDROXYCHLOROQUINE SULFATE 200 MG/1
200 TABLET, FILM COATED ORAL DAILY
COMMUNITY
Start: 2023-08-14

## 2024-03-24 RX ORDER — SPIRONOLACTONE 25 MG/1
25 TABLET ORAL DAILY
COMMUNITY
Start: 2019-01-26 | End: 2024-04-01 | Stop reason: HOSPADM

## 2024-03-24 RX ORDER — ALBUTEROL SULFATE 90 UG/1
1 AEROSOL, METERED RESPIRATORY (INHALATION) EVERY 6 HOURS PRN
Status: DISCONTINUED | OUTPATIENT
Start: 2024-03-24 | End: 2024-04-01 | Stop reason: HOSPADM

## 2024-03-24 RX ORDER — FLUTICASONE FUROATE AND VILANTEROL 100; 25 UG/1; UG/1
1 POWDER RESPIRATORY (INHALATION)
Status: DISCONTINUED | OUTPATIENT
Start: 2024-03-24 | End: 2024-04-01 | Stop reason: HOSPADM

## 2024-03-24 RX ORDER — FAMOTIDINE 40 MG/1
40 TABLET, FILM COATED ORAL DAILY
COMMUNITY
Start: 2021-01-05

## 2024-03-24 RX ORDER — ALBUTEROL SULFATE 90 UG/1
1 AEROSOL, METERED RESPIRATORY (INHALATION) EVERY 6 HOURS PRN
COMMUNITY

## 2024-03-24 RX ORDER — TIZANIDINE 4 MG/1
4 TABLET ORAL 3 TIMES DAILY PRN
COMMUNITY
Start: 2024-02-20

## 2024-03-24 RX ORDER — FAMOTIDINE 20 MG/1
20 TABLET, FILM COATED ORAL DAILY
Status: DISCONTINUED | OUTPATIENT
Start: 2024-03-24 | End: 2024-03-25

## 2024-03-24 RX ORDER — METOPROLOL TARTRATE 25 MG/1
12.5 TABLET, FILM COATED ORAL 2 TIMES DAILY
Status: DISCONTINUED | OUTPATIENT
Start: 2024-03-24 | End: 2024-03-25

## 2024-03-24 RX ADMIN — IPRATROPIUM BROMIDE AND ALBUTEROL SULFATE 3 ML: .5; 3 SOLUTION RESPIRATORY (INHALATION) at 20:24

## 2024-03-24 RX ADMIN — METOPROLOL TARTRATE 12.5 MG: 25 TABLET, FILM COATED ORAL at 11:49

## 2024-03-24 RX ADMIN — IPRATROPIUM BROMIDE AND ALBUTEROL SULFATE 3 ML: .5; 3 SOLUTION RESPIRATORY (INHALATION) at 16:27

## 2024-03-24 RX ADMIN — PANTOPRAZOLE SODIUM 40 MG: 40 TABLET, DELAYED RELEASE ORAL at 07:39

## 2024-03-24 RX ADMIN — Medication: at 20:00

## 2024-03-24 RX ADMIN — ACETAMINOPHEN 650 MG: 325 TABLET ORAL at 13:10

## 2024-03-24 RX ADMIN — AMPICILLIN SODIUM 2 G: 2 INJECTION, POWDER, FOR SOLUTION INTRAMUSCULAR; INTRAVENOUS at 03:32

## 2024-03-24 RX ADMIN — PREGABALIN 75 MG: 75 CAPSULE ORAL at 11:49

## 2024-03-24 RX ADMIN — POTASSIUM CHLORIDE 20 MEQ: 14.9 INJECTION, SOLUTION INTRAVENOUS at 03:18

## 2024-03-24 RX ADMIN — VANCOMYCIN HYDROCHLORIDE 1750 MG: 5 INJECTION, POWDER, LYOPHILIZED, FOR SOLUTION INTRAVENOUS at 08:01

## 2024-03-24 RX ADMIN — IPRATROPIUM BROMIDE AND ALBUTEROL SULFATE 3 ML: .5; 3 SOLUTION RESPIRATORY (INHALATION) at 11:40

## 2024-03-24 RX ADMIN — AMPICILLIN SODIUM 2 G: 2 INJECTION, POWDER, FOR SOLUTION INTRAMUSCULAR; INTRAVENOUS at 18:40

## 2024-03-24 RX ADMIN — ATORVASTATIN CALCIUM 80 MG: 80 TABLET, FILM COATED ORAL at 20:45

## 2024-03-24 RX ADMIN — Medication: at 08:00

## 2024-03-24 RX ADMIN — OXYCODONE HYDROCHLORIDE 10 MG: 5 TABLET ORAL at 02:53

## 2024-03-24 RX ADMIN — DIPHENHYDRAMINE HYDROCHLORIDE 25 MG: 25 CAPSULE ORAL at 06:26

## 2024-03-24 RX ADMIN — FLUTICASONE FUROATE AND VILANTEROL TRIFENATATE 1 PUFF: 100; 25 POWDER RESPIRATORY (INHALATION) at 11:33

## 2024-03-24 RX ADMIN — LIDOCAINE 1 PATCH: 4 PATCH TOPICAL at 13:10

## 2024-03-24 RX ADMIN — CEFTRIAXONE SODIUM 2 G: 2 INJECTION, SOLUTION INTRAVENOUS at 07:39

## 2024-03-24 RX ADMIN — SENNOSIDES AND DOCUSATE SODIUM 2 TABLET: 8.6; 5 TABLET ORAL at 08:02

## 2024-03-24 RX ADMIN — ACETAMINOPHEN 650 MG: 325 TABLET ORAL at 00:27

## 2024-03-24 RX ADMIN — HEPARIN SODIUM 5000 UNITS: 5000 INJECTION INTRAVENOUS; SUBCUTANEOUS at 11:49

## 2024-03-24 RX ADMIN — HEPARIN SODIUM 5000 UNITS: 5000 INJECTION INTRAVENOUS; SUBCUTANEOUS at 02:02

## 2024-03-24 RX ADMIN — FAMOTIDINE 20 MG: 20 TABLET ORAL at 11:49

## 2024-03-24 RX ADMIN — TRAZODONE HYDROCHLORIDE 50 MG: 50 TABLET ORAL at 20:45

## 2024-03-24 RX ADMIN — PREGABALIN 75 MG: 75 CAPSULE ORAL at 20:45

## 2024-03-24 RX ADMIN — CEFTRIAXONE SODIUM 2 G: 2 INJECTION, SOLUTION INTRAVENOUS at 19:53

## 2024-03-24 RX ADMIN — HEPARIN SODIUM 5000 UNITS: 5000 INJECTION INTRAVENOUS; SUBCUTANEOUS at 18:40

## 2024-03-24 RX ADMIN — TIOTROPIUM BROMIDE INHALATION SPRAY 2 PUFF: 3.12 SPRAY, METERED RESPIRATORY (INHALATION) at 11:34

## 2024-03-24 RX ADMIN — ACETAMINOPHEN 650 MG: 325 TABLET ORAL at 06:26

## 2024-03-24 RX ADMIN — OXYCODONE HYDROCHLORIDE 5 MG: 5 TABLET ORAL at 16:06

## 2024-03-24 RX ADMIN — ACETAMINOPHEN 650 MG: 325 TABLET ORAL at 18:40

## 2024-03-24 RX ADMIN — FUROSEMIDE 20 MG: 10 INJECTION, SOLUTION INTRAMUSCULAR; INTRAVENOUS at 06:33

## 2024-03-24 RX ADMIN — FUROSEMIDE 20 MG: 10 INJECTION, SOLUTION INTRAMUSCULAR; INTRAVENOUS at 00:27

## 2024-03-24 RX ADMIN — MELATONIN 3 MG: 3 TAB ORAL at 20:45

## 2024-03-24 RX ADMIN — IPRATROPIUM BROMIDE AND ALBUTEROL SULFATE 3 ML: .5; 3 SOLUTION RESPIRATORY (INHALATION) at 06:34

## 2024-03-24 RX ADMIN — SENNOSIDES AND DOCUSATE SODIUM 2 TABLET: 8.6; 5 TABLET ORAL at 20:45

## 2024-03-24 RX ADMIN — AMPICILLIN SODIUM 2 G: 2 INJECTION, POWDER, FOR SOLUTION INTRAMUSCULAR; INTRAVENOUS at 00:27

## 2024-03-24 RX ADMIN — ASPIRIN 81 MG CHEWABLE TABLET 81 MG: 81 TABLET CHEWABLE at 08:01

## 2024-03-24 RX ADMIN — METOPROLOL TARTRATE 12.5 MG: 25 TABLET, FILM COATED ORAL at 20:45

## 2024-03-24 RX ADMIN — POLYETHYLENE GLYCOL 3350 17 G: 17 POWDER, FOR SOLUTION ORAL at 08:01

## 2024-03-24 RX ADMIN — AMPICILLIN SODIUM 2 G: 2 INJECTION, POWDER, FOR SOLUTION INTRAMUSCULAR; INTRAVENOUS at 07:39

## 2024-03-24 RX ADMIN — AMPICILLIN SODIUM 2 G: 2 INJECTION, POWDER, FOR SOLUTION INTRAMUSCULAR; INTRAVENOUS at 15:30

## 2024-03-24 ASSESSMENT — COGNITIVE AND FUNCTIONAL STATUS - GENERAL
DAILY ACTIVITIY SCORE: 19
MOBILITY SCORE: 17
MOVING FROM LYING ON BACK TO SITTING ON SIDE OF FLAT BED WITH BEDRAILS: A LITTLE
DRESSING REGULAR UPPER BODY CLOTHING: A LITTLE
CLIMB 3 TO 5 STEPS WITH RAILING: A LITTLE
MOVING TO AND FROM BED TO CHAIR: A LITTLE
HELP NEEDED FOR BATHING: A LITTLE
STANDING UP FROM CHAIR USING ARMS: A LITTLE
MOVING FROM LYING ON BACK TO SITTING ON SIDE OF FLAT BED WITH BEDRAILS: A LITTLE
TURNING FROM BACK TO SIDE WHILE IN FLAT BAD: A LITTLE
STANDING UP FROM CHAIR USING ARMS: A LITTLE
TOILETING: A LITTLE
MOVING TO AND FROM BED TO CHAIR: A LITTLE
DRESSING REGULAR LOWER BODY CLOTHING: A LITTLE
MOBILITY SCORE: 18
WALKING IN HOSPITAL ROOM: A LITTLE
CLIMB 3 TO 5 STEPS WITH RAILING: A LOT
TURNING FROM BACK TO SIDE WHILE IN FLAT BAD: A LITTLE
WALKING IN HOSPITAL ROOM: A LITTLE
PERSONAL GROOMING: A LITTLE

## 2024-03-24 ASSESSMENT — PAIN - FUNCTIONAL ASSESSMENT
PAIN_FUNCTIONAL_ASSESSMENT: 0-10

## 2024-03-24 ASSESSMENT — PAIN SCALES - GENERAL
PAINLEVEL_OUTOF10: 0 - NO PAIN
PAINLEVEL_OUTOF10: 8
PAINLEVEL_OUTOF10: 5 - MODERATE PAIN
PAINLEVEL_OUTOF10: 0 - NO PAIN
PAINLEVEL_OUTOF10: 0 - NO PAIN
PAINLEVEL_OUTOF10: 5 - MODERATE PAIN
PAINLEVEL_OUTOF10: 5 - MODERATE PAIN
PAINLEVEL_OUTOF10: 3

## 2024-03-24 ASSESSMENT — PAIN DESCRIPTION - DESCRIPTORS: DESCRIPTORS: ACHING

## 2024-03-24 ASSESSMENT — PAIN DESCRIPTION - LOCATION: LOCATION: BACK

## 2024-03-24 ASSESSMENT — PAIN DESCRIPTION - ORIENTATION: ORIENTATION: MID

## 2024-03-24 NOTE — PROGRESS NOTES
CTICU Progress Note  Fannie Watson/[id number]    Admit Date: 3/5/2024  Hospital Length of Stay: 19   ICU Length of Stay: 2d 1h   CT SURGEON: Fara    SUBJECTIVE:   Pt did well overnight. Received 20mg Lasix x2 and voiding appropriately. Pt remains HDS, NSR, off pressors since 15:00 yesterday. Pain/anxiety well-controled. CT removed yesterday. Tolerating PO diet w/o N/V. Maintaining 95%+ sats on 2L NC and seen OOB to chair this AM. Restarting home COPD meds. NAEON. Pt stable for transfer to [facility].     MEDICATIONS  Infusions:  lactated Ringer's, Last Rate: 5 mL/hr (03/24/24 1000)      Scheduled:  acetaminophen, 650 mg, q6h   Or  acetaminophen, 650 mg, q6h  ampicillin, 2 g, q4h  aspirin, 81 mg, Daily  atorvastatin, 80 mg, Nightly  cefTRIAXone, 2 g, q12h  famotidine, 20 mg, Daily  tiotropium, 2 puff, Daily   And  fluticasone furoate-vilanteroL, 1 puff, Daily  heparin (porcine), 5,000 Units, q8h  insulin lispro, 0-15 Units, q4h  ipratropium-albuteroL, 3 mL, TID  lidocaine, 1 patch, q24h  melatonin, 3 mg, Nightly  metoprolol tartrate, 12.5 mg, BID  oxygen, , Continuous - Inhalation  polyethylene glycol, 17 g, BID  pregabalin, 75 mg, BID  sennosides-docusate sodium, 2 tablet, BID  tiotropium, 2 puff, Daily  traZODone, 50 mg, Nightly  vancomycin, 1,750 mg, q24h      PRN:  albuterol, 1 puff, q6h PRN  benzocaine-menthol, 1 lozenge, q2h PRN  benzonatate, 100 mg, TID PRN  calcium gluconate, 1 g, q6h PRN  calcium gluconate, 2 g, q6h PRN  dextrose, 25 g, q15 min PRN   Or  glucagon, 1 mg, q15 min PRN  hydrALAZINE, 5 mg, q4h PRN  HYDROmorphone, 0.2 mg, q3h PRN  magnesium sulfate, 2 g, q6h PRN  magnesium sulfate, 4 g, q6h PRN  ondansetron, 4 mg, q6h PRN  oxyCODONE, 10 mg, q6h PRN  oxyCODONE, 5 mg, q4h PRN  potassium chloride CR, 20 mEq, q6h PRN   Or  potassium chloride, 20 mEq, q6h PRN  potassium chloride CR, 40 mEq, q6h PRN   Or  potassium chloride, 40 mEq, q6h PRN  potassium chloride, 20 mEq, q6h PRN  potassium chloride, 40 mEq,  "q6h PRN  sodium chloride 0.9%, 1,000 mL/hr, Once PRN  vancomycin, , Daily PRN        PHYSICAL EXAM:   Visit Vitals  /70   Pulse 80   Temp 36.1 °C (97 °F) (Temporal)   Resp 23   Ht 1.57 m (5' 1.81\")   Wt 66.1 kg (145 lb 11.6 oz)   LMP  (LMP Unknown)   SpO2 93%   BMI 26.82 kg/m²   OB Status Hysterectomy   Smoking Status Former   BSA 1.7 m²     Wt Readings from Last 5 Encounters:   03/22/24 66.1 kg (145 lb 11.6 oz)     INTAKE/OUTPUT:  I/O last 3 completed shifts:  In: 3000.6 (45.4 mL/kg) [I.V.:950.6 (14.4 mL/kg); Blood:500; IV Piggyback:1550]  Out: 2275 (34.4 mL/kg) [Urine:1925 (0.8 mL/kg/hr); Chest Tube:350]  Weight: 66.1 kg      LDA:  Arterial Line 03/22/24 Left Brachial (Active)   Placement Date/Time: 03/22/24 (c) 0730   Size: 20 G  Orientation: Left  Location: Brachial  Securement Method: Transparent dressing  Patient Tolerance: Tolerated well   Number of days: 2       Pacer Wires (Active)   Placement Date: 03/22/24   Placed by: Sally Leger  Type of Pacing Wires: Ventricular   Number of days: 2     Physical Exam:   Constitutional: Elderly  female patient seen sitting up in chair, in no acute distress  Neurological: A+Ox3, no focal deficits, CAM negative  Eyes: Anicteric sclera, clear  Respiratory/Thorax: Diminished, clear breath sounds bilaterally, symmetric chest expansion. V pacing wires present  Cardiovascular: NSR, no murmurs appreciated  Gastrointestinal: Abdomen soft, nontender, nondistended, bowel sounds present  Genitourinary: Voids clear yellow urine via servin  Extremities: Palpable radial pulses, 1+ pulses to bilateral PT/DP, no pitting edema  Skin: Warm and dry throughout, Rt thora incision stable - dressing clean, dry, and intact  Psych: Calm and cooperative but anxious mood    Images/Labs/Notes reviewed.     Daily Risk Screen:  Line unnecessary, will be removed today  critically ill patient who need accurate urinary output measurements  Servin to be removed today    Assessment/Plan   Fannie " Walter is a 78 year old F with PMH significant for HTN, HLD, COPD, spondylolisthesis of lumbar lesion s/p L4-L5 decompression laminectomy and fusion 03/01/2024 who initially presented to OSH on 03/05 with altered mental status found to have trace R sylvian subarachnoid hemorrhage who was also found to have 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 Sunshine on 03/22/2024     Plan:  NEURO:  Presented with R Sylvian SAH. Per chart review patient has normal neurological exam. Has PMH of low back pain on lyrica, MDD, JESSICA. Acute post operative pain. Pain 3/10 this AM. On Oxy 5/10. Aox4, CAM(-), no focal deficits, anxious mood.   - Serial neuro and pain assessments   - Scheduled Tylenol   - PRN oxycodone 5/10  - Lidocaine patches   - PT Consult, OOB to chair as tolerated, chair position if not tolerated   - CAM ICU score qshift  - Sleep/wake cycle hygiene  - Restart home lyrica 75 BID  - Low threshold for CT head imaging if change in neuro exam given hx of SAH     CV:  Patient has a history of HTN, HLD. Had 2.5 cm Mitral valve vegetation with mod/severe mitral regurgitation. Is now status post R mini thoracotomy and Mitral valve replacement. Pre/Post EF: 55-60% pre and post Arrived to CTICU on no pressors. V epicardial wires set VVI @ 50. Off pressors since 15:00 yesterday.   - Maintain goal MAP 70-90  - Volume resuscitate as clinically indicated  - Maintain epicardial wires set VVI  @ 50  - Start Metoprolol 12.5 BID  - Stop ASA/Statin -> no indication      PULM: Hx of COPD on home albuterol, not on home O2.  Extubated and on NC. CT removed. Home COPD meds restarted.  - AM CXR QD  - Wean FiO2 maintaining SpO2 >92%.   - IS q1h and OOB to chair when extubated  - Scheduled DuoNebs for Hx of COPD  - Start Ipratropium, Albuterol, Symbicort, Tessalon Perles     GI:  Hx of GERD on home famotidine. Passed swallow, cardiac diet ordered.   -  Discontinue pantoprazole  - Start home famotidine   - Colace/senna BID and miralax BID     :  CSA-LINWOOD Risk Score medium.  No history of renal disease, baseline creatinine 0.4-0.5. Creatinine stable post-op. Munoz removed and pt making adequate UOP. Will allow pt to continue to autodiuresis.   - Goal UOP 0.5ml/kg/hr  - RFP as clinically indicated  - Fluid resuscitation as needed  - Replete electrolytes per CTICU protocol     ENDO:  No PMH  A1c: 5.5. BG WNL, no insulin required.   - Maintain BG <180, insulin per CTICU protocol     HEME:  Received 2 units pRBCs and 2 units FFP, 500 cell saver while intra-op due to Hgb down to 6s. Was previously on heparin drip pre-op, will hold AC POD #0. Acute blood loss anemia and thrombocytopenia. H/H stable today.   - Monitor drain output volume and characteristics  - CBC, coags, and fibrinogen post op and as clinically indicated  - Continue SQH  - SCDs for DVT prophylaxis.  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 Started on ceftriaxone and ampicillin.  - Trend temp q4h  - Monitor surgical sites for s/s infection  - On ceftriaxone and ampicillin (on day 14) prior to procedure for endocarditis, will continue post op  - Started on vancomycin for MRSA prophylaxis pending MRSA swab, will continue for 48hrs   - ID previously following, will reengage to establish timeline for Abx  - Will likely need PICC line for long term Abx treatment      Skin:  No active skin issues.  - Has surgical wound overlying lumbar spine   - preventative Mepilex dressings in place on sacrum and heels  - change preventative Mepilex weekly or more frequently as indicated (when moist/soiled)   - every shift skin assessment per nursing and weekly ICU skin rounds  - moisture barrier to be applied with felton care  - active skin problems addressed with nursing on daily  rounds     Proph:  SCDs  SQH     G:  Line  Right IJ MAC w Min[place]ac placed 03/22/2024 -> remove today  Left brachial a-line placed 03/22/2024 -> remove today  Munoz placed 03/22/2024 -> remove today     F: Family: will update at bedside postoperatively.    Restraints: Not indicated    Code status: Full Code    A,B,C,D,E,F,G: reviewed     Dispo: Transfer to [address]  CTICU TEAM PHONE [phone]

## 2024-03-25 ENCOUNTER — APPOINTMENT (OUTPATIENT)
Dept: RADIOLOGY | Facility: HOSPITAL | Age: 79
DRG: 981 | End: 2024-03-25
Payer: MEDICARE

## 2024-03-25 ENCOUNTER — HOME HEALTH ADMISSION (OUTPATIENT)
Dept: HOME HEALTH SERVICES | Facility: HOME HEALTH | Age: 79
End: 2024-03-25
Payer: MEDICARE

## 2024-03-25 PROBLEM — I21.9 MI (MYOCARDIAL INFARCTION) (MULTI): Status: RESOLVED | Noted: 2024-03-22 | Resolved: 2024-03-25

## 2024-03-25 PROBLEM — I60.9 SAH (SUBARACHNOID HEMORRHAGE) (MULTI): Status: RESOLVED | Noted: 2024-03-05 | Resolved: 2024-03-25

## 2024-03-25 LAB
ALBUMIN SERPL BCP-MCNC: 3.3 G/DL (ref 3.4–5)
ANION GAP SERPL CALC-SCNC: 12 MMOL/L (ref 10–20)
BUN SERPL-MCNC: 14 MG/DL (ref 6–23)
CALCIUM SERPL-MCNC: 8.6 MG/DL (ref 8.6–10.6)
CHLORIDE SERPL-SCNC: 99 MMOL/L (ref 98–107)
CO2 SERPL-SCNC: 29 MMOL/L (ref 21–32)
CREAT SERPL-MCNC: 0.48 MG/DL (ref 0.5–1.05)
EGFRCR SERPLBLD CKD-EPI 2021: >90 ML/MIN/1.73M*2
ERYTHROCYTE [DISTWIDTH] IN BLOOD BY AUTOMATED COUNT: 16.9 % (ref 11.5–14.5)
GLUCOSE BLD MANUAL STRIP-MCNC: 101 MG/DL (ref 74–99)
GLUCOSE BLD MANUAL STRIP-MCNC: 88 MG/DL (ref 74–99)
GLUCOSE SERPL-MCNC: 89 MG/DL (ref 74–99)
HCT VFR BLD AUTO: 26.5 % (ref 36–46)
HGB BLD-MCNC: 8.1 G/DL (ref 12–16)
MAGNESIUM SERPL-MCNC: 1.97 MG/DL (ref 1.6–2.4)
MCH RBC QN AUTO: 26.4 PG (ref 26–34)
MCHC RBC AUTO-ENTMCNC: 30.6 G/DL (ref 32–36)
MCV RBC AUTO: 86 FL (ref 80–100)
NRBC BLD-RTO: 0 /100 WBCS (ref 0–0)
PHOSPHATE SERPL-MCNC: 3.7 MG/DL (ref 2.5–4.9)
PLATELET # BLD AUTO: 187 X10*3/UL (ref 150–450)
POTASSIUM SERPL-SCNC: 4.3 MMOL/L (ref 3.5–5.3)
RBC # BLD AUTO: 3.07 X10*6/UL (ref 4–5.2)
SODIUM SERPL-SCNC: 136 MMOL/L (ref 136–145)
WBC # BLD AUTO: 9.3 X10*3/UL (ref 4.4–11.3)

## 2024-03-25 PROCEDURE — 94640 AIRWAY INHALATION TREATMENT: CPT

## 2024-03-25 PROCEDURE — 2500000004 HC RX 250 GENERAL PHARMACY W/ HCPCS (ALT 636 FOR OP/ED)

## 2024-03-25 PROCEDURE — 2500000002 HC RX 250 W HCPCS SELF ADMINISTERED DRUGS (ALT 637 FOR MEDICARE OP, ALT 636 FOR OP/ED): Mod: MUE | Performed by: NURSE PRACTITIONER

## 2024-03-25 PROCEDURE — 2780000003 HC OR 278 NO HCPCS

## 2024-03-25 PROCEDURE — 2500000001 HC RX 250 WO HCPCS SELF ADMINISTERED DRUGS (ALT 637 FOR MEDICARE OP): Performed by: STUDENT IN AN ORGANIZED HEALTH CARE EDUCATION/TRAINING PROGRAM

## 2024-03-25 PROCEDURE — 1200000002 HC GENERAL ROOM WITH TELEMETRY DAILY

## 2024-03-25 PROCEDURE — 36573 INSJ PICC RS&I 5 YR+: CPT

## 2024-03-25 PROCEDURE — A4217 STERILE WATER/SALINE, 500 ML: HCPCS | Performed by: NURSE PRACTITIONER

## 2024-03-25 PROCEDURE — 2500000001 HC RX 250 WO HCPCS SELF ADMINISTERED DRUGS (ALT 637 FOR MEDICARE OP): Performed by: NURSE PRACTITIONER

## 2024-03-25 PROCEDURE — 2500000004 HC RX 250 GENERAL PHARMACY W/ HCPCS (ALT 636 FOR OP/ED): Performed by: NURSE PRACTITIONER

## 2024-03-25 PROCEDURE — 83735 ASSAY OF MAGNESIUM: CPT | Performed by: NURSE PRACTITIONER

## 2024-03-25 PROCEDURE — 99232 SBSQ HOSP IP/OBS MODERATE 35: CPT | Performed by: INTERNAL MEDICINE

## 2024-03-25 PROCEDURE — C1751 CATH, INF, PER/CENT/MIDLINE: HCPCS

## 2024-03-25 PROCEDURE — 2500000001 HC RX 250 WO HCPCS SELF ADMINISTERED DRUGS (ALT 637 FOR MEDICARE OP)

## 2024-03-25 PROCEDURE — 2500000002 HC RX 250 W HCPCS SELF ADMINISTERED DRUGS (ALT 637 FOR MEDICARE OP, ALT 636 FOR OP/ED): Performed by: NURSE PRACTITIONER

## 2024-03-25 PROCEDURE — 71046 X-RAY EXAM CHEST 2 VIEWS: CPT | Performed by: RADIOLOGY

## 2024-03-25 PROCEDURE — 71046 X-RAY EXAM CHEST 2 VIEWS: CPT

## 2024-03-25 PROCEDURE — 2500000002 HC RX 250 W HCPCS SELF ADMINISTERED DRUGS (ALT 637 FOR MEDICARE OP, ALT 636 FOR OP/ED): Mod: MUE

## 2024-03-25 PROCEDURE — 2500000002 HC RX 250 W HCPCS SELF ADMINISTERED DRUGS (ALT 637 FOR MEDICARE OP, ALT 636 FOR OP/ED)

## 2024-03-25 PROCEDURE — 36569 INSJ PICC 5 YR+ W/O IMAGING: CPT

## 2024-03-25 PROCEDURE — 82947 ASSAY GLUCOSE BLOOD QUANT: CPT

## 2024-03-25 PROCEDURE — 80069 RENAL FUNCTION PANEL: CPT | Performed by: NURSE PRACTITIONER

## 2024-03-25 PROCEDURE — 85027 COMPLETE CBC AUTOMATED: CPT | Performed by: NURSE PRACTITIONER

## 2024-03-25 PROCEDURE — 99232 SBSQ HOSP IP/OBS MODERATE 35: CPT

## 2024-03-25 PROCEDURE — 2500000005 HC RX 250 GENERAL PHARMACY W/O HCPCS: Performed by: NURSE PRACTITIONER

## 2024-03-25 RX ORDER — HYDROXYCHLOROQUINE SULFATE 200 MG/1
200 TABLET, FILM COATED ORAL DAILY
Status: DISCONTINUED | OUTPATIENT
Start: 2024-03-25 | End: 2024-04-01 | Stop reason: HOSPADM

## 2024-03-25 RX ORDER — METOPROLOL TARTRATE 25 MG/1
25 TABLET, FILM COATED ORAL 2 TIMES DAILY
Status: DISCONTINUED | OUTPATIENT
Start: 2024-03-25 | End: 2024-03-28

## 2024-03-25 RX ORDER — FUROSEMIDE 10 MG/ML
40 INJECTION INTRAMUSCULAR; INTRAVENOUS ONCE
Status: COMPLETED | OUTPATIENT
Start: 2024-03-25 | End: 2024-03-25

## 2024-03-25 RX ORDER — CEFTRIAXONE 2 G/50ML
2 INJECTION, SOLUTION INTRAVENOUS EVERY 12 HOURS
Qty: 4200 ML | Refills: 0 | Status: SHIPPED | OUTPATIENT
Start: 2024-03-25 | End: 2024-05-06

## 2024-03-25 RX ORDER — DIPHENHYDRAMINE HCL 25 MG
25 CAPSULE ORAL ONCE
Status: COMPLETED | OUTPATIENT
Start: 2024-03-25 | End: 2024-03-25

## 2024-03-25 RX ORDER — FAMOTIDINE 20 MG/1
40 TABLET, FILM COATED ORAL DAILY
Status: DISCONTINUED | OUTPATIENT
Start: 2024-03-26 | End: 2024-04-01 | Stop reason: HOSPADM

## 2024-03-25 RX ORDER — TRAMADOL HYDROCHLORIDE 50 MG/1
50 TABLET ORAL EVERY 6 HOURS PRN
Status: DISCONTINUED | OUTPATIENT
Start: 2024-03-25 | End: 2024-04-01 | Stop reason: HOSPADM

## 2024-03-25 RX ORDER — POTASSIUM CHLORIDE 20 MEQ/1
40 TABLET, EXTENDED RELEASE ORAL ONCE
Status: COMPLETED | OUTPATIENT
Start: 2024-03-25 | End: 2024-03-25

## 2024-03-25 RX ORDER — LANOLIN ALCOHOL/MO/W.PET/CERES
400 CREAM (GRAM) TOPICAL ONCE
Status: COMPLETED | OUTPATIENT
Start: 2024-03-25 | End: 2024-03-25

## 2024-03-25 RX ORDER — LIDOCAINE HYDROCHLORIDE 10 MG/ML
5 INJECTION INFILTRATION; PERINEURAL ONCE
Status: DISCONTINUED | OUTPATIENT
Start: 2024-03-25 | End: 2024-04-01

## 2024-03-25 RX ORDER — TRAMADOL HYDROCHLORIDE 50 MG/1
25 TABLET ORAL EVERY 6 HOURS PRN
Status: DISCONTINUED | OUTPATIENT
Start: 2024-03-25 | End: 2024-04-01 | Stop reason: HOSPADM

## 2024-03-25 RX ORDER — ASPIRIN 81 MG/1
81 TABLET ORAL DAILY
Status: DISCONTINUED | OUTPATIENT
Start: 2024-03-25 | End: 2024-04-01 | Stop reason: HOSPADM

## 2024-03-25 RX ADMIN — TRAMADOL HYDROCHLORIDE 50 MG: 50 TABLET, COATED ORAL at 22:23

## 2024-03-25 RX ADMIN — AMPICILLIN SODIUM 2 G: 2 INJECTION, POWDER, FOR SOLUTION INTRAMUSCULAR; INTRAVENOUS at 04:02

## 2024-03-25 RX ADMIN — FUROSEMIDE 40 MG: 10 INJECTION, SOLUTION INTRAVENOUS at 09:03

## 2024-03-25 RX ADMIN — ATORVASTATIN CALCIUM 80 MG: 80 TABLET, FILM COATED ORAL at 20:33

## 2024-03-25 RX ADMIN — IPRATROPIUM BROMIDE AND ALBUTEROL SULFATE 3 ML: .5; 3 SOLUTION RESPIRATORY (INHALATION) at 15:07

## 2024-03-25 RX ADMIN — MELATONIN 3 MG: 3 TAB ORAL at 20:20

## 2024-03-25 RX ADMIN — SENNOSIDES AND DOCUSATE SODIUM 2 TABLET: 8.6; 5 TABLET ORAL at 08:19

## 2024-03-25 RX ADMIN — ACETAMINOPHEN 650 MG: 325 TABLET ORAL at 07:18

## 2024-03-25 RX ADMIN — Medication 400 MG: at 10:18

## 2024-03-25 RX ADMIN — DIPHENHYDRAMINE HYDROCHLORIDE 25 MG: 25 CAPSULE ORAL at 20:39

## 2024-03-25 RX ADMIN — VANCOMYCIN HYDROCHLORIDE 1750 MG: 5 INJECTION, POWDER, LYOPHILIZED, FOR SOLUTION INTRAVENOUS at 09:04

## 2024-03-25 RX ADMIN — ASPIRIN 81 MG: 81 TABLET, COATED ORAL at 15:13

## 2024-03-25 RX ADMIN — AMPICILLIN SODIUM 2 G: 2 INJECTION, POWDER, FOR SOLUTION INTRAMUSCULAR; INTRAVENOUS at 20:16

## 2024-03-25 RX ADMIN — POTASSIUM CHLORIDE 40 MEQ: 1500 TABLET, EXTENDED RELEASE ORAL at 10:18

## 2024-03-25 RX ADMIN — AMPICILLIN SODIUM 2 G: 2 INJECTION, POWDER, FOR SOLUTION INTRAMUSCULAR; INTRAVENOUS at 07:18

## 2024-03-25 RX ADMIN — TRAZODONE HYDROCHLORIDE 50 MG: 50 TABLET ORAL at 20:33

## 2024-03-25 RX ADMIN — SENNOSIDES AND DOCUSATE SODIUM 2 TABLET: 8.6; 5 TABLET ORAL at 20:21

## 2024-03-25 RX ADMIN — METOPROLOL TARTRATE 12.5 MG: 25 TABLET, FILM COATED ORAL at 08:19

## 2024-03-25 RX ADMIN — TIOTROPIUM BROMIDE INHALATION SPRAY 2 PUFF: 3.12 SPRAY, METERED RESPIRATORY (INHALATION) at 08:26

## 2024-03-25 RX ADMIN — PREGABALIN 75 MG: 75 CAPSULE ORAL at 08:19

## 2024-03-25 RX ADMIN — PREGABALIN 75 MG: 75 CAPSULE ORAL at 20:16

## 2024-03-25 RX ADMIN — ACETAMINOPHEN 650 MG: 325 TABLET ORAL at 20:33

## 2024-03-25 RX ADMIN — Medication: at 08:00

## 2024-03-25 RX ADMIN — CEFTRIAXONE SODIUM 2 G: 2 INJECTION, SOLUTION INTRAVENOUS at 08:21

## 2024-03-25 RX ADMIN — FLUTICASONE FUROATE AND VILANTEROL TRIFENATATE 1 PUFF: 100; 25 POWDER RESPIRATORY (INHALATION) at 08:27

## 2024-03-25 RX ADMIN — HEPARIN SODIUM 5000 UNITS: 5000 INJECTION INTRAVENOUS; SUBCUTANEOUS at 10:18

## 2024-03-25 RX ADMIN — AMPICILLIN SODIUM 2 G: 2 INJECTION, POWDER, FOR SOLUTION INTRAMUSCULAR; INTRAVENOUS at 00:57

## 2024-03-25 RX ADMIN — DIPHENHYDRAMINE HYDROCHLORIDE 25 MG: 25 CAPSULE ORAL at 10:18

## 2024-03-25 RX ADMIN — Medication: at 20:00

## 2024-03-25 RX ADMIN — ACETAMINOPHEN 650 MG: 325 TABLET ORAL at 12:01

## 2024-03-25 RX ADMIN — TRAMADOL HYDROCHLORIDE 50 MG: 50 TABLET, COATED ORAL at 15:10

## 2024-03-25 RX ADMIN — HEPARIN SODIUM 5000 UNITS: 5000 INJECTION INTRAVENOUS; SUBCUTANEOUS at 18:45

## 2024-03-25 RX ADMIN — AMPICILLIN SODIUM 2 G: 2 INJECTION, POWDER, FOR SOLUTION INTRAMUSCULAR; INTRAVENOUS at 12:02

## 2024-03-25 RX ADMIN — HEPARIN SODIUM 5000 UNITS: 5000 INJECTION INTRAVENOUS; SUBCUTANEOUS at 02:39

## 2024-03-25 RX ADMIN — CEFTRIAXONE SODIUM 2 G: 2 INJECTION, SOLUTION INTRAVENOUS at 21:21

## 2024-03-25 RX ADMIN — METOPROLOL TARTRATE 25 MG: 25 TABLET, FILM COATED ORAL at 20:33

## 2024-03-25 RX ADMIN — AMPICILLIN SODIUM 2 G: 2 INJECTION, POWDER, FOR SOLUTION INTRAMUSCULAR; INTRAVENOUS at 15:13

## 2024-03-25 RX ADMIN — ACETAMINOPHEN 650 MG: 325 TABLET ORAL at 00:58

## 2024-03-25 RX ADMIN — FAMOTIDINE 20 MG: 20 TABLET ORAL at 08:19

## 2024-03-25 ASSESSMENT — COGNITIVE AND FUNCTIONAL STATUS - GENERAL
DRESSING REGULAR UPPER BODY CLOTHING: A LITTLE
DRESSING REGULAR LOWER BODY CLOTHING: A LITTLE
HELP NEEDED FOR BATHING: A LITTLE
DAILY ACTIVITIY SCORE: 21
CLIMB 3 TO 5 STEPS WITH RAILING: A LITTLE
MOBILITY SCORE: 23

## 2024-03-25 ASSESSMENT — ENCOUNTER SYMPTOMS
CARDIOVASCULAR NEGATIVE: 1
ENDOCRINE NEGATIVE: 1
CONSTITUTIONAL NEGATIVE: 1
RESPIRATORY NEGATIVE: 1
PSYCHIATRIC NEGATIVE: 1
MUSCULOSKELETAL NEGATIVE: 1
NEUROLOGICAL NEGATIVE: 1
ALLERGIC/IMMUNOLOGIC NEGATIVE: 1
EYES NEGATIVE: 1
HEMATOLOGIC/LYMPHATIC NEGATIVE: 1
GASTROINTESTINAL NEGATIVE: 1

## 2024-03-25 ASSESSMENT — PAIN SCALES - GENERAL
PAINLEVEL_OUTOF10: 7
PAINLEVEL_OUTOF10: 5 - MODERATE PAIN
PAINLEVEL_OUTOF10: 5 - MODERATE PAIN

## 2024-03-25 ASSESSMENT — PAIN - FUNCTIONAL ASSESSMENT
PAIN_FUNCTIONAL_ASSESSMENT: 0-10
PAIN_FUNCTIONAL_ASSESSMENT: 0-10

## 2024-03-25 ASSESSMENT — PAIN DESCRIPTION - LOCATION: LOCATION: BACK

## 2024-03-25 NOTE — PROCEDURES
Pre-Procedure Checklist:  Emergent Line Insertion: No  Type of Line to be Placed: PICC  Consent Obtained: Yes  Emergency Medication Necessary: No  Patient Identified with 2 Independent Identifiers: Yes  Review of Allergies, Anticoagulation, Relevant Labs, ECG/Telemetry: Yes  Risks/Benefits/Alternatives Discussed with Patient/POA/Legal Representative: Yes  Stop Sign on Door: Yes  Time Out Performed: Yes  Catheter Exchange: No    Positioning Checklist:  All People, Including Patient, in the Room with Cap and Mask: Yes  Fluoroscopy Used to Identify Vessel and Guide Insertion: No   Sterile Cover Used: Yes  Full Barrier Precautions Followed (Mask, Cap, Gown, Gloves): Yes  Hands Washed: Yes  Monitors Attached with Sound Alarms On: No  Full Body Sterile Drape (Head-to-Toe) Used to Cover Patient: Yes  Trendelenburg Position (For IJ and Subclavian): No  CHG Skin Prep Used and Allowed to Air Dry to Skin Procedure: Yes    Procedure Checklist:  Blood Aspirated From All Lumens, All Ports Subsequently Flushed: Yes  Catheter Caps Placed on All Lumens; Lumens Clamped: Yes  Maintain Guidewire Control Throughout, Ensuring Guidewire Removal: Yes  Maintain Sterile Field Throughout Insertion: Yes  Catheter Secured: Yes  Confirmatory Test of Venous Placement: Non-Pulsatile Blood    Post Procedure Checklist:  Date and Time Written on Dressing: Yes  Sharp and Wire Count and Safe Disposal of all Sharps/Wires: Yes  Sterile Dressing Applied Per Protocol: Yes  X-ray Ordered or ECG Image: Yes    PICC Insertion Details:  Size (Fr): 4  Lumen Type: SL  Catheter to Vein Ratio Less Than 50%: Yes  Total Length (cm): 34  External Length (cm): 0  Orientation: Right basilic vein   Site Prep: Chlorohexidine; Usual sterile procedure followed  Local Anesthetic: Injectable/Subcutaneous  Indication: medication administration   Insertion Team Members in the Room: Bailey Ayala LPN  Initial Extremity Circumference (cm): 28  Insertion Attempts:  1  Patient Tolerance: Tolerated Well, Age Appropriate  Comfort Measures: Subcutaneous anesthetic; Verbal  Procedure Location: Bedside  Safety Measures: Patient specific safety measures addressed with RN  Estimated Blood Loss (mL): 1  Vessel Fully Compressible Proximally and Distally to Insertion Site: Yes  Brisk Blood Return Obtained and Line Draws Easily: Yes  Tip Location: PICC tip confirmed using 3cg at SVC  Line Confirmation: ECG  Lot #:[id number]  : Bard  PICC Line Exp Date: 03/31/2025  Securement: Stat Lock  Post Procedure Checklist: Handoff with RN; Obtain all new IV tubing prior to use; Bed at lowest level and wheels locked; Line discharge information at bedside.  Additional Details: Line was inserted using Modified Seldinger's Technique.   Placed by: Amber Khan RN

## 2024-03-25 NOTE — PROGRESS NOTES
Vancomycin Dosing by Pharmacy- Cessation of Therapy    Consult to pharmacy for vancomycin dosing has been discontinued by the prescriber, pharmacy will sign off at this time.    Please call pharmacy if there are further questions or re-enter a consult if vancomycin is resumed.     Ibrahima Tamayo, AnshulD

## 2024-03-25 NOTE — PROGRESS NOTES
CARDIAC SURGERY DAILY PROGRESS NOTE    Fannie Watson is a 78 y.o. female, with a PMH significant for HTN, HLD, COPD, spondylolisthesis of lumbar lesion s/p L4-L5 decompression laminectomy and fusion 03/01/2024 who presented to [facility] to Neuro ICU on 3/5/2024 for subarachnoid hemorrhage management.      Patient initially presented to [facility] (OSH) with altered mental status, for which a CT was done and showed a trace R sylvian subarachnoid hemorrhage and transferred to [facility] Neuro ICU for further management. While in NSU at [facility], patient went into paroxysmal A fib with RVR. TTE done on 03/07 showed an anterior mobile mass on mitral valve leaflet with moderate mitral valve regurgitation. Follow up CROW showed a 2.5 cm mobile vegetation on the mitral valve with moderate mitral regurgitation. Blood cultures drawn on 03/07 were positive for E. Faecium, for which infectious disease and cardiac surgery was consulted.      She had an MRI of her spine on 03/14 that showed an enlarging fluid collection that was drained by IR due to concern for abscess as source of infection vs possible discitis/osteomyelitis in the spine post-operatively.     Of note, per chart review patient has had multiple presentations since December 2023 for various symptoms including chest pain, chest tightness, shortness of breath and worsening back pain. She also had some dysphagia and decreased appetite for upwards of a month, as well as a cough that . 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 She had a L4-L5 TLIF on 03/01 at OSH.    3/22/2024 OPERATION/PROCEDURE: Dr. Deangelo Chaudhari   1. Minimally invasive mitral valve replacement (31mm Epic bioprosthesis)  2. Exploration of the right femoral vein and artery for cannulation for cardiopulmonary bypass.     CTICU: uneventful   Transferred to T3 on 3/24  ===================================  Interval  "History:   No acute events overnight; new out of ICU.   Right Upper Arm PICC single lumen placed.     SUBJECTIVE:  Patient complains of arthritis pain in hands, no complaints otherwise.     Objective   /62 (BP Location: Left arm, Patient Position: Sitting)   Pulse 77   Temp 36.7 °C (98.1 °F) (Temporal)   Resp 18   Ht 1.57 m (5' 1.81\")   Wt 73.9 kg (162 lb 14.4 oz)   LMP  (LMP Unknown)   SpO2 94%   BMI 29.98 kg/m²   Pain Score: 5 - Moderate pain   3 Day Weight Change: 2.597 kg (5 lb 11.6 oz) per day    Intake and Output    Intake/Output Summary (Last 24 hours) at 3/25/2024 1321  Last data filed at 3/25/2024 1026  Gross per 24 hour   Intake 610 ml   Output --   Net 610 ml     Physical Exam  Vitals and nursing note reviewed.   Constitutional:       General: She is not in acute distress.     Appearance: Normal appearance. She is obese. She is not ill-appearing.      Comments: Patient alert and awake, sitting up in chair - in no acute distress.    HENT:      Head: Atraumatic.      Nose: Nose normal.      Mouth/Throat:      Pharynx: Oropharynx is clear.   Eyes:      Conjunctiva/sclera: Conjunctivae normal.   Neck:      Comments: Trachea Midline    Cardiovascular:      Rate and Rhythm: Normal rate and regular rhythm.      Pulses: Normal pulses.      Heart sounds: Normal heart sounds. No murmur heard.     No gallop.      Comments: Tele: SR 1st degree AVB 70's to 80's   + equal and even pulses in all extremities   A/V epicardial wires - capped   Pulmonary:      Effort: Pulmonary effort is normal. No respiratory distress.      Breath sounds: No wheezing.      Comments: Equal and even chest expansion; thorax symmetric   Diminished breath sounds in bilateral bases   Good inspiratory effort on RA.     Abdominal:      General: Bowel sounds are normal. There is no distension.      Palpations: Abdomen is soft.      Tenderness: There is no abdominal tenderness.      Comments: Awaiting postop BM    Genitourinary:     " Comments: Voids independently   Musculoskeletal:      Right forearm: Edema (+1) present.      Left forearm: Edema (+1) present.      Cervical back: Neck supple.      Right lower le+ Edema present.      Left lower le+ Edema present.      Comments: ART; 4/5 strength in all extremities   Ambulates independently with walker     Skin:     General: Skin is warm and dry.      Capillary Refill: Capillary refill takes less than 2 seconds.      Coloration: Skin is not jaundiced or pale.      Findings: Bruising (abdomen; thoracotomy site) present.      Comments: Right thoracotomy site: well approximated, no s/s of infection or bleeding - SEEMA +ecchymosis around surgical incision     NITIN single lumen PICC placed 3/25.    Neurological:      General: No focal deficit present.      Mental Status: She is alert and oriented to person, place, and time.   Psychiatric:         Mood and Affect: Mood normal.         Behavior: Behavior normal.       Medications  Scheduled medications  acetaminophen, 650 mg, oral, q6h  ampicillin, 2 g, intravenous, q4h  atorvastatin, 80 mg, oral, Nightly  cefTRIAXone, 2 g, intravenous, q12h  famotidine, 20 mg, oral, Daily  tiotropium, 2 puff, inhalation, Daily   And  fluticasone furoate-vilanteroL, 1 puff, inhalation, Daily  heparin (porcine), 5,000 Units, subcutaneous, q8h  ipratropium-albuteroL, 3 mL, nebulization, TID  lidocaine, 5 mL, infiltration, Once  melatonin, 3 mg, oral, Nightly  metoprolol tartrate, 25 mg, oral, BID  oxygen, , inhalation, Continuous - Inhalation  polyethylene glycol, 17 g, oral, BID  pregabalin, 75 mg, oral, BID  sennosides-docusate sodium, 2 tablet, oral, BID  traZODone, 50 mg, oral, Nightly    Continuous medications   PRN medications  PRN medications: albuterol, alteplase, benzocaine-menthol, benzonatate, dextrose **OR** [DISCONTINUED] glucagon, ondansetron, oxyCODONE, oxyCODONE    LABS:  CMP:  Results from last 7 days   Lab Units 24  0501 24  1203  03/24/24  0611 03/23/24  2316 03/23/24  2115 03/23/24  1309 03/22/24  2349 03/22/24  1251 03/21/24  0728 03/20/24  0739 03/19/24  0310   SODIUM mmol/L 136 133* 132* 130* 132* 132* 136 140 136 136 136   POTASSIUM mmol/L 4.3 4.5 4.4 3.9 3.9 3.6 4.1 4.0 4.2 3.8 4.2   CHLORIDE mmol/L 99 97* 97* 95* 94* 95* 100 100 98 98 96*   CO2 mmol/L 29 24 25 25 24 25 27 29 28 29 28   ANION GAP mmol/L 12 17 14 14 18 16 13 15 14 13 16   BUN mg/dL 14 13 13 12 12 10 7 5* 6 6 7   CREATININE mg/dL 0.48* 0.54 0.53 0.53 0.58 0.56 0.40* 0.47* 0.49* 0.48* 0.43*   EGFR mL/min/1.73m*2 >90 >90 >90 >90 >90 >90 >90 >90 >90 >90 >90   MAGNESIUM mg/dL 1.97 1.92  --  2.27  --  1.88 2.12 3.35* 2.16  --  2.07   ALBUMIN g/dL 3.3* 3.8 3.6 3.7 4.0 4.2 3.8 3.2* 3.3* 3.2* 3.1*     CBC:  Results from last 7 days   Lab Units 03/25/24  0501 03/24/24  1203 03/24/24  0611 03/23/24  2316 03/23/24  1309 03/22/24  2349 03/22/24  1251 03/21/24  0728   WBC AUTO x10*3/uL 9.3 17.5* 16.0* 18.8* 19.0* 13.2* 18.2* 9.5   HEMOGLOBIN g/dL 8.1* 8.9* 8.2* 8.2* 8.6* 9.3* 10.2* 10.0*   HEMATOCRIT % 26.5* 27.9* 25.6* 24.9* 26.5* 29.9* 31.0* 31.8*   PLATELETS AUTO x10*3/uL 187 202 172 170 191 200 199 444   MCV fL 86 85 86 83 84 84 82 86     COAG:   Results from last 7 days   Lab Units 03/22/24  1251 03/18/24  2209   INR  1.3* 1.2*     HEME/ENDO:     CARDIAC:        IMPRESSION & PLAN:  POD # 3 s/p mini Mitral Valve replacement w/ Dr. Chaudhari on 3/22/2024  - Increase activity/ ambulation; PT/OT  - Encourage IS, C/DB; respiratory therapy; wean O2 as claudia -> on RA  - Cardiac rehab referral   - Continue cardiac meds: ASA, BB, statin   - Pain and anticonstipation meds  - 2v CXR completed on 3/25/2024.  - Postop echo ordered on 3/25.   - CUT epicardial wires prior to discharge; per surgeon preference.    - Tele until discharge    Rhythm  - Tele: SR with 1st degree AV Block 70's   - Continue BB: 3/25 increased metoprolol tartrate to 25mg BID   - Adjust medications as tolerated    Acute  Blood Loss Anemia   Recent Labs     24  0501 24  1203 24  0611 24  2316 24  1309 24  2349 24  1251   HGB 8.1* 8.9* 8.2* 8.2* 8.6* 9.3* 10.2*   HCT 26.5* 27.9* 25.6* 24.9* 26.5* 29.9* 31.0*   - MV, PO Iron x1mo  - Daily labs, transfuse as indicated    Thrombocytopenia  Recent Labs     24  0501 24  1203 24  0611 24  2316 24  1309 24  2349 24  1251    202 172 170 191 200 199   - Etiology likely postop/CPB related  - Continue to trend with daily CBCs    Volume/Electrolyte Status: Preop wt Weight: 69.6 kg (153 lb 7 oz)   Vitals:    24 0630   Weight: 73.9 kg (162 lb 14.4 oz)     - Weight: 73.9 kg   - Adjust diuresis for postop cardiac surgery hypervolemia  - 3/25: Lasix 40mg IV x1   - Replete electrolytes for hypokalemia/hypomagnesemia: replaced K & Mg 3/25.   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  Recent Labs     24  0501 24  1203 24  0611 24  2316 24  1309 24  2349 24  1251   WBC 9.3 17.5* 16.0* 18.8* 19.0* 13.2* 18.2*     Temp (36hrs), Av.4 °C (97.6 °F), Min:36.1 °C (97 °F), Max:36.7 °C (98.1 °F)     - Infectious Disease Following, appreciate recs:   ---Continue ampicillin 2 g IV  q 4  ---Continue ceftriaxone 2 g IV q 12  ---Both ABXs for 6 weeks from date of surgery, EOT 2024  ---okay to place PICC   ---Weekly CBC with diff and CMP as outpatient while on IV antibiotics and fax reports to Dr Cruz  ---ID will arrange for follow up as an outpatient   - 3/25: Right Upper Arm single lumen PICC placed   - monitor for s/s infection: currently afebrile without any s/s of infection present   - daily CBC while inpatient     Hypertension: home meds: Lisinopril 20mg daily; Bisoprolol 10mg daily   Systolic (24hrs), Av , Min:100 , Max:125   - continue BB   - continue holding home meds as not clinically  indicated to resume based on current BP's   - resume antihypertensives as indicated    COPD: home meds: albuterol inhaler every 6 hours for shortness of breath; Atrovent 17mcg 2 puffs BID; Trelegy Ellipta 1 puf daily   -CXR PRN  -wean NC maintaining SpO2 >92% - RA   -Continue duonebs  -Continue spiriva respimat and breo ellipta   -bronchial hygiene with EZ PAPs     Hyperlipidemia: no home meds   Lab Results   Component Value Date    CHOL 151 03/06/2024    HDL 38.0 03/06/2024    VLDL 21 03/06/2024    TRIG 107 03/06/2024    NHDL 113 03/06/2024   - continue high intensity statin therapy   - follow up lipid panel with PCP/ cardiologist for ongoing lipid management    GERD:  home famotidine 40mg   - continue home famotidine   - diet as tolerated     Arthritis/Chronic pain back and neck pain: home regimen: plaquenil 200mg daily ; Lyrica 75mg daily; Zanaflex 4mg TID PRN for muscle spasms/pain; prednisone 5mg daily during flare ups    - continue manage pain with PRN tramdol and tylenol   - No NSAID's x3 months s/p cardiac surgery   -OARRS reviewed 3/25/2024   --Current Prescriptions: Pregabalin 75mg capsule; last filled 2/15/2024; OTY: 270; prescriber GEORGE Kirkpatrick   - continue home lyrica  - 3/25: resume home plaquenil 200mg daily   - PT/OT   -will not need narcotic prescription at discharge      VTE Prophylaxis: SCDs/TEDs, ambulation, SQ heparin  Code Status: Prior    Dispo  - PT/OT recs low intensity level of continued care; home   - Would benefit from homecare for cardiac surgery carepath and RN visits  - Anticipate discharge 2-3 days, pending diuresis, IV antibiotics for home care set up, PICC line placement, HR/BP management, increased mobility   - Will continue to assess discharge needs    ALEJANDRO Montana-CNP  Cardiac Surgery [facility]  [facility]  Team Phone [phone]    3/25/2024  1:21 PM

## 2024-03-25 NOTE — PROGRESS NOTES
Transitional Care Coordinator   Met with patient this morning and introduced myself as Care Coordinator and member of the discharge planning team.  She is s/p MVR. She plans to return home at time of discharge with her . She was independent in ADL's prior to admission. We discussed need for IV antibiotics and she wants to discharge home. Says her  will help her with infusions. Discussed home care options and she prefers to use [facility].  Home Infusion was notified of plan to discharge 3/28 or 3/29. Prescriptions were faxed to number provided. PICC line placement pending. I will continue to follow for home going needs.  Rhina Almaraz RN

## 2024-03-25 NOTE — PROGRESS NOTES
Fannie Watson is a 78 y.o. female on day 20 of admission presenting with SAH (subarachnoid hemorrhage) (CMS/[facility]).    Subjective   Interval History:   Downgraded to the floor, doing well  Surgery went well  No new cxs collected        Review of Systems   Constitutional: Negative.    HENT: Negative.     Eyes: Negative.    Respiratory: Negative.     Cardiovascular: Negative.    Gastrointestinal: Negative.    Endocrine: Negative.    Genitourinary: Negative.    Musculoskeletal: Negative.    Skin: Negative.    Allergic/Immunologic: Negative.    Neurological: Negative.    Hematological: Negative.    Psychiatric/Behavioral: Negative.         Objective   Range of Vitals (last 24 hours)  Heart Rate:  [73-92]   Temp:  [36.2 °C (97.2 °F)-36.6 °C (97.9 °F)]   Resp:  [20-31]   BP: (105-125)/(59-69)   Weight:  [73.9 kg (162 lb 14.4 oz)]   SpO2:  [89 %-100 %]   Daily Weight  03/25/24 : 73.9 kg (162 lb 14.4 oz)    Body mass index is 29.98 kg/m².    Physical Exam  Vitals reviewed.   Constitutional:       Appearance: Normal appearance.   HENT:      Head: Normocephalic and atraumatic.      Mouth/Throat:      Mouth: Mucous membranes are moist.      Pharynx: Oropharynx is clear.   Cardiovascular:      Rate and Rhythm: Normal rate and regular rhythm.   Pulmonary:      Effort: Pulmonary effort is normal.   Abdominal:      General: Abdomen is flat. Bowel sounds are normal.      Palpations: Abdomen is soft.   Musculoskeletal:         General: Normal range of motion.      Cervical back: Normal range of motion.   Skin:     General: Skin is warm.   Neurological:      General: No focal deficit present.      Mental Status: She is alert and oriented to person, place, and time.   Psychiatric:         Mood and Affect: Mood normal.         Behavior: Behavior normal.         Thought Content: Thought content normal.         Judgment: Judgment normal.         Antibiotics  labetaloL (Normodyne,Trandate) injection 10 mg  hydrALAZINE (Apresoline)  injection 10 mg  niCARdipine (Cardene) 40 mg in sodium chloride 200 mL (0.2 mg/mL) infusion (premix)  niMODipine (Nimotop) capsule 60 mg  niMODipine (Nymalize) liquid 60 mg  dexAMETHasone (Decadron) injection 2 mg  levETIRAcetam in NaCl (iso-os) (Keppra)  mg  sennosides-docusate sodium (Malika-Colace) 8.6-50 mg per tablet 2 tablet  heparin (porcine) injection 5,000 Units  iohexol (OMNIPaque) 350 mg iodine/mL solution 80 mL  sodium chloride 0.9% infusion  sodium chloride 0.9 % bolus 1,000 mL  oxyCODONE (Roxicodone) immediate release tablet 5 mg  acetaminophen (Tylenol) tablet 650 mg  oxyCODONE (Roxicodone) immediate release tablet 10 mg  HYDROmorphone (Dilaudid) injection 0.2 mg  lidocaine 2% jelly 1 Application  LORazepam (Ativan) injection 1 mg  LORazepam (Ativan) injection 1 mg  LORazepam (Ativan) injection  - Omnicell Override Pull  sodium chloride 0.9 % bolus 500 mL  sodium chloride 0.9 % bolus 500 mL  metoprolol tartrate (Lopressor) injection 5 mg  sodium chloride 0.9% infusion  perflutren lipid microspheres (Definity) injection 0.5-10 mL of dilution  sulfur hexafluoride microsphr (Lumason) injection 24.28 mg  perflutren protein A microsphere (Optison) injection 0.5 mL  metoprolol tartrate (Lopressor) tablet 12.5 mg  vancomycin (Vancocin) placeholder  cefepime (Maxipime) IV 2 g  vancomycin (Vancocin) 1.25 g in dextrose 5 % in water (D5W) 250 mL IV  potassium phosphates 15 mmol in dextrose 5 % in water (D5W) 250 mL IV  fentaNYL PF (Sublimaze) injection  - Omnicell Override Pull  midazolam (Versed) injection  - Omnicell Override Pull  benzocaine (Hurricaine) 20 % mouth spray  lidocaine (Xylocaine) 2 % mouth solution  midazolam (Versed) injection  fentaNYL PF (Sublimaze) injection  sodium chloride (Hypertonic) 3% bolus 250 mL  midazolam (Versed) injection  - Omnicell Override Pull  fentaNYL PF (Sublimaze) injection  - Omnicell Override Pull  fentaNYL PF (Sublimaze) injection  midazolam (Versed)  injection  fentaNYL PF (Sublimaze) injection  midazolam (Versed) injection  DAPTOmycin (Cubicin) 700 mg in sodium chloride 0.9% 50 mL IV  hydrALAZINE (Apresoline) injection 10 mg  labetaloL (Normodyne,Trandate) injection 10 mg  niCARdipine (Cardene) 40 mg in sodium chloride 200 mL (0.2 mg/mL) infusion (premix)  iohexol (OMNIPaque) 350 mg iodine/mL solution 90 mL  sodium chloride 0.9 % bolus 500 mL  ampicillin (Omnipen) 2 g in sodium chloride 0.9 % 100 mL IV  cefTRIAXone (Rocephin) 2 g IV in dextrose 5% 50 mL  ipratropium-albuteroL (Duo-Neb) 0.5-2.5 mg/3 mL nebulizer solution 3 mL  heparin 25,000 Units in dextrose 5% 250 mL (100 Units/mL) infusion (premix)  heparin (porcine) injection 2,000-4,000 Units  ondansetron (Zofran) tablet 4 mg  benzocaine-menthol (Cepastat Sore Throat) 15-3.6 mg lozenge 1 lozenge  benzonatate (Tessalon) capsule 100 mg  guaiFENesin (Mucinex) 12 hr tablet 600 mg  furosemide (Lasix) tablet 20 mg  mupirocin (Bactroban) 2 % ointment  polyethylene glycol (Glycolax, Miralax) packet 17 g  ondansetron (Zofran) tablet 4 mg  ondansetron (Zofran) injection 4 mg  ondansetron (Zofran) injection 4 mg  bisacodyl (Dulcolax) suppository 10 mg  furosemide (Lasix) tablet 20 mg  potassium chloride 20 mEq in 100 mL IV premix  potassium chloride CR (Klor-Con M20) ER tablet 20 mEq  magnesium sulfate IV 2 g  lidocaine PF (Xylocaine) injection  - Omnicell Override Pull  verapamil (Isoptin) injection  - Omnicell Override Pull  heparin injection  - Omnicell Override Pull  midazolam (Versed) injection  - Omnicell Override Pull  fentaNYL PF (Sublimaze) injection  - Omnicell Override Pull  midazolam (Versed) injection  fentaNYL PF (Sublimaze) injection  lidocaine (Xylocaine) 20 mg/mL (2 %) injection  verapamil (Isoptin) injection  iohexol (OMNIPaque) 350 mg iodine/mL solution  sodium chloride 0.9% infusion  sodium chloride 0.9% infusion  gadoterate meglumine (Dotarem) 0.5 mmol/mL contrast injection 13 mL  furosemide  (Lasix) tablet 40 mg  bisacodyl (Dulcolax) suppository 10 mg  furosemide (Lasix) tablet 40 mg  furosemide (Lasix) tablet 40 mg  furosemide (Lasix) tablet 40 mg  ondansetron (Zofran) injection 4 mg  ondansetron (Zofran) injection  - Omnicell Override Pull  midazolam (Versed) injection  - Omnicell Override Pull  fentaNYL PF (Sublimaze) injection  - Omnicell Override Pull  midazolam (Versed) injection  fentaNYL PF (Sublimaze) injection  chlorhexidine (Peridex) 0.12 % solution 15 mL  heparin 25,000 Units in dextrose 5% 250 mL (100 Units/mL) infusion (premix)  furosemide (Lasix) tablet 20 mg  furosemide (Lasix) tablet 20 mg  bismuth subsalicylate (Pepto Bismol) chewable tablet 524 mg  bismuth subsalicylate (Pepto Bismol) 262 mg/15 mL suspension 524 mg  potassium chloride CR (Klor-Con M20) ER tablet 20 mEq  melatonin tablet 3 mg  melatonin tablet 3 mg  hydrOXYzine HCL (Atarax) tablet 10 mg  diphenhydrAMINE (BENADryl) capsule 50 mg  diphenhydrAMINE (BENADryl) capsule 25 mg  diphenhydrAMINE (BENADryl) injection 12.5 mg  methadone (Dolophine) injection 15 mg  electrolyte-A (Plasmalyte-A) solution  - Omnicell Override Pull  lidocaine (cardiac) (Xylocaine) injection  - Omnicell Override Pull  heparin injection  - Omnicell Override Pull  rocuronium (ZeMuron) injection  - Omnicell Override Pull  esmolol (Brevibloc) injection  - Omnicell Override Pull  nitroglycerin in 5 % dextrose (Tridil) infusion  - Omnicell Override Pull  fentaNYL PF (Sublimaze) injection  - Omnicell Override Pull  propofol (Diprivan) injection  - Omnicell Override Pull  midazolam (Versed) injection  - Omnicell Override Pull  propofol (Diprivan) injection  - Omnicell Override Pull  isoflurane inhalation liquid  - Omnicell Override Pull  protamine injection  - Omnicell Override Pull  furosemide (Lasix) injection  - Omnicell Override Pull  magnesium sulfate injection  - Omnicell Override Pull  sugammadex (Bridion) injection  - Omnicell Override  Pull  acetaminophen (Ofirmev) injection 1,000 mg  lactated Ringer's infusion  lactated Ringer's infusion  acetaminophen (Tylenol) tablet 650 mg  acetaminophen (Tylenol) suppository 650 mg  lidocaine 4 % patch 1 patch  HYDROmorphone (Dilaudid) injection 0.2 mg  atorvastatin (Lipitor) tablet 80 mg  aspirin chewable tablet 81 mg  polyethylene glycol (Glycolax, Miralax) packet 17 g  propofol (Diprivan) infusion  pantoprazole (ProtoNix) EC tablet 40 mg  pantoprazole (ProtoNix) injection 40 mg  oxyCODONE (Roxicodone) immediate release tablet 5 mg  sennosides-docusate sodium (Malika-Colace) 8.6-50 mg per tablet 2 tablet  insulin lispro (HumaLOG) injection 0-15 Units  dextrose 50 % injection 25 g  glucagon (Glucagen) injection 1 mg  EPINEPHrine (Adrenalin) 4 mg in dextrose 5% 250 mL (16 mcg/mL) infusion (premix)  norepinephrine (Levophed) 8 mg in dextrose 5% 250 mL (0.032 mg/mL) infusion (premix)  potassium chloride CR (Klor-Con M20) ER tablet 20 mEq  potassium chloride (Klor-Con) packet 20 mEq  potassium chloride CR (Klor-Con M20) ER tablet 40 mEq  potassium chloride (Klor-Con) packet 40 mEq  potassium chloride 20 mEq in 100 mL IV premix  potassium chloride 40 mEq in 100 mL IV premix  magnesium sulfate IV 2 g  magnesium sulfate IV 4 g  calcium gluconate in NS IV 1 g  calcium gluconate in NS IV 2 g  ipratropium-albuteroL (Duo-Neb) 0.5-2.5 mg/3 mL nebulizer solution 3 mL  oxygen (O2) therapy  vancomycin (Vancocin) pharmacy to dose - pharmacy monitoring  vancomycin (Vancocin) in % 5 dextrose 500 mL IV 1,750 mg  hydrALAZINE (Apresoline) injection 5 mg  HYDROmorphone (Dilaudid) injection 0.2 mg  dexmedeTOMIDine (Precedex) 400 mcg in 100 mL (4 mcg/mL) sodium chloride 0.9% infusion  albumin human 5 % infusion 25 g  melatonin tablet 3 mg  traZODone (Desyrel) tablet 50 mg  dexmedeTOMIDine (Precedex) 400 mcg in 100 mL (4 mcg/mL) sodium chloride 0.9% infusion  albumin human 5 % infusion 25 g  ipratropium-albuteroL (Duo-Neb) 0.5-2.5  mg/3 mL nebulizer solution 3 mL  albumin human 5 % infusion 12.5 g  lactated Ringer's bolus 500 mL  albumin human 5 % infusion 12.5 g  oxygen (O2) therapy  albumin human 5 % infusion 25 g  albumin human solution  - Omnicell Override Pull  benzocaine-menthol (Cepastat Sore Throat) 15-3.6 mg lozenge 1 lozenge  ondansetron (Zofran) injection 4 mg  oxyCODONE (Roxicodone) immediate release tablet 10 mg  HYDROmorphone (Dilaudid) injection 0.2 mg  heparin (porcine) injection 5,000 Units  lactated Ringer's bolus 250 mL  lactated Ringer's bolus 250 mL  albumin human 5 % infusion 12.5 g  furosemide (Lasix) injection 20 mg  diphenhydrAMINE (BENADryl) capsule 25 mg  furosemide (Lasix) injection 20 mg  metoprolol tartrate (Lopressor) tablet 12.5 mg  pregabalin (Lyrica) capsule 75 mg  famotidine (Pepcid) tablet 20 mg  cetirizine (ZyrTEC) tablet  famotidine (Pepcid) tablet    hydroxychloroquine (Plaquenil) tablet    spironolactone (Aldactone) tablet  pregabalin (Lyrica) capsule  tiZANidine (Zanaflex) tablet  predniSONE (Deltasone) tablet  lisinopril (Prinivil, Zestril) tablet  bisoprolol (Zebeta) tablet    tiotropium (Spiriva Respimat) 2.5 mcg/actuation inhaler 2 puff  tiotropium (Spiriva Respimat) 2.5 mcg/actuation inhaler 2 puff  fluticasone furoate-vilanteroL (Breo Ellipta) 100-25 mcg/dose inhaler 1 puff  albuterol 90 mcg/actuation inhaler 1 puff  benzonatate (Tessalon) capsule 100 mg  furosemide (Lasix) injection 40 mg  metoprolol tartrate (Lopressor) tablet 25 mg  perflutren lipid microspheres (Definity) injection 0.5-10 mL of dilution  diphenhydrAMINE (BENADryl) capsule 25 mg  potassium chloride CR (Klor-Con M20) ER tablet 40 mEq  magnesium oxide (Mag-Ox) tablet 400 mg      Relevant Results  Labs  Results from last 72 hours   Lab Units 03/25/24  0501 03/24/24  1203 03/24/24  0611   WBC AUTO x10*3/uL 9.3 17.5* 16.0*   HEMOGLOBIN g/dL 8.1* 8.9* 8.2*   HEMATOCRIT % 26.5* 27.9* 25.6*   PLATELETS AUTO x10*3/uL 187 202 172  "    Results from last 72 hours   Lab Units 03/25/24  0501 03/24/24  1203 03/24/24  0611   SODIUM mmol/L 136 133* 132*   POTASSIUM mmol/L 4.3 4.5 4.4   CHLORIDE mmol/L 99 97* 97*   CO2 mmol/L 29 24 25   BUN mg/dL 14 13 13   CREATININE mg/dL 0.48* 0.54 0.53   GLUCOSE mg/dL 89 129* 99   CALCIUM mg/dL 8.6 9.0 8.8   ANION GAP mmol/L 12 17 14   EGFR mL/min/1.73m*2 >90 >90 >90   PHOSPHORUS mg/dL 3.7 3.6 3.6     Results from last 72 hours   Lab Units 03/25/24  0501 03/24/24  1203 03/24/24  0611   ALBUMIN g/dL 3.3* 3.8 3.6     Estimated Creatinine Clearance: 93.5 mL/min (A) (by C-G formula based on SCr of 0.48 mg/dL (L)).  No results found for: \"CRP\"    Microbiology  No results found for the last 14 days.  Susceptibility data from last 90 days.  Collected Specimen Info Organism Ampicillin Gentamicin Synergy Penicillin Tetracycline Trimethoprim/Sulfamethoxazole Vancomycin   03/10/24 Blood culture from Peripheral Venipuncture Enterococcus faecium         03/10/24 Blood culture from Peripheral Venipuncture Enterococcus faecium         03/09/24 Blood culture from Peripheral Venipuncture Enterococcus faecium         03/09/24 Blood culture from Peripheral Venipuncture Enterococcus faecium         03/07/24 Blood culture from Peripheral Venipuncture Enterococcus faecium S S S S R S   03/07/24 Blood culture from Peripheral Venipuncture Enterococcus faecium         03/07/24 Blood culture from Peripheral Venipuncture Enterococcus faecium               Assessment/Plan   Fannie Watson is a 78 y.o. female with h/o HTN, HLD, COPD, spondylolisthesis L4-L5, admitted to [facility] on 3/1 for L4-L5 decompression and fusion, transferred to [facility] on 3/5 with encephalopathy and subarachnoid hemorrhage.   MRI lumbar spine 3/6 showed heterogenous fluid collection within hemilaminectomy bed measuring 2.7 x 2.0 x 2.9 cm felt to represent seroma/hematoma.  Found to have bacteremia with Enterococcus faecium 3/7, blood cultures were positive through 3/10.  " First negative cultures on 3/11, have remained negative since then.  TTE done on 3/7 showed possible vegetation on mitral valve, CROW done on 3/7 showed elongated 2.5 x 0.3 cm filamentous mass on mitral valve with moderate to severe mitral valve regurgitation.  Repeat MRI on 3/14 with interval enlargement of heterogenous fluid collection with thin rim enhancement measuring 5.1 x 1.8 x 2.4 cm.  Also noted enhancement along the inferior endplate of L3-possibly degenerative changes versus infectious/inflammatory disease.  Cardiac surgery consulted, plan for mitral valve replacement pending evaluation of lumbar surgical site fluid collection.        Given notable interval enlargement of lumbar fluid collection, there is concern for infection.  While it is heartening Ms. Watson's blood cultures have remained negative since 3/11, additional nidus of infection should be ruled out, such as postsurgical lumbar abscess/infected hematoma.  Unfortunately this cannot be definitively ascertained via imaging.     This is especially important given planned cardiac surgical procedure for mitral valve replacement, and every reasonable measure should be taken to mitigate risk of new valve infection.     UPDATE 3/22/2024  S/p OPERATION/PROCEDURE:  1. Minimally invasive mitral valve replacement (31mm Epic bioprosthesis)  2. Exploration of the right femoral vein and artery for cannulation for cardiopulmonary bypass.      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       Jesse Cruz MD MPH

## 2024-03-26 LAB
ALBUMIN SERPL BCP-MCNC: 3.2 G/DL (ref 3.4–5)
ANION GAP SERPL CALC-SCNC: 12 MMOL/L (ref 10–20)
BUN SERPL-MCNC: 13 MG/DL (ref 6–23)
CALCIUM SERPL-MCNC: 8.5 MG/DL (ref 8.6–10.6)
CHLORIDE SERPL-SCNC: 98 MMOL/L (ref 98–107)
CO2 SERPL-SCNC: 29 MMOL/L (ref 21–32)
CREAT SERPL-MCNC: 0.43 MG/DL (ref 0.5–1.05)
EGFRCR SERPLBLD CKD-EPI 2021: >90 ML/MIN/1.73M*2
ERYTHROCYTE [DISTWIDTH] IN BLOOD BY AUTOMATED COUNT: 16.7 % (ref 11.5–14.5)
GLUCOSE SERPL-MCNC: 99 MG/DL (ref 74–99)
HCT VFR BLD AUTO: 26 % (ref 36–46)
HGB BLD-MCNC: 7.8 G/DL (ref 12–16)
MAGNESIUM SERPL-MCNC: 1.92 MG/DL (ref 1.6–2.4)
MCH RBC QN AUTO: 26.3 PG (ref 26–34)
MCHC RBC AUTO-ENTMCNC: 30 G/DL (ref 32–36)
MCV RBC AUTO: 88 FL (ref 80–100)
NRBC BLD-RTO: 0 /100 WBCS (ref 0–0)
PHOSPHATE SERPL-MCNC: 3.2 MG/DL (ref 2.5–4.9)
PLATELET # BLD AUTO: 213 X10*3/UL (ref 150–450)
POTASSIUM SERPL-SCNC: 4 MMOL/L (ref 3.5–5.3)
RBC # BLD AUTO: 2.97 X10*6/UL (ref 4–5.2)
SODIUM SERPL-SCNC: 135 MMOL/L (ref 136–145)
WBC # BLD AUTO: 7.9 X10*3/UL (ref 4.4–11.3)

## 2024-03-26 PROCEDURE — 2500000001 HC RX 250 WO HCPCS SELF ADMINISTERED DRUGS (ALT 637 FOR MEDICARE OP): Performed by: NURSE PRACTITIONER

## 2024-03-26 PROCEDURE — 85027 COMPLETE CBC AUTOMATED: CPT | Performed by: NURSE PRACTITIONER

## 2024-03-26 PROCEDURE — 2500000004 HC RX 250 GENERAL PHARMACY W/ HCPCS (ALT 636 FOR OP/ED): Performed by: NURSE PRACTITIONER

## 2024-03-26 PROCEDURE — 1200000002 HC GENERAL ROOM WITH TELEMETRY DAILY

## 2024-03-26 PROCEDURE — 97168 OT RE-EVAL EST PLAN CARE: CPT | Mod: GO

## 2024-03-26 PROCEDURE — 2500000001 HC RX 250 WO HCPCS SELF ADMINISTERED DRUGS (ALT 637 FOR MEDICARE OP)

## 2024-03-26 PROCEDURE — 2500000002 HC RX 250 W HCPCS SELF ADMINISTERED DRUGS (ALT 637 FOR MEDICARE OP, ALT 636 FOR OP/ED): Mod: MUE | Performed by: NURSE PRACTITIONER

## 2024-03-26 PROCEDURE — 94640 AIRWAY INHALATION TREATMENT: CPT

## 2024-03-26 PROCEDURE — 97110 THERAPEUTIC EXERCISES: CPT | Mod: GP

## 2024-03-26 PROCEDURE — 80069 RENAL FUNCTION PANEL: CPT | Performed by: NURSE PRACTITIONER

## 2024-03-26 PROCEDURE — 97535 SELF CARE MNGMENT TRAINING: CPT | Mod: GO

## 2024-03-26 PROCEDURE — 97116 GAIT TRAINING THERAPY: CPT | Mod: GP

## 2024-03-26 PROCEDURE — 2500000002 HC RX 250 W HCPCS SELF ADMINISTERED DRUGS (ALT 637 FOR MEDICARE OP, ALT 636 FOR OP/ED): Performed by: NURSE PRACTITIONER

## 2024-03-26 PROCEDURE — 99232 SBSQ HOSP IP/OBS MODERATE 35: CPT | Performed by: NURSE PRACTITIONER

## 2024-03-26 PROCEDURE — 83735 ASSAY OF MAGNESIUM: CPT | Performed by: NURSE PRACTITIONER

## 2024-03-26 RX ORDER — DIPHENHYDRAMINE HCL 25 MG
25 CAPSULE ORAL EVERY 8 HOURS PRN
Status: DISCONTINUED | OUTPATIENT
Start: 2024-03-26 | End: 2024-03-29

## 2024-03-26 RX ADMIN — IPRATROPIUM BROMIDE AND ALBUTEROL SULFATE 3 ML: .5; 3 SOLUTION RESPIRATORY (INHALATION) at 22:02

## 2024-03-26 RX ADMIN — FAMOTIDINE 40 MG: 20 TABLET ORAL at 09:20

## 2024-03-26 RX ADMIN — FLUTICASONE FUROATE AND VILANTEROL TRIFENATATE 1 PUFF: 100; 25 POWDER RESPIRATORY (INHALATION) at 07:45

## 2024-03-26 RX ADMIN — TRAMADOL HYDROCHLORIDE 50 MG: 50 TABLET, COATED ORAL at 09:51

## 2024-03-26 RX ADMIN — TRAMADOL HYDROCHLORIDE 50 MG: 50 TABLET, COATED ORAL at 16:00

## 2024-03-26 RX ADMIN — AMPICILLIN SODIUM 2 G: 2 INJECTION, POWDER, FOR SOLUTION INTRAMUSCULAR; INTRAVENOUS at 00:01

## 2024-03-26 RX ADMIN — HEPARIN SODIUM 5000 UNITS: 5000 INJECTION INTRAVENOUS; SUBCUTANEOUS at 03:57

## 2024-03-26 RX ADMIN — AMPICILLIN SODIUM 2 G: 2 INJECTION, POWDER, FOR SOLUTION INTRAMUSCULAR; INTRAVENOUS at 12:09

## 2024-03-26 RX ADMIN — TRAMADOL HYDROCHLORIDE 50 MG: 50 TABLET, COATED ORAL at 22:01

## 2024-03-26 RX ADMIN — AMPICILLIN SODIUM 2 G: 2 INJECTION, POWDER, FOR SOLUTION INTRAMUSCULAR; INTRAVENOUS at 09:20

## 2024-03-26 RX ADMIN — ACETAMINOPHEN 650 MG: 325 TABLET ORAL at 20:36

## 2024-03-26 RX ADMIN — SENNOSIDES AND DOCUSATE SODIUM 2 TABLET: 8.6; 5 TABLET ORAL at 20:37

## 2024-03-26 RX ADMIN — CEFTRIAXONE SODIUM 2 G: 2 INJECTION, SOLUTION INTRAVENOUS at 20:28

## 2024-03-26 RX ADMIN — HYDROXYCHLOROQUINE SULFATE 200 MG: 200 TABLET, FILM COATED ORAL at 09:20

## 2024-03-26 RX ADMIN — AMPICILLIN SODIUM 2 G: 2 INJECTION, POWDER, FOR SOLUTION INTRAMUSCULAR; INTRAVENOUS at 21:42

## 2024-03-26 RX ADMIN — AMPICILLIN SODIUM 2 G: 2 INJECTION, POWDER, FOR SOLUTION INTRAMUSCULAR; INTRAVENOUS at 04:02

## 2024-03-26 RX ADMIN — MELATONIN 3 MG: 3 TAB ORAL at 20:37

## 2024-03-26 RX ADMIN — TRAZODONE HYDROCHLORIDE 50 MG: 50 TABLET ORAL at 20:37

## 2024-03-26 RX ADMIN — METOPROLOL TARTRATE 25 MG: 25 TABLET, FILM COATED ORAL at 20:37

## 2024-03-26 RX ADMIN — CEFTRIAXONE SODIUM 2 G: 2 INJECTION, SOLUTION INTRAVENOUS at 09:56

## 2024-03-26 RX ADMIN — PREGABALIN 75 MG: 75 CAPSULE ORAL at 09:20

## 2024-03-26 RX ADMIN — DIPHENHYDRAMINE HYDROCHLORIDE 25 MG: 25 CAPSULE ORAL at 09:52

## 2024-03-26 RX ADMIN — ASPIRIN 81 MG: 81 TABLET, COATED ORAL at 09:20

## 2024-03-26 RX ADMIN — POLYETHYLENE GLYCOL 3350 17 G: 17 POWDER, FOR SOLUTION ORAL at 17:42

## 2024-03-26 RX ADMIN — ACETAMINOPHEN 650 MG: 325 TABLET ORAL at 03:56

## 2024-03-26 RX ADMIN — SENNOSIDES AND DOCUSATE SODIUM 2 TABLET: 8.6; 5 TABLET ORAL at 09:20

## 2024-03-26 RX ADMIN — HEPARIN SODIUM 5000 UNITS: 5000 INJECTION INTRAVENOUS; SUBCUTANEOUS at 20:36

## 2024-03-26 RX ADMIN — IPRATROPIUM BROMIDE AND ALBUTEROL SULFATE 3 ML: .5; 3 SOLUTION RESPIRATORY (INHALATION) at 07:48

## 2024-03-26 RX ADMIN — TIOTROPIUM BROMIDE INHALATION SPRAY 2 PUFF: 3.12 SPRAY, METERED RESPIRATORY (INHALATION) at 07:45

## 2024-03-26 RX ADMIN — TRAMADOL HYDROCHLORIDE 50 MG: 50 TABLET, COATED ORAL at 04:03

## 2024-03-26 RX ADMIN — ATORVASTATIN CALCIUM 80 MG: 80 TABLET, FILM COATED ORAL at 20:37

## 2024-03-26 RX ADMIN — PREGABALIN 75 MG: 75 CAPSULE ORAL at 20:44

## 2024-03-26 RX ADMIN — AMPICILLIN SODIUM 2 G: 2 INJECTION, POWDER, FOR SOLUTION INTRAMUSCULAR; INTRAVENOUS at 16:00

## 2024-03-26 RX ADMIN — BENZONATATE 100 MG: 100 CAPSULE ORAL at 09:21

## 2024-03-26 RX ADMIN — ACETAMINOPHEN 650 MG: 325 TABLET ORAL at 13:13

## 2024-03-26 RX ADMIN — METOPROLOL TARTRATE 25 MG: 25 TABLET, FILM COATED ORAL at 09:20

## 2024-03-26 RX ADMIN — HEPARIN SODIUM 5000 UNITS: 5000 INJECTION INTRAVENOUS; SUBCUTANEOUS at 12:06

## 2024-03-26 RX ADMIN — DIPHENHYDRAMINE HYDROCHLORIDE 25 MG: 25 CAPSULE ORAL at 17:43

## 2024-03-26 ASSESSMENT — COGNITIVE AND FUNCTIONAL STATUS - GENERAL
DRESSING REGULAR UPPER BODY CLOTHING: A LITTLE
DRESSING REGULAR UPPER BODY CLOTHING: A LITTLE
PERSONAL GROOMING: A LITTLE
DRESSING REGULAR LOWER BODY CLOTHING: A LOT
HELP NEEDED FOR BATHING: A LOT
STANDING UP FROM CHAIR USING ARMS: A LITTLE
MOVING TO AND FROM BED TO CHAIR: A LOT
DAILY ACTIVITIY SCORE: 19
MOVING FROM LYING ON BACK TO SITTING ON SIDE OF FLAT BED WITH BEDRAILS: A LITTLE
DAILY ACTIVITIY SCORE: 16
CLIMB 3 TO 5 STEPS WITH RAILING: A LOT
TURNING FROM BACK TO SIDE WHILE IN FLAT BAD: A LITTLE
EATING MEALS: A LITTLE
TOILETING: A LITTLE
DRESSING REGULAR LOWER BODY CLOTHING: A LITTLE
TOILETING: A LITTLE
WALKING IN HOSPITAL ROOM: A LITTLE
MOBILITY SCORE: 16
PERSONAL GROOMING: A LITTLE
HELP NEEDED FOR BATHING: A LITTLE

## 2024-03-26 ASSESSMENT — PAIN SCALES - GENERAL
PAINLEVEL_OUTOF10: 4
PAINLEVEL_OUTOF10: 7
PAINLEVEL_OUTOF10: 10 - WORST POSSIBLE PAIN
PAINLEVEL_OUTOF10: 5 - MODERATE PAIN
PAINLEVEL_OUTOF10: 4
PAINLEVEL_OUTOF10: 5 - MODERATE PAIN
PAINLEVEL_OUTOF10: 5 - MODERATE PAIN
PAINLEVEL_OUTOF10: 7

## 2024-03-26 ASSESSMENT — PAIN DESCRIPTION - LOCATION: LOCATION: BACK

## 2024-03-26 ASSESSMENT — PAIN - FUNCTIONAL ASSESSMENT
PAIN_FUNCTIONAL_ASSESSMENT: 0-10

## 2024-03-26 ASSESSMENT — ACTIVITIES OF DAILY LIVING (ADL)
HOME_MANAGEMENT_TIME_ENTRY: 8
ADL_ASSISTANCE: INDEPENDENT

## 2024-03-26 ASSESSMENT — PAIN DESCRIPTION - DESCRIPTORS
DESCRIPTORS: ACHING;SORE
DESCRIPTORS: ACHING;SORE

## 2024-03-26 NOTE — PROGRESS NOTES
Occupational Therapy    Re-Evaluation/Treatment    Patient Name: Fannie Watson  MRN: [id number]  Today's Date: 3/26/2024  Time Calculation  Start Time: 1011  Stop Time: 1034  Time Calculation (min): 23 min        Assessment:  OT Assessment: Pt would benefit from low intensity OT to address ADLs, mobility, and endurance.  Barriers to Discharge: None  End of Session Patient Position: Up in chair, Alarm off, not on at start of session  OT Assessment Results: Decreased ADL status, Decreased endurance, Decreased functional mobility, Decreased IADLs  Plan:  Treatment Interventions: ADL retraining, Functional transfer training, UE strengthening/ROM, Endurance training, Patient/family training  OT Frequency: 2 times per week  OT Discharge Recommendations: Low intensity level of continued care  OT - OK to Discharge: Yes  Treatment Interventions: ADL retraining, Functional transfer training, UE strengthening/ROM, Endurance training, Patient/family training    Subjective     General:  General  Reason for Referral: Re-eval; s/p (R) mini thoracotomy and MVR  Past Medical History Relevant to Rehab: HTN, HLD, COPD, 3/1 s/p MIS L4-5 TLIF  Family/Caregiver Present: No  Patient Position Received: Bed, 2 rail up, Alarm off, not on at start of session  Preferred Learning Style: verbal, visual  Precautions:  Medical Precautions: Fall precautions, Cardiac precautions, Oxygen therapy device and L/min, Spinal precautions  Vital Signs:  Heart Rate:  (HR high 80s throughout session)  Pain:  Pain Assessment  Pain Assessment: 0-10  Pain Score:  (3/10 low back pain at rest, increased to 5/10 during fx mobility)    Objective   Cognition:  Overall Cognitive Status: Within Functional Limits  Orientation Level: Oriented X4           Home Living:  Type of Home: House  Lives With: Spouse  Home Adaptive Equipment: Cane, Walker rolling or standard  Home Layout: Two level, Laundry in basement, Bed/bath upstairs, Able to live on main level with  bedroom/bathroom  Alternate Level Stairs-Rails: Right  Alternate Level Stairs-Number of Steps: 12  Home Access: Stairs to enter with rails  Entrance Stairs-Rails: Both  Entrance Stairs-Number of Steps: 4  Bathroom Shower/Tub: Tub/shower unit  Bathroom Toilet: Standard  Bathroom Equipment: Shower chair with back  Prior Function:  Level of [place]: Independent with ADLs and functional transfers, Independent with homemaking with ambulation  Receives Help From: Family  ADL Assistance: Independent  Homemaking Assistance: Independent  Ambulatory Assistance: Independent  Vocational: Retired  Leisure: plays with cat  Hand Dominance: Right  Prior Function Comments: drives (+), falls (-)     ADL:  Grooming Assistance: Stand by  Grooming Deficit: Wash/dry hands (standing at the sink)  Toileting Assistance with Device: Stand by  Toileting Deficit: Clothing management up, Clothing management down, Perineal hygiene     Bed Mobility/Transfers: Bed Mobility 1  Bed Mobility 1: Supine to sitting  Level of Assistance 1: Close supervision  Bed Mobility Comments 1:  (HOB elevated, log roll)    Transfer 1  Technique 1: Sit to stand, Stand to sit  Transfer Device 1: Walker  Transfer Level of Assistance 1: Contact guard  Transfers 2  Transfer From 2: Bed to  Transfer to 2: Toilet  Transfer Device 2: Walker  Transfer Level of Assistance 2: Contact guard  Transfers 3  Transfer From 3: Toilet to  Transfer to 3: Chair with arms  Transfer Device 3: Walker  Transfer Level of Assistance 3: Contact guard      Functional Mobility:  Functional Mobility  Functional Mobility Performed: Yes  Functional Mobility 1  Comments 1:  (CGA ambulating to/from bathroom with wheeled walker)  Sitting Balance:  Static Sitting Balance  Static Sitting-Level of Assistance: Close supervision  Standing Balance:  Static Standing Balance  Static Standing-Level of Assistance: Contact guard  Static Standing-Comment/Number of Minutes:  (wheeled walker)       Outcome  Measures:[facility] Daily Activity  Putting on and taking off regular lower body clothing: A lot  Bathing (including washing, rinsing, drying): A lot  Putting on and taking off regular upper body clothing: A little  Toileting, which includes using toilet, bedpan or urinal: A little  Taking care of personal grooming such as brushing teeth: A little  Eating Meals: A little  Daily Activity - Total Score: 16   and Brief Confusion Assessment Method (bCAM)  CAM Result: CAM -    Education Documentation  Body Mechanics, taught by Summer Crandall OT at 3/26/2024 10:48 AM.  Learner: Patient  Readiness: Acceptance  Method: Explanation  Response: Verbalizes Understanding    Precautions, taught by Summer Crandall OT at 3/26/2024 10:48 AM.  Learner: Patient  Readiness: Acceptance  Method: Explanation  Response: Verbalizes Understanding    ADL Training, taught by Summer Crandall OT at 3/26/2024 10:48 AM.  Learner: Patient  Readiness: Acceptance  Method: Explanation  Response: Verbalizes Understanding    Education Comments  No comments found.           Goals:  Encounter Problems       Encounter Problems (Active)       ADLs       Patient with complete lower body dressing with independent level of assistance donning and doffing all LE clothes  with PRN adaptive equipment while edge of bed  (Progressing)       Start:  03/07/24    Expected End:  04/09/24            Patient will complete toileting including hygiene clothing management/hygiene with independent level of assistance and grab bars. (Progressing)       Start:  03/07/24    Expected End:  04/09/24               BALANCE       Pt will maintain dynamic standing balance during ADL task with contact guard assist level of assistance in order to demonstrate decreased risk of falling and improved postural control. (Progressing)       Start:  03/07/24    Expected End:  04/09/24               COGNITION/SAFETY       Patient will score WFL on standardized cognitive assessment with no cues and within  reasonable time frame (Progressing)       Start:  03/07/24    Expected End:  04/09/24               TRANSFERS       Patient will perform bed mobility independent level of assistance in order to improve safety and independence with mobility (Progressing)       Start:  03/07/24    Expected End:  04/09/24            Patient will complete functional transfer to toilet with least restrictive device with stand by assist level of assistance. (Progressing)       Start:  03/07/24    Expected End:  04/09/24

## 2024-03-26 NOTE — PROGRESS NOTES
Transitional Care Coordinator   Spoke to patient about home infusion cost and provided her the number to set up payments. She said she was confused about the amount she was responsible for and thought the cost was too high. I clarified that her estimated cost is $290.31 per week. She may be interested in SNF placement instead and was provided a list. I offered to call her  to discuss this. She said she wanted to talk to him first. I will follow up with her this afternoon. Rhina Almaraz RN

## 2024-03-26 NOTE — PROGRESS NOTES
Physical Therapy    Physical Therapy Treatment    Patient Name: Fannie Watson  MRN: [id number]  Today's Date: 3/26/2024  Time Calculation  Start Time: 1506  Stop Time: 1529  Time Calculation (min): 23 min       Assessment/Plan   PT Assessment  End of Session Communication: Bedside nurse  Assessment Comment: Pt. continues to demo decreased strength, decreased activity tolerance/endurance, impaired balance, and decreased safety awareness. Pt. is at increased risk for falls and will benefit from further skilled PT intervention while inpatient to address deficits. At this time, pt. is not safe to d/c home and will benefit from MOD intensity PT upon d/c.  End of Session Patient Position: Bed, 3 rail up, Alarm on     PT Plan  Treatment/Interventions: Bed mobility, Transfer training, Gait training, Stair training, Balance training, Strengthening, Endurance training, Range of motion, Therapeutic exercise, Therapeutic activity, Home exercise program  PT Plan: Skilled PT  PT Frequency: Daily  PT Discharge Recommendations: Moderate intensity level of continued care  Equipment Recommended upon Discharge: Wheeled walker  PT Recommended Transfer Status: Assist x1, Assistive device  PT - OK to Discharge: Yes      General Visit Information:   PT  Visit  PT Received On: 03/26/24  Response to Previous Treatment: Patient with no complaints from previous session.  General  Reason for Referral: s/p L4-L5 decompression laminectomy and fusion 03/01/2024 who initially presented to OSH on 03/05 with AMS and found to have trace R sylvian SAH who was also found to have mitral valve endocarditis with E faecium bacteremia. Now s/p R mini thoracotomy and Mitral valve replacement  Past Medical History Relevant to Rehab: HTN, HLD, COPD, 3/1 s/p MIS L4-5 TLIF  Family/Caregiver Present: No  Prior to Session Communication: Bedside nurse  Patient Position Received: Bed, 3 rail up, Alarm off, not on at start of session  Preferred Learning Style: verbal,  visual  General Comment: Pt. received supine in bed, pleasant and agreeable to participate in session with encouragement.    Subjective   Precautions:  Precautions  Medical Precautions: Fall precautions, Cardiac precautions, Oxygen therapy device and L/min, Spinal precautions (2L O2 via NC)  Precautions Comment: SBP < 140, MAP 70-90    Vital Signs:  Vital Signs  Heart Rate: 80  Heart Rate Source: Monitor  Patient Position: Lying    Objective   Pain:  Pain Assessment  Pain Assessment: 0-10  Pain Score: 4  Pain Type: Surgical pain  Pain Location: Back    Cognition:  Cognition  Overall Cognitive Status: Within Functional Limits  Orientation Level: Oriented X4    Activity Tolerance:  Activity Tolerance  Endurance: Tolerates 10 - 20 min exercise with multiple rests  Activity Tolerance Comments: Pt. tolerated ambulating in hallways this date, endorsing increased fatigue.    Treatments:  Therapeutic Exercise  Therapeutic Exercise Performed: Yes  Therapeutic Exercise Activity 1: EOB: AP x20 brody, LAQ x15 brody    Therapeutic Activity  Therapeutic Activity Performed: Yes  Therapeutic Activity 1: Pt. completed static standing activities at sink in bathroom, ~2 min, CGAx1 for standing balance. Notable postural sway though no acute LOB.    Bed Mobility  Bed Mobility: Yes  Bed Mobility 1  Bed Mobility 1: Supine to sitting  Level of Assistance 1: Close supervision  Bed Mobility Comments 1: HOB elevated, use of bed rails, VCs for log roll technique.    Bed Mobility 2  Bed Mobility  2: Sitting to supine  Level of Assistance 2: Minimum assistance, Minimal verbal cues  Bed Mobility Comments 2: Assist to elevate brody LEs onto bed    Ambulation/Gait Training  Ambulation/Gait Training Performed: Yes  Ambulation/Gait Training 1  Surface 1: Level tile  Device 1: Rolling walker  Assistance 1: Contact guard, Minimal verbal cues  Quality of Gait 1: Diminished heel strike, Forward flexed posture (decreased ariane)  Comments/Distance (ft) 1: 10  "ft, cues for AD management and sequencing    Ambulation/Gait Training 2  Surface 2: Level tile  Device 2: Rolling walker  Assistance 2: Contact guard, Minimal verbal cues  Quality of Gait 2: Diminished heel strike, Decreased step length, Forward flexed posture (decreased ariane, increased hesitation with change in directions)  Comments/Distance (ft) 2: 30 ft, cues for AD management and sequencing (Pt. endorsing brody knee trembling and feeling \"like they are going to give out\")    Transfers  Transfer: Yes  Transfer 1  Transfer From 1: Bed to  Transfer to 1: Stand  Technique 1: Sit to stand  Transfer Device 1: Walker  Transfer Level of Assistance 1: Moderate assistance, Moderate verbal cues  Trials/Comments 1: Increased assist as pt. slow to rise and unable to achieve full stand. Cues for proper UE placement and technique.    Transfers 2  Transfer From 2: Stand to  Transfer to 2: Toilet  Technique 2: Stand to sit  Transfer Device 2: Walker  Transfer Level of Assistance 2: Contact guard, Minimal verbal cues  Trials/Comments 2: Cues for slow descent    Transfers 3  Transfer From 3: Toilet to, Stand to  Transfer to 3: Stand, Bed  Technique 3: Sit to stand, Stand to sit  Transfer Device 3: Walker  Transfer Level of Assistance 3: Contact guard, Minimal verbal cues  Trials/Comments 3: Cues for hand placement and technique    Stairs  Stairs: No    Outcome Measures:  [facility] Basic Mobility  Turning from your back to your side while in a flat bed without using bedrails: A little  Moving from lying on your back to sitting on the side of a flat bed without using bedrails: A little  Moving to and from bed to chair (including a wheelchair): A lot  Standing up from a chair using your arms (e.g. wheelchair or bedside chair): A little  To walk in hospital room: A little  Climbing 3-5 steps with railing: A lot  Basic Mobility - Total Score: 16    Education Documentation  Precautions, taught by Jewell Julian, PT at 3/26/2024  3:46 " PM.  Learner: Patient  Readiness: Acceptance  Method: Explanation, Demonstration  Response: Verbalizes Understanding, Needs Reinforcement    Body Mechanics, taught by Jewell Julian PT at 3/26/2024  3:46 PM.  Learner: Patient  Readiness: Acceptance  Method: Explanation, Demonstration  Response: Verbalizes Understanding, Needs Reinforcement    Mobility Training, taught by Jewell Julian, PT at 3/26/2024  3:46 PM.  Learner: Patient  Readiness: Acceptance  Method: Explanation, Demonstration  Response: Verbalizes Understanding, Needs Reinforcement    Education Comments  No comments found.        OP EDUCATION:       Encounter Problems       Encounter Problems (Active)       PT Problem       Pt. will score 24/28 on Tinetti balance test.  (Progressing)       Start:  03/07/24    Expected End:  04/06/24            Patient will complete supine to sit and sit to supine Supervision  (Progressing)       Start:  03/07/24    Expected End:  04/06/24            Patient will complete stand to sit and sit to stand with Supervision with wheeled walker.  (Progressing)       Start:  03/07/24    Expected End:  04/06/24            Patient will ambulate >400' with Rolling Walker and Supervision  (Progressing)       Start:  03/07/24    Expected End:  04/06/24            Patient will ascend and descend 4 steps with 2 UE Support and Supervision  (Progressing)       Start:  03/07/24    Expected End:  04/06/24               Pain - Adult              03/26/24 at 3:46 PM - Jewell Julian PT

## 2024-03-26 NOTE — PROGRESS NOTES
CARDIAC SURGERY DAILY PROGRESS NOTE    Fannie Watson is a 78 y.o. female, with a PMH significant for HTN, HLD, COPD, spondylolisthesis of lumbar lesion s/p L4-L5 decompression laminectomy and fusion 03/01/2024 who presented to [facility] to Neuro ICU on 3/5/2024 for subarachnoid hemorrhage management.      Patient initially presented to [facility] (OSH) with altered mental status, for which a CT was done and showed a trace R sylvian subarachnoid hemorrhage and transferred to [facility] Neuro ICU for further management. While in NSU at [facility], patient went into paroxysmal A fib with RVR. TTE done on 03/07 showed an anterior mobile mass on mitral valve leaflet with moderate mitral valve regurgitation. Follow up CROW showed a 2.5 cm mobile vegetation on the mitral valve with moderate mitral regurgitation. Blood cultures drawn on 03/07 were positive for E. Faecium, for which infectious disease and cardiac surgery was consulted.      She had an MRI of her spine on 03/14 that showed an enlarging fluid collection that was drained by IR due to concern for abscess as source of infection vs possible discitis/osteomyelitis in the spine post-operatively.     Of note, per chart review patient has had multiple presentations since December 2023 for various symptoms including chest pain, chest tightness, shortness of breath and worsening back pain. She also had some dysphagia and decreased appetite for upwards of a month, as well as a cough that . 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 She had a L4-L5 TLIF on 03/01 at OSH.    3/22/2024 OPERATION/PROCEDURE: Dr. Deangelo Chaudhari   1. Minimally invasive mitral valve replacement (31mm Epic bioprosthesis)  2. Exploration of the right femoral vein and artery for cannulation for cardiopulmonary bypass.     CTICU: uneventful   Transferred to T3 on 3/24  ===================================  Interval  "History:   No acute events overnight.  Right Upper Arm PICC single lumen placed.       SUBJECTIVE:  Reports itching of hands and requesting Benedryl as she takes at home.  Patient complains of arthritis pain in hands and of back pain from recent back surgery.    Objective   /51 (BP Location: Left arm, Patient Position: Sitting)   Pulse 92   Temp 36.1 °C (97 °F) (Temporal)   Resp 18   Ht 1.57 m (5' 1.81\")   Wt 75.5 kg (166 lb 6.4 oz)   LMP  (LMP Unknown)   SpO2 94%   BMI 30.62 kg/m²   Pain Score: 7   3 Day Weight Change: Unable to Calculate    Intake and Output    Intake/Output Summary (Last 24 hours) at 3/26/2024 1220  Last data filed at 3/26/2024 1209  Gross per 24 hour   Intake 720 ml   Output 350 ml   Net 370 ml       Physical Exam  Vitals and nursing note reviewed.   Constitutional:       General: She is not in acute distress.     Appearance: Normal appearance. She is obese. She is not ill-appearing.      Comments: Patient alert and awake, sitting up in chair - in no acute distress.    HENT:      Head: Atraumatic.      Nose: Nose normal.      Mouth/Throat:      Pharynx: Oropharynx is clear.   Eyes:      Conjunctiva/sclera: Conjunctivae normal.   Neck:      Comments: Trachea Midline    Cardiovascular:      Rate and Rhythm: Normal rate and regular rhythm.      Pulses: Normal pulses.      Heart sounds: Normal heart sounds. No murmur heard.     No gallop.      Comments: Tele: SR 1st degree AVB 70's to 80's   + equal and even pulses in all extremities   A/V epicardial wires - capped   Right upper extremity PICC; no redness or edema at site  Pulmonary:      Effort: Pulmonary effort is normal. No respiratory distress.      Breath sounds: No wheezing.      Comments: Equal and even chest expansion; thorax symmetric   Diminished breath sounds in bilateral bases   Good inspiratory effort on RA.     Abdominal:      General: Bowel sounds are normal. There is no distension.      Palpations: Abdomen is soft.      " Tenderness: There is no abdominal tenderness.      Comments: Awaiting postop BM    Genitourinary:     Comments: Voids independently   Musculoskeletal:      Cervical back: Neck supple.      Right lower le+ Edema present.      Left lower le+ Edema present.      Comments: ART; 4/5 strength in all extremities   Ambulates independently with walker     Skin:     General: Skin is warm and dry.      Capillary Refill: Capillary refill takes less than 2 seconds.      Coloration: Skin is not jaundiced or pale.      Findings: Bruising (abdomen; thoracotomy site) present.      Comments: Right thoracotomy site: well approximated, no s/s of infection or bleeding - SEEMA +ecchymosis around surgical incision     NITIN single lumen PICC placed 3/25.    Neurological:      General: No focal deficit present.      Mental Status: She is alert and oriented to person, place, and time.   Psychiatric:         Mood and Affect: Mood normal.         Behavior: Behavior normal.       Medications  Scheduled medications  acetaminophen, 650 mg, oral, q6h  ampicillin, 2 g, intravenous, q4h  aspirin, 81 mg, oral, Daily  atorvastatin, 80 mg, oral, Nightly  cefTRIAXone, 2 g, intravenous, q12h  famotidine, 40 mg, oral, Daily  tiotropium, 2 puff, inhalation, Daily   And  fluticasone furoate-vilanteroL, 1 puff, inhalation, Daily  heparin (porcine), 5,000 Units, subcutaneous, q8h  hydroxychloroquine, 200 mg, oral, Daily  ipratropium-albuteroL, 3 mL, nebulization, TID  lidocaine, 5 mL, infiltration, Once  melatonin, 3 mg, oral, Nightly  metoprolol tartrate, 25 mg, oral, BID  oxygen, , inhalation, Continuous - Inhalation  polyethylene glycol, 17 g, oral, BID  pregabalin, 75 mg, oral, BID  sennosides-docusate sodium, 2 tablet, oral, BID  traZODone, 50 mg, oral, Nightly    Continuous medications   PRN medications  PRN medications: albuterol, alteplase, benzocaine-menthol, benzonatate, dextrose **OR** [DISCONTINUED] glucagon, diphenhydrAMINE, ondansetron,  traMADol, traMADol    LABS:  CMP:  Results from last 7 days   Lab Units 03/26/24  0359 03/25/24  0501 03/24/24  1203 03/24/24  0611 03/23/24  2316 03/23/24  2115 03/23/24  1309 03/22/24  2349 03/22/24  1251 03/21/24  0728   SODIUM mmol/L 135* 136 133* 132* 130* 132* 132* 136 140 136   POTASSIUM mmol/L 4.0 4.3 4.5 4.4 3.9 3.9 3.6 4.1 4.0 4.2   CHLORIDE mmol/L 98 99 97* 97* 95* 94* 95* 100 100 98   CO2 mmol/L 29 29 24 25 25 24 25 27 29 28   ANION GAP mmol/L 12 12 17 14 14 18 16 13 15 14   BUN mg/dL 13 14 13 13 12 12 10 7 5* 6   CREATININE mg/dL 0.43* 0.48* 0.54 0.53 0.53 0.58 0.56 0.40* 0.47* 0.49*   EGFR mL/min/1.73m*2 >90 >90 >90 >90 >90 >90 >90 >90 >90 >90   MAGNESIUM mg/dL 1.92 1.97 1.92  --  2.27  --  1.88 2.12 3.35* 2.16   ALBUMIN g/dL 3.2* 3.3* 3.8 3.6 3.7 4.0 4.2 3.8 3.2* 3.3*       CBC:  Results from last 7 days   Lab Units 03/26/24  0359 03/25/24  0501 03/24/24  1203 03/24/24  0611 03/23/24  2316 03/23/24  1309 03/22/24  2349 03/22/24  1251   WBC AUTO x10*3/uL 7.9 9.3 17.5* 16.0* 18.8* 19.0* 13.2* 18.2*   HEMOGLOBIN g/dL 7.8* 8.1* 8.9* 8.2* 8.2* 8.6* 9.3* 10.2*   HEMATOCRIT % 26.0* 26.5* 27.9* 25.6* 24.9* 26.5* 29.9* 31.0*   PLATELETS AUTO x10*3/uL 213 187 202 172 170 191 200 199   MCV fL 88 86 85 86 83 84 84 82       COAG:   Results from last 7 days   Lab Units 03/22/24  1251   INR  1.3*       HEME/ENDO:     CARDIAC:        IMPRESSION & PLAN:  POD # 4 s/p mini Mitral Valve replacement w/ Dr. Chaudhari on 3/22/2024  - Increase activity/ ambulation; PT/OT  - Encourage IS, C/DB; respiratory therapy; wean O2 as claudia -> on RA  - Cardiac rehab referral   - Continue cardiac meds: ASA, BB, statin   - Pain and anticonstipation meds  - 2v CXR completed on 3/25/2024.  - Postop echo ordered on 3/25.   - CUT epicardial wires prior to discharge; per surgeon preference.    - Tele until discharge    Rhythm  - Tele: SR with 1st degree AV Block 70's   - Continue BB: 3/25 increased metoprolol tartrate to 25mg BID   - Adjust  medications as tolerated    Acute Blood Loss Anemia   Recent Labs     24  0359 24  0501 24  1203 24  0611 24  2316 24  1309 24  2349   HGB 7.8* 8.1* 8.9* 8.2* 8.2* 8.6* 9.3*   HCT 26.0* 26.5* 27.9* 25.6* 24.9* 26.5* 29.9*     - MV, PO Iron x1mo  - Daily labs, transfuse as indicated    Thrombocytopenia  Recent Labs     24  0359 24  0501 24  1203 24  0611 24  2316 24  1309 24  2349    187 202 172 170 191 200     - Etiology likely postop/CPB related  - Continue to trend with daily CBCs    Volume/Electrolyte Status: Preop wt Weight: 69.6 kg (153 lb 7 oz)   Vitals:    24 0622   Weight: 75.5 kg (166 lb 6.4 oz)     - Weight: 73.9 kg   - Adjust diuresis for postop cardiac surgery hypervolemia  - 3/25: Lasix 40mg IV x1   - Replete electrolytes for hypokalemia/hypomagnesemia: replaced K & Mg 3/25.   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  Recent Labs     24  0359 24  0501 24  1203 24  0611 24  2316 24  1309 24  2349   WBC 7.9 9.3 17.5* 16.0* 18.8* 19.0* 13.2*       Temp (36hrs), Av.3 °C (97.4 °F), Min:36.1 °C (97 °F), Max:36.7 °C (98.1 °F)     - Infectious Disease Following, appreciate recs:   ---Continue ampicillin 2 g IV  q 4  ---Continue ceftriaxone 2 g IV q 12  ---Both ABXs for 6 weeks from date of surgery, EOT 2024  ---okay to place PICC   ---Weekly CBC with diff and CMP as outpatient while on IV antibiotics and fax reports to Dr Cruz  ---ID will arrange for follow up as an outpatient   - 3/25: Right Upper Arm single lumen PICC placed   - monitor for s/s infection: currently afebrile without any s/s of infection present   - daily CBC while inpatient     Hypertension: home meds: Lisinopril 20mg daily; Bisoprolol 10mg daily   Systolic (24hrs), Av , Min:103 , Max:120   - continue BB   - continue  holding home meds as not clinically indicated to resume based on current BP's   - resume antihypertensives as indicated    COPD: home meds: albuterol inhaler every 6 hours for shortness of breath; Atrovent 17mcg 2 puffs BID; Trelegy Ellipta 1 puf daily   -CXR PRN  -wean NC maintaining SpO2 >92% - RA   -Continue duonebs  -Continue spiriva respimat and breo ellipta   -bronchial hygiene with EZ PAPs     Hyperlipidemia: no home meds   Lab Results   Component Value Date    CHOL 151 03/06/2024    HDL 38.0 03/06/2024    VLDL 21 03/06/2024    TRIG 107 03/06/2024    NHDL 113 03/06/2024   - continue high intensity statin therapy   - follow up lipid panel with PCP/ cardiologist for ongoing lipid management    GERD:  home famotidine 40mg   - continue home famotidine   - diet as tolerated     Arthritis/Chronic pain back and neck pain: home regimen: plaquenil 200mg daily ; Lyrica 75mg daily; Zanaflex 4mg TID PRN for muscle spasms/pain; prednisone 5mg daily during flare ups    - continue manage pain with PRN tramdol and tylenol   - No NSAID's x3 months s/p cardiac surgery   -OARRS reviewed 3/25/2024   --Current Prescriptions: Pregabalin 75mg capsule; last filled 2/15/2024; OTY: 270; prescriber GEORGE Kirkpatrick   - continue home lyrica  - 3/25: resume home plaquenil 200mg daily   - PT/OT   -will not need narcotic prescription at discharge      VTE Prophylaxis: SCDs/TEDs, ambulation, SQ heparin  Code Status: Prior    Dispo  - PT/OT recs low intensity level of continued care; home   - Would benefit from homecare for cardiac surgery carepath and RN visits  - Anticipate discharge 1-2 days, pending diuresis, IV antibiotics for home care set up, PICC line placement, HR/BP management, increased mobility   - Will continue to assess discharge needs    ALEJANDRO Gutierrez-CNP  Cardiac Surgery [facility]  [facility]  Team Phone [phone]    3/26/2024  12:20 PM

## 2024-03-27 ENCOUNTER — APPOINTMENT (OUTPATIENT)
Dept: CARDIOLOGY | Facility: HOSPITAL | Age: 79
DRG: 981 | End: 2024-03-27
Payer: MEDICARE

## 2024-03-27 LAB
ALBUMIN SERPL BCP-MCNC: 3.2 G/DL (ref 3.4–5)
ANION GAP SERPL CALC-SCNC: 13 MMOL/L (ref 10–20)
AORTIC VALVE MEAN GRADIENT: 9.8 MMHG
AORTIC VALVE PEAK VELOCITY: 2.27 M/S
ATRIAL RATE: 78 BPM
AV PEAK GRADIENT: 20.6 MMHG
AVA (PEAK VEL): 1.46 CM2
AVA (VTI): 1.72 CM2
BUN SERPL-MCNC: 9 MG/DL (ref 6–23)
CALCIUM SERPL-MCNC: 8.9 MG/DL (ref 8.6–10.6)
CHLORIDE SERPL-SCNC: 98 MMOL/L (ref 98–107)
CO2 SERPL-SCNC: 27 MMOL/L (ref 21–32)
CREAT SERPL-MCNC: 0.43 MG/DL (ref 0.5–1.05)
EGFRCR SERPLBLD CKD-EPI 2021: >90 ML/MIN/1.73M*2
EJECTION FRACTION APICAL 4 CHAMBER: 58.8
EJECTION FRACTION: 58 %
ERYTHROCYTE [DISTWIDTH] IN BLOOD BY AUTOMATED COUNT: 16.2 % (ref 11.5–14.5)
GLUCOSE BLD MANUAL STRIP-MCNC: 95 MG/DL (ref 74–99)
GLUCOSE SERPL-MCNC: 85 MG/DL (ref 74–99)
HCT VFR BLD AUTO: 27.7 % (ref 36–46)
HGB BLD-MCNC: 8.6 G/DL (ref 12–16)
LEFT ATRIUM VOLUME AREA LENGTH INDEX BSA: 31.1 ML/M2
LEFT VENTRICLE INTERNAL DIMENSION DIASTOLE: 4.2 CM (ref 3.5–6)
LEFT VENTRICULAR OUTFLOW TRACT DIAMETER: 1.86 CM
MAGNESIUM SERPL-MCNC: 1.83 MG/DL (ref 1.6–2.4)
MCH RBC QN AUTO: 27.2 PG (ref 26–34)
MCHC RBC AUTO-ENTMCNC: 31 G/DL (ref 32–36)
MCV RBC AUTO: 88 FL (ref 80–100)
MITRAL VALVE E/E' RATIO: 20.74
NRBC BLD-RTO: 0 /100 WBCS (ref 0–0)
P AXIS: 43 DEGREES
P OFFSET: 184 MS
P ONSET: 135 MS
PHOSPHATE SERPL-MCNC: 4.1 MG/DL (ref 2.5–4.9)
PLATELET # BLD AUTO: 252 X10*3/UL (ref 150–450)
POTASSIUM SERPL-SCNC: 4.3 MMOL/L (ref 3.5–5.3)
PR INTERVAL: 182 MS
Q ONSET: 226 MS
QRS COUNT: 13 BEATS
QRS DURATION: 84 MS
QT INTERVAL: 378 MS
QTC CALCULATION(BAZETT): 430 MS
QTC FREDERICIA: 412 MS
R AXIS: -40 DEGREES
RBC # BLD AUTO: 3.16 X10*6/UL (ref 4–5.2)
RIGHT VENTRICLE FREE WALL PEAK S': 8 CM/S
RIGHT VENTRICLE PEAK SYSTOLIC PRESSURE: 48.2 MMHG
SODIUM SERPL-SCNC: 134 MMOL/L (ref 136–145)
T AXIS: 30 DEGREES
T OFFSET: 415 MS
TRICUSPID ANNULAR PLANE SYSTOLIC EXCURSION: 1.6 CM
VENTRICULAR RATE: 78 BPM
WBC # BLD AUTO: 8.3 X10*3/UL (ref 4.4–11.3)

## 2024-03-27 PROCEDURE — 2500000001 HC RX 250 WO HCPCS SELF ADMINISTERED DRUGS (ALT 637 FOR MEDICARE OP): Performed by: NURSE PRACTITIONER

## 2024-03-27 PROCEDURE — 2500000004 HC RX 250 GENERAL PHARMACY W/ HCPCS (ALT 636 FOR OP/ED): Performed by: NURSE PRACTITIONER

## 2024-03-27 PROCEDURE — 85027 COMPLETE CBC AUTOMATED: CPT | Performed by: NURSE PRACTITIONER

## 2024-03-27 PROCEDURE — 2500000002 HC RX 250 W HCPCS SELF ADMINISTERED DRUGS (ALT 637 FOR MEDICARE OP, ALT 636 FOR OP/ED): Mod: MUE | Performed by: PHYSICIAN ASSISTANT

## 2024-03-27 PROCEDURE — 2500000002 HC RX 250 W HCPCS SELF ADMINISTERED DRUGS (ALT 637 FOR MEDICARE OP, ALT 636 FOR OP/ED): Performed by: NURSE PRACTITIONER

## 2024-03-27 PROCEDURE — 2500000002 HC RX 250 W HCPCS SELF ADMINISTERED DRUGS (ALT 637 FOR MEDICARE OP, ALT 636 FOR OP/ED): Performed by: PHYSICIAN ASSISTANT

## 2024-03-27 PROCEDURE — 2500000002 HC RX 250 W HCPCS SELF ADMINISTERED DRUGS (ALT 637 FOR MEDICARE OP, ALT 636 FOR OP/ED): Mod: MUE | Performed by: NURSE PRACTITIONER

## 2024-03-27 PROCEDURE — 83735 ASSAY OF MAGNESIUM: CPT | Performed by: NURSE PRACTITIONER

## 2024-03-27 PROCEDURE — 94640 AIRWAY INHALATION TREATMENT: CPT

## 2024-03-27 PROCEDURE — 2500000004 HC RX 250 GENERAL PHARMACY W/ HCPCS (ALT 636 FOR OP/ED): Performed by: PHYSICIAN ASSISTANT

## 2024-03-27 PROCEDURE — 2500000005 HC RX 250 GENERAL PHARMACY W/O HCPCS: Performed by: NURSE PRACTITIONER

## 2024-03-27 PROCEDURE — 82947 ASSAY GLUCOSE BLOOD QUANT: CPT

## 2024-03-27 PROCEDURE — 1200000002 HC GENERAL ROOM WITH TELEMETRY DAILY

## 2024-03-27 PROCEDURE — 93306 TTE W/DOPPLER COMPLETE: CPT | Performed by: INTERNAL MEDICINE

## 2024-03-27 PROCEDURE — 97116 GAIT TRAINING THERAPY: CPT | Mod: GP

## 2024-03-27 PROCEDURE — 93306 TTE W/DOPPLER COMPLETE: CPT

## 2024-03-27 PROCEDURE — 80069 RENAL FUNCTION PANEL: CPT | Performed by: NURSE PRACTITIONER

## 2024-03-27 PROCEDURE — 97110 THERAPEUTIC EXERCISES: CPT | Mod: GP

## 2024-03-27 PROCEDURE — 2500000001 HC RX 250 WO HCPCS SELF ADMINISTERED DRUGS (ALT 637 FOR MEDICARE OP)

## 2024-03-27 RX ORDER — FUROSEMIDE 10 MG/ML
20 INJECTION INTRAMUSCULAR; INTRAVENOUS EVERY 12 HOURS
Status: DISCONTINUED | OUTPATIENT
Start: 2024-03-27 | End: 2024-03-29

## 2024-03-27 RX ORDER — IPRATROPIUM BROMIDE AND ALBUTEROL SULFATE 2.5; .5 MG/3ML; MG/3ML
3 SOLUTION RESPIRATORY (INHALATION)
Status: DISCONTINUED | OUTPATIENT
Start: 2024-03-27 | End: 2024-04-01 | Stop reason: HOSPADM

## 2024-03-27 RX ORDER — TIZANIDINE 4 MG/1
4 TABLET ORAL EVERY 8 HOURS PRN
Status: DISCONTINUED | OUTPATIENT
Start: 2024-03-27 | End: 2024-04-01 | Stop reason: HOSPADM

## 2024-03-27 RX ADMIN — BENZONATATE 100 MG: 100 CAPSULE ORAL at 16:31

## 2024-03-27 RX ADMIN — DIPHENHYDRAMINE HYDROCHLORIDE 25 MG: 25 CAPSULE ORAL at 05:36

## 2024-03-27 RX ADMIN — TIZANIDINE 4 MG: 4 TABLET ORAL at 12:06

## 2024-03-27 RX ADMIN — SENNOSIDES AND DOCUSATE SODIUM 2 TABLET: 8.6; 5 TABLET ORAL at 20:11

## 2024-03-27 RX ADMIN — Medication: at 08:00

## 2024-03-27 RX ADMIN — HEPARIN SODIUM 5000 UNITS: 5000 INJECTION INTRAVENOUS; SUBCUTANEOUS at 12:07

## 2024-03-27 RX ADMIN — CEFTRIAXONE SODIUM 2 G: 2 INJECTION, SOLUTION INTRAVENOUS at 10:30

## 2024-03-27 RX ADMIN — METOPROLOL TARTRATE 25 MG: 25 TABLET, FILM COATED ORAL at 20:12

## 2024-03-27 RX ADMIN — FAMOTIDINE 40 MG: 20 TABLET ORAL at 09:11

## 2024-03-27 RX ADMIN — ASPIRIN 81 MG: 81 TABLET, COATED ORAL at 09:11

## 2024-03-27 RX ADMIN — HYDROXYCHLOROQUINE SULFATE 200 MG: 200 TABLET, FILM COATED ORAL at 09:11

## 2024-03-27 RX ADMIN — SENNOSIDES AND DOCUSATE SODIUM 2 TABLET: 8.6; 5 TABLET ORAL at 09:11

## 2024-03-27 RX ADMIN — HEPARIN SODIUM 5000 UNITS: 5000 INJECTION INTRAVENOUS; SUBCUTANEOUS at 20:10

## 2024-03-27 RX ADMIN — METOPROLOL TARTRATE 25 MG: 25 TABLET, FILM COATED ORAL at 09:11

## 2024-03-27 RX ADMIN — AMPICILLIN SODIUM 2 G: 2 INJECTION, POWDER, FOR SOLUTION INTRAMUSCULAR; INTRAVENOUS at 21:06

## 2024-03-27 RX ADMIN — MELATONIN 3 MG: 3 TAB ORAL at 20:11

## 2024-03-27 RX ADMIN — AMPICILLIN SODIUM 2 G: 2 INJECTION, POWDER, FOR SOLUTION INTRAMUSCULAR; INTRAVENOUS at 09:08

## 2024-03-27 RX ADMIN — POLYETHYLENE GLYCOL 3350 17 G: 17 POWDER, FOR SOLUTION ORAL at 09:11

## 2024-03-27 RX ADMIN — IPRATROPIUM BROMIDE AND ALBUTEROL SULFATE 3 ML: .5; 3 SOLUTION RESPIRATORY (INHALATION) at 09:39

## 2024-03-27 RX ADMIN — AMPICILLIN SODIUM 2 G: 2 INJECTION, POWDER, FOR SOLUTION INTRAMUSCULAR; INTRAVENOUS at 04:48

## 2024-03-27 RX ADMIN — TIOTROPIUM BROMIDE INHALATION SPRAY 2 PUFF: 3.12 SPRAY, METERED RESPIRATORY (INHALATION) at 09:39

## 2024-03-27 RX ADMIN — DIPHENHYDRAMINE HYDROCHLORIDE 25 MG: 25 CAPSULE ORAL at 16:31

## 2024-03-27 RX ADMIN — TRAZODONE HYDROCHLORIDE 50 MG: 50 TABLET ORAL at 20:11

## 2024-03-27 RX ADMIN — TRAMADOL HYDROCHLORIDE 50 MG: 50 TABLET, COATED ORAL at 16:31

## 2024-03-27 RX ADMIN — FUROSEMIDE 20 MG: 10 INJECTION, SOLUTION INTRAMUSCULAR; INTRAVENOUS at 09:09

## 2024-03-27 RX ADMIN — AMPICILLIN SODIUM 2 G: 2 INJECTION, POWDER, FOR SOLUTION INTRAMUSCULAR; INTRAVENOUS at 16:43

## 2024-03-27 RX ADMIN — BENZONATATE 100 MG: 100 CAPSULE ORAL at 09:11

## 2024-03-27 RX ADMIN — ACETAMINOPHEN 650 MG: 325 TABLET ORAL at 10:29

## 2024-03-27 RX ADMIN — ACETAMINOPHEN 650 MG: 325 TABLET ORAL at 20:18

## 2024-03-27 RX ADMIN — FUROSEMIDE 20 MG: 10 INJECTION, SOLUTION INTRAMUSCULAR; INTRAVENOUS at 20:29

## 2024-03-27 RX ADMIN — Medication: at 20:00

## 2024-03-27 RX ADMIN — FLUTICASONE FUROATE AND VILANTEROL TRIFENATATE 1 PUFF: 100; 25 POWDER RESPIRATORY (INHALATION) at 09:39

## 2024-03-27 RX ADMIN — AMPICILLIN SODIUM 2 G: 2 INJECTION, POWDER, FOR SOLUTION INTRAMUSCULAR; INTRAVENOUS at 01:12

## 2024-03-27 RX ADMIN — TRAMADOL HYDROCHLORIDE 50 MG: 50 TABLET, COATED ORAL at 09:11

## 2024-03-27 RX ADMIN — AMPICILLIN SODIUM 2 G: 2 INJECTION, POWDER, FOR SOLUTION INTRAMUSCULAR; INTRAVENOUS at 12:50

## 2024-03-27 RX ADMIN — PREGABALIN 75 MG: 75 CAPSULE ORAL at 09:11

## 2024-03-27 RX ADMIN — PREGABALIN 75 MG: 75 CAPSULE ORAL at 20:10

## 2024-03-27 RX ADMIN — ATORVASTATIN CALCIUM 80 MG: 80 TABLET, FILM COATED ORAL at 20:11

## 2024-03-27 RX ADMIN — CEFTRIAXONE SODIUM 2 G: 2 INJECTION, SOLUTION INTRAVENOUS at 20:10

## 2024-03-27 RX ADMIN — HEPARIN SODIUM 5000 UNITS: 5000 INJECTION INTRAVENOUS; SUBCUTANEOUS at 04:48

## 2024-03-27 ASSESSMENT — PAIN DESCRIPTION - DESCRIPTORS
DESCRIPTORS: ACHING;SORE;SPASM
DESCRIPTORS: ACHING;SORE
DESCRIPTORS: ACHING;SORE

## 2024-03-27 ASSESSMENT — COGNITIVE AND FUNCTIONAL STATUS - GENERAL
STANDING UP FROM CHAIR USING ARMS: A LITTLE
DAILY ACTIVITIY SCORE: 20
WALKING IN HOSPITAL ROOM: A LITTLE
MOVING TO AND FROM BED TO CHAIR: A LITTLE
TOILETING: A LITTLE
TURNING FROM BACK TO SIDE WHILE IN FLAT BAD: A LITTLE
MOVING FROM LYING ON BACK TO SITTING ON SIDE OF FLAT BED WITH BEDRAILS: A LITTLE
PERSONAL GROOMING: A LITTLE
CLIMB 3 TO 5 STEPS WITH RAILING: A LOT
MOBILITY SCORE: 17
HELP NEEDED FOR BATHING: A LITTLE
DRESSING REGULAR UPPER BODY CLOTHING: A LITTLE

## 2024-03-27 ASSESSMENT — PAIN - FUNCTIONAL ASSESSMENT
PAIN_FUNCTIONAL_ASSESSMENT: 0-10

## 2024-03-27 ASSESSMENT — PAIN SCALES - GENERAL
PAINLEVEL_OUTOF10: 8
PAINLEVEL_OUTOF10: 8
PAINLEVEL_OUTOF10: 6
PAINLEVEL_OUTOF10: 8

## 2024-03-27 NOTE — PROGRESS NOTES
Physical Therapy    Physical Therapy Treatment    Patient Name: Fannie Watson  MRN: [id number]  Today's Date: 3/27/2024  Time Calculation  Start Time: 1129  Stop Time: 1155  Time Calculation (min): 26 min       Assessment/Plan   PT Assessment  Evaluation/Treatment Tolerance: Patient tolerated treatment well, Patient limited by fatigue, Patient limited by pain  End of Session Communication: Bedside nurse  Assessment Comment: Pt. is progressing appropriately, continues to demo decreased functional strength, impaired balance, and decreased activity tolerance/endurance. Pt. will benefit from continued PT intervention while inpatient to address deficits. Despite AMPAC of 17, pt. is at increased risk for falls and is not at baseline level of function. Pt. remains appropriate for MOD intensity PT upon d/c.  End of Session Patient Position: Up in chair, Alarm off, not on at start of session     PT Plan  Treatment/Interventions: Bed mobility, Transfer training, Gait training, Stair training, Balance training, Strengthening, Endurance training, Range of motion, Therapeutic exercise, Therapeutic activity, Home exercise program  PT Plan: Skilled PT  PT Frequency: Daily  PT Discharge Recommendations: Moderate intensity level of continued care  Equipment Recommended upon Discharge: Wheeled walker  PT Recommended Transfer Status: Assist x1, Assistive device  PT - OK to Discharge: Yes      General Visit Information:   PT  Visit  PT Received On: 03/27/24  Response to Previous Treatment: Patient with no complaints from previous session.  General  Reason for Referral: s/p L4-L5 decompression laminectomy and fusion 03/01/2024 who initially presented to OSH on 03/05 with AMS and found to have trace R sylvian SAH who was also found to have mitral valve endocarditis with E faecium bacteremia. Now s/p R mini thoracotomy and Mitral valve replacement  Past Medical History Relevant to Rehab: HTN, HLD, COPD, 3/1 s/p MIS L4-5  TLIF  Family/Caregiver Present: No  Prior to Session Communication: Bedside nurse  Patient Position Received: Bed, 3 rail up, Alarm off, not on at start of session  Preferred Learning Style: verbal, visual  General Comment: Pt. received supine in bed, agreeable to participate in session. Pt. able to recall spinal precautions this date.    Subjective   Precautions:  Precautions  Medical Precautions: Fall precautions, Cardiac precautions, Spinal precautions  Post-Surgical Precautions: Spinal precautions  Precautions Comment: SBP < 140, MAP 70-90    Vital Signs:  Vital Signs  Heart Rate: 88 (Max HR 98 bpm with ambulation. Post: 90 bpm)  Heart Rate Source: Monitor  Patient Position: Lying    Objective   Pain:  Pain Assessment  Pain Assessment: 0-10  Pain Score: 8  Pain Type: Surgical pain  Pain Location: Back  Pain Orientation: Lower, Mid  Pain Descriptors: Aching, Sore    Cognition:  Cognition  Overall Cognitive Status: Within Functional Limits  Orientation Level: Oriented X4    Activity Tolerance:  Activity Tolerance  Endurance: Tolerates 10 - 20 min exercise with multiple rests  Activity Tolerance Comments: Pt. tolerated ambulating increased distance this date, continues to be limited by decreased activity tolerance/endurance and pain    Treatments:  Therapeutic Exercise  Therapeutic Exercise Performed: Yes  Therapeutic Exercise Activity 1: Sup: AP x20 brdoy, heel slides x15 brody  Therapeutic Exercise Activity 2: EOB: LAQ x15 broyd    Therapeutic Activity  Therapeutic Activity Performed: Yes  Therapeutic Activity 1: Pt. completed static standing activities at sink in bathroom, ~2 min, CGAx1 for standing balance. Pt. with increased UE reliance on counter, intermittently leaning onto elbows. No acute LOB.    Therapeutic Activity 2: Seated rest break provided between ambulation trials as pt. endorsing significant pain and fatigue. ~5 min in duration. Emphasis on upright posture and deep breathing activities for pain  "control.    Bed Mobility  Bed Mobility: Yes  Bed Mobility 1  Bed Mobility 1: Supine to sitting  Level of Assistance 1: Close supervision  Bed Mobility Comments 1: HOB elevated, cues for log roll, use of bed rails    Ambulation/Gait Training  Ambulation/Gait Training Performed: Yes  Ambulation/Gait Training 1  Surface 1: Level tile  Device 1: Rolling walker  Assistance 1: Contact guard, Minimal verbal cues  Quality of Gait 1: Diminished heel strike, Forward flexed posture (decreased ariane)  Comments/Distance (ft) 1: 10 ft to bathroom, seated rest on toilet to complete toileting activities    Ambulation/Gait Training 2  Surface 2: Level tile  Device 2: Rolling walker  Assistance 2: Contact guard, Minimal verbal cues  Quality of Gait 2: Wide base of support, Decreased step length, Forward flexed posture (decreased ariane)  Comments/Distance (ft) 2: 80 ft, followed by seated rest break 2/2 fatigue, back pain, and feeling as if legs were \"going to give out\" (cues for AD management and sequencing, cues to maintain self within parameters of FWW to maximize stability and safety)    Ambulation/Gait Training 3  Surface 3: Level tile  Device 3: Rolling walker  Assistance 3: Minimum assistance, Minimal verbal cues  Quality of Gait 3: Wide base of support, Decreased step length, Forward flexed posture (decerased ariane)  Comments/Distance (ft) 3: 100 ft, increased assist required this date 2/2 fatigue (Cues for AD management and sequencing. Cues to avoid obstacles in hallway. Cues to maintain self within parameters of FWW to maximie stability.)    Transfers  Transfer: Yes  Transfer 1  Transfer From 1: Bed to  Transfer to 1: Stand  Technique 1: Sit to stand  Transfer Device 1: Walker  Transfer Level of Assistance 1: Minimal verbal cues, Minimum assistance  Trials/Comments 1: Cues for proper UE placement and technique as pt. attempting to pull self to stand with walker. Pt. able to achieve ~75% stand with CGAx1 though demos " "increased knee flexion and requires minAx1 to achieve full stand.    Transfers 2  Transfer From 2: Stand to, Toilet to  Transfer to 2: Toilet, Stand  Technique 2: Stand to sit, Sit to stand  Transfer Device 2: Walker  Transfer Level of Assistance 2: Contact guard, Minimal verbal cues  Trials/Comments 2: Cues for slow desecnt and to reach posteriorly to maximize stability and safety.    Transfers 3  Transfer From 3: Stand to  Transfer to 3: Chair without arms  Technique 3: Stand to sit  Transfer Device 3: Walker  Transfer Level of Assistance 3: Minimum assistance, Minimal verbal cues  Trials/Comments 3: Increased assist as pt. endorsing significant fatigue and impulsive, reporting \"my legs are giving out\". Pt. demo decreased eccentric control with descent.    Transfers 4  Transfer From 4: Chair with arms to  Transfer to 4: Stand  Technique 4: Sit to stand  Transfer Device 4: Walker  Transfer Level of Assistance 4: Minimum assistance, Minimal verbal cues  Trials/Comments 4: Pt. again required cues for proper UE placement and technique, demos increased instablity with transitional movement    Transfers 5  Transfer From 5: Stand to  Transfer to 5: Chair with arms  Technique 5: Stand to sit  Transfer Device 5: Walker  Transfer Level of Assistance 5: Minimum assistance, Minimal verbal cues    Stairs  Stairs: No    Outcome Measures:  [facility] Basic Mobility  Turning from your back to your side while in a flat bed without using bedrails: A little  Moving from lying on your back to sitting on the side of a flat bed without using bedrails: A little  Moving to and from bed to chair (including a wheelchair): A little  Standing up from a chair using your arms (e.g. wheelchair or bedside chair): A little  To walk in hospital room: A little  Climbing 3-5 steps with railing: A lot  Basic Mobility - Total Score: 17    Education Documentation  Precautions, taught by Jewell Julian, PT at 3/27/2024 12:11 PM.  Learner: " Patient  Readiness: Acceptance  Method: Explanation, Demonstration  Response: Verbalizes Understanding, Needs Reinforcement    Body Mechanics, taught by Jewell Julian PT at 3/27/2024 12:11 PM.  Learner: Patient  Readiness: Acceptance  Method: Explanation, Demonstration  Response: Verbalizes Understanding, Needs Reinforcement    Mobility Training, taught by Jewell Julian, PT at 3/27/2024 12:11 PM.  Learner: Patient  Readiness: Acceptance  Method: Explanation, Demonstration  Response: Verbalizes Understanding, Needs Reinforcement    Education Comments  No comments found.        OP EDUCATION:       Encounter Problems       Encounter Problems (Active)       PT Problem       Pt. will score 24/28 on Tinetti balance test.  (Progressing)       Start:  03/07/24    Expected End:  04/06/24            Patient will complete supine to sit and sit to supine Supervision  (Progressing)       Start:  03/07/24    Expected End:  04/06/24            Patient will complete stand to sit and sit to stand with Supervision with wheeled walker.  (Progressing)       Start:  03/07/24    Expected End:  04/06/24            Patient will ambulate >400' with Rolling Walker and Supervision  (Progressing)       Start:  03/07/24    Expected End:  04/06/24            Patient will ascend and descend 4 steps with 2 UE Support and Supervision  (Progressing)       Start:  03/07/24    Expected End:  04/06/24               Pain - Adult              03/27/24 at 12:12 PM - Jewell Julian PT

## 2024-03-27 NOTE — PROGRESS NOTES
SW met with patient at bedside to follow up on discharge plan. Patient states she prefers to discharge home with infusion if the total cost is $290.31/week. Patient also provided SNF choices 1. [facility] and [facility]. [facility]. SW sent SNF referral and updated TCC. SW will continue to follow.     KARELY Hagen

## 2024-03-27 NOTE — PROGRESS NOTES
"Nutrition Follow Up Assessment:   Nutrition Assessment         Patient is a 78 y.o. female presenting with R SAH.   3/1 s/p MIS L4-5 TLIF, 3/5 found to have change in mental status and transferred to [facility]. On admission day 22.     New onset Afib with RVR, HTN, Mitral valve vegetation, moderate to severe Mitral regurgitation, Enterococcus faecium bacteremia and native valve mitral endocarditis      Nutrition History:  Energy Intake: Fair 50-75 %  Food and Nutrient History: Pt endorses increased appetite and intake. She was upset that she gets interrupted during meal times and that her food gets cold. She is not receiving Ensure at this time but would like to restart one per day just to supplement if needed. RD agrees. She requests chocolate. Per flowsheet, intake has been % of meals documented.    Anthropometrics:  Height: 157 cm (5' 1.81\")   Weight: 75.5 kg (166 lb 8 oz)   BMI (Calculated): 30.64  IBW/kg (Dietitian Calculated): 52.3 kg  Percent of IBW: 127 %       Weight History:   03/27/24 0123 75.5 kg (166 lb 8 oz)   03/26/24 0622 75.5 kg (166 lb 6.4 oz)   03/25/24 0630 73.9 kg (162 lb 14.4 oz)   03/22/24 0500 66.1 kg (145 lb 11.6 oz)   03/21/24 0500 66.7 kg (147 lb 0.8 oz)   03/20/24 0423 66.7 kg (147 lb 0.8 oz)   03/19/24 0329 66 kg (145 lb 8.1 oz)   03/18/24 0004 67.2 kg (148 lb 2.4 oz)   03/17/24 0402 66.1 kg (145 lb 11.6 oz)   03/16/24 0546 66.6 kg (146 lb 13.2 oz)   03/16/24 0400 66.6 kg (146 lb 13.2 oz)   03/15/24 0534 66.5 kg (146 lb 9.7 oz)   03/15/24 0344 66.5 kg (146 lb 9.7 oz)   03/14/24 0255 67.2 kg (148 lb 2.4 oz)   03/13/24 0412 67 kg (147 lb 11.3 o     Weight Change %:  Weight History / % Weight Change: 9.4kg increase in 5 days  Significant Weight Gain: Fluid related    Nutrition Focused Physical Exam Findings:  Subcutaneous Fat Loss:   Orbital Fat Pads: Mild-Moderate (slight dark circles and slight hollowing)  Buccal Fat Pads: Mild-Moderate (flat cheeks, minimal bounce)  Muscle " Wasting:  Temporalis: Mild-Moderate (slight depression)  Pectoralis (Clavicular Region): Well nourished (clavicle not visible)  Deltoid/Trapezius: Well nourished (rounded appearance at arm, shoulder, neck)  Interosseous: Well nourished (muscle bulges)  Edema:  Edema: +2 mild  Edema Location: generalized  Physical Findings:  Skin: Negative (healing surgical incisions)    Nutrition Significant Labs:  Reviewed    Nutrition Specific Medications:  Reviewed    I/O:   Last BM Date: 03/24/24; Stool Appearance: Formed (03/21/24 1154)    Dietary Orders (From admission, onward)       Start     Ordered    03/23/24 0551  Adult diet Cardiac; 70 gm fat; 2 - 3 grams Sodium  Diet effective now        Question Answer Comment   Diet type Cardiac    Fat restriction: 70 gm fat    Sodium restriction: 2 - 3 grams Sodium        03/23/24 0550                   Estimated Needs:   Total Energy Estimated Needs (kCal): 1700 kCal  Method for Estimating Needs: 25kcal/kg ABW  Total Protein Estimated Needs (g): 70 g  Method for Estimating Needs: 1.4gm/kg IBW  Total Fluid Estimated Needs (mL):  (per team)    Nutrition Diagnosis   Malnutrition Diagnosis  Patient has Malnutrition Diagnosis: No    Nutrition Diagnosis  Patient has Nutrition Diagnosis: Yes  Diagnosis Status (1): Ongoing  Nutrition Diagnosis 1: Inadequate oral intake  Related to (1): decreased appetite/intake with acute illness  As Evidenced by (1): report of eating less than 50% of meals.       Nutrition Interventions/Recommendations         Nutrition Prescription:   Continue current diet   Ensure Plus once daily to provide 350kcal and 13gm protein         Nutrition Interventions:   Interventions: Meals and snacks, Medical food supplement  Meals and Snacks: General healthful diet  Goal: consume > 66% x 3 meals/day  Medical Food Supplement: Commercial beverage  Goal: consume 100% x 1 Ensure Plus daily    Nutrition Education:    N/A     Nutrition Monitoring and Evaluation   Food/Nutrient  Related History Monitoring  Monitoring and Evaluation Plan: Energy intake  Energy Intake: Estimated energy intake  Criteria: > 75% EENs    Body Composition/Growth/Weight History  Monitoring and Evaluation Plan: Weight change  Criteria: maintain stable weight    Biochemical Data, Medical Tests and Procedures  Monitoring and Evaluation Plan: Electrolyte/renal panel, Glucose/endocrine profile  Criteria: labs WNL    Time Spent/Follow-up Reminder:   Time Spent (min): 60 minutes  Last Date of Nutrition Visit: 03/27/24  Nutrition Follow-Up Needed?: Dietitian to reassess per policy  Follow up Comment: ONS 1x/day

## 2024-03-27 NOTE — PROGRESS NOTES
CARDIAC SURGERY DAILY PROGRESS NOTE    Fannie Watson is a 78 y.o. female, with a PMH significant for HTN, HLD, COPD, spondylolisthesis of lumbar lesion s/p L4-L5 decompression laminectomy and fusion 03/01/2024 who presented to [facility] to Neuro ICU on 3/5/2024 for subarachnoid hemorrhage management.      Patient initially presented to [facility] (OSH) with altered mental status, for which a CT was done and showed a trace R sylvian subarachnoid hemorrhage and transferred to [facility] Neuro ICU for further management. While [facility] [place] [facility], patient went into paroxysmal A fib with RVR. TTE done on 03/07 showed an anterior mobile mass on mitral valve leaflet with moderate mitral valve regurgitation. Follow up CROW showed a 2.5 cm mobile vegetation on the mitral valve with moderate mitral regurgitation. Blood cultures drawn on 03/07 were positive for E. Faecium, for which infectious disease and cardiac surgery was consulted.      She had an MRI of her spine on 03/14 that showed an enlarging fluid collection that was drained by IR due to concern for abscess as source of infection vs possible discitis/osteomyelitis in the spine post-operatively.     Of note, per chart review patient has had multiple presentations since December 2023 for various symptoms including chest pain, chest tightness, shortness of breath and worsening back pain. She also had some dysphagia and decreased appetite for upwards of a month, as well as a cough that . 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 She had a L4-L5 TLIF on 03/01 at OSH.    3/22/2024 OPERATION/PROCEDURE: Dr. Deangelo Chaudhari   1. Minimally invasive mitral valve replacement (31mm Epic bioprosthesis)  2. Exploration of the right femoral vein and artery for cannulation for cardiopulmonary bypass.     CTICU: uneventful   Transferred to T3 on 3/24  ===================================  Interval  "History:   No acute events overnight.  Right Upper Arm PICC single lumen placed.       SUBJECTIVE:  Reports itching of hands and requesting Benedryl as she takes at home.  Patient complains of arthritis pain in hands and of back pain from recent back surgery.    Objective   /73 (BP Location: Left arm, Patient Position: Lying)   Pulse 83   Temp 36.2 °C (97.2 °F) (Temporal)   Resp 17   Ht 1.57 m (5' 1.81\")   Wt 75.5 kg (166 lb 8 oz)   LMP  (LMP Unknown)   SpO2 95%   BMI 30.64 kg/m²   Pain Score: 4   3 Day Weight Change: Unable to Calculate    Intake and Output    Intake/Output Summary (Last 24 hours) at 3/27/2024 0915  Last data filed at 3/27/2024 0548  Gross per 24 hour   Intake 960 ml   Output 1400 ml   Net -440 ml       Physical Exam  Vitals reviewed.   Constitutional:       General: She is not in acute distress.     Appearance: Normal appearance. She is obese. She is not ill-appearing.      Comments: Patient alert and awake, sitting up in chair - in no acute distress.    HENT:      Head: Atraumatic.      Nose: Nose normal.      Mouth/Throat:      Pharynx: Oropharynx is clear.   Eyes:      Conjunctiva/sclera: Conjunctivae normal.   Neck:      Comments: Trachea Midline    Cardiovascular:      Rate and Rhythm: Normal rate and regular rhythm.      Pulses: Normal pulses.      Heart sounds: Normal heart sounds. No murmur heard.     No gallop.      Comments: Tele: SR 1st degree AVB 70's to 80's   + equal and even pulses in all extremities   A/V epicardial wires - capped   Right upper extremity PICC; no redness or edema at site  Pulmonary:      Effort: Pulmonary effort is normal. No respiratory distress.      Breath sounds: No wheezing.      Comments: Equal and even chest expansion; thorax symmetric   Diminished breath sounds in bilateral bases   Good inspiratory effort on RA.     Abdominal:      General: Bowel sounds are normal. There is no distension.      Palpations: Abdomen is soft.      Tenderness: " There is no abdominal tenderness.   Genitourinary:     Comments: Voids independently   Musculoskeletal:      Cervical back: Neck supple.      Right lower le+ Edema present.      Left lower le+ Edema present.      Comments: ART; 4/5 strength in all extremities   Ambulates independently with walker     Skin:     General: Skin is warm and dry.      Capillary Refill: Capillary refill takes less than 2 seconds.      Coloration: Skin is not jaundiced or pale.      Findings: Bruising (abdomen; thoracotomy site) present.      Comments: Right thoracotomy site: well approximated, no s/s of infection or bleeding - SEEMA +ecchymosis around surgical incision     RUE PICC in place with occlusive dressing no redness    Lower back incisions well approximated and well healed with no erythema or drainage   Neurological:      General: No focal deficit present.      Mental Status: She is alert and oriented to person, place, and time.   Psychiatric:         Mood and Affect: Mood normal.         Behavior: Behavior normal.       Medications  Scheduled medications  acetaminophen, 650 mg, oral, q6h  ampicillin, 2 g, intravenous, q4h  aspirin, 81 mg, oral, Daily  atorvastatin, 80 mg, oral, Nightly  cefTRIAXone, 2 g, intravenous, q12h  famotidine, 40 mg, oral, Daily  tiotropium, 2 puff, inhalation, Daily   And  fluticasone furoate-vilanteroL, 1 puff, inhalation, Daily  furosemide, 20 mg, intravenous, q12h  heparin (porcine), 5,000 Units, subcutaneous, q8h  hydroxychloroquine, 200 mg, oral, Daily  ipratropium-albuteroL, 3 mL, nebulization, TID  lidocaine, 5 mL, infiltration, Once  melatonin, 3 mg, oral, Nightly  metoprolol tartrate, 25 mg, oral, BID  oxygen, , inhalation, Continuous - Inhalation  polyethylene glycol, 17 g, oral, BID  pregabalin, 75 mg, oral, BID  sennosides-docusate sodium, 2 tablet, oral, BID  traZODone, 50 mg, oral, Nightly    Continuous medications   PRN medications  PRN medications: albuterol, alteplase,  benzocaine-menthol, benzonatate, dextrose **OR** [DISCONTINUED] glucagon, diphenhydrAMINE, ondansetron, traMADol, traMADol    LABS:  CMP:  Results from last 7 days   Lab Units 03/27/24  0532 03/26/24  0359 03/25/24  0501 03/24/24  1203 03/24/24  0611 03/23/24  2316 03/23/24  2115 03/23/24  1309 03/22/24  2349 03/22/24  1251 03/21/24  0728   SODIUM mmol/L 134* 135* 136 133* 132* 130* 132* 132* 136 140 136   POTASSIUM mmol/L 4.3 4.0 4.3 4.5 4.4 3.9 3.9 3.6 4.1 4.0 4.2   CHLORIDE mmol/L 98 98 99 97* 97* 95* 94* 95* 100 100 98   CO2 mmol/L 27 29 29 24 25 25 24 25 27 29 28   ANION GAP mmol/L 13 12 12 17 14 14 18 16 13 15 14   BUN mg/dL 9 13 14 13 13 12 12 10 7 5* 6   CREATININE mg/dL 0.43* 0.43* 0.48* 0.54 0.53 0.53 0.58 0.56 0.40* 0.47* 0.49*   EGFR mL/min/1.73m*2 >90 >90 >90 >90 >90 >90 >90 >90 >90 >90 >90   MAGNESIUM mg/dL 1.83 1.92 1.97 1.92  --  2.27  --  1.88 2.12 3.35* 2.16   ALBUMIN g/dL 3.2* 3.2* 3.3* 3.8 3.6 3.7 4.0 4.2 3.8 3.2* 3.3*       CBC:  Results from last 7 days   Lab Units 03/27/24  0532 03/26/24  0359 03/25/24  0501 03/24/24  1203 03/24/24  0611 03/23/24  2316 03/23/24  1309 03/22/24  2349   WBC AUTO x10*3/uL 8.3 7.9 9.3 17.5* 16.0* 18.8* 19.0* 13.2*   HEMOGLOBIN g/dL 8.6* 7.8* 8.1* 8.9* 8.2* 8.2* 8.6* 9.3*   HEMATOCRIT % 27.7* 26.0* 26.5* 27.9* 25.6* 24.9* 26.5* 29.9*   PLATELETS AUTO x10*3/uL 252 213 187 202 172 170 191 200   MCV fL 88 88 86 85 86 83 84 84       COAG:   Results from last 7 days   Lab Units 03/22/24  1251   INR  1.3*       HEME/ENDO:     CARDIAC:        IMPRESSION & PLAN:  s/p mini Mitral Valve replacement w/ Dr. Chaudhari on 3/22/2024  - Increase activity/ ambulation; PT/OT  - Encourage IS, C/DB; respiratory therapy; wean O2 as claudia -> on RA  - Cardiac rehab referral   - Continue cardiac meds: ASA, BB, statin   - Pain and anticonstipation meds  - Postop echo PENDING  - CUT epicardial wires prior to discharge; per surgeon preference.    - Tele until discharge    Rhythm  - Tele: SR with  1st degree AV Block 70's   - Continue BB: 3/25 increased metoprolol tartrate to 25mg BID     Acute Blood Loss Anemia - stable  Recent Labs     24  0532 24  0359 24  0501 24  1203 24  0611 24  2316 24  1309   HGB 8.6* 7.8* 8.1* 8.9* 8.2* 8.2* 8.6*   HCT 27.7* 26.0* 26.5* 27.9* 25.6* 24.9* 26.5*     - MV, PO Iron x1mo    Volume/Electrolyte Status: Preop wt Weight: 69.6 kg (153 lb 7 oz)   Total body fluid overload  Vitals:    24 0123   Weight: 75.5 kg (166 lb 8 oz)     - BID scheduled lasix today  - Replete electrolytes as needed  - Daily weights/strict I&Os  - Daily RFP     ? 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  Temp (36hrs), Av.3 °C (97.4 °F), Min:36 °C (96.8 °F), Max:37.1 °C (98.8 °F)     - Infectious Disease Following, appreciate recs:   -Continue ampicillin 2 g IV  q 4  -Continue ceftriaxone 2 g IV q 12  -Both ABXs for 6 weeks from date of surgery, EOT 2024  -Weekly CBC with diff and CMP as outpatient while on IV antibiotics and fax reports to Dr Cruz  -ID will arrange for follow up as an outpatient    - daily CBC while inpatient     Hypertension: home meds: Lisinopril 20mg daily; Bisoprolol 10mg daily   Systolic (24hrs), Av , Min:102 , Max:144   - continue BB   - continue holding home meds as not clinically indicated to resume based on current BP's   - resume antihypertensives as indicated    Acute post op pulmonary insufficiency  COPD (not on home O2): home meds: albuterol inhaler every 6 hours for shortness of breath; Atrovent 17mcg 2 puffs BID; Trelegy Ellipta 1 puf daily   -wean NC maintaining SpO2 >92% currently on 1L NC   -Continue duonebs  -Continue spiriva respimat and breo ellipta   -bronchial hygiene with EZ PAPs     GERD:  home famotidine 40mg   - continue home famotidine   - diet as tolerated     Arthritis/Chronic  pain back and neck pain: home regimen: plaquenil 200mg daily ; Lyrica 75mg daily; Zanaflex 4mg TID PRN for muscle spasms/pain; prednisone 5mg daily during flare ups    - continue manage pain with PRN tramdol and tylenol   -OARRS reviewed 3/25/2024   - continue home lyrica  - Continue home plaquenil 200mg daily   - PT/OT   - Restart home Zanaflex    TLIF of L4-L5 2/26/24  Per patient she was required to wear a brace post op and does not have here  - Will engage orthopedic/spine surgery here to re-evaluate need for brace    VTE Prophylaxis: SCDs/TEDs, ambulation, SQ heparin  Code Status: Prior    Access: RUE PICC placed 3/25    Dispo  - PT/OT recs low intensity level of continued care; home   - Would benefit from homecare for cardiac surgery carepath and RN visits  - Anticipate discharge 1-2 days, pending diuresis, IV antibiotics for home care set up, PICC line placement, HR/BP management, increased mobility   - Will continue to assess discharge needs    Tony Acosta PA-C  Cardiac Surgery [facility]  [facility]  Team Phone [phone]    3/27/2024  9:46 AM

## 2024-03-28 LAB
ALBUMIN SERPL BCP-MCNC: 3 G/DL (ref 3.4–5)
ANION GAP SERPL CALC-SCNC: 9 MMOL/L (ref 10–20)
BUN SERPL-MCNC: 8 MG/DL (ref 6–23)
CALCIUM SERPL-MCNC: 8.7 MG/DL (ref 8.6–10.6)
CHLORIDE SERPL-SCNC: 99 MMOL/L (ref 98–107)
CO2 SERPL-SCNC: 34 MMOL/L (ref 21–32)
CREAT SERPL-MCNC: 0.4 MG/DL (ref 0.5–1.05)
EGFRCR SERPLBLD CKD-EPI 2021: >90 ML/MIN/1.73M*2
ERYTHROCYTE [DISTWIDTH] IN BLOOD BY AUTOMATED COUNT: 16.2 % (ref 11.5–14.5)
GLUCOSE SERPL-MCNC: 92 MG/DL (ref 74–99)
HCT VFR BLD AUTO: 26.9 % (ref 36–46)
HGB BLD-MCNC: 8.3 G/DL (ref 12–16)
MAGNESIUM SERPL-MCNC: 1.78 MG/DL (ref 1.6–2.4)
MCH RBC QN AUTO: 26.7 PG (ref 26–34)
MCHC RBC AUTO-ENTMCNC: 30.9 G/DL (ref 32–36)
MCV RBC AUTO: 87 FL (ref 80–100)
NRBC BLD-RTO: 0 /100 WBCS (ref 0–0)
PHOSPHATE SERPL-MCNC: 4 MG/DL (ref 2.5–4.9)
PLATELET # BLD AUTO: 293 X10*3/UL (ref 150–450)
POTASSIUM SERPL-SCNC: 3.7 MMOL/L (ref 3.5–5.3)
RBC # BLD AUTO: 3.11 X10*6/UL (ref 4–5.2)
SODIUM SERPL-SCNC: 138 MMOL/L (ref 136–145)
WBC # BLD AUTO: 6.6 X10*3/UL (ref 4.4–11.3)

## 2024-03-28 PROCEDURE — 97530 THERAPEUTIC ACTIVITIES: CPT | Mod: GP

## 2024-03-28 PROCEDURE — 2500000004 HC RX 250 GENERAL PHARMACY W/ HCPCS (ALT 636 FOR OP/ED): Performed by: NURSE PRACTITIONER

## 2024-03-28 PROCEDURE — 94640 AIRWAY INHALATION TREATMENT: CPT

## 2024-03-28 PROCEDURE — 83735 ASSAY OF MAGNESIUM: CPT | Performed by: NURSE PRACTITIONER

## 2024-03-28 PROCEDURE — 2500000004 HC RX 250 GENERAL PHARMACY W/ HCPCS (ALT 636 FOR OP/ED): Performed by: PHYSICIAN ASSISTANT

## 2024-03-28 PROCEDURE — 2500000002 HC RX 250 W HCPCS SELF ADMINISTERED DRUGS (ALT 637 FOR MEDICARE OP, ALT 636 FOR OP/ED): Mod: MUE | Performed by: PHYSICIAN ASSISTANT

## 2024-03-28 PROCEDURE — 85027 COMPLETE CBC AUTOMATED: CPT | Performed by: NURSE PRACTITIONER

## 2024-03-28 PROCEDURE — 80069 RENAL FUNCTION PANEL: CPT | Performed by: NURSE PRACTITIONER

## 2024-03-28 PROCEDURE — 2500000001 HC RX 250 WO HCPCS SELF ADMINISTERED DRUGS (ALT 637 FOR MEDICARE OP): Performed by: NURSE PRACTITIONER

## 2024-03-28 PROCEDURE — 2500000005 HC RX 250 GENERAL PHARMACY W/O HCPCS: Performed by: NURSE PRACTITIONER

## 2024-03-28 PROCEDURE — 2500000001 HC RX 250 WO HCPCS SELF ADMINISTERED DRUGS (ALT 637 FOR MEDICARE OP)

## 2024-03-28 PROCEDURE — 97116 GAIT TRAINING THERAPY: CPT | Mod: GP

## 2024-03-28 PROCEDURE — 2500000001 HC RX 250 WO HCPCS SELF ADMINISTERED DRUGS (ALT 637 FOR MEDICARE OP): Performed by: PHYSICIAN ASSISTANT

## 2024-03-28 PROCEDURE — 2500000002 HC RX 250 W HCPCS SELF ADMINISTERED DRUGS (ALT 637 FOR MEDICARE OP, ALT 636 FOR OP/ED): Performed by: PHYSICIAN ASSISTANT

## 2024-03-28 PROCEDURE — 1200000002 HC GENERAL ROOM WITH TELEMETRY DAILY

## 2024-03-28 PROCEDURE — 2500000002 HC RX 250 W HCPCS SELF ADMINISTERED DRUGS (ALT 637 FOR MEDICARE OP, ALT 636 FOR OP/ED): Mod: MUE | Performed by: THORACIC SURGERY (CARDIOTHORACIC VASCULAR SURGERY)

## 2024-03-28 PROCEDURE — 2500000002 HC RX 250 W HCPCS SELF ADMINISTERED DRUGS (ALT 637 FOR MEDICARE OP, ALT 636 FOR OP/ED): Performed by: THORACIC SURGERY (CARDIOTHORACIC VASCULAR SURGERY)

## 2024-03-28 RX ORDER — ATORVASTATIN CALCIUM 80 MG/1
80 TABLET, FILM COATED ORAL NIGHTLY
Start: 2024-03-28

## 2024-03-28 RX ORDER — ACETAMINOPHEN 325 MG/1
650 TABLET ORAL EVERY 6 HOURS PRN
Start: 2024-03-28

## 2024-03-28 RX ORDER — ASPIRIN 81 MG/1
81 TABLET ORAL DAILY
Start: 2024-03-29

## 2024-03-28 RX ORDER — POLYETHYLENE GLYCOL 3350 17 G/17G
17 POWDER, FOR SOLUTION ORAL 2 TIMES DAILY
Start: 2024-03-28 | End: 2024-03-31 | Stop reason: SDUPTHER

## 2024-03-28 RX ORDER — TALC
3 POWDER (GRAM) TOPICAL NIGHTLY
Start: 2024-03-28

## 2024-03-28 RX ORDER — METOPROLOL TARTRATE 50 MG/1
50 TABLET ORAL 2 TIMES DAILY
Start: 2024-03-28

## 2024-03-28 RX ORDER — POTASSIUM CHLORIDE 20 MEQ/1
40 TABLET, EXTENDED RELEASE ORAL ONCE
Status: COMPLETED | OUTPATIENT
Start: 2024-03-28 | End: 2024-03-28

## 2024-03-28 RX ORDER — DIPHENHYDRAMINE HCL 25 MG
25 CAPSULE ORAL EVERY 8 HOURS PRN
Qty: 30 CAPSULE | Refills: 0 | Status: SHIPPED | OUTPATIENT
Start: 2024-03-28 | End: 2024-03-29 | Stop reason: HOSPADM

## 2024-03-28 RX ORDER — METOPROLOL TARTRATE 50 MG/1
50 TABLET ORAL 2 TIMES DAILY
Status: DISCONTINUED | OUTPATIENT
Start: 2024-03-28 | End: 2024-04-01 | Stop reason: HOSPADM

## 2024-03-28 RX ADMIN — AMPICILLIN SODIUM 2 G: 2 INJECTION, POWDER, FOR SOLUTION INTRAMUSCULAR; INTRAVENOUS at 04:57

## 2024-03-28 RX ADMIN — HEPARIN SODIUM 5000 UNITS: 5000 INJECTION INTRAVENOUS; SUBCUTANEOUS at 20:57

## 2024-03-28 RX ADMIN — SENNOSIDES AND DOCUSATE SODIUM 2 TABLET: 8.6; 5 TABLET ORAL at 20:58

## 2024-03-28 RX ADMIN — POLYETHYLENE GLYCOL 3350 17 G: 17 POWDER, FOR SOLUTION ORAL at 20:58

## 2024-03-28 RX ADMIN — AMPICILLIN SODIUM 2 G: 2 INJECTION, POWDER, FOR SOLUTION INTRAMUSCULAR; INTRAVENOUS at 08:54

## 2024-03-28 RX ADMIN — ACETAMINOPHEN 650 MG: 325 TABLET ORAL at 08:53

## 2024-03-28 RX ADMIN — IPRATROPIUM BROMIDE AND ALBUTEROL SULFATE 3 ML: .5; 3 SOLUTION RESPIRATORY (INHALATION) at 08:25

## 2024-03-28 RX ADMIN — ATORVASTATIN CALCIUM 80 MG: 80 TABLET, FILM COATED ORAL at 20:56

## 2024-03-28 RX ADMIN — POTASSIUM CHLORIDE 40 MEQ: 1500 TABLET, EXTENDED RELEASE ORAL at 08:53

## 2024-03-28 RX ADMIN — MELATONIN 3 MG: 3 TAB ORAL at 20:56

## 2024-03-28 RX ADMIN — METOPROLOL TARTRATE 50 MG: 50 TABLET, FILM COATED ORAL at 20:56

## 2024-03-28 RX ADMIN — TRAMADOL HYDROCHLORIDE 50 MG: 50 TABLET, COATED ORAL at 00:32

## 2024-03-28 RX ADMIN — IPRATROPIUM BROMIDE AND ALBUTEROL SULFATE 3 ML: .5; 3 SOLUTION RESPIRATORY (INHALATION) at 00:54

## 2024-03-28 RX ADMIN — IPRATROPIUM BROMIDE AND ALBUTEROL SULFATE 3 ML: .5; 3 SOLUTION RESPIRATORY (INHALATION) at 14:21

## 2024-03-28 RX ADMIN — PREGABALIN 75 MG: 75 CAPSULE ORAL at 20:54

## 2024-03-28 RX ADMIN — SENNOSIDES AND DOCUSATE SODIUM 2 TABLET: 8.6; 5 TABLET ORAL at 08:53

## 2024-03-28 RX ADMIN — AMPICILLIN SODIUM 2 G: 2 INJECTION, POWDER, FOR SOLUTION INTRAMUSCULAR; INTRAVENOUS at 20:56

## 2024-03-28 RX ADMIN — DIPHENHYDRAMINE HYDROCHLORIDE 25 MG: 25 CAPSULE ORAL at 00:33

## 2024-03-28 RX ADMIN — CEFTRIAXONE SODIUM 2 G: 2 INJECTION, SOLUTION INTRAVENOUS at 20:56

## 2024-03-28 RX ADMIN — BENZONATATE 100 MG: 100 CAPSULE ORAL at 08:53

## 2024-03-28 RX ADMIN — BENZONATATE 100 MG: 100 CAPSULE ORAL at 17:34

## 2024-03-28 RX ADMIN — FLUTICASONE FUROATE AND VILANTEROL TRIFENATATE 1 PUFF: 100; 25 POWDER RESPIRATORY (INHALATION) at 08:25

## 2024-03-28 RX ADMIN — POLYETHYLENE GLYCOL 3350 17 G: 17 POWDER, FOR SOLUTION ORAL at 08:52

## 2024-03-28 RX ADMIN — FUROSEMIDE 20 MG: 10 INJECTION, SOLUTION INTRAMUSCULAR; INTRAVENOUS at 08:53

## 2024-03-28 RX ADMIN — HYDROXYCHLOROQUINE SULFATE 200 MG: 200 TABLET, FILM COATED ORAL at 08:53

## 2024-03-28 RX ADMIN — TRAZODONE HYDROCHLORIDE 50 MG: 50 TABLET ORAL at 20:56

## 2024-03-28 RX ADMIN — AMPICILLIN SODIUM 2 G: 2 INJECTION, POWDER, FOR SOLUTION INTRAMUSCULAR; INTRAVENOUS at 13:08

## 2024-03-28 RX ADMIN — FUROSEMIDE 20 MG: 10 INJECTION, SOLUTION INTRAMUSCULAR; INTRAVENOUS at 20:57

## 2024-03-28 RX ADMIN — CEFTRIAXONE SODIUM 2 G: 2 INJECTION, SOLUTION INTRAVENOUS at 08:54

## 2024-03-28 RX ADMIN — AMPICILLIN SODIUM 2 G: 2 INJECTION, POWDER, FOR SOLUTION INTRAMUSCULAR; INTRAVENOUS at 15:53

## 2024-03-28 RX ADMIN — Medication 2 L/MIN: at 20:00

## 2024-03-28 RX ADMIN — ALBUTEROL SULFATE 1 PUFF: 90 AEROSOL, METERED RESPIRATORY (INHALATION) at 06:45

## 2024-03-28 RX ADMIN — DIPHENHYDRAMINE HYDROCHLORIDE 25 MG: 25 CAPSULE ORAL at 08:53

## 2024-03-28 RX ADMIN — TRAMADOL HYDROCHLORIDE 25 MG: 50 TABLET, COATED ORAL at 17:35

## 2024-03-28 RX ADMIN — TRAMADOL HYDROCHLORIDE 50 MG: 50 TABLET, COATED ORAL at 13:08

## 2024-03-28 RX ADMIN — ACETAMINOPHEN 650 MG: 325 TABLET ORAL at 20:56

## 2024-03-28 RX ADMIN — AMPICILLIN SODIUM 2 G: 2 INJECTION, POWDER, FOR SOLUTION INTRAMUSCULAR; INTRAVENOUS at 00:34

## 2024-03-28 RX ADMIN — TRAMADOL HYDROCHLORIDE 25 MG: 50 TABLET, COATED ORAL at 08:53

## 2024-03-28 RX ADMIN — HEPARIN SODIUM 5000 UNITS: 5000 INJECTION INTRAVENOUS; SUBCUTANEOUS at 13:08

## 2024-03-28 RX ADMIN — FAMOTIDINE 40 MG: 20 TABLET ORAL at 08:53

## 2024-03-28 RX ADMIN — Medication: at 08:00

## 2024-03-28 RX ADMIN — DIPHENHYDRAMINE HYDROCHLORIDE 25 MG: 25 CAPSULE ORAL at 17:34

## 2024-03-28 RX ADMIN — METOPROLOL TARTRATE 25 MG: 25 TABLET, FILM COATED ORAL at 08:53

## 2024-03-28 RX ADMIN — POTASSIUM CHLORIDE 40 MEQ: 1500 TABLET, EXTENDED RELEASE ORAL at 13:08

## 2024-03-28 RX ADMIN — PREGABALIN 75 MG: 75 CAPSULE ORAL at 08:53

## 2024-03-28 RX ADMIN — HEPARIN SODIUM 5000 UNITS: 5000 INJECTION INTRAVENOUS; SUBCUTANEOUS at 04:24

## 2024-03-28 RX ADMIN — TIOTROPIUM BROMIDE INHALATION SPRAY 2 PUFF: 3.12 SPRAY, METERED RESPIRATORY (INHALATION) at 08:24

## 2024-03-28 RX ADMIN — IPRATROPIUM BROMIDE AND ALBUTEROL SULFATE 3 ML: .5; 3 SOLUTION RESPIRATORY (INHALATION) at 19:45

## 2024-03-28 RX ADMIN — ASPIRIN 81 MG: 81 TABLET, COATED ORAL at 08:53

## 2024-03-28 ASSESSMENT — COGNITIVE AND FUNCTIONAL STATUS - GENERAL
MOVING TO AND FROM BED TO CHAIR: A LITTLE
CLIMB 3 TO 5 STEPS WITH RAILING: TOTAL
DRESSING REGULAR LOWER BODY CLOTHING: A LITTLE
MOVING FROM LYING ON BACK TO SITTING ON SIDE OF FLAT BED WITH BEDRAILS: A LITTLE
MOVING FROM LYING ON BACK TO SITTING ON SIDE OF FLAT BED WITH BEDRAILS: A LITTLE
TOILETING: A LITTLE
DRESSING REGULAR UPPER BODY CLOTHING: A LITTLE
MOVING TO AND FROM BED TO CHAIR: A LITTLE
DAILY ACTIVITIY SCORE: 20
STANDING UP FROM CHAIR USING ARMS: A LITTLE
CLIMB 3 TO 5 STEPS WITH RAILING: A LOT
MOBILITY SCORE: 17
STANDING UP FROM CHAIR USING ARMS: A LITTLE
WALKING IN HOSPITAL ROOM: A LITTLE
MOBILITY SCORE: 16
WALKING IN HOSPITAL ROOM: A LITTLE
TURNING FROM BACK TO SIDE WHILE IN FLAT BAD: A LITTLE
TURNING FROM BACK TO SIDE WHILE IN FLAT BAD: A LITTLE
HELP NEEDED FOR BATHING: A LITTLE

## 2024-03-28 ASSESSMENT — PAIN SCALES - GENERAL
PAINLEVEL_OUTOF10: 8
PAINLEVEL_OUTOF10: 4

## 2024-03-28 ASSESSMENT — PAIN - FUNCTIONAL ASSESSMENT
PAIN_FUNCTIONAL_ASSESSMENT: 0-10

## 2024-03-28 NOTE — CARE PLAN
The patient's goals for the shift include To get Benadryl ordered for skin itchiness    The clinical goals for the shift include patient will have pain less than 5/10    Over the shift, the patient did not make progress toward the following goals. Barriers to progression include higher pain. Recommendations to address these barriers include staying ahead of the pain.    Problem: Fall/Injury  Goal: Not fall by end of shift  3/28/2024 1008 by Demetrio Solis RN  Outcome: Progressing  3/28/2024 1008 by Demetrio Solis RN  Outcome: Not Progressing  Goal: Be free from injury by end of the shift  3/28/2024 1008 by Demetrio Solis RN  Outcome: Progressing  3/28/2024 1008 by Demetrio Solis RN  Outcome: Not Progressing  Goal: Verbalize understanding of personal risk factors for fall in the hospital  3/28/2024 1008 by Demetrio Solis RN  Outcome: Progressing  3/28/2024 1008 by Demetrio Solis RN  Outcome: Not Progressing  Goal: Verbalize understanding of risk factor reduction measures to prevent injury from fall in the home  3/28/2024 1008 by Demetrio Solis RN  Outcome: Progressing  3/28/2024 1008 by Demetrio Solis RN  Outcome: Not Progressing  Goal: Use assistive devices by end of the shift  3/28/2024 1008 by Demetrio Solis RN  Outcome: Progressing  3/28/2024 1008 by Demetrio Solis RN  Outcome: Not Progressing  Goal: Pace activities to prevent fatigue by end of the shift  3/28/2024 1008 by Demetrio Solis RN  Outcome: Progressing  3/28/2024 1008 by Demetrio Solis RN  Outcome: Not Progressing     Problem: General Stroke  Goal: Establish a mutual long term goal with patient by discharge  3/28/2024 1008 by Demetrio Solis RN  Outcome: Progressing  3/28/2024 1008 by Demetrio Solis RN  Outcome: Not Progressing  Goal: Demonstrate improvement in neurological exam throughout the shift  3/28/2024 1008 by Demetrio Solis RN  Outcome: Progressing  3/28/2024 1008 by Demetrio Solis RN  Outcome: Not Progressing  Goal: Maintain BP within ordered limits throughout  shift  3/28/2024 1008 by Demetrio Solis RN  Outcome: Progressing  3/28/2024 1008 by Demetrio Solis RN  Outcome: Not Progressing  Goal: Participate in treatment (ie., meds, therapy) throughout shift  3/28/2024 1008 by Demetrio Solis RN  Outcome: Progressing  3/28/2024 1008 by Demetrio Solis RN  Outcome: Not Progressing  Goal: No symptoms of aspiration throughout shift  3/28/2024 1008 by Demetrio Solis RN  Outcome: Progressing  3/28/2024 1008 by Demetrio Solis RN  Outcome: Not Progressing  Goal: No symptoms of hemorrhage throughout shift  3/28/2024 1008 by Demetrio Solis RN  Outcome: Progressing  3/28/2024 1008 by Demetrio Solis RN  Outcome: Not Progressing  Goal: Tolerate enteral feeding throughout shift  3/28/2024 1008 by Demetrio Solis RN  Outcome: Progressing  3/28/2024 1008 by Demetrio Solis RN  Outcome: Not Progressing  Goal: Decreased nausea/vomiting throughout shift  3/28/2024 1008 by Demetrio Solis RN  Outcome: Progressing  3/28/2024 1008 by Demetrio Solis RN  Outcome: Not Progressing  Goal: Controlled blood glucose throughout shift  3/28/2024 1008 by Demetrio Solis RN  Outcome: Progressing  3/28/2024 1008 by Demetrio Solis RN  Outcome: Not Progressing  Goal: Out of bed three times today  3/28/2024 1008 by Demetrio Solis RN  Outcome: Progressing  3/28/2024 1008 by Demetrio Solis RN  Outcome: Not Progressing     Problem: ICU Stroke  Goal: Maintain ICP within ordered limits throughout shift  3/28/2024 1008 by Demetrio Solis RN  Outcome: Progressing  3/28/2024 1008 by Demetrio Solis RN  Outcome: Not Progressing  Goal: Tolerate EVD clamping trial throughout shift  3/28/2024 1008 by Demetrio Solis RN  Outcome: Progressing  3/28/2024 1008 by Demetrio Solis RN  Outcome: Not Progressing  Goal: Tolerate ventilator weaning trial during shift  3/28/2024 1008 by Demetrio Solis RN  Outcome: Progressing  3/28/2024 1008 by Demetrio Solis RN  Outcome: Not Progressing  Goal: Maintain patent airway throughout shift  3/28/2024 1008 by Demetrio Solis,  RN  Outcome: Progressing  3/28/2024 1008 by Demetrio Solis RN  Outcome: Not Progressing  Goal: Achieve/maintain targeted sodium level throughout shift  3/28/2024 1008 by Demetrio Solis RN  Outcome: Progressing  3/28/2024 1008 by Demetrio Solis RN  Outcome: Not Progressing     Problem: Pain  Goal: Takes deep breaths with improved pain control throughout the shift  3/28/2024 1008 by Demetrio Solis RN  Outcome: Progressing  3/28/2024 1008 by Demetrio Solis RN  Outcome: Not Progressing  Goal: Turns in bed with improved pain control throughout the shift  3/28/2024 1008 by Demetrio Solis RN  Outcome: Progressing  3/28/2024 1008 by Demetrio Solis RN  Outcome: Not Progressing  Goal: Walks with improved pain control throughout the shift  3/28/2024 1008 by Demetrio Solis RN  Outcome: Progressing  3/28/2024 1008 by Demetrio Solis RN  Outcome: Not Progressing  Goal: Performs ADL's with improved pain control throughout shift  3/28/2024 1008 by Demetrio Solis RN  Outcome: Progressing  3/28/2024 1008 by Demetrio Solis RN  Outcome: Not Progressing  Goal: Participates in PT with improved pain control throughout the shift  3/28/2024 1008 by Demetrio Solis RN  Outcome: Progressing  3/28/2024 1008 by Demetrio Solis RN  Outcome: Not Progressing  Goal: Free from opioid side effects throughout the shift  3/28/2024 1008 by Demetrio Solis RN  Outcome: Progressing  3/28/2024 1008 by Demetrio Solis RN  Outcome: Not Progressing  Goal: Free from acute confusion related to pain meds throughout the shift  3/28/2024 1008 by Demetrio Solis RN  Outcome: Progressing  3/28/2024 1008 by Demetrio Solis RN  Outcome: Not Progressing     Problem: Pain - Adult  Goal: Verbalizes/displays adequate comfort level or baseline comfort level  3/28/2024 1008 by Demetrio Solis RN  Outcome: Progressing  3/28/2024 1008 by Demetrio Solis RN  Outcome: Not Progressing     Problem: Safety - Adult  Goal: Free from fall injury  3/28/2024 1008 by Demetrio Solis, RN  Outcome:  Progressing  3/28/2024 1008 by Demetrio Solis RN  Outcome: Not Progressing     Problem: Discharge Planning  Goal: Discharge to home or other facility with appropriate resources  3/28/2024 1008 by Demetrio Solis RN  Outcome: Progressing  3/28/2024 1008 by Demetrio Solis RN  Outcome: Not Progressing     Problem: Chronic Conditions and Co-morbidities  Goal: Patient's chronic conditions and co-morbidity symptoms are monitored and maintained or improved  3/28/2024 1008 by Demetrio Solis RN  Outcome: Progressing  3/28/2024 1008 by Demetrio Solis RN  Outcome: Not Progressing     Problem: Skin  Goal: Decreased wound size/increased tissue granulation at next dressing change  3/28/2024 1008 by Demetrio Solis RN  Outcome: Progressing  3/28/2024 1008 by Demetrio Solis RN  Outcome: Not Progressing  Goal: Participates in plan/prevention/treatment measures  3/28/2024 1008 by Demetrio Solis RN  Outcome: Progressing  3/28/2024 1008 by Demetrio Solis RN  Outcome: Not Progressing  Goal: Prevent/manage excess moisture  3/28/2024 1008 by Demetrio Solis RN  Outcome: Progressing  3/28/2024 1008 by Demetrio Solis RN  Outcome: Not Progressing  Goal: Prevent/minimize sheer/friction injuries  3/28/2024 1008 by Demetrio Solis RN  Outcome: Progressing  3/28/2024 1008 by Demetrio Solis RN  Outcome: Not Progressing  Goal: Promote/optimize nutrition  3/28/2024 1008 by Demetrio Solis RN  Outcome: Progressing  3/28/2024 1008 by Demetrio Solis RN  Outcome: Not Progressing  Goal: Promote skin healing  3/28/2024 1008 by Demetrio Solis RN  Outcome: Progressing  3/28/2024 1008 by Demetrio Solis RN  Outcome: Not Progressing     Problem: Meds/Post-op Pain  Goal: Pain controlled to tolerate pain level  3/28/2024 1008 by Demetrio Solis RN  Outcome: Progressing  3/28/2024 1008 by Demetrio Solis RN  Outcome: Not Progressing  Goal: Tolerates prescribed medication  3/28/2024 1008 by Demetrio Solis RN  Outcome: Progressing  3/28/2024 1008 by Demetrio Solis RN  Outcome: Not  Progressing     Problem: DVT/VTE Prevention/Activity  Goal: No decrease in circulation/sensation  3/28/2024 1008 by Demetrio Solis RN  Outcome: Progressing  3/28/2024 1008 by Demetrio Solis RN  Outcome: Not Progressing  Goal: Prevent skin breakdown  3/28/2024 1008 by Demetrio Solis RN  Outcome: Progressing  3/28/2024 1008 by Demetrio Solis RN  Outcome: Not Progressing  Goal: Return to preop oxygenation status  3/28/2024 1008 by Demetrio Solis RN  Outcome: Progressing  3/28/2024 1008 by Demetrio Solis RN  Outcome: Not Progressing  Goal: Tolerates optimal activity  3/28/2024 1008 by Demetrio Solis RN  Outcome: Progressing  3/28/2024 1008 by Demetrio Solis RN  Outcome: Not Progressing  Goal: Increase self care and/or family involvement in 24 hrs.  3/28/2024 1008 by Demetrio Solis RN  Outcome: Progressing  3/28/2024 1008 by Demetrio Slois RN  Outcome: Not Progressing     Problem: Wound care/infection prevention  Goal: No signs of infection in 24 hrs.  3/28/2024 1008 by Demetrio Solis RN  Outcome: Progressing  3/28/2024 1008 by Demetrio Solis RN  Outcome: Not Progressing  Goal: No unexpected bleeding from incision this shift  3/28/2024 1008 by Demetrio Solis RN  Outcome: Progressing  3/28/2024 1008 by Demetrio Solis RN  Outcome: Not Progressing     Problem: Diet/fluid balance  Goal: Adequate urinary output  3/28/2024 1008 by Demetrio Solis RN  Outcome: Progressing  3/28/2024 1008 by Demetrio Solis RN  Outcome: Not Progressing  Goal: Free from nausea/vomiting  3/28/2024 1008 by Demetrio Solis RN  Outcome: Progressing  3/28/2024 1008 by Demetrio Solis RN  Outcome: Not Progressing  Goal: Return in bowel function  3/28/2024 1008 by Demetrio Solis RN  Outcome: Progressing  3/28/2024 1008 by Demetrio Solis RN  Outcome: Not Progressing  Goal: Tolerates prescribed diet  3/28/2024 1008 by Demetrio Solis, RN  Outcome: Progressing  3/28/2024 1008 by Demetrio Solis, RN  Outcome: Not Progressing     Problem: Other goals  Goal: No change in neurological  status  3/28/2024 1008 by Demetrio Solis RN  Outcome: Progressing  3/28/2024 1008 by Demetrio Solis RN  Outcome: Not Progressing  Goal: Stabilize vital signs (return to 10% of baseline)  3/28/2024 1008 by Demetrio Solis RN  Outcome: Progressing  3/28/2024 1008 by Demetrio Solis RN  Outcome: Not Progressing

## 2024-03-28 NOTE — PROGRESS NOTES
Per [facility] update, patient is agreeable to SNF. [facility]. ALEXX met with patient and family at bedside to update. Family expressed interest in [facility]. Referral submitted for review. Patient states  will tour facilities today. SW will continue to follow.    [facility] accepted. SW updated patient. Patient confirmed  will be in to visit later today. Pending confirmation of [facility]. Will continue to follow.    KARELY Hagen

## 2024-03-28 NOTE — PROGRESS NOTES
CARDIAC SURGERY DAILY PROGRESS NOTE    Fannie Watson is a 78 y.o. female, with a PMH significant for HTN, HLD, COPD, spondylolisthesis of lumbar lesion s/p L4-L5 decompression laminectomy and fusion 03/01/2024 who presented to [facility] to Neuro ICU on 3/5/2024 for subarachnoid hemorrhage management.      Patient initially presented to [facility] (OSH) with altered mental status, for which a CT was done and showed a trace R sylvian subarachnoid hemorrhage and transferred to [facility] Neuro ICU for further management. [place] [facility] [place] went into paroxysmal A fib with RVR. TTE done on 03/07 showed an anterior mobile mass on mitral valve leaflet with moderate mitral valve regurgitation. Follow up CROW showed a 2.5 cm mobile vegetation on the mitral valve with moderate mitral regurgitation. Blood cultures drawn on 03/07 were positive for E. Faecium, for which infectious disease and cardiac surgery was consulted.      She had an MRI of her spine on 03/14 that showed an enlarging fluid collection that was drained by IR due to concern for abscess as source of infection vs possible discitis/osteomyelitis in the spine post-operatively.     Of note, per chart review patient has had multiple presentations since December 2023 for various symptoms including chest pain, chest tightness, shortness of breath and worsening back pain. She also had some dysphagia and decreased appetite for upwards of a month, as well as a cough that . 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 She had a L4-L5 TLIF on 03/01 at OSH.    3/22/2024 OPERATION/PROCEDURE: Dr. Deangelo Chaudhari   1. Minimally invasive mitral valve replacement (31mm Epic bioprosthesis)  2. Exploration of the right femoral vein and artery for cannulation for cardiopulmonary bypass.     CTICU: uneventful   Transferred to T3 on 3/24  ===================================  Interval  "History:   No acute events overnight.    Subjective  - Doing a little better today still with some back pain from previous surgery- does not have any strength deficits and walks to bathroom well with walker for balance    Objective   /76   Pulse 89   Temp 36.5 °C (97.7 °F)   Resp 18   Ht 1.57 m (5' 1.81\")   Wt 74.3 kg (163 lb 11.2 oz)   LMP  (LMP Unknown)   SpO2 95%   BMI 30.12 kg/m²   Pain Score: 6   3 Day Weight Change: 0.121 kg (4.3 oz) per day    Intake and Output    Intake/Output Summary (Last 24 hours) at 3/28/2024 1027  Last data filed at 3/28/2024 1010  Gross per 24 hour   Intake 1240 ml   Output 1700 ml   Net -460 ml       Physical Exam  Vitals reviewed.   Constitutional:       General: She is not in acute distress.     Appearance: Normal appearance. She is obese. She is not ill-appearing.      Comments: Patient alert and awake, sitting up in chair - in no acute distress.    HENT:      Head: Atraumatic.      Nose: Nose normal.      Mouth/Throat:      Pharynx: Oropharynx is clear.   Eyes:      Conjunctiva/sclera: Conjunctivae normal.   Neck:      Comments: Trachea Midline    Cardiovascular:      Rate and Rhythm: Normal rate and regular rhythm.      Pulses: Normal pulses.      Heart sounds: Normal heart sounds. No murmur heard.     No gallop.      Comments: Tele: SR  + equal and even pulses in all extremities   A/V epicardial wires - capped     Pulmonary:      Effort: Pulmonary effort is normal. No respiratory distress.      Breath sounds: No wheezing.      Comments: Equal and even chest expansion; thorax symmetric   Diminished breath sounds in bilateral bases   Good inspiratory effort on RA.     Abdominal:      General: Bowel sounds are normal. There is no distension.      Palpations: Abdomen is soft.      Tenderness: There is no abdominal tenderness.   Genitourinary:     Comments: Voids independently   Musculoskeletal:      Cervical back: Neck supple.      Right lower le+ Edema present.    "   Left lower le+ Edema present.      Comments: ART; 5/5 strength in all extremities   Ambulates independently with walker    Skin:     General: Skin is warm and dry.      Capillary Refill: Capillary refill takes less than 2 seconds.      Coloration: Skin is not jaundiced or pale.      Findings: Bruising (abdomen; thoracotomy site) present.      Comments: Right thoracotomy site: well approximated, no s/s of infection or bleeding - SEEMA +ecchymosis around surgical incision     RUE PICC in place with occlusive dressing no redness    Lower back incisions well approximated and well healed with no erythema or drainage   Neurological:      General: No focal deficit present.      Mental Status: She is alert and oriented to person, place, and time.      Comments: No sensory deficits throughout   Psychiatric:         Mood and Affect: Mood normal.         Behavior: Behavior normal.       Medications  Scheduled medications  acetaminophen, 650 mg, oral, q6h  ampicillin, 2 g, intravenous, q4h  aspirin, 81 mg, oral, Daily  atorvastatin, 80 mg, oral, Nightly  cefTRIAXone, 2 g, intravenous, q12h  famotidine, 40 mg, oral, Daily  tiotropium, 2 puff, inhalation, Daily   And  fluticasone furoate-vilanteroL, 1 puff, inhalation, Daily  furosemide, 20 mg, intravenous, q12h  heparin (porcine), 5,000 Units, subcutaneous, q8h  hydroxychloroquine, 200 mg, oral, Daily  ipratropium-albuteroL, 3 mL, nebulization, TID  lidocaine, 5 mL, infiltration, Once  melatonin, 3 mg, oral, Nightly  metoprolol tartrate, 25 mg, oral, BID  oxygen, , inhalation, Continuous - Inhalation  polyethylene glycol, 17 g, oral, BID  pregabalin, 75 mg, oral, BID  sennosides-docusate sodium, 2 tablet, oral, BID  traZODone, 50 mg, oral, Nightly    Continuous medications   PRN medications  PRN medications: albuterol, alteplase, benzocaine-menthol, benzonatate, dextrose **OR** [DISCONTINUED] glucagon, diphenhydrAMINE, ondansetron, tiZANidine, traMADol,  traMADol    LABS:  CMP:  Results from last 7 days   Lab Units 03/28/24  0452 03/27/24  0532 03/26/24  0359 03/25/24  0501 03/24/24  1203 03/24/24  0611 03/23/24  2316 03/23/24  2115 03/23/24  1309 03/22/24  2349 03/22/24  1251   SODIUM mmol/L 138 134* 135* 136 133* 132* 130* 132* 132* 136 140   POTASSIUM mmol/L 3.7 4.3 4.0 4.3 4.5 4.4 3.9 3.9 3.6 4.1 4.0   CHLORIDE mmol/L 99 98 98 99 97* 97* 95* 94* 95* 100 100   CO2 mmol/L 34* 27 29 29 24 25 25 24 25 27 29   ANION GAP mmol/L 9* 13 12 12 17 14 14 18 16 13 15   BUN mg/dL 8 9 13 14 13 13 12 12 10 7 5*   CREATININE mg/dL 0.40* 0.43* 0.43* 0.48* 0.54 0.53 0.53 0.58 0.56 0.40* 0.47*   EGFR mL/min/1.73m*2 >90 >90 >90 >90 >90 >90 >90 >90 >90 >90 >90   MAGNESIUM mg/dL 1.78 1.83 1.92 1.97 1.92  --  2.27  --  1.88 2.12 3.35*   ALBUMIN g/dL 3.0* 3.2* 3.2* 3.3* 3.8 3.6 3.7 4.0 4.2 3.8 3.2*       CBC:  Results from last 7 days   Lab Units 03/28/24  0452 03/27/24  0532 03/26/24  0359 03/25/24  0501 03/24/24  1203 03/24/24  0611 03/23/24  2316 03/23/24  1309   WBC AUTO x10*3/uL 6.6 8.3 7.9 9.3 17.5* 16.0* 18.8* 19.0*   HEMOGLOBIN g/dL 8.3* 8.6* 7.8* 8.1* 8.9* 8.2* 8.2* 8.6*   HEMATOCRIT % 26.9* 27.7* 26.0* 26.5* 27.9* 25.6* 24.9* 26.5*   PLATELETS AUTO x10*3/uL 293 252 213 187 202 172 170 191   MCV fL 87 88 88 86 85 86 83 84       COAG:   Results from last 7 days   Lab Units 03/22/24  1251   INR  1.3*       HEME/ENDO:     CARDIAC:        IMPRESSION & PLAN:  s/p mini Mitral Valve replacement w/ Dr. Chaudhari on 3/22/2024  - Increase activity/ ambulation; PT/OT  - Encourage IS, C/DB; respiratory therapy; wean O2 as claudia -> on RA  - Cardiac rehab referral   - Continue cardiac meds: ASA, BB, statin   - Pain and anticonstipation meds  - Postop echo completed EF 60-65%, MVR in place with 18mmHg peak gradient and 7.4mmHg mean gradient with mod elevated RV systolic pressure 48 mmHg  - CUT epicardial wires prior to discharge; per surgeon preference.    - Tele until discharge    Rhythm  -  Tele: SR with 1st degree AV Block 70's - a lot of artifact on review no signs of atrial fib from review of tele  - Increase metoprolol to 50mg BID    Acute Blood Loss Anemia - stable  Recent Labs     24  0452 24  0532 24  0359 24  0501 24  1203 24  0611 24  2316   HGB 8.3* 8.6* 7.8* 8.1* 8.9* 8.2* 8.2*   HCT 26.9* 27.7* 26.0* 26.5* 27.9* 25.6* 24.9*     - MV, PO Iron x1mo    Volume/Electrolyte Status: Preop wt Weight: 69.6 kg (153 lb 7 oz)   Total body fluid overload  Hyponatremia-improved with diuresis  Vitals:    24 0355   Weight: 74.3 kg (163 lb 11.2 oz)     - BID scheduled lasix today- will need to re-evaluate tomorrow getting close to euvolemic  - Replete electrolytes as needed  - Daily weights/strict I&Os  - Daily RFP     ? 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  Temp (36hrs), Av.3 °C (97.3 °F), Min:36 °C (96.8 °F), Max:37 °C (98.6 °F)     - Infectious Disease Following, appreciate recs:   -Continue ampicillin 2 g IV  q 4  -Continue ceftriaxone 2 g IV q 12  -Both ABXs for 6 weeks from date of surgery, EOT 2024  -Weekly CBC with diff and CMP as outpatient while on IV antibiotics and fax reports to Dr Cruz  -ID will arrange for follow up as an outpatient    - daily CBC while inpatient     Hypertension: home meds: Lisinopril 20mg daily; Bisoprolol 10mg daily   Systolic (24hrs), Av , Min:103 , Max:130   - continue BB   - continue holding home meds as not clinically indicated to resume based on current BP's   - resume antihypertensives as indicated    Acute post op pulmonary insufficiency  COPD (not on home O2): home meds: albuterol inhaler every 6 hours for shortness of breath; Atrovent 17mcg 2 puffs BID; Trelegy Ellipta 1 puf daily   -wean NC maintaining SpO2 >92%  -Continue duonebs  -Continue spiriva respimat and breo ellipta    -bronchial hygiene with EZ PAPs     GERD:  home famotidine 40mg   - continue home famotidine   - diet as tolerated     Arthritis/Chronic pain back and neck pain: home regimen: plaquenil 200mg daily ; Lyrica 75mg daily; Zanaflex 4mg TID PRN for muscle spasms/pain; prednisone 5mg daily during flare ups    - continue manage pain with PRN tramdol and tylenol   - continue home lyrica  - Continue home plaquenil 200mg daily   - PT/OT   - home Zanaflex    TLIF of L4-L5 2/26/24  Per patient she was required to wear a brace post op and does not have here- discussed with neurosurgery and given that this was minimally invasive and with NO changes in neurologic exam no indication for brace at this time- instructed to follow up with surgeon from OSH for further receommendations    VTE Prophylaxis: SCDs/TEDs, ambulation, SQ heparin  Code Status: Prior    Access: RUE PICC- 3/25    Dispo  - PT/OT recs low intensity level of continued care  - Anticipate discharge 1-2 days, pending diuresis, IV antibiotics for home care set up, PICC line placement, HR/BP management, increased mobility   - Patient states that she and her  have agreed to nursing facility discharge    Tony Acosta PA-C  Cardiac Surgery [facility]  [facility]  Team Phone [phone]    3/28/2024  10:27 AM

## 2024-03-28 NOTE — PROGRESS NOTES
Physical Therapy    Physical Therapy Treatment    Patient Name: Fannie Watson  MRN: [id number]  Today's Date: 3/28/2024  Time Calculation  Start Time: 1008  Stop Time: 1049  Time Calculation (min): 41 min     Assessment/Plan   PT Assessment  Evaluation/Treatment Tolerance: Patient limited by fatigue, Patient limited by pain  End of Session Communication: Bedside nurse  Assessment Comment: Pt progressing appropriately; however, continues to be limited 2/2 pain, decreased strength, balance and endurance. Pt with need for significant VCs throughout session for safety awareness and to maintain spine precautions. She tolerated seated LE exercises, multiple transfers from various surfaces and gait training. Increased time during session for rest breaks and felton care. Despite AMPAC of 16, she will continue to benefit from skilled therapy during stay to improve overall functional mobility. She remains appropriate for mod intensity therapy upon d/c.  End of Session Patient Position: Up in chair, Alarm off, not on at start of session (call light within reach, RN aware of pt position with permission to leave alarm off)  PT Plan  Inpatient/Swing Bed or Outpatient: Inpatient  PT Plan  Treatment/Interventions: Bed mobility, Transfer training, Gait training, Stair training, Balance training, Strengthening, Endurance training, Range of motion, Therapeutic exercise, Therapeutic activity, Home exercise program  PT Plan: Skilled PT  PT Frequency: Daily  PT Discharge Recommendations: Moderate intensity level of continued care  Equipment Recommended upon Discharge: Wheeled walker  PT Recommended Transfer Status: Assist x1, Assistive device  PT - OK to Discharge: Yes    General Visit Information:   PT  Visit  PT Received On: 03/28/24  Response to Previous Treatment: Patient reporting fatigue but able to participate.  General  Reason for Referral: s/p L4-L5 decompression laminectomy and fusion 03/01/2024 who initially presented to OSH on  03/05 with AMS and found to have trace R sylvian SAH who was also found to have mitral valve endocarditis with E faecium bacteremia. Now s/p R mini thoracotomy and Mitral valve replacement  Past Medical History Relevant to Rehab: HTN, HLD, COPD, 3/1 s/p MIS L4-5 TLIF  Family/Caregiver Present: No  Prior to Session Communication: Bedside nurse  Patient Position Received: Bed, 3 rail up, Alarm off, not on at start of session  Preferred Learning Style: verbal, visual  General Comment: Pt recieved supine with CTA present. Pt pleasant and agreeable to work with therapy. Pt with increased back pain throughout session. Increased time during session for felton care as pt incontinent and pt with episode of crying 2/2 pain and frustration.    Subjective   Precautions:  Precautions  Medical Precautions: Fall precautions, Cardiac precautions, Spinal precautions  Post-Surgical Precautions: Spinal precautions  Precautions Comment: SBP < 140, MAP 70-90 (Spine precautions reinforced throughout session as pt demos noncompliance with bending and twisting with functional mobility tasks.)  Vital Signs:  Vital Signs  Heart Rate: 88  Heart Rate Source: Monitor  SpO2: 95 %  BP: 130/76  BP Location: Left arm  BP Method: Automatic  Patient Position: Lying    Objective   Pain:  Pain Assessment  Pain Assessment: 0-10  Pain Score: 8  Pain Type: Acute pain, Surgical pain  Pain Location: Back  Cognition:  Cognition  Overall Cognitive Status: Within Functional Limits  Orientation Level: Oriented X4 (VCs for correct date number)  Impulsive: Mildly    Activity Tolerance:  Activity Tolerance  Endurance: Tolerates 30 min exercise with multiple rests  Treatments:  Pt able to tolerate the following activities during today's treatment session. Pt instructed/educated throughout the session on safety awareness, body mechanics and proper hand placement. Skilled monitoring of vitals throughout session for pt safety.     Therapeutic Exercise  Therapeutic  Exercise Performed: Yes  Therapeutic Exercise Activity 1: Sitting in chair: AP x20 BL, LAQ x10 BL, marches x10 BL    Therapeutic Activity  Therapeutic Activity Performed: Yes  Therapeutic Activity 1: x5 static sitting rest breaks throughout session between gait training on chair SBA and toilet distant supervision with UE/LE support for a total of ~20 min. Pt required rest breaks as she demos fatigue with gait training. Pt educated on maintaining upright posture and pursed lipped breathing as she demo'd SOB.  Therapeutic Activity 2: x2 static standing rest breaks for felton care with BUE support on FWW CGA for a total of ~5 min.    Bed Mobility  Bed Mobility: Yes  Bed Mobility 1  Bed Mobility 1: Supine to sitting, Log roll  Level of Assistance 1: Close supervision, Moderate verbal cues  Bed Mobility Comments 1: HOB elevated and use of bed HR. VCs for log roll technique.    Ambulation/Gait Training  Ambulation/Gait Training Performed: Yes  Ambulation/Gait Training 1  Surface 1: Level tile  Device 1: Rolling walker  Assistance 1: Contact guard, Maximum verbal cues  Quality of Gait 1: Diminished heel strike, Forward flexed posture (decreased ariane, impulsiveness)  Comments/Distance (ft) 1: x4 trials, ~10 ft from chair <> bathroom, x2 static stitng rest breaks (max VCs for safety awareness with FWW, to maintain upright posture, to slow down to prevent risk for falls and to maintain spine precautions.)  Ambulation/Gait Training 2  Surface 2: Level tile  Device 2: Rolling walker  Assistance 2: Contact guard, Minimal verbal cues  Quality of Gait 2: Wide base of support, Decreased step length, Forward flexed posture (decreased ariane)  Comments/Distance (ft) 2: x2 trials: ~50 ft then static sitting rest break, ~50 ft then static sitting rest break  Ambulation/Gait Training 3  Surface 3: Level tile  Device 3: Rolling walker  Assistance 3: Contact guard, Minimal verbal cues  Quality of Gait 3: Wide base of support, Decreased  step length, Forward flexed posture (decreased ariane)  Comments/Distance (ft) 3: ~100 ft then static sitting rest break    Transfers  Transfer: Yes  Transfer 1  Transfer From 1: Bed to  Transfer to 1: Stand  Technique 1: Sit to stand  Transfer Device 1: Walker  Transfer Level of Assistance 1: Contact guard, Minimal verbal cues  Transfers 2  Transfer From 2: Sit to, Toilet to  Transfer to 2: Toilet, Stand  Technique 2: Sit to stand, Stand to sit  Transfer Device 2: Walker  Transfer Level of Assistance 2: Contact guard, Minimal verbal cues  Trials/Comments 2: x2 trials  Transfers 3  Transfer From 3: Stand to, Chair with arms to  Transfer to 3: Chair with arms, Stand  Technique 3: Stand to sit  Transfer Device 3: Walker  Transfer Level of Assistance 3: Contact guard, Minimal verbal cues  Trials/Comments 3: x3 trials  Transfers 4  Transfer From 4: Sit to, Chair without arms to  Transfer to 4: Chair without arms, Stand  Technique 4: Sit to stand, Stand to sit  Transfer Device 4: Walker  Transfer Level of Assistance 4: Contact guard, Minimal verbal cues  Trials/Comments 4: x2 trials    Outcome Measures:  [facility] Basic Mobility  Turning from your back to your side while in a flat bed without using bedrails: A little  Moving from lying on your back to sitting on the side of a flat bed without using bedrails: A little  Moving to and from bed to chair (including a wheelchair): A little  Standing up from a chair using your arms (e.g. wheelchair or bedside chair): A little  To walk in hospital room: A little  Climbing 3-5 steps with railing: Total  Basic Mobility - Total Score: 16    Education Documentation  Home Exercise Program, taught by LAVERN Gibbs at 3/28/2024 12:30 PM.  Learner: Patient  Readiness: Acceptance  Method: Explanation, Teach-back  Response: Verbalizes Understanding, Demonstrated Understanding, Needs Reinforcement    Precautions, taught by LAVERN Gibbs at 3/28/2024 12:30 PM.  Learner:  Patient  Readiness: Acceptance  Method: Explanation, Teach-back  Response: Verbalizes Understanding, Demonstrated Understanding, Needs Reinforcement    Body Mechanics, taught by HODA HusainPT at 3/28/2024 12:30 PM.  Learner: Patient  Readiness: Acceptance  Method: Explanation, Teach-back  Response: Verbalizes Understanding, Demonstrated Understanding, Needs Reinforcement    Mobility Training, taught by HODA HusainPT at 3/28/2024 12:30 PM.  Learner: Patient  Readiness: Acceptance  Method: Explanation, Teach-back  Response: Verbalizes Understanding, Demonstrated Understanding, Needs Reinforcement    Education Comments  No comments found.      OP EDUCATION:       Encounter Problems       Encounter Problems (Active)       PT Problem       Pt. will score 24/28 on Tinetti balance test.  (Progressing)       Start:  03/07/24    Expected End:  04/06/24            Patient will complete supine to sit and sit to supine Supervision  (Progressing)       Start:  03/07/24    Expected End:  04/06/24            Patient will complete stand to sit and sit to stand with Supervision with wheeled walker.  (Progressing)       Start:  03/07/24    Expected End:  04/06/24            Patient will ambulate >400' with Rolling Walker and Supervision  (Progressing)       Start:  03/07/24    Expected End:  04/06/24            Patient will ascend and descend 4 steps with 2 UE Support and Supervision  (Progressing)       Start:  03/07/24    Expected End:  04/06/24                 LAVERN Husain

## 2024-03-29 LAB
ALBUMIN SERPL BCP-MCNC: 3.1 G/DL (ref 3.4–5)
ANION GAP SERPL CALC-SCNC: 13 MMOL/L (ref 10–20)
BUN SERPL-MCNC: 7 MG/DL (ref 6–23)
CALCIUM SERPL-MCNC: 8.8 MG/DL (ref 8.6–10.6)
CHLORIDE SERPL-SCNC: 101 MMOL/L (ref 98–107)
CO2 SERPL-SCNC: 28 MMOL/L (ref 21–32)
CREAT SERPL-MCNC: 0.45 MG/DL (ref 0.5–1.05)
EGFRCR SERPLBLD CKD-EPI 2021: >90 ML/MIN/1.73M*2
ERYTHROCYTE [DISTWIDTH] IN BLOOD BY AUTOMATED COUNT: 16.1 % (ref 11.5–14.5)
GLUCOSE SERPL-MCNC: 95 MG/DL (ref 74–99)
HCT VFR BLD AUTO: 28.2 % (ref 36–46)
HGB BLD-MCNC: 8.7 G/DL (ref 12–16)
MAGNESIUM SERPL-MCNC: 1.88 MG/DL (ref 1.6–2.4)
MCH RBC QN AUTO: 27.1 PG (ref 26–34)
MCHC RBC AUTO-ENTMCNC: 30.9 G/DL (ref 32–36)
MCV RBC AUTO: 88 FL (ref 80–100)
NRBC BLD-RTO: 0 /100 WBCS (ref 0–0)
PHOSPHATE SERPL-MCNC: 3.8 MG/DL (ref 2.5–4.9)
PLATELET # BLD AUTO: 365 X10*3/UL (ref 150–450)
POTASSIUM SERPL-SCNC: 4.3 MMOL/L (ref 3.5–5.3)
RBC # BLD AUTO: 3.21 X10*6/UL (ref 4–5.2)
SODIUM SERPL-SCNC: 138 MMOL/L (ref 136–145)
WBC # BLD AUTO: 7.4 X10*3/UL (ref 4.4–11.3)

## 2024-03-29 PROCEDURE — 2500000002 HC RX 250 W HCPCS SELF ADMINISTERED DRUGS (ALT 637 FOR MEDICARE OP, ALT 636 FOR OP/ED): Mod: MUE

## 2024-03-29 PROCEDURE — 2500000001 HC RX 250 WO HCPCS SELF ADMINISTERED DRUGS (ALT 637 FOR MEDICARE OP): Performed by: NURSE PRACTITIONER

## 2024-03-29 PROCEDURE — 2500000005 HC RX 250 GENERAL PHARMACY W/O HCPCS: Performed by: NURSE PRACTITIONER

## 2024-03-29 PROCEDURE — 2500000004 HC RX 250 GENERAL PHARMACY W/ HCPCS (ALT 636 FOR OP/ED): Performed by: NURSE PRACTITIONER

## 2024-03-29 PROCEDURE — 94640 AIRWAY INHALATION TREATMENT: CPT

## 2024-03-29 PROCEDURE — 2500000002 HC RX 250 W HCPCS SELF ADMINISTERED DRUGS (ALT 637 FOR MEDICARE OP, ALT 636 FOR OP/ED): Mod: MUE | Performed by: THORACIC SURGERY (CARDIOTHORACIC VASCULAR SURGERY)

## 2024-03-29 PROCEDURE — 2500000001 HC RX 250 WO HCPCS SELF ADMINISTERED DRUGS (ALT 637 FOR MEDICARE OP)

## 2024-03-29 PROCEDURE — 1200000002 HC GENERAL ROOM WITH TELEMETRY DAILY

## 2024-03-29 PROCEDURE — 83735 ASSAY OF MAGNESIUM: CPT | Performed by: NURSE PRACTITIONER

## 2024-03-29 PROCEDURE — 80069 RENAL FUNCTION PANEL: CPT | Performed by: NURSE PRACTITIONER

## 2024-03-29 PROCEDURE — 2500000001 HC RX 250 WO HCPCS SELF ADMINISTERED DRUGS (ALT 637 FOR MEDICARE OP): Performed by: PHYSICIAN ASSISTANT

## 2024-03-29 PROCEDURE — 2500000004 HC RX 250 GENERAL PHARMACY W/ HCPCS (ALT 636 FOR OP/ED)

## 2024-03-29 PROCEDURE — 2500000004 HC RX 250 GENERAL PHARMACY W/ HCPCS (ALT 636 FOR OP/ED): Performed by: PHYSICIAN ASSISTANT

## 2024-03-29 PROCEDURE — 85027 COMPLETE CBC AUTOMATED: CPT | Performed by: NURSE PRACTITIONER

## 2024-03-29 PROCEDURE — 2500000002 HC RX 250 W HCPCS SELF ADMINISTERED DRUGS (ALT 637 FOR MEDICARE OP, ALT 636 FOR OP/ED): Performed by: THORACIC SURGERY (CARDIOTHORACIC VASCULAR SURGERY)

## 2024-03-29 PROCEDURE — 99232 SBSQ HOSP IP/OBS MODERATE 35: CPT

## 2024-03-29 PROCEDURE — 97530 THERAPEUTIC ACTIVITIES: CPT | Mod: GP,CQ

## 2024-03-29 PROCEDURE — 2500000002 HC RX 250 W HCPCS SELF ADMINISTERED DRUGS (ALT 637 FOR MEDICARE OP, ALT 636 FOR OP/ED)

## 2024-03-29 RX ORDER — HYDROXYZINE HYDROCHLORIDE 50 MG/1
50 TABLET, FILM COATED ORAL EVERY 6 HOURS PRN
Refills: 0
Start: 2024-03-29 | End: 2024-04-28

## 2024-03-29 RX ORDER — NYSTATIN 100000 [USP'U]/ML
4 SUSPENSION ORAL 2 TIMES DAILY
Status: DISCONTINUED | OUTPATIENT
Start: 2024-03-29 | End: 2024-04-01 | Stop reason: HOSPADM

## 2024-03-29 RX ORDER — POTASSIUM CHLORIDE 20 MEQ/1
20 TABLET, EXTENDED RELEASE ORAL ONCE
Status: COMPLETED | OUTPATIENT
Start: 2024-03-29 | End: 2024-03-29

## 2024-03-29 RX ORDER — BISACODYL 10 MG/1
10 SUPPOSITORY RECTAL DAILY
Status: DISCONTINUED | OUTPATIENT
Start: 2024-03-29 | End: 2024-04-01

## 2024-03-29 RX ORDER — LANOLIN ALCOHOL/MO/W.PET/CERES
800 CREAM (GRAM) TOPICAL ONCE
Status: COMPLETED | OUTPATIENT
Start: 2024-03-29 | End: 2024-03-29

## 2024-03-29 RX ORDER — POLYETHYLENE GLYCOL 3350 17 G/17G
17 POWDER, FOR SOLUTION ORAL 3 TIMES DAILY
Status: DISCONTINUED | OUTPATIENT
Start: 2024-03-29 | End: 2024-03-31

## 2024-03-29 RX ORDER — HYDROXYZINE HYDROCHLORIDE 25 MG/1
50 TABLET, FILM COATED ORAL EVERY 6 HOURS PRN
Status: DISCONTINUED | OUTPATIENT
Start: 2024-03-29 | End: 2024-04-01 | Stop reason: HOSPADM

## 2024-03-29 RX ORDER — FUROSEMIDE 10 MG/ML
20 INJECTION INTRAMUSCULAR; INTRAVENOUS DAILY
Status: DISCONTINUED | OUTPATIENT
Start: 2024-03-30 | End: 2024-03-30

## 2024-03-29 RX ORDER — LISINOPRIL 5 MG/1
5 TABLET ORAL DAILY
Status: DISCONTINUED | OUTPATIENT
Start: 2024-03-29 | End: 2024-04-01 | Stop reason: HOSPADM

## 2024-03-29 RX ORDER — ADHESIVE BANDAGE
30 BANDAGE TOPICAL ONCE
Status: COMPLETED | OUTPATIENT
Start: 2024-03-29 | End: 2024-03-29

## 2024-03-29 RX ADMIN — AMPICILLIN SODIUM 2 G: 2 INJECTION, POWDER, FOR SOLUTION INTRAMUSCULAR; INTRAVENOUS at 01:20

## 2024-03-29 RX ADMIN — Medication: at 20:00

## 2024-03-29 RX ADMIN — PREGABALIN 75 MG: 75 CAPSULE ORAL at 08:43

## 2024-03-29 RX ADMIN — NYSTATIN 400000 UNITS: 100000 SUSPENSION ORAL at 20:17

## 2024-03-29 RX ADMIN — LISINOPRIL 5 MG: 5 TABLET ORAL at 12:21

## 2024-03-29 RX ADMIN — HYDROXYZINE HYDROCHLORIDE 50 MG: 25 TABLET ORAL at 18:08

## 2024-03-29 RX ADMIN — FLUTICASONE FUROATE AND VILANTEROL TRIFENATATE 1 PUFF: 100; 25 POWDER RESPIRATORY (INHALATION) at 08:52

## 2024-03-29 RX ADMIN — FUROSEMIDE 20 MG: 10 INJECTION, SOLUTION INTRAMUSCULAR; INTRAVENOUS at 08:43

## 2024-03-29 RX ADMIN — CEFTRIAXONE SODIUM 2 G: 2 INJECTION, SOLUTION INTRAVENOUS at 20:16

## 2024-03-29 RX ADMIN — SENNOSIDES AND DOCUSATE SODIUM 2 TABLET: 8.6; 5 TABLET ORAL at 20:17

## 2024-03-29 RX ADMIN — CEFTRIAXONE SODIUM 2 G: 2 INJECTION, SOLUTION INTRAVENOUS at 08:46

## 2024-03-29 RX ADMIN — DIPHENHYDRAMINE HYDROCHLORIDE 25 MG: 25 CAPSULE ORAL at 04:56

## 2024-03-29 RX ADMIN — POLYETHYLENE GLYCOL 3350 17 G: 17 POWDER, FOR SOLUTION ORAL at 20:19

## 2024-03-29 RX ADMIN — TRAZODONE HYDROCHLORIDE 50 MG: 50 TABLET ORAL at 20:17

## 2024-03-29 RX ADMIN — SENNOSIDES AND DOCUSATE SODIUM 2 TABLET: 8.6; 5 TABLET ORAL at 08:43

## 2024-03-29 RX ADMIN — TRAMADOL HYDROCHLORIDE 50 MG: 50 TABLET, COATED ORAL at 18:07

## 2024-03-29 RX ADMIN — HYDROXYZINE HYDROCHLORIDE 50 MG: 25 TABLET ORAL at 12:21

## 2024-03-29 RX ADMIN — POTASSIUM CHLORIDE 20 MEQ: 1500 TABLET, EXTENDED RELEASE ORAL at 12:21

## 2024-03-29 RX ADMIN — MELATONIN 3 MG: 3 TAB ORAL at 20:19

## 2024-03-29 RX ADMIN — IPRATROPIUM BROMIDE AND ALBUTEROL SULFATE 3 ML: .5; 3 SOLUTION RESPIRATORY (INHALATION) at 08:54

## 2024-03-29 RX ADMIN — FAMOTIDINE 40 MG: 20 TABLET ORAL at 08:43

## 2024-03-29 RX ADMIN — AMPICILLIN SODIUM 2 G: 2 INJECTION, POWDER, FOR SOLUTION INTRAMUSCULAR; INTRAVENOUS at 04:31

## 2024-03-29 RX ADMIN — IPRATROPIUM BROMIDE AND ALBUTEROL SULFATE 3 ML: .5; 3 SOLUTION RESPIRATORY (INHALATION) at 21:10

## 2024-03-29 RX ADMIN — PREGABALIN 75 MG: 75 CAPSULE ORAL at 20:17

## 2024-03-29 RX ADMIN — MAGNESIUM HYDROXIDE 30 ML: 400 SUSPENSION ORAL at 12:22

## 2024-03-29 RX ADMIN — ACETAMINOPHEN 650 MG: 325 TABLET ORAL at 17:45

## 2024-03-29 RX ADMIN — ASPIRIN 81 MG: 81 TABLET, COATED ORAL at 08:43

## 2024-03-29 RX ADMIN — TIOTROPIUM BROMIDE INHALATION SPRAY 2 PUFF: 3.12 SPRAY, METERED RESPIRATORY (INHALATION) at 08:51

## 2024-03-29 RX ADMIN — ACETAMINOPHEN 650 MG: 325 TABLET ORAL at 08:43

## 2024-03-29 RX ADMIN — Medication 2 L/MIN: at 08:00

## 2024-03-29 RX ADMIN — METOPROLOL TARTRATE 50 MG: 50 TABLET, FILM COATED ORAL at 20:19

## 2024-03-29 RX ADMIN — IPRATROPIUM BROMIDE AND ALBUTEROL SULFATE 3 ML: .5; 3 SOLUTION RESPIRATORY (INHALATION) at 15:04

## 2024-03-29 RX ADMIN — AMPICILLIN SODIUM 2 G: 2 INJECTION, POWDER, FOR SOLUTION INTRAMUSCULAR; INTRAVENOUS at 12:23

## 2024-03-29 RX ADMIN — TRAMADOL HYDROCHLORIDE 50 MG: 50 TABLET, COATED ORAL at 04:56

## 2024-03-29 RX ADMIN — HEPARIN SODIUM 5000 UNITS: 5000 INJECTION INTRAVENOUS; SUBCUTANEOUS at 20:18

## 2024-03-29 RX ADMIN — Medication 800 MG: at 12:21

## 2024-03-29 RX ADMIN — AMPICILLIN SODIUM 2 G: 2 INJECTION, POWDER, FOR SOLUTION INTRAMUSCULAR; INTRAVENOUS at 17:43

## 2024-03-29 RX ADMIN — TRAMADOL HYDROCHLORIDE 50 MG: 50 TABLET, COATED ORAL at 12:21

## 2024-03-29 RX ADMIN — HEPARIN SODIUM 5000 UNITS: 5000 INJECTION INTRAVENOUS; SUBCUTANEOUS at 12:22

## 2024-03-29 RX ADMIN — ATORVASTATIN CALCIUM 80 MG: 80 TABLET, FILM COATED ORAL at 20:18

## 2024-03-29 RX ADMIN — HYDROXYCHLOROQUINE SULFATE 200 MG: 200 TABLET, FILM COATED ORAL at 08:43

## 2024-03-29 RX ADMIN — POLYETHYLENE GLYCOL 3350 17 G: 17 POWDER, FOR SOLUTION ORAL at 12:37

## 2024-03-29 RX ADMIN — HEPARIN SODIUM 5000 UNITS: 5000 INJECTION INTRAVENOUS; SUBCUTANEOUS at 04:31

## 2024-03-29 RX ADMIN — AMPICILLIN SODIUM 2 G: 2 INJECTION, POWDER, FOR SOLUTION INTRAMUSCULAR; INTRAVENOUS at 21:27

## 2024-03-29 RX ADMIN — METOPROLOL TARTRATE 50 MG: 50 TABLET, FILM COATED ORAL at 08:43

## 2024-03-29 ASSESSMENT — PAIN SCALES - GENERAL
PAINLEVEL_OUTOF10: 7
PAINLEVEL_OUTOF10: 7
PAINLEVEL_OUTOF10: 4
PAINLEVEL_OUTOF10: 8
PAINLEVEL_OUTOF10: 7

## 2024-03-29 ASSESSMENT — COGNITIVE AND FUNCTIONAL STATUS - GENERAL
TURNING FROM BACK TO SIDE WHILE IN FLAT BAD: A LITTLE
PERSONAL GROOMING: A LITTLE
MOVING FROM LYING ON BACK TO SITTING ON SIDE OF FLAT BED WITH BEDRAILS: A LITTLE
MOVING TO AND FROM BED TO CHAIR: A LITTLE
MOBILITY SCORE: 16
WALKING IN HOSPITAL ROOM: A LITTLE
CLIMB 3 TO 5 STEPS WITH RAILING: TOTAL
CLIMB 3 TO 5 STEPS WITH RAILING: TOTAL
TOILETING: A LITTLE
MOBILITY SCORE: 16
DAILY ACTIVITIY SCORE: 19
STANDING UP FROM CHAIR USING ARMS: A LITTLE
HELP NEEDED FOR BATHING: A LITTLE
STANDING UP FROM CHAIR USING ARMS: A LITTLE
MOVING FROM LYING ON BACK TO SITTING ON SIDE OF FLAT BED WITH BEDRAILS: A LITTLE
MOVING TO AND FROM BED TO CHAIR: A LITTLE
DRESSING REGULAR UPPER BODY CLOTHING: A LITTLE
WALKING IN HOSPITAL ROOM: A LITTLE
DRESSING REGULAR LOWER BODY CLOTHING: A LITTLE
TURNING FROM BACK TO SIDE WHILE IN FLAT BAD: A LITTLE

## 2024-03-29 ASSESSMENT — PAIN - FUNCTIONAL ASSESSMENT
PAIN_FUNCTIONAL_ASSESSMENT: 0-10

## 2024-03-29 NOTE — CARE PLAN
Problem: Fall/Injury  Goal: Not fall by end of shift  Outcome: Progressing  Goal: Be free from injury by end of the shift  Outcome: Progressing  Goal: Verbalize understanding of personal risk factors for fall in the hospital  Outcome: Progressing  Goal: Verbalize understanding of risk factor reduction measures to prevent injury from fall in the home  Outcome: Progressing  Goal: Use assistive devices by end of the shift  Outcome: Progressing  Goal: Pace activities to prevent fatigue by end of the shift  Outcome: Progressing     Problem: General Stroke  Goal: Establish a mutual long term goal with patient by discharge  Outcome: Progressing  Goal: Demonstrate improvement in neurological exam throughout the shift  Outcome: Progressing  Goal: Maintain BP within ordered limits throughout shift  Outcome: Progressing  Goal: Participate in treatment (ie., meds, therapy) throughout shift  Outcome: Progressing  Goal: No symptoms of aspiration throughout shift  Outcome: Progressing  Goal: No symptoms of hemorrhage throughout shift  Outcome: Progressing  Goal: Tolerate enteral feeding throughout shift  Outcome: Progressing  Goal: Decreased nausea/vomiting throughout shift  Outcome: Progressing  Goal: Controlled blood glucose throughout shift  Outcome: Progressing  Goal: Out of bed three times today  Outcome: Progressing     Problem: ICU Stroke  Goal: Maintain ICP within ordered limits throughout shift  Outcome: Progressing  Goal: Tolerate EVD clamping trial throughout shift  Outcome: Progressing  Goal: Tolerate ventilator weaning trial during shift  Outcome: Progressing  Goal: Maintain patent airway throughout shift  Outcome: Progressing  Goal: Achieve/maintain targeted sodium level throughout shift  Outcome: Progressing     Problem: Pain  Goal: Takes deep breaths with improved pain control throughout the shift  Outcome: Progressing  Goal: Turns in bed with improved pain control throughout the shift  Outcome:  Progressing  Goal: Walks with improved pain control throughout the shift  Outcome: Progressing  Goal: Performs ADL's with improved pain control throughout shift  Outcome: Progressing  Goal: Participates in PT with improved pain control throughout the shift  Outcome: Progressing  Goal: Free from opioid side effects throughout the shift  Outcome: Progressing  Goal: Free from acute confusion related to pain meds throughout the shift  Outcome: Progressing     Problem: Pain - Adult  Goal: Verbalizes/displays adequate comfort level or baseline comfort level  Outcome: Progressing     Problem: Safety - Adult  Goal: Free from fall injury  Outcome: Progressing     Problem: Discharge Planning  Goal: Discharge to home or other facility with appropriate resources  Outcome: Progressing     Problem: Chronic Conditions and Co-morbidities  Goal: Patient's chronic conditions and co-morbidity symptoms are monitored and maintained or improved  Outcome: Progressing     Problem: Skin  Goal: Decreased wound size/increased tissue granulation at next dressing change  Outcome: Progressing  Goal: Participates in plan/prevention/treatment measures  Outcome: Progressing  Goal: Prevent/manage excess moisture  Outcome: Progressing  Goal: Prevent/minimize sheer/friction injuries  Outcome: Progressing  Goal: Promote/optimize nutrition  Outcome: Progressing  Goal: Promote skin healing  Outcome: Progressing     Problem: Meds/Post-op Pain  Goal: Pain controlled to tolerate pain level  Outcome: Progressing  Goal: Tolerates prescribed medication  Outcome: Progressing     Problem: DVT/VTE Prevention/Activity  Goal: No decrease in circulation/sensation  Outcome: Progressing  Goal: Prevent skin breakdown  Outcome: Progressing  Goal: Return to preop oxygenation status  Outcome: Progressing  Goal: Tolerates optimal activity  Outcome: Progressing  Goal: Increase self care and/or family involvement in 24 hrs.  Outcome: Progressing     Problem: Wound  care/infection prevention  Goal: No signs of infection in 24 hrs.  Outcome: Progressing  Goal: No unexpected bleeding from incision this shift  Outcome: Progressing     Problem: Diet/fluid balance  Goal: Adequate urinary output  Outcome: Progressing  Goal: Free from nausea/vomiting  Outcome: Progressing  Goal: Return in bowel function  Outcome: Progressing  Goal: Tolerates prescribed diet  Outcome: Progressing     Problem: Other goals  Goal: No change in neurological status  Outcome: Progressing  Goal: Stabilize vital signs (return to 10% of baseline)  Outcome: Progressing   The patient's goals for the shift include To get Benadryl ordered for skin itchiness    The clinical goals for the shift include Pt will have pain control within this shift

## 2024-03-29 NOTE — PROGRESS NOTES
SW followed up with patient. Patient confirmed FOC is [place]. SW updated SNF. SW advised patient she may discharge today. Patient states she did not think she was ready for discharge. SW placed stretcher with 02 transport in [facility] for 3/30. Will continue to follow.     KARELY Hagen

## 2024-03-29 NOTE — PROGRESS NOTES
Physical Therapy    Physical Therapy Treatment    Patient Name: Fannie Watson  MRN: [id number]  Today's Date: 3/29/2024  Time Calculation  Start Time: 1443  Stop Time: 1502  Time Calculation (min): 19 min       Assessment/Plan   PT Assessment  End of Session Communication: Bedside nurse  Assessment Comment: .  End of Session Patient Position: Up in chair, Alarm off, not on at start of session  PT Plan  Inpatient/Swing Bed or Outpatient: Inpatient  PT Plan  Treatment/Interventions: Bed mobility, Transfer training, Gait training, Stair training, Balance training, Strengthening, Endurance training, Range of motion, Therapeutic exercise, Therapeutic activity, Home exercise program  PT Plan: Skilled PT  PT Frequency: Daily  PT Discharge Recommendations: Moderate intensity level of continued care  Equipment Recommended upon Discharge: Wheeled walker  PT Recommended Transfer Status: Assist x1, Assistive device  PT - OK to Discharge: Yes      General Visit Information:   PT  Visit  PT Received On: 03/29/24  General  Prior to Session Communication: Bedside nurse  Patient Position Received: Bed, 3 rail up, Alarm off, not on at start of session  General Comment: Pt recieved supine  present. pt reports frequent BM and declining long distance ambulation. pt slow to rise when transferring sit to stand requiring additional time for full knee extension    Subjective   Precautions:  Precautions  Medical Precautions: Fall precautions, Cardiac precautions, Spinal precautions  Vital Signs:       Objective   Pain:     Cognition:  Cognition  Orientation Level: Oriented X4       Treatments:  Therapeutic Exercise  Therapeutic Exercise Activity 1: Sitting in chair: AP x20 BL, LAQ x10 BL, and standing marches x10 BL    Bed Mobility 1  Bed Mobility 1: Supine to sitting, Log roll  Level of Assistance 1: Close supervision, Moderate verbal cues  Bed Mobility Comments 1: HOB elevated, cues to maitnain spinal precautions    Ambulation/Gait  Training 1  Surface 1: Level tile  Device 1: Rolling walker  Assistance 1: Contact guard, Maximum verbal cues  Quality of Gait 1: Diminished heel strike, Forward flexed posture  Comments/Distance (ft) 1: 110, breif standing rest break to complete distance  Transfer 1  Technique 1: Sit to stand  Transfer Device 1: Walker  Transfer Level of Assistance 1: Minimal verbal cues, Minimum assistance  Trials/Comments 1: cues for increased UE support and adequate knee extension         Outcome Measures:  [facility] Basic Mobility  Turning from your back to your side while in a flat bed without using bedrails: A little  Moving from lying on your back to sitting on the side of a flat bed without using bedrails: A little  Moving to and from bed to chair (including a wheelchair): A little  Standing up from a chair using your arms (e.g. wheelchair or bedside chair): A little  To walk in hospital room: A little  Climbing 3-5 steps with railing: Total  Basic Mobility - Total Score: 16    Education Documentation  Body Mechanics, taught by Nando Alves PTA at 3/29/2024  4:38 PM.  Learner: Patient  Readiness: Acceptance  Method: Explanation  Response: Verbalizes Understanding    Education Comments  No comments found.        OP EDUCATION:       Encounter Problems       Encounter Problems (Active)       PT Problem       Pt. will score 24/28 on Tinetti balance test.  (Progressing)       Start:  03/07/24    Expected End:  04/06/24            Patient will complete supine to sit and sit to supine Supervision  (Progressing)       Start:  03/07/24    Expected End:  04/06/24            Patient will complete stand to sit and sit to stand with Supervision with wheeled walker.  (Progressing)       Start:  03/07/24    Expected End:  04/06/24            Patient will ambulate >400' with Rolling Walker and Supervision  (Progressing)       Start:  03/07/24    Expected End:  04/06/24            Patient will ascend and descend 4 steps with 2 UE Support  and Supervision  (Progressing)       Start:  03/07/24    Expected End:  04/06/24               Pain - Adult

## 2024-03-29 NOTE — PROGRESS NOTES
CARDIAC SURGERY DAILY PROGRESS NOTE    Fannie Watson is a 78 y.o. female, with a PMH significant for HTN, HLD, COPD, spondylolisthesis of lumbar lesion s/p L4-L5 decompression laminectomy and fusion 03/01/2024 who presented to [facility] to Neuro ICU on 3/5/2024 for subarachnoid hemorrhage management.      Patient initially presented to [facility] (OSH) with altered mental status, for which a CT was done and showed a trace R sylvian subarachnoid hemorrhage and transferred to [facility] Neuro ICU for further management. While in NSU at [facility], patient went into paroxysmal A fib with RVR. TTE done on 03/07 showed an anterior mobile mass on mitral valve leaflet with moderate mitral valve regurgitation. Follow up CROW showed a 2.5 cm mobile vegetation on the mitral valve with moderate mitral regurgitation. Blood cultures drawn on 03/07 were positive for E. Faecium, for which infectious disease and cardiac surgery was consulted.      She had an MRI of her spine on 03/14 that showed an enlarging fluid collection that was drained by IR due to concern for abscess as source of infection vs possible discitis/osteomyelitis in the spine post-operatively.     Of note, per chart review patient has had multiple presentations since December 2023 for various symptoms including chest pain, chest tightness, shortness of breath and worsening back pain. She also had some dysphagia and decreased appetite for upwards of a month, as well as a cough that . 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 She had a L4-L5 TLIF on 03/01 at OSH.    3/22/2024 OPERATION/PROCEDURE: Dr. Deangelo Chaudhari   1. Minimally invasive mitral valve replacement (31mm Epic bioprosthesis)  2. Exploration of the right femoral vein and artery for cannulation for cardiopulmonary bypass.     CTICU: uneventful   Transferred to T3 on 3/24  ===================================  Interval  "History:   Patient accepted at [facility] for IV Antibiotics, plan for transport to facility for tomorrow 3/30 pending postop bowel movement.      Postop constipation; ordered MOMx2, soap suds enema, increased miralax to TID, added suppository.     SUBJECTIVE:  Reports itching of hands, chronic back pain, concern over IV antibiotics.  Reviewed with patient and  at bedside regarding continued plan of care and IV antibiotic duration.       Objective   /69   Pulse 78   Temp 36.7 °C (98.1 °F)   Resp 16   Ht 1.57 m (5' 1.81\")   Wt 73 kg (160 lb 13.2 oz)   LMP  (LMP Unknown)   SpO2 96%   BMI 29.60 kg/m²   Pain Score: 8   3 Day Weight Change: -0.843 kg (-1 lb 13.7 oz) per day    Intake and Output    Intake/Output Summary (Last 24 hours) at 3/29/2024 1536  Last data filed at 3/29/2024 0501  Gross per 24 hour   Intake 280 ml   Output 400 ml   Net -120 ml       Physical Exam  Vitals and nursing note reviewed.   Constitutional:       General: She is not in acute distress.     Appearance: Normal appearance. She is obese. She is not ill-appearing.      Comments: Patient alert and awake, sitting up in chair - in no acute distress.    HENT:      Head: Atraumatic.      Nose: Nose normal.      Mouth/Throat:      Pharynx: Oropharynx is clear.   Eyes:      Conjunctiva/sclera: Conjunctivae normal.   Neck:      Comments: Trachea Midline    Cardiovascular:      Rate and Rhythm: Normal rate and regular rhythm.      Pulses: Normal pulses.      Heart sounds: Normal heart sounds. No murmur heard.     No gallop.      Comments: Tele: SR 1st degree AVB 70's to 80's   +2 equal and even pulses in all extremities   A/V epicardial wires - capped   Right upper extremity PICC; no redness or edema at site  Pulmonary:      Effort: Pulmonary effort is normal. No respiratory distress.      Breath sounds: No wheezing.      Comments: Equal and even chest expansion; thorax symmetric   Diminished breath sounds in bilateral bases "   Good inspiratory effort on RA.     Abdominal:      General: Bowel sounds are normal. There is no distension.      Palpations: Abdomen is soft.      Tenderness: There is no abdominal tenderness.      Comments: Awaiting postop BM    Genitourinary:     Comments: Voids independently   Musculoskeletal:      Cervical back: Neck supple.      Right lower le+ Edema present.      Left lower le+ Edema present.      Comments: ART; 5/5 strength in all extremities   Ambulates independently with walker     Skin:     General: Skin is warm and dry.      Capillary Refill: Capillary refill takes less than 2 seconds.      Coloration: Skin is not jaundiced or pale.      Findings: Bruising (abdomen; thoracotomy site) present.      Comments: Right thoracotomy site: well approximated, no s/s of infection or bleeding - SEEMA +ecchymosis around surgical incision     NITIN single lumen PICC placed 3/25.    Neurological:      General: No focal deficit present.      Mental Status: She is alert and oriented to person, place, and time.   Psychiatric:         Mood and Affect: Mood normal.         Behavior: Behavior normal.       Medications  Scheduled medications  acetaminophen, 650 mg, oral, q6h  ampicillin, 2 g, intravenous, q4h  aspirin, 81 mg, oral, Daily  atorvastatin, 80 mg, oral, Nightly  bisacodyl, 10 mg, rectal, Daily  cefTRIAXone, 2 g, intravenous, q12h  famotidine, 40 mg, oral, Daily  tiotropium, 2 puff, inhalation, Daily   And  fluticasone furoate-vilanteroL, 1 puff, inhalation, Daily  [START ON 3/30/2024] furosemide, 20 mg, intravenous, Daily  heparin (porcine), 5,000 Units, subcutaneous, q8h  hydroxychloroquine, 200 mg, oral, Daily  ipratropium-albuteroL, 3 mL, nebulization, TID  lidocaine, 5 mL, infiltration, Once  lisinopril, 5 mg, oral, Daily  melatonin, 3 mg, oral, Nightly  metoprolol tartrate, 50 mg, oral, BID  oxygen, , inhalation, Continuous - Inhalation  polyethylene glycol, 17 g, oral, BID  pregabalin, 75 mg, oral,  BID  sennosides-docusate sodium, 2 tablet, oral, BID  traZODone, 50 mg, oral, Nightly    Continuous medications   PRN medications  PRN medications: albuterol, alteplase, benzocaine-menthol, benzonatate, dextrose **OR** [DISCONTINUED] glucagon, hydrOXYzine HCL, ondansetron, tiZANidine, traMADol, traMADol    LABS:  CMP:  Results from last 7 days   Lab Units 03/29/24  0431 03/28/24  0452 03/27/24  0532 03/26/24  0359 03/25/24  0501 03/24/24  1203 03/24/24  0611 03/23/24  2316 03/23/24  2115 03/23/24  1309 03/22/24  2349   SODIUM mmol/L 138 138 134* 135* 136 133* 132* 130* 132* 132* 136   POTASSIUM mmol/L 4.3 3.7 4.3 4.0 4.3 4.5 4.4 3.9 3.9 3.6 4.1   CHLORIDE mmol/L 101 99 98 98 99 97* 97* 95* 94* 95* 100   CO2 mmol/L 28 34* 27 29 29 24 25 25 24 25 27   ANION GAP mmol/L 13 9* 13 12 12 17 14 14 18 16 13   BUN mg/dL 7 8 9 13 14 13 13 12 12 10 7   CREATININE mg/dL 0.45* 0.40* 0.43* 0.43* 0.48* 0.54 0.53 0.53 0.58 0.56 0.40*   EGFR mL/min/1.73m*2 >90 >90 >90 >90 >90 >90 >90 >90 >90 >90 >90   MAGNESIUM mg/dL 1.88 1.78 1.83 1.92 1.97 1.92  --  2.27  --  1.88 2.12   ALBUMIN g/dL 3.1* 3.0* 3.2* 3.2* 3.3* 3.8 3.6 3.7 4.0 4.2 3.8       CBC:  Results from last 7 days   Lab Units 03/29/24  0431 03/28/24  0452 03/27/24  0532 03/26/24  0359 03/25/24  0501 03/24/24  1203 03/24/24  0611 03/23/24  2316   WBC AUTO x10*3/uL 7.4 6.6 8.3 7.9 9.3 17.5* 16.0* 18.8*   HEMOGLOBIN g/dL 8.7* 8.3* 8.6* 7.8* 8.1* 8.9* 8.2* 8.2*   HEMATOCRIT % 28.2* 26.9* 27.7* 26.0* 26.5* 27.9* 25.6* 24.9*   PLATELETS AUTO x10*3/uL 365 293 252 213 187 202 172 170   MCV fL 88 87 88 88 86 85 86 83       COAG:         HEME/ENDO:     CARDIAC:        IMPRESSION & PLAN:  POD # 7 s/p mini Mitral Valve replacement w/ Dr. Chaudhari on 3/22/2024  - Increase activity/ ambulation; PT/OT  - Encourage IS, C/DB; respiratory therapy; wean O2 as claudia -> on RA  - Cardiac rehab referral   - Continue cardiac meds: ASA, BB, statin   - Pain and anticonstipation meds  - 2v CXR completed on  3/25/2024.  - Postop echo completed 3/27 and showed an EF 60-65%, MVR in place with 18mmHg peak gradient and 7.4mmHg mean gradient with mod elevated RV systolic pressure 48 mmHg   - Postop echo ordered on 3/25.   - CUT epicardial wires prior to discharge; per surgeon preference.    - Tele until discharge    Rhythm  - Tele: SR with 1st degree AV Block 70's   - Continue BB: metoprolol tartrate 50mg BID   - Adjust medications as tolerated    Acute Blood Loss Anemia   Recent Labs     03/29/24  0431 03/28/24  0452 03/27/24  0532 03/26/24  0359 03/25/24  0501 03/24/24  1203 03/24/24  0611   HGB 8.7* 8.3* 8.6* 7.8* 8.1* 8.9* 8.2*   HCT 28.2* 26.9* 27.7* 26.0* 26.5* 27.9* 25.6*     - MV, PO Iron x1mo  - Daily labs, transfuse as indicated    Thrombocytopenia - resolved   Recent Labs     03/29/24  0431 03/28/24  0452 03/27/24  0532 03/26/24  0359 03/25/24  0501 03/24/24  1203 03/24/24  0611    293 252 213 187 202 172     - Etiology likely postop/CPB related  - Continue to trend with daily CBCs    Volume/Electrolyte Status: Preop wt Weight: 69.6 kg (153 lb 7 oz)   Vitals:    03/29/24 0008   Weight: 73 kg (160 lb 13.2 oz)     - Weight: 73.9 kg   - Adjust diuresis for postop cardiac surgery hypervolemia  - continue Lasix 20mg IV daily for diuresis   - Replete electrolytes for hypokalemia/hypomagnesemia: replaced K & Mg 3/25; replaced Mg 3/29.   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  Lumbar postsurgical site infection- drained 3/18 with no growth - resolved   Recent Labs     03/29/24  0431 03/28/24  0452 03/27/24  0532 03/26/24  0359 03/25/24  0501 03/24/24  1203 03/24/24  0611   WBC 7.4 6.6 8.3 7.9 9.3 17.5* 16.0*       - Infectious Disease Following, appreciate recs:   ---Continue ampicillin 2 g IV  q 4  ---Continue ceftriaxone 2 g IV q 12  ---Both ABXs for 6 weeks from date of surgery, EOT 5/03/2024  ---Weekly CBC with diff and CMP as outpatient  while on IV antibiotics and fax reports to Dr Cruz  ---ID will arrange for follow up as an outpatient   - 3/25: Right Upper Arm single lumen PICC placed   - monitor for s/s infection: currently afebrile without any s/s of infection present   - daily CBC while inpatient     Postop Constipation:   - 3/29: Added Milk of magnesium x2, soap suds enema, encouraged prune juice   - encourage mobility and chewing gum   - 3/29: changed diet to full liquid   - AM KUB ordered for tomorrow 3/30   - Continue bowel regimen of:   --Miralax TID  --Suppository daily   --felton-colace BID     Hypertension: home meds: Lisinopril 20mg daily; Bisoprolol 10mg daily   Systolic (24hrs), Av , Min:105 , Max:135   - continue BB   - 3/29: Added Lisinopril 5mg daily   - continue holding home bisoprolol as not clinically indicated to resume based on current BP's   - resume antihypertensives as indicated    Acute post op pulmonary insufficiency   COPD (not on home O2): home meds: albuterol inhaler every 6 hours for shortness of breath; Atrovent 17mcg 2 puffs BID; Trelegy Ellipta 1 puf daily   -CXR PRN  -wean NC maintaining SpO2 >92% - RA   -Continue duonebs  -Continue spiriva respimat and breo ellipta   -bronchial hygiene with EZ PAPs/IS     Hyperlipidemia: no home meds   Lab Results   Component Value Date    CHOL 151 2024    HDL 38.0 2024    VLDL 21 2024    TRIG 107 2024    NHDL 113 2024   - continue high intensity statin therapy   - follow up lipid panel with PCP/ cardiologist for ongoing lipid management    GERD:  home famotidine 40mg   - continue home famotidine   - diet as tolerated     Arthritis/Chronic pain back and neck pain: home regimen: plaquenil 200mg daily ; Lyrica 75mg daily; Zanaflex 4mg TID PRN for muscle spasms/pain; prednisone 5mg daily during flare ups    - continue manage pain with PRN tramdol and tylenol   - No NSAID's x3 months s/p cardiac surgery   -OARRS reviewed 3/25/2024   --Current  Prescriptions: Pregabalin 75mg capsule; last filled 2/15/2024; OTY: 270; prescriber GEORGE Kirkpatrick   - continue home lyrica  - 3/25: resume home plaquenil 200mg daily   - PT/OT   -will not need narcotic prescription at discharge      TLIF of L4-L5 2/26/24  Per patient she was required to wear a brace post op and does not have here- discussed with neurosurgery and given that this was minimally invasive and with NO changes in neurologic exam no indication for brace at this time- instructed to follow up with surgeon from OSH for further receommendation    VTE Prophylaxis: SCDs/TEDs, ambulation, SQ heparin  Code Status: Full Code    Dispo  - PT/OT recs low intensity level of continued care  - Would benefit from homecare for cardiac surgery carepath and RN visits  - Patient plan to discharge to SNF for IV antibiotic infusion d/t high cost/no insurance coverage if infused at home: accepted to [facility] - awaiting transportation plans    - Anticipate discharge to SNF tomorrow  days, pending diuresis, postop BM, and increased mobility.    - Will continue to assess discharge needs    ALEJANDRO Montana-CNP  Cardiac Surgery [facility]  [facility]  Team Phone [phone]    3/29/2024  3:36 PM

## 2024-03-30 ENCOUNTER — APPOINTMENT (OUTPATIENT)
Dept: RADIOLOGY | Facility: HOSPITAL | Age: 79
DRG: 981 | End: 2024-03-30
Payer: MEDICARE

## 2024-03-30 LAB
ALBUMIN SERPL BCP-MCNC: 3.1 G/DL (ref 3.4–5)
ANION GAP SERPL CALC-SCNC: 14 MMOL/L (ref 10–20)
BUN SERPL-MCNC: 7 MG/DL (ref 6–23)
CALCIUM SERPL-MCNC: 8.7 MG/DL (ref 8.6–10.6)
CHLORIDE SERPL-SCNC: 100 MMOL/L (ref 98–107)
CO2 SERPL-SCNC: 26 MMOL/L (ref 21–32)
CREAT SERPL-MCNC: 0.43 MG/DL (ref 0.5–1.05)
EGFRCR SERPLBLD CKD-EPI 2021: >90 ML/MIN/1.73M*2
ERYTHROCYTE [DISTWIDTH] IN BLOOD BY AUTOMATED COUNT: 16.3 % (ref 11.5–14.5)
GLUCOSE SERPL-MCNC: 94 MG/DL (ref 74–99)
HCT VFR BLD AUTO: 29 % (ref 36–46)
HGB BLD-MCNC: 9 G/DL (ref 12–16)
MAGNESIUM SERPL-MCNC: 1.99 MG/DL (ref 1.6–2.4)
MCH RBC QN AUTO: 26.5 PG (ref 26–34)
MCHC RBC AUTO-ENTMCNC: 31 G/DL (ref 32–36)
MCV RBC AUTO: 85 FL (ref 80–100)
NRBC BLD-RTO: 0 /100 WBCS (ref 0–0)
PHOSPHATE SERPL-MCNC: 3.8 MG/DL (ref 2.5–4.9)
PLATELET # BLD AUTO: 416 X10*3/UL (ref 150–450)
POTASSIUM SERPL-SCNC: 4.2 MMOL/L (ref 3.5–5.3)
RBC # BLD AUTO: 3.4 X10*6/UL (ref 4–5.2)
SODIUM SERPL-SCNC: 136 MMOL/L (ref 136–145)
WBC # BLD AUTO: 9 X10*3/UL (ref 4.4–11.3)

## 2024-03-30 PROCEDURE — 2500000002 HC RX 250 W HCPCS SELF ADMINISTERED DRUGS (ALT 637 FOR MEDICARE OP, ALT 636 FOR OP/ED): Mod: MUE | Performed by: PHYSICIAN ASSISTANT

## 2024-03-30 PROCEDURE — 74018 RADEX ABDOMEN 1 VIEW: CPT | Performed by: RADIOLOGY

## 2024-03-30 PROCEDURE — 1200000002 HC GENERAL ROOM WITH TELEMETRY DAILY

## 2024-03-30 PROCEDURE — 2500000001 HC RX 250 WO HCPCS SELF ADMINISTERED DRUGS (ALT 637 FOR MEDICARE OP)

## 2024-03-30 PROCEDURE — 74018 RADEX ABDOMEN 1 VIEW: CPT

## 2024-03-30 PROCEDURE — 94640 AIRWAY INHALATION TREATMENT: CPT

## 2024-03-30 PROCEDURE — 2500000001 HC RX 250 WO HCPCS SELF ADMINISTERED DRUGS (ALT 637 FOR MEDICARE OP): Performed by: NURSE PRACTITIONER

## 2024-03-30 PROCEDURE — 99232 SBSQ HOSP IP/OBS MODERATE 35: CPT

## 2024-03-30 PROCEDURE — 2500000002 HC RX 250 W HCPCS SELF ADMINISTERED DRUGS (ALT 637 FOR MEDICARE OP, ALT 636 FOR OP/ED): Performed by: THORACIC SURGERY (CARDIOTHORACIC VASCULAR SURGERY)

## 2024-03-30 PROCEDURE — 2500000002 HC RX 250 W HCPCS SELF ADMINISTERED DRUGS (ALT 637 FOR MEDICARE OP, ALT 636 FOR OP/ED): Mod: MUE

## 2024-03-30 PROCEDURE — 83735 ASSAY OF MAGNESIUM: CPT | Performed by: NURSE PRACTITIONER

## 2024-03-30 PROCEDURE — 2500000002 HC RX 250 W HCPCS SELF ADMINISTERED DRUGS (ALT 637 FOR MEDICARE OP, ALT 636 FOR OP/ED): Mod: MUE | Performed by: THORACIC SURGERY (CARDIOTHORACIC VASCULAR SURGERY)

## 2024-03-30 PROCEDURE — 2500000002 HC RX 250 W HCPCS SELF ADMINISTERED DRUGS (ALT 637 FOR MEDICARE OP, ALT 636 FOR OP/ED)

## 2024-03-30 PROCEDURE — 2500000004 HC RX 250 GENERAL PHARMACY W/ HCPCS (ALT 636 FOR OP/ED)

## 2024-03-30 PROCEDURE — 85027 COMPLETE CBC AUTOMATED: CPT | Performed by: NURSE PRACTITIONER

## 2024-03-30 PROCEDURE — 2500000004 HC RX 250 GENERAL PHARMACY W/ HCPCS (ALT 636 FOR OP/ED): Performed by: NURSE PRACTITIONER

## 2024-03-30 PROCEDURE — 2500000001 HC RX 250 WO HCPCS SELF ADMINISTERED DRUGS (ALT 637 FOR MEDICARE OP): Performed by: PHYSICIAN ASSISTANT

## 2024-03-30 PROCEDURE — 2500000002 HC RX 250 W HCPCS SELF ADMINISTERED DRUGS (ALT 637 FOR MEDICARE OP, ALT 636 FOR OP/ED): Performed by: PHYSICIAN ASSISTANT

## 2024-03-30 PROCEDURE — 82435 ASSAY OF BLOOD CHLORIDE: CPT | Performed by: NURSE PRACTITIONER

## 2024-03-30 RX ORDER — NYSTATIN 100000 [USP'U]/ML
4 SUSPENSION ORAL 2 TIMES DAILY
Qty: 32 ML | Refills: 0
Start: 2024-03-30 | End: 2024-04-03

## 2024-03-30 RX ORDER — FUROSEMIDE 40 MG/1
40 TABLET ORAL DAILY
Qty: 5 TABLET | Refills: 0
Start: 2024-03-30 | End: 2024-05-20 | Stop reason: WASHOUT

## 2024-03-30 RX ORDER — FUROSEMIDE 10 MG/ML
40 INJECTION INTRAMUSCULAR; INTRAVENOUS ONCE
Status: COMPLETED | OUTPATIENT
Start: 2024-03-30 | End: 2024-03-30

## 2024-03-30 RX ORDER — LISINOPRIL 5 MG/1
5 TABLET ORAL DAILY
Refills: 0
Start: 2024-03-30 | End: 2024-04-29

## 2024-03-30 RX ORDER — POTASSIUM CHLORIDE 20 MEQ/1
20 TABLET, EXTENDED RELEASE ORAL ONCE
Status: COMPLETED | OUTPATIENT
Start: 2024-03-30 | End: 2024-03-30

## 2024-03-30 RX ORDER — CALCIUM CARBONATE 300MG(750)
400 TABLET,CHEWABLE ORAL DAILY
Qty: 5 TABLET | Refills: 0
Start: 2024-03-30 | End: 2024-04-04

## 2024-03-30 RX ORDER — TRAMADOL HYDROCHLORIDE 50 MG/1
50 TABLET ORAL EVERY 6 HOURS PRN
Qty: 20 TABLET | Refills: 0 | Status: SHIPPED | OUTPATIENT
Start: 2024-03-30 | End: 2024-04-04

## 2024-03-30 RX ORDER — LANOLIN ALCOHOL/MO/W.PET/CERES
800 CREAM (GRAM) TOPICAL ONCE
Status: COMPLETED | OUTPATIENT
Start: 2024-03-30 | End: 2024-03-30

## 2024-03-30 RX ORDER — FUROSEMIDE 10 MG/ML
20 INJECTION INTRAMUSCULAR; INTRAVENOUS ONCE
Status: COMPLETED | OUTPATIENT
Start: 2024-03-30 | End: 2024-03-30

## 2024-03-30 RX ORDER — POTASSIUM CHLORIDE 750 MG/1
10 TABLET, FILM COATED, EXTENDED RELEASE ORAL DAILY
Qty: 5 TABLET | Refills: 0
Start: 2024-03-30 | End: 2024-04-04

## 2024-03-30 RX ADMIN — POLYETHYLENE GLYCOL 3350 17 G: 17 POWDER, FOR SOLUTION ORAL at 16:26

## 2024-03-30 RX ADMIN — METOPROLOL TARTRATE 50 MG: 50 TABLET, FILM COATED ORAL at 09:09

## 2024-03-30 RX ADMIN — TRAMADOL HYDROCHLORIDE 50 MG: 50 TABLET, COATED ORAL at 06:50

## 2024-03-30 RX ADMIN — AMPICILLIN SODIUM 2 G: 2 INJECTION, POWDER, FOR SOLUTION INTRAMUSCULAR; INTRAVENOUS at 11:35

## 2024-03-30 RX ADMIN — TRAMADOL HYDROCHLORIDE 50 MG: 50 TABLET, COATED ORAL at 21:47

## 2024-03-30 RX ADMIN — PREGABALIN 75 MG: 75 CAPSULE ORAL at 21:47

## 2024-03-30 RX ADMIN — ATORVASTATIN CALCIUM 80 MG: 80 TABLET, FILM COATED ORAL at 21:47

## 2024-03-30 RX ADMIN — MELATONIN 3 MG: 3 TAB ORAL at 21:46

## 2024-03-30 RX ADMIN — ASPIRIN 81 MG: 81 TABLET, COATED ORAL at 09:09

## 2024-03-30 RX ADMIN — HYDROXYZINE HYDROCHLORIDE 50 MG: 25 TABLET ORAL at 21:47

## 2024-03-30 RX ADMIN — AMPICILLIN SODIUM 2 G: 2 INJECTION, POWDER, FOR SOLUTION INTRAMUSCULAR; INTRAVENOUS at 21:46

## 2024-03-30 RX ADMIN — PREGABALIN 75 MG: 75 CAPSULE ORAL at 09:09

## 2024-03-30 RX ADMIN — AMPICILLIN SODIUM 2 G: 2 INJECTION, POWDER, FOR SOLUTION INTRAMUSCULAR; INTRAVENOUS at 04:31

## 2024-03-30 RX ADMIN — LISINOPRIL 5 MG: 5 TABLET ORAL at 09:09

## 2024-03-30 RX ADMIN — POTASSIUM CHLORIDE 20 MEQ: 1500 TABLET, EXTENDED RELEASE ORAL at 09:09

## 2024-03-30 RX ADMIN — HYDROXYCHLOROQUINE SULFATE 200 MG: 200 TABLET, FILM COATED ORAL at 09:09

## 2024-03-30 RX ADMIN — SENNOSIDES AND DOCUSATE SODIUM 2 TABLET: 8.6; 5 TABLET ORAL at 09:09

## 2024-03-30 RX ADMIN — FUROSEMIDE 20 MG: 10 INJECTION, SOLUTION INTRAMUSCULAR; INTRAVENOUS at 16:26

## 2024-03-30 RX ADMIN — HEPARIN SODIUM 5000 UNITS: 5000 INJECTION INTRAVENOUS; SUBCUTANEOUS at 04:31

## 2024-03-30 RX ADMIN — ACETAMINOPHEN 650 MG: 325 TABLET ORAL at 09:08

## 2024-03-30 RX ADMIN — FUROSEMIDE 40 MG: 10 INJECTION, SOLUTION INTRAVENOUS at 09:09

## 2024-03-30 RX ADMIN — FLUTICASONE FUROATE AND VILANTEROL TRIFENATATE 1 PUFF: 100; 25 POWDER RESPIRATORY (INHALATION) at 10:39

## 2024-03-30 RX ADMIN — HYDROXYZINE HYDROCHLORIDE 50 MG: 25 TABLET ORAL at 11:36

## 2024-03-30 RX ADMIN — NYSTATIN 400000 UNITS: 100000 SUSPENSION ORAL at 09:09

## 2024-03-30 RX ADMIN — POLYETHYLENE GLYCOL 3350 17 G: 17 POWDER, FOR SOLUTION ORAL at 09:10

## 2024-03-30 RX ADMIN — TRAZODONE HYDROCHLORIDE 50 MG: 50 TABLET ORAL at 21:46

## 2024-03-30 RX ADMIN — FAMOTIDINE 40 MG: 20 TABLET ORAL at 09:09

## 2024-03-30 RX ADMIN — ACETAMINOPHEN 650 MG: 325 TABLET ORAL at 22:19

## 2024-03-30 RX ADMIN — TIZANIDINE 4 MG: 4 TABLET ORAL at 09:21

## 2024-03-30 RX ADMIN — TIOTROPIUM BROMIDE INHALATION SPRAY 2 PUFF: 3.12 SPRAY, METERED RESPIRATORY (INHALATION) at 10:39

## 2024-03-30 RX ADMIN — IPRATROPIUM BROMIDE AND ALBUTEROL SULFATE 3 ML: .5; 3 SOLUTION RESPIRATORY (INHALATION) at 21:47

## 2024-03-30 RX ADMIN — HEPARIN SODIUM 5000 UNITS: 5000 INJECTION INTRAVENOUS; SUBCUTANEOUS at 22:19

## 2024-03-30 RX ADMIN — HEPARIN SODIUM 5000 UNITS: 5000 INJECTION INTRAVENOUS; SUBCUTANEOUS at 11:35

## 2024-03-30 RX ADMIN — Medication 800 MG: at 09:09

## 2024-03-30 RX ADMIN — AMPICILLIN SODIUM 2 G: 2 INJECTION, POWDER, FOR SOLUTION INTRAMUSCULAR; INTRAVENOUS at 00:48

## 2024-03-30 RX ADMIN — BENZOCAINE AND MENTHOL 1 LOZENGE: 15; 3.6 LOZENGE ORAL at 09:23

## 2024-03-30 RX ADMIN — ACETAMINOPHEN 650 MG: 325 TABLET ORAL at 16:26

## 2024-03-30 RX ADMIN — HYDROXYZINE HYDROCHLORIDE 50 MG: 25 TABLET ORAL at 06:48

## 2024-03-30 RX ADMIN — CEFTRIAXONE SODIUM 2 G: 2 INJECTION, SOLUTION INTRAVENOUS at 09:10

## 2024-03-30 RX ADMIN — AMPICILLIN SODIUM 2 G: 2 INJECTION, POWDER, FOR SOLUTION INTRAMUSCULAR; INTRAVENOUS at 16:27

## 2024-03-30 RX ADMIN — IPRATROPIUM BROMIDE AND ALBUTEROL SULFATE 3 ML: .5; 3 SOLUTION RESPIRATORY (INHALATION) at 10:39

## 2024-03-30 RX ADMIN — METOPROLOL TARTRATE 50 MG: 50 TABLET, FILM COATED ORAL at 21:47

## 2024-03-30 RX ADMIN — CEFTRIAXONE SODIUM 2 G: 2 INJECTION, SOLUTION INTRAVENOUS at 22:19

## 2024-03-30 RX ADMIN — TRAMADOL HYDROCHLORIDE 50 MG: 50 TABLET, COATED ORAL at 11:36

## 2024-03-30 ASSESSMENT — COGNITIVE AND FUNCTIONAL STATUS - GENERAL
TURNING FROM BACK TO SIDE WHILE IN FLAT BAD: A LITTLE
TOILETING: A LITTLE
STANDING UP FROM CHAIR USING ARMS: A LITTLE
WALKING IN HOSPITAL ROOM: A LITTLE
DRESSING REGULAR LOWER BODY CLOTHING: A LITTLE
MOVING FROM LYING ON BACK TO SITTING ON SIDE OF FLAT BED WITH BEDRAILS: A LITTLE
HELP NEEDED FOR BATHING: A LITTLE
PERSONAL GROOMING: A LITTLE
MOBILITY SCORE: 18
DRESSING REGULAR UPPER BODY CLOTHING: A LITTLE
CLIMB 3 TO 5 STEPS WITH RAILING: A LITTLE
DAILY ACTIVITIY SCORE: 19
MOVING TO AND FROM BED TO CHAIR: A LITTLE

## 2024-03-30 ASSESSMENT — PAIN SCALES - GENERAL
PAINLEVEL_OUTOF10: 8
PAINLEVEL_OUTOF10: 7
PAINLEVEL_OUTOF10: 4

## 2024-03-30 ASSESSMENT — PAIN - FUNCTIONAL ASSESSMENT
PAIN_FUNCTIONAL_ASSESSMENT: 0-10
PAIN_FUNCTIONAL_ASSESSMENT: 0-10

## 2024-03-30 NOTE — CARE PLAN
Problem: Fall/Injury  Goal: Not fall by end of shift  Outcome: Progressing  Goal: Be free from injury by end of the shift  Outcome: Progressing  Goal: Verbalize understanding of personal risk factors for fall in the hospital  Outcome: Progressing  Goal: Verbalize understanding of risk factor reduction measures to prevent injury from fall in the home  Outcome: Progressing  Goal: Use assistive devices by end of the shift  Outcome: Progressing  Goal: Pace activities to prevent fatigue by end of the shift  Outcome: Progressing     Problem: General Stroke  Goal: Establish a mutual long term goal with patient by discharge  Outcome: Progressing  Goal: Demonstrate improvement in neurological exam throughout the shift  Outcome: Progressing  Goal: Maintain BP within ordered limits throughout shift  Outcome: Progressing  Goal: Participate in treatment (ie., meds, therapy) throughout shift  Outcome: Progressing  Goal: No symptoms of aspiration throughout shift  Outcome: Progressing  Goal: No symptoms of hemorrhage throughout shift  Outcome: Progressing  Goal: Tolerate enteral feeding throughout shift  Outcome: Progressing  Goal: Decreased nausea/vomiting throughout shift  Outcome: Progressing  Goal: Controlled blood glucose throughout shift  Outcome: Progressing  Goal: Out of bed three times today  Outcome: Progressing     Problem: ICU Stroke  Goal: Maintain ICP within ordered limits throughout shift  Outcome: Progressing  Goal: Tolerate EVD clamping trial throughout shift  Outcome: Progressing  Goal: Tolerate ventilator weaning trial during shift  Outcome: Progressing  Goal: Maintain patent airway throughout shift  Outcome: Progressing  Goal: Achieve/maintain targeted sodium level throughout shift  Outcome: Progressing     Problem: Pain  Goal: Takes deep breaths with improved pain control throughout the shift  Outcome: Progressing  Goal: Turns in bed with improved pain control throughout the shift  Outcome:  Progressing  Goal: Walks with improved pain control throughout the shift  Outcome: Progressing  Goal: Performs ADL's with improved pain control throughout shift  Outcome: Progressing  Goal: Participates in PT with improved pain control throughout the shift  Outcome: Progressing  Goal: Free from opioid side effects throughout the shift  Outcome: Progressing  Goal: Free from acute confusion related to pain meds throughout the shift  Outcome: Progressing     Problem: Pain - Adult  Goal: Verbalizes/displays adequate comfort level or baseline comfort level  Outcome: Progressing     Problem: Safety - Adult  Goal: Free from fall injury  Outcome: Progressing     Problem: Discharge Planning  Goal: Discharge to home or other facility with appropriate resources  Outcome: Progressing     Problem: Chronic Conditions and Co-morbidities  Goal: Patient's chronic conditions and co-morbidity symptoms are monitored and maintained or improved  Outcome: Progressing     Problem: Skin  Goal: Decreased wound size/increased tissue granulation at next dressing change  Outcome: Progressing  Goal: Participates in plan/prevention/treatment measures  Outcome: Progressing  Goal: Prevent/manage excess moisture  Outcome: Progressing  Goal: Prevent/minimize sheer/friction injuries  Outcome: Progressing  Goal: Promote/optimize nutrition  Outcome: Progressing  Goal: Promote skin healing  Outcome: Progressing     Problem: Meds/Post-op Pain  Goal: Pain controlled to tolerate pain level  Outcome: Progressing  Goal: Tolerates prescribed medication  Outcome: Progressing     Problem: DVT/VTE Prevention/Activity  Goal: No decrease in circulation/sensation  Outcome: Progressing  Goal: Prevent skin breakdown  Outcome: Progressing  Goal: Return to preop oxygenation status  Outcome: Progressing  Goal: Tolerates optimal activity  Outcome: Progressing  Goal: Increase self care and/or family involvement in 24 hrs.  Outcome: Progressing     Problem: Wound  care/infection prevention  Goal: No signs of infection in 24 hrs.  Outcome: Progressing  Goal: No unexpected bleeding from incision this shift  Outcome: Progressing     Problem: Diet/fluid balance  Goal: Adequate urinary output  Outcome: Progressing  Goal: Free from nausea/vomiting  Outcome: Progressing  Goal: Return in bowel function  Outcome: Progressing  Goal: Tolerates prescribed diet  Outcome: Progressing     Problem: Other goals  Goal: No change in neurological status  Outcome: Progressing  Goal: Stabilize vital signs (return to 10% of baseline)  Outcome: Progressing   The patient's goals for the shift include To get Benadryl ordered for skin itchiness    The clinical goals for the shift include Pt will have BM within this shift

## 2024-03-30 NOTE — PROGRESS NOTES
CARDIAC SURGERY DAILY PROGRESS NOTE    Fannie Watson is a 78 y.o. female, with a PMH significant for HTN, HLD, COPD, spondylolisthesis of lumbar lesion s/p L4-L5 decompression laminectomy and fusion 03/01/2024 who presented to [facility] to Neuro ICU on 3/5/2024 for subarachnoid hemorrhage management.      Patient initially presented to [facility] (OSH) with altered mental status, for which a CT was done and showed a trace R sylvian subarachnoid hemorrhage and transferred to [facility] Neuro ICU for further management. [place] [facility] [place] went into paroxysmal A fib with RVR. TTE done on 03/07 showed an anterior mobile mass on mitral valve leaflet with moderate mitral valve regurgitation. Follow up CROW showed a 2.5 cm mobile vegetation on the mitral valve with moderate mitral regurgitation. Blood cultures drawn on 03/07 were positive for E. Faecium, for which infectious disease and cardiac surgery was consulted.      She had an MRI of her spine on 03/14 that showed an enlarging fluid collection that was drained by IR due to concern for abscess as source of infection vs possible discitis/osteomyelitis in the spine post-operatively.     Of note, per chart review patient has had multiple presentations since December 2023 for various symptoms including chest pain, chest tightness, shortness of breath and worsening back pain. She also had some dysphagia and decreased appetite for upwards of a month, as well as a cough that . 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 She had a L4-L5 TLIF on 03/01 at OSH.    3/22/2024 OPERATION/PROCEDURE: Dr. Deangelo Chaudhari   1. Minimally invasive mitral valve replacement (31mm Epic bioprosthesis)  2. Exploration of the right femoral vein and artery for cannulation for cardiopulmonary bypass.     CTICU: uneventful   Transferred to T3 on 3/24  ===================================  Interval  "History:   -Patient accepted at [facility] for IV Antibiotics, plan for transport to facility Monday 4/1.   -Postop constipation/illeus; ordered MOMx2, soap suds enema, increased miralax to TID, added suppository- resolved after multiple large BM's    SUBJECTIVE:  Reviewed with patient and  at bedside regarding continued plan of care and IV antibiotic duration.   Patient complains of chronic back pain.       Objective   /64   Pulse 74   Temp 36.4 °C (97.5 °F)   Resp 20   Ht 1.57 m (5' 1.81\")   Wt 73 kg (161 lb)   LMP  (LMP Unknown)   SpO2 94%   BMI 29.63 kg/m²   Pain Score: 7   3 Day Weight Change: -0.832 kg (-1 lb 13.3 oz) per day    Intake and Output    Intake/Output Summary (Last 24 hours) at 3/30/2024 1718  Last data filed at 3/30/2024 1709  Gross per 24 hour   Intake 2330 ml   Output 1125 ml   Net 1205 ml       Physical Exam  Vitals and nursing note reviewed.   Constitutional:       General: She is not in acute distress.     Appearance: Normal appearance. She is obese. She is not ill-appearing.      Comments: Patient alert and awake, sitting up in chair - in no acute distress.    HENT:      Head: Atraumatic.      Nose: Nose normal.      Mouth/Throat:      Pharynx: Oropharynx is clear.   Eyes:      Conjunctiva/sclera: Conjunctivae normal.      Comments: Patient wearing glasses.    Neck:      Comments: Trachea Midline    Cardiovascular:      Rate and Rhythm: Normal rate and regular rhythm.      Pulses: Normal pulses.      Heart sounds: Normal heart sounds. No murmur heard.     No gallop.      Comments: Tele: SR 1st degree AVB 70's to 80's   +2 equal and even pulses in all extremities   A/V epicardial wires - capped   Right upper extremity PICC; no redness or edema at site  Pulmonary:      Effort: Pulmonary effort is normal. No respiratory distress.      Breath sounds: No wheezing.      Comments: Equal and even chest expansion; thorax symmetric   Diminished breath sounds in bilateral bases "   Good inspiratory effort on RA.     Abdominal:      General: Bowel sounds are normal. There is no distension.      Palpations: Abdomen is soft.      Tenderness: There is no abdominal tenderness.      Comments: +postop with multiple large BM's 3/29 and 3/30    Genitourinary:     Comments: Voids independently   Musculoskeletal:      Cervical back: Neck supple.      Right lower le+ Edema present.      Left lower le+ Edema present.      Comments: ART; 5/5 strength in all extremities   Ambulates independently with walker     Skin:     General: Skin is warm and dry.      Capillary Refill: Capillary refill takes less than 2 seconds.      Coloration: Skin is not jaundiced or pale.      Findings: Bruising (abdomen; thoracotomy site) present.      Comments: Right thoracotomy site: well approximated, no s/s of infection or bleeding - SEEMA +ecchymosis around surgical incision     NITIN single lumen PICC placed 3/25.    Neurological:      General: No focal deficit present.      Mental Status: She is alert and oriented to person, place, and time.   Psychiatric:         Mood and Affect: Mood normal.         Behavior: Behavior normal.       Medications  Scheduled medications  acetaminophen, 650 mg, oral, q6h  ampicillin, 2 g, intravenous, q4h  aspirin, 81 mg, oral, Daily  atorvastatin, 80 mg, oral, Nightly  bisacodyl, 10 mg, rectal, Daily  cefTRIAXone, 2 g, intravenous, q12h  famotidine, 40 mg, oral, Daily  tiotropium, 2 puff, inhalation, Daily   And  fluticasone furoate-vilanteroL, 1 puff, inhalation, Daily  heparin (porcine), 5,000 Units, subcutaneous, q8h  hydroxychloroquine, 200 mg, oral, Daily  ipratropium-albuteroL, 3 mL, nebulization, TID  lidocaine, 5 mL, infiltration, Once  lisinopril, 5 mg, oral, Daily  melatonin, 3 mg, oral, Nightly  metoprolol tartrate, 50 mg, oral, BID  nystatin, 4 mL, Swish & Spit, BID  oxygen, , inhalation, Continuous - Inhalation  polyethylene glycol, 17 g, oral, TID  pregabalin, 75 mg, oral,  BID  sennosides-docusate sodium, 2 tablet, oral, BID  traZODone, 50 mg, oral, Nightly    Continuous medications   PRN medications  PRN medications: albuterol, alteplase, benzocaine-menthol, benzonatate, dextrose **OR** [DISCONTINUED] glucagon, hydrOXYzine HCL, ondansetron, tiZANidine, traMADol, traMADol    LABS:  CMP:  Results from last 7 days   Lab Units 03/30/24  0432 03/29/24  0431 03/28/24  0452 03/27/24  0532 03/26/24  0359 03/25/24  0501 03/24/24  1203 03/24/24  0611 03/23/24  2316 03/23/24  2115   SODIUM mmol/L 136 138 138 134* 135* 136 133* 132* 130* 132*   POTASSIUM mmol/L 4.2 4.3 3.7 4.3 4.0 4.3 4.5 4.4 3.9 3.9   CHLORIDE mmol/L 100 101 99 98 98 99 97* 97* 95* 94*   CO2 mmol/L 26 28 34* 27 29 29 24 25 25 24   ANION GAP mmol/L 14 13 9* 13 12 12 17 14 14 18   BUN mg/dL 7 7 8 9 13 14 13 13 12 12   CREATININE mg/dL 0.43* 0.45* 0.40* 0.43* 0.43* 0.48* 0.54 0.53 0.53 0.58   EGFR mL/min/1.73m*2 >90 >90 >90 >90 >90 >90 >90 >90 >90 >90   MAGNESIUM mg/dL 1.99 1.88 1.78 1.83 1.92 1.97 1.92  --  2.27  --    ALBUMIN g/dL 3.1* 3.1* 3.0* 3.2* 3.2* 3.3* 3.8 3.6 3.7 4.0       CBC:  Results from last 7 days   Lab Units 03/30/24  0432 03/29/24  0431 03/28/24  0452 03/27/24  0532 03/26/24  0359 03/25/24  0501 03/24/24  1203 03/24/24  0611   WBC AUTO x10*3/uL 9.0 7.4 6.6 8.3 7.9 9.3 17.5* 16.0*   HEMOGLOBIN g/dL 9.0* 8.7* 8.3* 8.6* 7.8* 8.1* 8.9* 8.2*   HEMATOCRIT % 29.0* 28.2* 26.9* 27.7* 26.0* 26.5* 27.9* 25.6*   PLATELETS AUTO x10*3/uL 416 365 293 252 213 187 202 172   MCV fL 85 88 87 88 88 86 85 86       COAG:         HEME/ENDO:     CARDIAC:        IMPRESSION & PLAN:  POD # 8 s/p mini Mitral Valve replacement w/ Dr. Chaudhari on 3/22/2024  - Increase activity/ ambulation; PT/OT  - Encourage IS, C/DB; respiratory therapy; wean O2 as claudia -> on RA  - Cardiac rehab referral   - Continue cardiac meds: ASA, BB, statin   - Pain and anticonstipation meds  - 2v CXR completed on 3/25/2024.  - Postop echo completed 3/27 and showed an  EF 60-65%, MVR in place with 18mmHg peak gradient and 7.4mmHg mean gradient with mod elevated RV systolic pressure 48 mmHg   - CUT epicardial wires prior to discharge; per surgeon preference.    - Tele until discharge    Rhythm  - Tele: SR with 1st degree AV Block 70's   - Continue BB: metoprolol tartrate 50mg BID   - Adjust medications as tolerated    Acute Blood Loss Anemia   Recent Labs     03/30/24  0432 03/29/24  0431 03/28/24  0452 03/27/24  0532 03/26/24  0359 03/25/24  0501 03/24/24  1203   HGB 9.0* 8.7* 8.3* 8.6* 7.8* 8.1* 8.9*   HCT 29.0* 28.2* 26.9* 27.7* 26.0* 26.5* 27.9*     - MV, PO Iron x1mo  - Daily labs, transfuse as indicated    Volume/Electrolyte Status: Preop wt Weight: 69.6 kg (153 lb 7 oz)   Vitals:    03/30/24 0149   Weight: 73 kg (161 lb)     - Weight: 73, 73, 73.9 kg   - Adjust diuresis for postop cardiac surgery hypervolemia  - 3/30: Lasix 40mg IV x1 in AM; 20mg IV x1 PM   - Replete electrolytes for hypokalemia/hypomagnesemia: replaced K & Mg 3/25; replaced Mg 3/29; replaced K & Mg 3/30.   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  Lumbar postsurgical site infection- drained 3/18 with no growth - resolved   Recent Labs     03/30/24  0432 03/29/24  0431 03/28/24  0452 03/27/24  0532 03/26/24  0359 03/25/24  0501 03/24/24  1203   WBC 9.0 7.4 6.6 8.3 7.9 9.3 17.5*       - Infectious Disease Following, appreciate recs:   ---Continue ampicillin 2 g IV  q 4  ---Continue ceftriaxone 2 g IV q 12  ---Both ABXs for 6 weeks from date of surgery, EOT 5/03/2024  ---Weekly CBC with diff and CMP as outpatient while on IV antibiotics and fax reports to Dr Cruz  ---ID will arrange for follow up as an outpatient   - 3/25: Right Upper Arm single lumen PICC placed   - monitor for s/s infection: currently afebrile without any s/s of infection present   - daily CBC while inpatient     Postop Constipation:   - 3/29: Added Milk of magnesium  x2, soap suds enema, encouraged prune juice   - encourage mobility and chewing gum   - 3/29: changed diet to full liquid   - 3/30: AM KUB showed dilated colonic loops; consistent with colonic ileus    - Continue bowel regimen of:   --Miralax TID  --Suppository daily   --felton-colace BID  --MOM x1   -3/30: multiple large BM's; advanced diet to regular for dinner; no N/V   - ordered KUB for AM      Hypertension: home meds: Lisinopril 20mg daily; Bisoprolol 10mg daily   Systolic (24hrs), Av , Min:92 , Max:129   - continue BB   - 3/29: Added Lisinopril 5mg daily   - continue holding home bisoprolol as not clinically indicated to resume based on current BP's   - resume antihypertensives as indicated    Acute post op pulmonary insufficiency   COPD (not on home O2): home meds: albuterol inhaler every 6 hours for shortness of breath; Atrovent 17mcg 2 puffs BID; Trelegy Ellipta 1 puf daily   -CXR PRN  -wean NC maintaining SpO2 >92% - RA   -Continue duonebs  -Continue spiriva respimat and breo ellipta   -bronchial hygiene with EZ PAPs/IS     Hyperlipidemia: no home meds   Lab Results   Component Value Date    CHOL 151 2024    HDL 38.0 2024    VLDL 21 2024    TRIG 107 2024    NHDL 113 2024   - continue high intensity statin therapy   - follow up lipid panel with PCP/ cardiologist for ongoing lipid management    GERD:  home famotidine 40mg   - continue home famotidine   - diet as tolerated     Arthritis/Chronic pain back and neck pain: home regimen: plaquenil 200mg daily ; Lyrica 75mg daily; Zanaflex 4mg TID PRN for muscle spasms/pain; prednisone 5mg daily during flare ups    - continue manage pain with PRN tramdol and tylenol   - No NSAID's x3 months s/p cardiac surgery   -OARRS reviewed 3/25/2024   --Current Prescriptions: Pregabalin 75mg capsule; last filled 2/15/2024; OTY: 270; prescriber GEORGE Kirkpatrick   - continue home lyrica  - 3/25: resume home plaquenil 200mg daily   - PT/OT   -will not need  narcotic prescription at discharge      TLIF of L4-L5 2/26/24  -Per patient she was required to wear a brace post op and does not have here- discussed with neurosurgery and given that this was minimally invasive and with NO changes in neurologic exam no indication for brace at this time- instructed to follow up with surgeon from OSH for further receommendation    VTE Prophylaxis: SCDs/TEDs, ambulation, SQ heparin  Code Status: Full Code    Dispo  - PT/OT recs low intensity level of continued care  - Would benefit from homecare for cardiac surgery carepath and RN visits  - Patient plan to discharge to SNF for IV antibiotic infusion d/t high cost/no insurance coverage if infused at home: accepted to [facility] - awaiting transportation plans    - Anticipate discharge to SNF Monday (4/1), pending diuresis and transport to facility.   - Will continue to assess discharge needs    ALEJANDRO Montana-CNP  Cardiac Surgery [facility]  [facility]  Team Phone [phone]    3/30/2024  5:18 PM

## 2024-03-30 NOTE — PROGRESS NOTES
Physical Therapy                 Therapy Communication Note    Patient Name: Fannie Watson  MRN: [id number]  Today's Date: 3/30/2024     Discipline: Physical Therapy    Missed Visit Reason:      Missed Time: Attempt    Comment: pt declining session secondary to frequent BM and fatigue from waling to/from toilet.

## 2024-03-31 ENCOUNTER — APPOINTMENT (OUTPATIENT)
Dept: RADIOLOGY | Facility: HOSPITAL | Age: 79
DRG: 981 | End: 2024-03-31
Payer: MEDICARE

## 2024-03-31 LAB
ALBUMIN SERPL BCP-MCNC: 2.9 G/DL (ref 3.4–5)
ANION GAP SERPL CALC-SCNC: 14 MMOL/L (ref 10–20)
BUN SERPL-MCNC: 9 MG/DL (ref 6–23)
CALCIUM SERPL-MCNC: 8.6 MG/DL (ref 8.6–10.6)
CHLORIDE SERPL-SCNC: 100 MMOL/L (ref 98–107)
CO2 SERPL-SCNC: 27 MMOL/L (ref 21–32)
CREAT SERPL-MCNC: 0.49 MG/DL (ref 0.5–1.05)
EGFRCR SERPLBLD CKD-EPI 2021: >90 ML/MIN/1.73M*2
ERYTHROCYTE [DISTWIDTH] IN BLOOD BY AUTOMATED COUNT: 16.4 % (ref 11.5–14.5)
GLUCOSE SERPL-MCNC: 91 MG/DL (ref 74–99)
HCT VFR BLD AUTO: 28.4 % (ref 36–46)
HGB BLD-MCNC: 8.9 G/DL (ref 12–16)
MAGNESIUM SERPL-MCNC: 2.08 MG/DL (ref 1.6–2.4)
MCH RBC QN AUTO: 27.1 PG (ref 26–34)
MCHC RBC AUTO-ENTMCNC: 31.3 G/DL (ref 32–36)
MCV RBC AUTO: 86 FL (ref 80–100)
NRBC BLD-RTO: 0 /100 WBCS (ref 0–0)
PHOSPHATE SERPL-MCNC: 4.4 MG/DL (ref 2.5–4.9)
PLATELET # BLD AUTO: 450 X10*3/UL (ref 150–450)
POTASSIUM SERPL-SCNC: 4 MMOL/L (ref 3.5–5.3)
RBC # BLD AUTO: 3.29 X10*6/UL (ref 4–5.2)
SODIUM SERPL-SCNC: 137 MMOL/L (ref 136–145)
WBC # BLD AUTO: 9.6 X10*3/UL (ref 4.4–11.3)

## 2024-03-31 PROCEDURE — 2500000001 HC RX 250 WO HCPCS SELF ADMINISTERED DRUGS (ALT 637 FOR MEDICARE OP): Performed by: NURSE PRACTITIONER

## 2024-03-31 PROCEDURE — 2500000004 HC RX 250 GENERAL PHARMACY W/ HCPCS (ALT 636 FOR OP/ED): Performed by: NURSE PRACTITIONER

## 2024-03-31 PROCEDURE — 80069 RENAL FUNCTION PANEL: CPT | Performed by: NURSE PRACTITIONER

## 2024-03-31 PROCEDURE — 97116 GAIT TRAINING THERAPY: CPT | Mod: GP,CQ

## 2024-03-31 PROCEDURE — 85027 COMPLETE CBC AUTOMATED: CPT | Performed by: NURSE PRACTITIONER

## 2024-03-31 PROCEDURE — 2500000004 HC RX 250 GENERAL PHARMACY W/ HCPCS (ALT 636 FOR OP/ED)

## 2024-03-31 PROCEDURE — 74018 RADEX ABDOMEN 1 VIEW: CPT

## 2024-03-31 PROCEDURE — 2500000001 HC RX 250 WO HCPCS SELF ADMINISTERED DRUGS (ALT 637 FOR MEDICARE OP): Performed by: PHYSICIAN ASSISTANT

## 2024-03-31 PROCEDURE — 2500000002 HC RX 250 W HCPCS SELF ADMINISTERED DRUGS (ALT 637 FOR MEDICARE OP, ALT 636 FOR OP/ED): Performed by: THORACIC SURGERY (CARDIOTHORACIC VASCULAR SURGERY)

## 2024-03-31 PROCEDURE — 2500000002 HC RX 250 W HCPCS SELF ADMINISTERED DRUGS (ALT 637 FOR MEDICARE OP, ALT 636 FOR OP/ED): Mod: MUE | Performed by: THORACIC SURGERY (CARDIOTHORACIC VASCULAR SURGERY)

## 2024-03-31 PROCEDURE — 83735 ASSAY OF MAGNESIUM: CPT | Performed by: NURSE PRACTITIONER

## 2024-03-31 PROCEDURE — 1200000002 HC GENERAL ROOM WITH TELEMETRY DAILY

## 2024-03-31 PROCEDURE — 2500000001 HC RX 250 WO HCPCS SELF ADMINISTERED DRUGS (ALT 637 FOR MEDICARE OP)

## 2024-03-31 PROCEDURE — 2500000002 HC RX 250 W HCPCS SELF ADMINISTERED DRUGS (ALT 637 FOR MEDICARE OP, ALT 636 FOR OP/ED): Mod: MUE

## 2024-03-31 PROCEDURE — 99232 SBSQ HOSP IP/OBS MODERATE 35: CPT

## 2024-03-31 PROCEDURE — 2500000002 HC RX 250 W HCPCS SELF ADMINISTERED DRUGS (ALT 637 FOR MEDICARE OP, ALT 636 FOR OP/ED): Performed by: PHYSICIAN ASSISTANT

## 2024-03-31 PROCEDURE — 2500000002 HC RX 250 W HCPCS SELF ADMINISTERED DRUGS (ALT 637 FOR MEDICARE OP, ALT 636 FOR OP/ED): Mod: MUE | Performed by: PHYSICIAN ASSISTANT

## 2024-03-31 PROCEDURE — 74018 RADEX ABDOMEN 1 VIEW: CPT | Performed by: RADIOLOGY

## 2024-03-31 PROCEDURE — 94640 AIRWAY INHALATION TREATMENT: CPT

## 2024-03-31 PROCEDURE — 2500000002 HC RX 250 W HCPCS SELF ADMINISTERED DRUGS (ALT 637 FOR MEDICARE OP, ALT 636 FOR OP/ED)

## 2024-03-31 RX ORDER — LANOLIN ALCOHOL/MO/W.PET/CERES
400 CREAM (GRAM) TOPICAL ONCE
Status: COMPLETED | OUTPATIENT
Start: 2024-03-31 | End: 2024-03-31

## 2024-03-31 RX ORDER — LANOLIN ALCOHOL/MO/W.PET/CERES
400 CREAM (GRAM) TOPICAL ONCE
Status: DISCONTINUED | OUTPATIENT
Start: 2024-03-31 | End: 2024-03-31

## 2024-03-31 RX ORDER — POTASSIUM CHLORIDE 20 MEQ/1
20 TABLET, EXTENDED RELEASE ORAL ONCE
Status: COMPLETED | OUTPATIENT
Start: 2024-03-31 | End: 2024-03-31

## 2024-03-31 RX ORDER — FUROSEMIDE 10 MG/ML
20 INJECTION INTRAMUSCULAR; INTRAVENOUS ONCE
Status: DISCONTINUED | OUTPATIENT
Start: 2024-03-31 | End: 2024-03-31

## 2024-03-31 RX ORDER — POLYETHYLENE GLYCOL 3350 17 G/17G
17 POWDER, FOR SOLUTION ORAL 2 TIMES DAILY PRN
Start: 2024-03-31

## 2024-03-31 RX ORDER — FUROSEMIDE 10 MG/ML
20 INJECTION INTRAMUSCULAR; INTRAVENOUS ONCE
Status: COMPLETED | OUTPATIENT
Start: 2024-03-31 | End: 2024-03-31

## 2024-03-31 RX ORDER — POTASSIUM CHLORIDE 20 MEQ/1
20 TABLET, EXTENDED RELEASE ORAL ONCE
Status: DISCONTINUED | OUTPATIENT
Start: 2024-03-31 | End: 2024-03-31

## 2024-03-31 RX ORDER — POLYETHYLENE GLYCOL 3350 17 G/17G
17 POWDER, FOR SOLUTION ORAL DAILY
Status: DISCONTINUED | OUTPATIENT
Start: 2024-04-01 | End: 2024-04-01 | Stop reason: HOSPADM

## 2024-03-31 RX ADMIN — ACETAMINOPHEN 650 MG: 325 TABLET ORAL at 09:45

## 2024-03-31 RX ADMIN — AMPICILLIN SODIUM 2 G: 2 INJECTION, POWDER, FOR SOLUTION INTRAMUSCULAR; INTRAVENOUS at 15:25

## 2024-03-31 RX ADMIN — HYDROXYZINE HYDROCHLORIDE 50 MG: 25 TABLET ORAL at 09:45

## 2024-03-31 RX ADMIN — FUROSEMIDE 20 MG: 10 INJECTION, SOLUTION INTRAMUSCULAR; INTRAVENOUS at 09:45

## 2024-03-31 RX ADMIN — HEPARIN SODIUM 5000 UNITS: 5000 INJECTION INTRAVENOUS; SUBCUTANEOUS at 11:26

## 2024-03-31 RX ADMIN — Medication 400 MG: at 09:46

## 2024-03-31 RX ADMIN — HYDROXYCHLOROQUINE SULFATE 200 MG: 200 TABLET, FILM COATED ORAL at 09:46

## 2024-03-31 RX ADMIN — HEPARIN SODIUM 5000 UNITS: 5000 INJECTION INTRAVENOUS; SUBCUTANEOUS at 20:14

## 2024-03-31 RX ADMIN — AMPICILLIN SODIUM 2 G: 2 INJECTION, POWDER, FOR SOLUTION INTRAMUSCULAR; INTRAVENOUS at 05:04

## 2024-03-31 RX ADMIN — AMPICILLIN SODIUM 2 G: 2 INJECTION, POWDER, FOR SOLUTION INTRAMUSCULAR; INTRAVENOUS at 20:15

## 2024-03-31 RX ADMIN — METOPROLOL TARTRATE 50 MG: 50 TABLET, FILM COATED ORAL at 09:46

## 2024-03-31 RX ADMIN — TRAMADOL HYDROCHLORIDE 50 MG: 50 TABLET, COATED ORAL at 03:48

## 2024-03-31 RX ADMIN — PREGABALIN 75 MG: 75 CAPSULE ORAL at 20:30

## 2024-03-31 RX ADMIN — ASPIRIN 81 MG: 81 TABLET, COATED ORAL at 09:45

## 2024-03-31 RX ADMIN — FLUTICASONE FUROATE AND VILANTEROL TRIFENATATE 1 PUFF: 100; 25 POWDER RESPIRATORY (INHALATION) at 07:00

## 2024-03-31 RX ADMIN — LISINOPRIL 5 MG: 5 TABLET ORAL at 09:45

## 2024-03-31 RX ADMIN — TRAMADOL HYDROCHLORIDE 50 MG: 50 TABLET, COATED ORAL at 20:31

## 2024-03-31 RX ADMIN — ACETAMINOPHEN 650 MG: 325 TABLET ORAL at 20:14

## 2024-03-31 RX ADMIN — TRAZODONE HYDROCHLORIDE 50 MG: 50 TABLET ORAL at 20:31

## 2024-03-31 RX ADMIN — HEPARIN SODIUM 5000 UNITS: 5000 INJECTION INTRAVENOUS; SUBCUTANEOUS at 03:47

## 2024-03-31 RX ADMIN — AMPICILLIN SODIUM 2 G: 2 INJECTION, POWDER, FOR SOLUTION INTRAMUSCULAR; INTRAVENOUS at 11:26

## 2024-03-31 RX ADMIN — POTASSIUM CHLORIDE 20 MEQ: 1500 TABLET, EXTENDED RELEASE ORAL at 09:46

## 2024-03-31 RX ADMIN — METOPROLOL TARTRATE 50 MG: 50 TABLET, FILM COATED ORAL at 20:31

## 2024-03-31 RX ADMIN — AMPICILLIN SODIUM 2 G: 2 INJECTION, POWDER, FOR SOLUTION INTRAMUSCULAR; INTRAVENOUS at 00:52

## 2024-03-31 RX ADMIN — CEFTRIAXONE SODIUM 2 G: 2 INJECTION, SOLUTION INTRAVENOUS at 09:44

## 2024-03-31 RX ADMIN — MELATONIN 3 MG: 3 TAB ORAL at 20:31

## 2024-03-31 RX ADMIN — ATORVASTATIN CALCIUM 80 MG: 80 TABLET, FILM COATED ORAL at 20:38

## 2024-03-31 RX ADMIN — CEFTRIAXONE SODIUM 2 G: 2 INJECTION, SOLUTION INTRAVENOUS at 21:09

## 2024-03-31 RX ADMIN — TIZANIDINE 4 MG: 4 TABLET ORAL at 15:24

## 2024-03-31 RX ADMIN — BENZONATATE 100 MG: 100 CAPSULE ORAL at 09:46

## 2024-03-31 RX ADMIN — NYSTATIN 400000 UNITS: 100000 SUSPENSION ORAL at 20:31

## 2024-03-31 RX ADMIN — IPRATROPIUM BROMIDE AND ALBUTEROL SULFATE 3 ML: .5; 3 SOLUTION RESPIRATORY (INHALATION) at 08:00

## 2024-03-31 RX ADMIN — NYSTATIN 400000 UNITS: 100000 SUSPENSION ORAL at 09:44

## 2024-03-31 RX ADMIN — HYDROXYZINE HYDROCHLORIDE 50 MG: 25 TABLET ORAL at 20:30

## 2024-03-31 RX ADMIN — TRAMADOL HYDROCHLORIDE 50 MG: 50 TABLET, COATED ORAL at 09:45

## 2024-03-31 RX ADMIN — PREGABALIN 75 MG: 75 CAPSULE ORAL at 09:45

## 2024-03-31 RX ADMIN — ACETAMINOPHEN 650 MG: 325 TABLET ORAL at 15:24

## 2024-03-31 RX ADMIN — IPRATROPIUM BROMIDE AND ALBUTEROL SULFATE 3 ML: .5; 3 SOLUTION RESPIRATORY (INHALATION) at 21:39

## 2024-03-31 RX ADMIN — TIOTROPIUM BROMIDE INHALATION SPRAY 2 PUFF: 3.12 SPRAY, METERED RESPIRATORY (INHALATION) at 07:00

## 2024-03-31 RX ADMIN — FAMOTIDINE 40 MG: 20 TABLET ORAL at 09:45

## 2024-03-31 ASSESSMENT — PAIN SCALES - GENERAL
PAINLEVEL_OUTOF10: 7
PAINLEVEL_OUTOF10: 8
PAINLEVEL_OUTOF10: 0 - NO PAIN

## 2024-03-31 ASSESSMENT — COGNITIVE AND FUNCTIONAL STATUS - GENERAL
TURNING FROM BACK TO SIDE WHILE IN FLAT BAD: A LITTLE
MOVING TO AND FROM BED TO CHAIR: A LITTLE
MOBILITY SCORE: 18
STANDING UP FROM CHAIR USING ARMS: A LITTLE
CLIMB 3 TO 5 STEPS WITH RAILING: A LITTLE
WALKING IN HOSPITAL ROOM: A LITTLE
MOVING FROM LYING ON BACK TO SITTING ON SIDE OF FLAT BED WITH BEDRAILS: A LITTLE

## 2024-03-31 ASSESSMENT — PAIN - FUNCTIONAL ASSESSMENT
PAIN_FUNCTIONAL_ASSESSMENT: 0-10

## 2024-03-31 ASSESSMENT — PAIN DESCRIPTION - DESCRIPTORS: DESCRIPTORS: ACHING

## 2024-03-31 ASSESSMENT — PAIN DESCRIPTION - LOCATION
LOCATION: BACK
LOCATION: BACK

## 2024-03-31 NOTE — CARE PLAN
The patient's goals for the shift include To get Benadryl ordered for skin itchiness    The clinical goals for the shift include Pt will have BM within this shift    Patient's pain level will be within the acceptable range.

## 2024-03-31 NOTE — PROGRESS NOTES
CARDIAC SURGERY DAILY PROGRESS NOTE    Fannie Watson is a 78 y.o. female, with a PMH significant for HTN, HLD, COPD, spondylolisthesis of lumbar lesion s/p L4-L5 decompression laminectomy and fusion 03/01/2024 who presented to [facility] to Neuro ICU on 3/5/2024 for subarachnoid hemorrhage management.      Patient initially presented to [facility] (OSH) with altered mental status, for which a CT was done and showed a trace R sylvian subarachnoid hemorrhage and transferred to [facility] Neuro ICU for further management. While in NSU at [facility], patient went into paroxysmal A fib with RVR. TTE done on 03/07 showed an anterior mobile mass on mitral valve leaflet with moderate mitral valve regurgitation. Follow up CROW showed a 2.5 cm mobile vegetation on the mitral valve with moderate mitral regurgitation. Blood cultures drawn on 03/07 were positive for E. Faecium, for which infectious disease and cardiac surgery was consulted.      She had an MRI of her spine on 03/14 that showed an enlarging fluid collection that was drained by IR due to concern for abscess as source of infection vs possible discitis/osteomyelitis in the spine post-operatively.     Of note, per chart review patient has had multiple presentations since December 2023 for various symptoms including chest pain, chest tightness, shortness of breath and worsening back pain. She also had some dysphagia and decreased appetite for upwards of a month, as well as a cough that . 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 She had a L4-L5 TLIF on 03/01 at OSH.    3/22/2024 OPERATION/PROCEDURE: Dr. Deangelo Chaudhari   1. Minimally invasive mitral valve replacement (31mm Epic bioprosthesis)  2. Exploration of the right femoral vein and artery for cannulation for cardiopulmonary bypass.     CTICU: uneventful   Transferred to T3 on 3/24  ===================================  Interval  "History:   -Patient accepted at [facility] for IV Antibiotics, plan for transport to facility Monday 4/1.   -Postop constipation/illeus resolving; multiple large BM's - AM KUB much improved from previous day   -CUT epicardial wires     SUBJECTIVE:  Patient complains of anxiety, back pain, arthritis pain in hand, foot soreness from walking.      Objective   /66   Pulse 78   Temp 36.5 °C (97.7 °F)   Resp 20   Ht 1.57 m (5' 1.81\")   Wt 71 kg (156 lb 8.4 oz)   LMP  (LMP Unknown)   SpO2 95%   BMI 28.80 kg/m²   Pain Score: 7   3 Day Weight Change: -1.058 kg (-2 lb 5.3 oz) per day    Intake and Output    Intake/Output Summary (Last 24 hours) at 3/31/2024 1403  Last data filed at 3/31/2024 1119  Gross per 24 hour   Intake 720 ml   Output 1325 ml   Net -605 ml       Physical Exam  Vitals and nursing note reviewed.   Constitutional:       General: She is not in acute distress.     Appearance: Normal appearance. She is obese. She is not ill-appearing.      Comments: Patient alert and awake, sitting up in chair - in no acute distress.    HENT:      Head: Atraumatic.      Nose: Nose normal.      Mouth/Throat:      Pharynx: Oropharynx is clear.   Eyes:      Conjunctiva/sclera: Conjunctivae normal.      Comments: Patient wearing glasses.    Neck:      Comments: Trachea Midline    Cardiovascular:      Rate and Rhythm: Normal rate and regular rhythm.      Pulses: Normal pulses.      Heart sounds: Normal heart sounds. No murmur heard.     No gallop.      Comments: Tele: SR 1st degree AVB 70's to 80's   +2 equal and even pulses in all extremities   A/V epicardial wires - CUT 3/31  Right upper extremity PICC; no redness or edema at site  Pulmonary:      Effort: Pulmonary effort is normal. No respiratory distress.      Breath sounds: Normal breath sounds. No wheezing.      Comments: Equal and even chest expansion; thorax symmetric   Clear breath sounds throughout   Good inspiratory effort on RA.     Abdominal:      " General: Bowel sounds are normal. There is no distension.      Palpations: Abdomen is soft.      Tenderness: There is no abdominal tenderness.      Comments: +postop with multiple large BM's 3/29 and 3/30 and 3/31    Genitourinary:     Comments: Voids independently; occasional incontinence   Musculoskeletal:      Cervical back: Neck supple.      Right lower le+ Edema present.      Left lower le+ Edema present.      Comments: ART; 5/5 strength in all extremities   Ambulates independently with walker     Skin:     General: Skin is warm and dry.      Capillary Refill: Capillary refill takes less than 2 seconds.      Coloration: Skin is not jaundiced or pale.      Findings: Bruising (abdomen; thoracotomy site) present.      Comments: Right thoracotomy site: well approximated, no s/s of infection or bleeding - SEEMA +ecchymosis around surgical incision     NITIN single lumen PICC placed 3/25. - dressing changed 3/31   Neurological:      General: No focal deficit present.      Mental Status: She is alert and oriented to person, place, and time.   Psychiatric:         Mood and Affect: Affect normal. Mood is anxious.         Behavior: Behavior normal.       Medications  Scheduled medications  acetaminophen, 650 mg, oral, q6h  ampicillin, 2 g, intravenous, q4h  aspirin, 81 mg, oral, Daily  atorvastatin, 80 mg, oral, Nightly  bisacodyl, 10 mg, rectal, Daily  cefTRIAXone, 2 g, intravenous, q12h  famotidine, 40 mg, oral, Daily  tiotropium, 2 puff, inhalation, Daily   And  fluticasone furoate-vilanteroL, 1 puff, inhalation, Daily  heparin (porcine), 5,000 Units, subcutaneous, q8h  hydroxychloroquine, 200 mg, oral, Daily  ipratropium-albuteroL, 3 mL, nebulization, TID  lidocaine, 5 mL, infiltration, Once  lisinopril, 5 mg, oral, Daily  melatonin, 3 mg, oral, Nightly  metoprolol tartrate, 50 mg, oral, BID  nystatin, 4 mL, Swish & Spit, BID  [START ON 2024] polyethylene glycol, 17 g, oral, Daily  pregabalin, 75 mg, oral,  BID  sennosides-docusate sodium, 2 tablet, oral, BID  traZODone, 50 mg, oral, Nightly    Continuous medications   PRN medications  PRN medications: albuterol, alteplase, benzocaine-menthol, benzonatate, dextrose **OR** [DISCONTINUED] glucagon, hydrOXYzine HCL, ondansetron, tiZANidine, traMADol, traMADol    LABS:  CMP:  Results from last 7 days   Lab Units 03/31/24  0503 03/30/24  0432 03/29/24  0431 03/28/24  0452 03/27/24  0532 03/26/24  0359 03/25/24  0501   SODIUM mmol/L 137 136 138 138 134* 135* 136   POTASSIUM mmol/L 4.0 4.2 4.3 3.7 4.3 4.0 4.3   CHLORIDE mmol/L 100 100 101 99 98 98 99   CO2 mmol/L 27 26 28 34* 27 29 29   ANION GAP mmol/L 14 14 13 9* 13 12 12   BUN mg/dL 9 7 7 8 9 13 14   CREATININE mg/dL 0.49* 0.43* 0.45* 0.40* 0.43* 0.43* 0.48*   EGFR mL/min/1.73m*2 >90 >90 >90 >90 >90 >90 >90   MAGNESIUM mg/dL 2.08 1.99 1.88 1.78 1.83 1.92 1.97   ALBUMIN g/dL 2.9* 3.1* 3.1* 3.0* 3.2* 3.2* 3.3*       CBC:  Results from last 7 days   Lab Units 03/31/24  0503 03/30/24  0432 03/29/24  0431 03/28/24  0452 03/27/24  0532 03/26/24  0359 03/25/24  0501   WBC AUTO x10*3/uL 9.6 9.0 7.4 6.6 8.3 7.9 9.3   HEMOGLOBIN g/dL 8.9* 9.0* 8.7* 8.3* 8.6* 7.8* 8.1*   HEMATOCRIT % 28.4* 29.0* 28.2* 26.9* 27.7* 26.0* 26.5*   PLATELETS AUTO x10*3/uL 450 416 365 293 252 213 187   MCV fL 86 85 88 87 88 88 86       COAG:         HEME/ENDO:     CARDIAC:        IMPRESSION & PLAN:  POD # 9 s/p mini Mitral Valve replacement w/ Dr. Chaudhari on 3/22/2024  - Increase activity/ ambulation; PT/OT  - Encourage IS, C/DB; respiratory therapy; wean O2 as claudia -> on RA  - Cardiac rehab referral   - Continue cardiac meds: ASA, BB, statin   - Pain and anticonstipation meds  - 2v CXR completed on 3/25/2024.  - Postop echo completed 3/27 and showed an EF 60-65%, MVR in place with 18mmHg peak gradient and 7.4mmHg mean gradient with mod elevated RV systolic pressure 48 mmHg   - Epicardial wires CUT 3/31 per surgeon preference.     - Tele until  discharge    Rhythm  - Tele: SR with 1st degree AV Block 70's to 80's  - Continue BB: metoprolol tartrate 50mg BID   - Adjust medications as tolerated    Acute Blood Loss Anemia   Recent Labs     03/31/24  0503 03/30/24  0432 03/29/24  0431 03/28/24  0452 03/27/24  0532 03/26/24  0359 03/25/24  0501   HGB 8.9* 9.0* 8.7* 8.3* 8.6* 7.8* 8.1*   HCT 28.4* 29.0* 28.2* 26.9* 27.7* 26.0* 26.5*     - MV, PO Iron x1mo  - Daily labs, transfuse as indicated    Volume/Electrolyte Status: Preop wt Weight: 69.6 kg (153 lb 7 oz)   Vitals:    03/31/24 0557   Weight: 71 kg (156 lb 8.4 oz)     - Weight: 71, 73, 73, 73.9 kg   - Adjust diuresis for postop cardiac surgery hypervolemia  - 3/31: Lasix 20mg IV x1  - Replete electrolytes for hypokalemia/hypomagnesemia: replaced K & Mg 3/25; replaced Mg 3/29; replaced K & Mg 3/30; replaced K & Mg 3/31.   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  Lumbar postsurgical site infection- drained 3/18 with no growth - resolved   Recent Labs     03/31/24  0503 03/30/24  0432 03/29/24  0431 03/28/24  0452 03/27/24  0532 03/26/24  0359 03/25/24  0501   WBC 9.6 9.0 7.4 6.6 8.3 7.9 9.3       - Infectious Disease Following, appreciate recs:   ---Continue ampicillin 2 g IV  q 4  ---Continue ceftriaxone 2 g IV q 12  ---Both ABXs for 6 weeks from date of surgery, EOT 5/03/2024  ---Weekly CBC with diff and CMP as outpatient while on IV antibiotics and fax reports to Dr Cruz  ---ID will arrange for follow up as an outpatient   - 3/25: Right Upper Arm single lumen PICC placed   - monitor for s/s infection: currently afebrile without any s/s of infection present (dressing changed 3/31)    - daily CBC while inpatient     Postop Ileus: Resolving   - 3/29: Added Milk of magnesium x2, soap suds enema, encouraged prune juice   - encourage mobility and chewing gum   - 3/29: changed diet to full liquid   - 3/30: AM KUB showed dilated colonic  loops; consistent with colonic ileus    - Continue bowel regimen of:   --Miralax, suppository, felton-colace, MOM  -3/30: multiple large BM's; advanced diet to regular for dinner; no N/V   3/31: multiple large BM's; AM KUB much improved from day before   - daily KUB until resolved     Hypertension: home meds: Lisinopril 20mg daily; Bisoprolol 10mg daily   Systolic (24hrs), Av , Min:92 , Max:115   - continue BB   - 3/29: Added Lisinopril 5mg daily   - continue holding home bisoprolol as not clinically indicated to resume based on current BP's   - resume antihypertensives as indicated    Acute post op pulmonary insufficiency   COPD (not on home O2): home meds: albuterol inhaler every 6 hours for shortness of breath; Atrovent 17mcg 2 puffs BID; Trelegy Ellipta 1 puf daily   -CXR PRN  -wean NC maintaining SpO2 >92% - RA   -Continue duonebs  -Continue spiriva respimat and breo ellipta   -bronchial hygiene with EZ PAPs/IS     Hyperlipidemia: no home meds   Lab Results   Component Value Date    CHOL 151 2024    HDL 38.0 2024    VLDL 21 2024    TRIG 107 2024    NHDL 113 2024   - continue high intensity statin therapy   - follow up lipid panel with PCP/ cardiologist for ongoing lipid management    GERD:  home famotidine 40mg   - continue home famotidine   - diet as tolerated     Arthritis/Chronic pain back and neck pain: home regimen: plaquenil 200mg daily ; Lyrica 75mg daily; Zanaflex 4mg TID PRN for muscle spasms/pain; prednisone 5mg daily during flare ups    - continue manage pain with PRN tramdol and tylenol   - No NSAID's x3 months s/p cardiac surgery   -OARRS reviewed 3/25/2024   --Current Prescriptions: Pregabalin 75mg capsule; last filled 2/15/2024; OTY: 270; prescriber GEORGE Kirkpatrick   - continue home lyrica  - 3/25: resume home plaquenil 200mg daily   - PT/OT   -will not need narcotic prescription at discharge      TLIF of L4-L5 24  -Per patient she was required to wear a brace post op  and does not have here- discussed with neurosurgery and given that this was minimally invasive and with NO changes in neurologic exam no indication for brace at this time- instructed to follow up with surgeon from OSH for further receommendation    VTE Prophylaxis: SCDs/TEDs, ambulation, SQ heparin  Code Status: Full Code    Dispo  - PT/OT recs low intensity level of continued care  - Would benefit from homecare for cardiac surgery carepath and RN visits  - Patient plan to discharge to SNF for IV antibiotic infusion d/t high cost/no insurance coverage if infused at home: accepted to [facility] - awaiting transportation plans    - Anticipate discharge to SNF Monday (4/1), pending diuresis and transport to facility.   - Will continue to assess discharge needs    ALEJANDRO Montana-CNP  Cardiac Surgery [facility]  [facility]  Team Phone [phone]    3/31/2024  2:03 PM

## 2024-03-31 NOTE — PROGRESS NOTES
Physical Therapy    Physical Therapy Treatment    Patient Name: Fannie Watson  MRN: [id number]  Today's Date: 3/31/2024  Time Calculation  Start Time: 1031  Stop Time: 1048  Time Calculation (min): 17 min       Assessment/Plan   PT Assessment  PT Assessment Results: Decreased strength, Decreased endurance, Impaired balance, Decreased mobility  End of Session Communication: Bedside nurse  End of Session Patient Position: Up in chair, Alarm off, not on at start of session  PT Plan  Inpatient/Swing Bed or Outpatient: Inpatient  PT Plan  Treatment/Interventions: Bed mobility, Transfer training, Gait training, Stair training, Balance training, Strengthening, Endurance training, Range of motion, Therapeutic exercise, Therapeutic activity, Home exercise program  PT Plan: Skilled PT  PT Frequency: Daily  PT Discharge Recommendations: Moderate intensity level of continued care  Equipment Recommended upon Discharge: Wheeled walker  PT Recommended Transfer Status: Assist x1, Assistive device  PT - OK to Discharge: Yes      General Visit Information:   PT  Visit  PT Received On: 03/31/24  General  Prior to Session Communication: Bedside nurse  Patient Position Received: Up in chair, Alarm off, not on at start of session  General Comment: Pt seated in chair upon arrival. Pt stated she had pain but willing to do therapy.    Subjective   Precautions:  Precautions  Medical Precautions: Fall precautions, Spinal precautions, Cardiac precautions  Post-Surgical Precautions: Spinal precautions  Precautions Comment: SBP < 140, MAP 70-90  Vital Signs:       Objective   Pain:  Pain Assessment  Pain Assessment: 0-10  Pain Score: 7  Pain Type: Acute pain  Pain Location: Back  Pain Orientation: Lower, Mid  Pain Descriptors: Aching  Pain Frequency: Constant/continuous  Pain Onset: Ongoing  Clinical Progression: Not changed  Pain Interventions:  (Pt stated RN administered pain medication prior to treatment.)  Response to Interventions: decreased  from 8 to 7.  Cognition:  Cognition  Overall Cognitive Status: Within Functional Limits    Activity Tolerance:  Activity Tolerance  Endurance: Tolerates 30 min exercise with multiple rests  Treatments:  Ambulation/Gait Training  Ambulation/Gait Training Performed: Yes  Ambulation/Gait Training 1  Surface 1: Level tile  Device 1: Rolling walker  Assistance 1: Close supervision  Quality of Gait 1: Decreased step length, Diminished heel strike  Comments/Distance (ft) 1: 200'  Transfers  Transfer: Yes  Transfer 1  Transfer From 1: Sit to  Transfer to 1: Stand  Technique 1: Sit to stand  Transfer Device 1: Walker  Transfer Level of Assistance 1: Close supervision  Transfers 2  Transfer From 2: Stand to  Transfer to 2: Sit  Technique 2: Stand to sit  Transfer Device 2: Walker  Transfer Level of Assistance 2: Close supervision    Outcome Measures:  [facility] Basic Mobility  Turning from your back to your side while in a flat bed without using bedrails: A little  Moving from lying on your back to sitting on the side of a flat bed without using bedrails: A little  Moving to and from bed to chair (including a wheelchair): A little  Standing up from a chair using your arms (e.g. wheelchair or bedside chair): A little  To walk in hospital room: A little  Climbing 3-5 steps with railing: A little  Basic Mobility - Total Score: 18    Education Documentation  Home Exercise Program, taught by Huong Oro PTA at 3/31/2024 10:57 AM.  Learner: Patient  Readiness: Acceptance  Method: Explanation  Response: Verbalizes Understanding    Precautions, taught by Huong Oro PTA at 3/31/2024 10:57 AM.  Learner: Patient  Readiness: Acceptance  Method: Explanation  Response: Verbalizes Understanding    Body Mechanics, taught by Huong Oro PTA at 3/31/2024 10:57 AM.  Learner: Patient  Readiness: Acceptance  Method: Explanation  Response: Verbalizes Understanding    Mobility Training, taught by Huong Oro PTA at 3/31/2024 10:57  AM.  Learner: Patient  Readiness: Acceptance  Method: Explanation  Response: Verbalizes Understanding    Education Comments  No comments found.        OP EDUCATION:       Encounter Problems       Encounter Problems (Active)       PT Problem       Pt. will score 24/28 on Tinetti balance test.  (Progressing)       Start:  03/07/24    Expected End:  04/06/24            Patient will complete supine to sit and sit to supine Supervision  (Progressing)       Start:  03/07/24    Expected End:  04/06/24            Patient will complete stand to sit and sit to stand with Supervision with wheeled walker.  (Progressing)       Start:  03/07/24    Expected End:  04/06/24            Patient will ambulate >400' with Rolling Walker and Supervision  (Progressing)       Start:  03/07/24    Expected End:  04/06/24            Patient will ascend and descend 4 steps with 2 UE Support and Supervision  (Progressing)       Start:  03/07/24    Expected End:  04/06/24               Pain - Adult

## 2024-03-31 NOTE — SIGNIFICANT EVENT
Ventricular wires cut at skin level due to surgeon preference.  Patient instructed to notify radiology of retained epicardial wires prior to any MRI procedure, and to notify Dr Sunshine of any visible wires or s/s infection.

## 2024-04-01 VITALS
WEIGHT: 155.1 LBS | HEIGHT: 62 IN | SYSTOLIC BLOOD PRESSURE: 125 MMHG | HEART RATE: 96 BPM | DIASTOLIC BLOOD PRESSURE: 64 MMHG | BODY MASS INDEX: 28.54 KG/M2 | RESPIRATION RATE: 18 BRPM | OXYGEN SATURATION: 98 % | TEMPERATURE: 96.4 F

## 2024-04-01 LAB
ALBUMIN SERPL BCP-MCNC: 3.1 G/DL (ref 3.4–5)
ANION GAP SERPL CALC-SCNC: 14 MMOL/L (ref 10–20)
BUN SERPL-MCNC: 10 MG/DL (ref 6–23)
CALCIUM SERPL-MCNC: 8.4 MG/DL (ref 8.6–10.6)
CHLORIDE SERPL-SCNC: 103 MMOL/L (ref 98–107)
CO2 SERPL-SCNC: 27 MMOL/L (ref 21–32)
CREAT SERPL-MCNC: 0.47 MG/DL (ref 0.5–1.05)
EGFRCR SERPLBLD CKD-EPI 2021: >90 ML/MIN/1.73M*2
ERYTHROCYTE [DISTWIDTH] IN BLOOD BY AUTOMATED COUNT: 16.8 % (ref 11.5–14.5)
GLUCOSE SERPL-MCNC: 100 MG/DL (ref 74–99)
HCT VFR BLD AUTO: 30.5 % (ref 36–46)
HGB BLD-MCNC: 9 G/DL (ref 12–16)
MAGNESIUM SERPL-MCNC: 2.1 MG/DL (ref 1.6–2.4)
MCH RBC QN AUTO: 25.8 PG (ref 26–34)
MCHC RBC AUTO-ENTMCNC: 29.5 G/DL (ref 32–36)
MCV RBC AUTO: 87 FL (ref 80–100)
NRBC BLD-RTO: 0 /100 WBCS (ref 0–0)
PHOSPHATE SERPL-MCNC: 4.2 MG/DL (ref 2.5–4.9)
PLATELET # BLD AUTO: 488 X10*3/UL (ref 150–450)
POTASSIUM SERPL-SCNC: 4.1 MMOL/L (ref 3.5–5.3)
RBC # BLD AUTO: 3.49 X10*6/UL (ref 4–5.2)
SODIUM SERPL-SCNC: 140 MMOL/L (ref 136–145)
WBC # BLD AUTO: 8.2 X10*3/UL (ref 4.4–11.3)

## 2024-04-01 PROCEDURE — 85027 COMPLETE CBC AUTOMATED: CPT | Performed by: NURSE PRACTITIONER

## 2024-04-01 PROCEDURE — 2500000004 HC RX 250 GENERAL PHARMACY W/ HCPCS (ALT 636 FOR OP/ED)

## 2024-04-01 PROCEDURE — 2500000002 HC RX 250 W HCPCS SELF ADMINISTERED DRUGS (ALT 637 FOR MEDICARE OP, ALT 636 FOR OP/ED): Performed by: THORACIC SURGERY (CARDIOTHORACIC VASCULAR SURGERY)

## 2024-04-01 PROCEDURE — 97535 SELF CARE MNGMENT TRAINING: CPT | Mod: GO

## 2024-04-01 PROCEDURE — 2500000001 HC RX 250 WO HCPCS SELF ADMINISTERED DRUGS (ALT 637 FOR MEDICARE OP): Performed by: NURSE PRACTITIONER

## 2024-04-01 PROCEDURE — 2500000001 HC RX 250 WO HCPCS SELF ADMINISTERED DRUGS (ALT 637 FOR MEDICARE OP): Performed by: PHYSICIAN ASSISTANT

## 2024-04-01 PROCEDURE — 2500000002 HC RX 250 W HCPCS SELF ADMINISTERED DRUGS (ALT 637 FOR MEDICARE OP, ALT 636 FOR OP/ED): Mod: MUE | Performed by: THORACIC SURGERY (CARDIOTHORACIC VASCULAR SURGERY)

## 2024-04-01 PROCEDURE — 99232 SBSQ HOSP IP/OBS MODERATE 35: CPT | Performed by: NURSE PRACTITIONER

## 2024-04-01 PROCEDURE — 2500000001 HC RX 250 WO HCPCS SELF ADMINISTERED DRUGS (ALT 637 FOR MEDICARE OP)

## 2024-04-01 PROCEDURE — 2500000004 HC RX 250 GENERAL PHARMACY W/ HCPCS (ALT 636 FOR OP/ED): Performed by: NURSE PRACTITIONER

## 2024-04-01 PROCEDURE — 80069 RENAL FUNCTION PANEL: CPT | Performed by: NURSE PRACTITIONER

## 2024-04-01 PROCEDURE — 83735 ASSAY OF MAGNESIUM: CPT | Performed by: NURSE PRACTITIONER

## 2024-04-01 PROCEDURE — 94640 AIRWAY INHALATION TREATMENT: CPT

## 2024-04-01 RX ORDER — FUROSEMIDE 20 MG/1
20 TABLET ORAL DAILY
Status: DISCONTINUED | OUTPATIENT
Start: 2024-04-01 | End: 2024-04-01 | Stop reason: HOSPADM

## 2024-04-01 RX ADMIN — TRAMADOL HYDROCHLORIDE 50 MG: 50 TABLET, COATED ORAL at 06:41

## 2024-04-01 RX ADMIN — NYSTATIN 400000 UNITS: 100000 SUSPENSION ORAL at 08:46

## 2024-04-01 RX ADMIN — ACETAMINOPHEN 650 MG: 325 TABLET ORAL at 02:25

## 2024-04-01 RX ADMIN — TIOTROPIUM BROMIDE INHALATION SPRAY 2 PUFF: 3.12 SPRAY, METERED RESPIRATORY (INHALATION) at 09:06

## 2024-04-01 RX ADMIN — LISINOPRIL 5 MG: 5 TABLET ORAL at 08:42

## 2024-04-01 RX ADMIN — AMPICILLIN SODIUM 2 G: 2 INJECTION, POWDER, FOR SOLUTION INTRAMUSCULAR; INTRAVENOUS at 08:44

## 2024-04-01 RX ADMIN — FAMOTIDINE 40 MG: 20 TABLET ORAL at 08:58

## 2024-04-01 RX ADMIN — IPRATROPIUM BROMIDE AND ALBUTEROL SULFATE 3 ML: .5; 3 SOLUTION RESPIRATORY (INHALATION) at 09:06

## 2024-04-01 RX ADMIN — AMPICILLIN SODIUM 2 G: 2 INJECTION, POWDER, FOR SOLUTION INTRAMUSCULAR; INTRAVENOUS at 00:20

## 2024-04-01 RX ADMIN — PREGABALIN 75 MG: 75 CAPSULE ORAL at 08:58

## 2024-04-01 RX ADMIN — ACETAMINOPHEN 650 MG: 325 TABLET ORAL at 13:59

## 2024-04-01 RX ADMIN — AMPICILLIN SODIUM 2 G: 2 INJECTION, POWDER, FOR SOLUTION INTRAMUSCULAR; INTRAVENOUS at 04:07

## 2024-04-01 RX ADMIN — TRAMADOL HYDROCHLORIDE 50 MG: 50 TABLET, COATED ORAL at 14:05

## 2024-04-01 RX ADMIN — HYDROXYCHLOROQUINE SULFATE 200 MG: 200 TABLET, FILM COATED ORAL at 08:46

## 2024-04-01 RX ADMIN — FLUTICASONE FUROATE AND VILANTEROL TRIFENATATE 1 PUFF: 100; 25 POWDER RESPIRATORY (INHALATION) at 09:06

## 2024-04-01 RX ADMIN — POLYETHYLENE GLYCOL 3350 17 G: 17 POWDER, FOR SOLUTION ORAL at 08:48

## 2024-04-01 RX ADMIN — AMPICILLIN SODIUM 2 G: 2 INJECTION, POWDER, FOR SOLUTION INTRAMUSCULAR; INTRAVENOUS at 15:11

## 2024-04-01 RX ADMIN — CEFTRIAXONE SODIUM 2 G: 2 INJECTION, SOLUTION INTRAVENOUS at 11:54

## 2024-04-01 RX ADMIN — AMPICILLIN SODIUM 2 G: 2 INJECTION, POWDER, FOR SOLUTION INTRAMUSCULAR; INTRAVENOUS at 12:29

## 2024-04-01 RX ADMIN — SENNOSIDES AND DOCUSATE SODIUM 2 TABLET: 8.6; 5 TABLET ORAL at 08:42

## 2024-04-01 RX ADMIN — ACETAMINOPHEN 650 MG: 325 TABLET ORAL at 08:43

## 2024-04-01 RX ADMIN — IPRATROPIUM BROMIDE AND ALBUTEROL SULFATE 3 ML: .5; 3 SOLUTION RESPIRATORY (INHALATION) at 13:45

## 2024-04-01 RX ADMIN — HEPARIN SODIUM 5000 UNITS: 5000 INJECTION INTRAVENOUS; SUBCUTANEOUS at 04:07

## 2024-04-01 RX ADMIN — HEPARIN SODIUM 5000 UNITS: 5000 INJECTION INTRAVENOUS; SUBCUTANEOUS at 11:54

## 2024-04-01 RX ADMIN — METOPROLOL TARTRATE 50 MG: 50 TABLET, FILM COATED ORAL at 08:43

## 2024-04-01 RX ADMIN — FUROSEMIDE 20 MG: 20 TABLET ORAL at 09:31

## 2024-04-01 RX ADMIN — ASPIRIN 81 MG: 81 TABLET, COATED ORAL at 08:41

## 2024-04-01 ASSESSMENT — COGNITIVE AND FUNCTIONAL STATUS - GENERAL
DAILY ACTIVITIY SCORE: 17
TOILETING: A LITTLE
DRESSING REGULAR UPPER BODY CLOTHING: A LITTLE
HELP NEEDED FOR BATHING: A LOT
PERSONAL GROOMING: A LITTLE
DRESSING REGULAR LOWER BODY CLOTHING: A LOT

## 2024-04-01 ASSESSMENT — PAIN SCALES - GENERAL
PAINLEVEL_OUTOF10: 8
PAINLEVEL_OUTOF10: 4
PAINLEVEL_OUTOF10: 8
PAINLEVEL_OUTOF10: 8
PAINLEVEL_OUTOF10: 0 - NO PAIN
PAINLEVEL_OUTOF10: 8

## 2024-04-01 ASSESSMENT — PAIN DESCRIPTION - LOCATION
LOCATION: BACK
LOCATION: BACK

## 2024-04-01 ASSESSMENT — PAIN - FUNCTIONAL ASSESSMENT
PAIN_FUNCTIONAL_ASSESSMENT: 0-10

## 2024-04-01 ASSESSMENT — ACTIVITIES OF DAILY LIVING (ADL): HOME_MANAGEMENT_TIME_ENTRY: 17

## 2024-04-01 ASSESSMENT — PAIN DESCRIPTION - ORIENTATION: ORIENTATION: LOWER

## 2024-04-01 NOTE — PROGRESS NOTES
[employer] confirmed 1630 transport. [facility]LE[facility] updated TCC, patient, the floor, and SNF. Pending goldenrod. [facility] updated DSC. Will continue to follow.    1631-[facility]rod complete. [facility] sent GR to [facility] and updated DSC.     KARELY Hagen

## 2024-04-01 NOTE — PROGRESS NOTES
Occupational Therapy    Occupational Therapy Treatment    Name: Fannie Watson  MRN: [id number]  : 1945  Date: 24   Total time: 0235-0033    Assessment:  OT Assessment: Pt tolerated treatment well this day demonstrating increased endurance compared to previous sessions noted. Pt was able to ambulate greater than household distance with minimal exertion concerns. Pt demonstrated good standing balance throughout standing ADLs and during ambulation. Overall, patient tolerated treatment well and would continue to benefit from skilled OT services to improve ADL performance and increase functional mobility and endurance.  Prognosis: Good  Barriers to Discharge: None  Evaluation/Treatment Tolerance: Patient tolerated treatment well  Medical Staff Made Aware: Yes  End of Session Communication: Bedside nurse  End of Session Patient Position: Up in chair, Alarm off, caregiver present  Plan:  Treatment Interventions: ADL retraining, Functional transfer training, Endurance training, Patient/family training  OT Frequency: 2 times per week  OT Discharge Recommendations: Moderate intensity level of continued care  Equipment Recommended upon Discharge: Wheeled walker  OT Recommended Transfer Status: Assist of 1, Stand by assist  OT - OK to Discharge: Yes    Subjective   General:  OT Last Visit  OT Received On: 24  Prior to Session Communication: Bedside nurse  Patient Position Received: Bed, 3 rail up, Alarm off, not on at start of session, Alarm off, caregiver present  Family/Caregiver Present: Yes  Caregiver Feedback: spouse present and supportive  General Comment: Pt supine in bed visiting with spouse- agreeable to therapy treatment   Precautions:  Medical Precautions: Fall precautions, Cardiac precautions  Post-Surgical Precautions: Spinal precautions    Lines/Tubes/Drains:  PICC - Adult 24 Single lumen Right Basilic vein (Active)   Number of days: 6       Pacer Wires (Active)   Number of days: 10      Cognition:  Orientation Level: Oriented X4    Objective   Activities of Daily Living:      Grooming  Grooming Level of Assistance: Close supervision  Grooming Where Assessed: Standing sinkside  Grooming Comments: Pt completed washing hands, washing face, and brushing teeth all while standing at sink with leaning on sink for a standing rest break PRN x2    LE Dressing  LE Dressing: Yes  Sock Level of Assistance: Distant supervision  LE Dressing Comments: Verbal cues to maintain spinal precautions    Bed Mobility/Transfers:     Bed Mobility  Bed Mobility: Yes  Bed Mobility 1  Bed Mobility 1: Supine to sitting, Log roll  Level of Assistance 1: Close supervision, Minimal verbal cues  Bed Mobility Comments 1: HOB elevated    Transfers  Transfer: Yes  Transfer 1  Transfer From 1: Sit to  Transfer to 1: Stand  Technique 1: Sit to stand  Transfer Device 1: Walker  Transfer Level of Assistance 1: Close supervision  Trials/Comments 1: Cues for walker safety  Transfers 2  Transfer From 2: Stand to  Transfer to 2: Chair with arms  Technique 2: Stand to sit  Transfer Device 2: Walker  Transfer Level of Assistance 2: Close supervision    Balance:  Dynamic Standing Balance  Dynamic Standing-Balance Support: Bilateral upper extremity supported  Dynamic Standing-Balance: Lateral lean, Reaching for objects, Forward lean, Turning  Dynamic Standing-Comments: CGA  Static Sitting Balance  Static Sitting-Balance Support: Feet supported  Static Sitting-Level of Assistance: Close supervision  Static Standing Balance  Static Standing-Balance Support: Bilateral upper extremity supported  Static Standing-Level of Assistance: Contact guard    Therapy/Activity:      Therapeutic Activity  Therapeutic Activity Performed: Yes  Therapeutic Activity 1: Pt ambulated greater than a household distance out of her room and onto earl and back to room. Pt tolerated ambulation well using FWW and required only short standing rest breaks PRN. Pt was then  able continue with endurance training by performing grooming tasks of washing hands, washing face, and brushing teeth while standing at the sink before returning to chair. Pt required only CGA - Supervision throughout session and reported 5/10 exertion only at the end of therapy treatment before sitting in chair.     Outcome Measures:  [facility] Daily Activity  Putting on and taking off regular lower body clothing: A lot  Bathing (including washing, rinsing, drying): A lot  Putting on and taking off regular upper body clothing: A little  Toileting, which includes using toilet, bedpan or urinal: A little  Taking care of personal grooming such as brushing teeth: A little  Eating Meals: None  Daily Activity - Total Score: 17     Education Documentation  Body Mechanics, taught by Cal Aguilar OT at 4/1/2024  1:39 PM.  Learner: Patient  Readiness: Acceptance  Method: Explanation  Response: Verbalizes Understanding, Demonstrated Understanding    Precautions, taught by Cal Aguilar OT at 4/1/2024  1:39 PM.  Learner: Patient  Readiness: Acceptance  Method: Explanation  Response: Verbalizes Understanding, Demonstrated Understanding    ADL Training, taught by Cal Aguilar OT at 4/1/2024  1:39 PM.  Learner: Patient  Readiness: Acceptance  Method: Explanation  Response: Verbalizes Understanding, Demonstrated Understanding    Education Comments  No comments found.      Goals:  Encounter Problems       Encounter Problems (Active)       ADLs       Patient with complete lower body dressing with independent level of assistance donning and doffing all LE clothes  with PRN adaptive equipment while edge of bed  (Progressing)       Start:  03/07/24    Expected End:  04/09/24            Patient will complete toileting including hygiene clothing management/hygiene with independent level of assistance and grab bars. (Progressing)       Start:  03/07/24    Expected End:  04/09/24               BALANCE       Pt will maintain  dynamic standing balance during ADL task with contact guard assist level of assistance in order to demonstrate decreased risk of falling and improved postural control. (Progressing)       Start:  03/07/24    Expected End:  04/09/24               COGNITION/SAFETY       Patient will score WFL on standardized cognitive assessment with no cues and within reasonable time frame (Progressing)       Start:  03/07/24    Expected End:  04/09/24               TRANSFERS       Patient will perform bed mobility independent level of assistance in order to improve safety and independence with mobility (Progressing)       Start:  03/07/24    Expected End:  04/09/24            Patient will complete functional transfer to toilet with least restrictive device with stand by assist level of assistance. (Progressing)       Start:  03/07/24    Expected End:  04/09/24 04/01/24 at 1:43 PM   MARK DWYER, OT   [phone]

## 2024-04-01 NOTE — PROGRESS NOTES
CARDIAC SURGERY DAILY PROGRESS NOTE    Fannie Watson is a 78 y.o. female, with a PMH significant for HTN, HLD, COPD, spondylolisthesis of lumbar lesion s/p L4-L5 decompression laminectomy and fusion 03/01/2024 who presented to [facility] to Neuro ICU on 3/5/2024 for subarachnoid hemorrhage management.      Patient initially presented to [facility] (OSH) with altered mental status, for which a CT was done and showed a trace R sylvian subarachnoid hemorrhage and transferred to [facility] Neuro ICU for further management. While [place] [facility], patient went into paroxysmal A fib with RVR. TTE done on 03/07 showed an anterior mobile mass on mitral valve leaflet with moderate mitral valve regurgitation. Follow up CROW showed a 2.5 cm mobile vegetation on the mitral valve with moderate mitral regurgitation. Blood cultures drawn on 03/07 were positive for E. Faecium, for which infectious disease and cardiac surgery was consulted.      She had an MRI of her spine on 03/14 that showed an enlarging fluid collection that was drained by IR due to concern for abscess as source of infection vs possible discitis/osteomyelitis in the spine post-operatively.     Of note, per chart review patient has had multiple presentations since December 2023 for various symptoms including chest pain, chest tightness, shortness of breath and worsening back pain. She also had some dysphagia and decreased appetite for upwards of a month, as well as a cough that . 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 She had a L4-L5 TLIF on 03/01 at OSH.    3/22/2024 OPERATION/PROCEDURE: Dr. Deangelo Chaudhari   1. Minimally invasive mitral valve replacement (31mm Epic bioprosthesis)  2. Exploration of the right femoral vein and artery for cannulation for cardiopulmonary bypass.     CTICU: uneventful   Transferred to T3 on 3/24  ===================================  Interval  "History:       SUBJECTIVE:  Pt ready for discharge     Objective   /64 (BP Location: Left arm, Patient Position: Sitting)   Pulse 96   Temp 35.8 °C (96.4 °F) (Temporal)   Resp 18   Ht 1.57 m (5' 1.81\")   Wt 70.4 kg (155 lb 1.6 oz)   LMP  (LMP Unknown)   SpO2 98%   BMI 28.54 kg/m²   Pain Score: 8   3 Day Weight Change: -0.866 kg (-1 lb 14.5 oz) per day    Intake and Output    Intake/Output Summary (Last 24 hours) at 2024 1516  Last data filed at 2024 1229  Gross per 24 hour   Intake 440 ml   Output 0 ml   Net 440 ml       Physical Exam  Vitals and nursing note reviewed.   Constitutional:       General: She is not in acute distress.     Appearance: Normal appearance. She is obese. She is not ill-appearing.   Eyes:      Conjunctiva/sclera: Conjunctivae normal.      Comments: Patient wearing glasses.    Neck:      Comments: Trachea Midline    Cardiovascular:      Rate and Rhythm: Normal rate and regular rhythm.      Pulses: Normal pulses.      Heart sounds: Normal heart sounds. No murmur heard.     No gallop.      Comments: Tele: SR 1st degree AVB 70's to 80's     Pulmonary:      Effort: Pulmonary effort is normal. No respiratory distress.      Breath sounds: Normal breath sounds. No wheezing.      Comments: Equal and even chest expansion; thorax symmetric   Clear breath sounds throughout   Good inspiratory effort on RA.     Abdominal:      General: Bowel sounds are normal. There is no distension.      Palpations: Abdomen is soft.      Tenderness: There is no abdominal tenderness.      Comments: +postop with multiple large BM's 3/29 and 3/30 and 3/31    Genitourinary:     Comments: Voids independently; occasional incontinence   Musculoskeletal:      Cervical back: Neck supple.      Right lower le+ Edema present.      Left lower le+ Edema present.      Comments: ART;  Ambulates independently with walker     Skin:     General: Skin is warm and dry.      Capillary Refill: Capillary refill " takes less than 2 seconds.      Coloration: Skin is not jaundiced or pale.      Findings: Bruising (abdomen; thoracotomy site) present.      Comments: Right thoracotomy site: well approximated, no s/s of infection or bleeding - SEEMA +ecchymosis around surgical incision   Right upper extremity PICC; no redness or edema at site     Neurological:      General: No focal deficit present.      Mental Status: She is alert and oriented to person, place, and time.   Psychiatric:         Mood and Affect: Affect normal. Mood is anxious.         Behavior: Behavior normal.       Medications  Scheduled medications  acetaminophen, 650 mg, oral, q6h  ampicillin, 2 g, intravenous, q4h  aspirin, 81 mg, oral, Daily  atorvastatin, 80 mg, oral, Nightly  cefTRIAXone, 2 g, intravenous, q12h  famotidine, 40 mg, oral, Daily  tiotropium, 2 puff, inhalation, Daily   And  fluticasone furoate-vilanteroL, 1 puff, inhalation, Daily  furosemide, 20 mg, oral, Daily  heparin (porcine), 5,000 Units, subcutaneous, q8h  hydroxychloroquine, 200 mg, oral, Daily  ipratropium-albuteroL, 3 mL, nebulization, TID  lisinopril, 5 mg, oral, Daily  melatonin, 3 mg, oral, Nightly  metoprolol tartrate, 50 mg, oral, BID  nystatin, 4 mL, Swish & Spit, BID  polyethylene glycol, 17 g, oral, Daily  pregabalin, 75 mg, oral, BID  sennosides-docusate sodium, 2 tablet, oral, BID  traZODone, 50 mg, oral, Nightly    Continuous medications   PRN medications  PRN medications: albuterol, alteplase, benzocaine-menthol, benzonatate, hydrOXYzine HCL, tiZANidine, traMADol, traMADol    LABS:  CMP:  Results from last 7 days   Lab Units 04/01/24  0559 03/31/24  0503 03/30/24  0432 03/29/24  0431 03/28/24  0452 03/27/24  0532 03/26/24  0359   SODIUM mmol/L 140 137 136 138 138 134* 135*   POTASSIUM mmol/L 4.1 4.0 4.2 4.3 3.7 4.3 4.0   CHLORIDE mmol/L 103 100 100 101 99 98 98   CO2 mmol/L 27 27 26 28 34* 27 29   ANION GAP mmol/L 14 14 14 13 9* 13 12   BUN mg/dL 10 9 7 7 8 9 13   CREATININE  mg/dL 0.47* 0.49* 0.43* 0.45* 0.40* 0.43* 0.43*   EGFR mL/min/1.73m*2 >90 >90 >90 >90 >90 >90 >90   MAGNESIUM mg/dL 2.10 2.08 1.99 1.88 1.78 1.83 1.92   ALBUMIN g/dL 3.1* 2.9* 3.1* 3.1* 3.0* 3.2* 3.2*       CBC:  Results from last 7 days   Lab Units 04/01/24  0559 03/31/24  0503 03/30/24  0432 03/29/24  0431 03/28/24  0452 03/27/24  0532 03/26/24  0359   WBC AUTO x10*3/uL 8.2 9.6 9.0 7.4 6.6 8.3 7.9   HEMOGLOBIN g/dL 9.0* 8.9* 9.0* 8.7* 8.3* 8.6* 7.8*   HEMATOCRIT % 30.5* 28.4* 29.0* 28.2* 26.9* 27.7* 26.0*   PLATELETS AUTO x10*3/uL 488* 450 416 365 293 252 213   MCV fL 87 86 85 88 87 88 88         IMPRESSION & PLAN:  POD # 10 s/p mini Mitral Valve replacement w/ Dr. Chaudhari on 3/22/2024  - Increase activity/ ambulation; PT/OT  - Encourage IS, C/DB; respiratory therapy; wean O2 as claudia -> on RA  - Cardiac rehab referral   - Continue cardiac meds: ASA, BB, statin   - Pain and anticonstipation meds  - 2v CXR completed on 3/25/2024.  - Postop echo completed 3/27 and showed an EF 60-65%, MVR in place with 18mmHg peak gradient and 7.4mmHg mean gradient with mod elevated RV systolic pressure 48 mmHg   - Epicardial wires CUT 3/31  - Tele until discharge    Rhythm  - Tele: SR with 1st degree AV Block 70's to 80's  - metoprolol tartrate 50mg BID   - Adjust medications as tolerated    Acute Blood Loss Anemia   Recent Labs     04/01/24  0559 03/31/24  0503 03/30/24  0432 03/29/24  0431 03/28/24  0452 03/27/24  0532 03/26/24  0359   HGB 9.0* 8.9* 9.0* 8.7* 8.3* 8.6* 7.8*   HCT 30.5* 28.4* 29.0* 28.2* 26.9* 27.7* 26.0*     - MV, PO Iron x1mo  - Daily labs, transfuse as indicated    Volume/Electrolyte Status: Preop wt Weight: 69.6 kg (153 lb 7 oz)   Vitals:    04/01/24 0645   Weight: 70.4 kg (155 lb 1.6 oz)     - Weight: 70.4, 71, 73, 73, 73.9 kg   - Adjust diuresis for postop cardiac surgery hypervolemia  - lasix 20mg PO daily   - Replete electrolytes for hypokalemia/hypomagnesemia: replaced K & Mg 3/25; replaced Mg 3/29;  replaced K & Mg 3/30; replaced K & Mg 3/31.   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  Lumbar postsurgical site infection- drained 3/18 with no growth - resolved   Recent Labs     24  0559 24  0503 24  0432 24  0431 24  0452 24  0532 24  0359   WBC 8.2 9.6 9.0 7.4 6.6 8.3 7.9       - Infectious Disease Following, appreciate recs:   ---Continue ampicillin 2 g IV  q 4  ---Continue ceftriaxone 2 g IV q 12  ---Both ABXs for 6 weeks from date of surgery, EOT 2024  ---Weekly CBC with diff and CMP as outpatient while on IV antibiotics and fax reports to Dr Cruz  ---ID will arrange for follow up as an outpatient   - 3/25: Right Upper Arm single lumen PICC placed   - monitor for s/s infection: currently afebrile without any s/s of infection present (dressing changed 3/31)    - daily CBC while inpatient     Postop Ileus: Resolving   - 3/29: Added Milk of magnesium x2, soap suds enema, encouraged prune juice   - encourage mobility and chewing gum   - 3/29: changed diet to full liquid   - 3/30: AM KUB showed dilated colonic loops; consistent with colonic ileus    - Continue bowel regimen of:   --Miralax, suppository, felton-colace, MOM  -3/30: multiple large BM's; advanced diet to regular for dinner; no N/V   3/31: multiple large BM's; AM KUB much improved from day before   - daily KUB until resolved     Hypertension: home meds: Lisinopril 20mg daily; Bisoprolol 10mg daily   Systolic (24hrs), Av , Min:94 , Max:125   - continue metoprolol 50mg PO BID   - Lisinopril 5mg daily   - continue holding home bisoprolol   - resume antihypertensives as indicated    Acute post op pulmonary insufficiency   COPD (not on home O2): home meds: albuterol inhaler every 6 hours for shortness of breath; Atrovent 17mcg 2 puffs BID; Trelegy Ellipta 1 puf daily   -CXR PRN  -wean NC maintaining SpO2 >92% - RA   -Continue  duonebs  -Continue spiriva respimat and breo ellipta   -bronchial hygiene with EZ PAPs/IS     Hyperlipidemia: no home meds   Lab Results   Component Value Date    CHOL 151 03/06/2024    HDL 38.0 03/06/2024    VLDL 21 03/06/2024    TRIG 107 03/06/2024    NHDL 113 03/06/2024   - continue high intensity statin therapy   - follow up lipid panel with PCP/ cardiologist for ongoing lipid management    GERD:  home famotidine 40mg   - continue home famotidine   - diet as tolerated     Arthritis/Chronic pain back and neck pain: home regimen: plaquenil 200mg daily ; Lyrica 75mg daily; Zanaflex 4mg TID PRN for muscle spasms/pain; prednisone 5mg daily during flare ups    - continue manage pain with PRN tramdol and tylenol   - No NSAID's x3 months s/p cardiac surgery   -OARRS reviewed 3/25/2024   --Current Prescriptions: Pregabalin 75mg capsule; last filled 2/15/2024; OTY: 270; prescriber GEORGE Kirkpatrick   - continue home lyrica  - 3/25: resume home plaquenil 200mg daily   - PT/OT   -will not need narcotic prescription at discharge      TLIF of L4-L5 2/26/24  -Per patient she was required to wear a brace post op and does not have here- discussed with neurosurgery and given that this was minimally invasive and with NO changes in neurologic exam no indication for brace at this time- instructed to follow up with surgeon from OSH for further receommendation    VTE Prophylaxis: SCDs/TEDs, ambulation, SQ heparin  Code Status: Full Code    Dispo  - PT/OT recs low intensity level of continued care  - Would benefit from homecare for cardiac surgery carepath and RN visits  - Patient plan to discharge to SNF for IV antibiotic infusion d/t high cost/no insurance coverage if infused at home: accepted to [facility] - awaiting transportation plans    - Discharge to SNF today  - Will continue to assess discharge needs    ALEJANDOR Muro-CNP  Cardiac Surgery [facility]  [facility]  Team Phone [phone]    4/1/2024  3:16 PM

## 2024-04-01 NOTE — CARE PLAN
Problem: General Stroke  Goal: Establish a mutual long term goal with patient by discharge  Outcome: Progressing  Goal: Demonstrate improvement in neurological exam throughout the shift  Outcome: Progressing  Goal: Participate in treatment (ie., meds, therapy) throughout shift  Outcome: Progressing  Goal: No symptoms of aspiration throughout shift  Outcome: Progressing  Goal: No symptoms of hemorrhage throughout shift  Outcome: Progressing     Problem: Skin  Goal: Decreased wound size/increased tissue granulation at next dressing change  Outcome: Progressing  Goal: Participates in plan/prevention/treatment measures  Outcome: Progressing  Goal: Prevent/manage excess moisture  Outcome: Progressing  Goal: Prevent/minimize sheer/friction injuries  Outcome: Progressing   The patient's goals for the shift include To get Benadryl ordered for skin itchiness    The clinical goals for the shift include Patient will remain safe wtihin the shift.    Pt progressed on all goal for this shift.  No set back on goals noted.

## 2024-04-01 NOTE — DISCHARGE SUMMARY
Discharge Diagnosis  SAH (subarachnoid hemorrhage) (CMS/Formerly [facility])    Issues Requiring Follow-Up  ID follow up for PICC line removal and ATB stop date    Test Results Pending At Discharge  Pending Labs       Order Current Status    Surgical Pathology Exam In process            Hospital Course  Fannie Watson is a 78 year old F with PMH significant for HTN, HLD, COPD, spondylolisthesis of lumbar lesion s/p L4-L5 decompression laminectomy and fusion 03/01/2024 who presented to [facility] to Neuro ICU on 3/5/2024 for subarachnoid hemorrhage management.      Patient initially presented to [facility] (OSH) with altered mental status, for which a CT was done and showed a trace R sylvian subarachnoid hemorrhage and transferred to [facility] Neuro ICU for further management. [place] [facility] went into paroxysmal A fib with RVR. TTE done on 03/07 showed an anterior mobile mass on mitral valve leaflet with moderate mitral valve regurgitation. Follow up CROW showed a 2.5 cm mobile vegetation on the mitral valve with moderate mitral regurgitation. Blood cultures drawn on 03/07 were positive for E. Faecium, for which infectious disease and cardiac surgery was consulted.      She had an MRI of her spine on 03/14 that showed an enlarging fluid collection that was drained by IR due to concern for abscess as source of infection vs possible discitis/osteomyelitis in the spine post-operatively.     Of note, per chart review patient has had multiple presentations since December 2023 for various symptoms including chest pain, chest tightness, shortness of breath and worsening back pain. She also had some dysphagia and decreased appetite for upwards of a month, as well as a cough that . 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 She had a L4-L5 TLIF on 03/01 at OSH.    3/22/2024 OPERATION/PROCEDURE: Dr. Deangelo Chaudhari   1. Minimally invasive  mitral valve replacement (31mm Epic bioprosthesis)  2. Exploration of the right femoral vein and artery for cannulation for cardiopulmonary bypass.     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   - Infectious Disease Following, appreciate recs:   ---Continue ampicillin 2 g IV  q 4  ---Continue ceftriaxone 2 g IV q 12  ---Both ABXs for 6 weeks from date of surgery, EOT 5/03/2024  ---Weekly CBC with diff and CMP as outpatient while on IV antibiotics and fax reports to Dr Cruz  ---ID will arrange for follow up as an outpatient   - 3/25: Right Upper Arm single lumen PICC placed   - monitor for s/s infection: currently afebrile without any s/s of infection present   ==========================  - Acute postop pulmonary insufficiency/Hx of COPD (no home O2): discharge to SNF with home spiriva and breo ellipta; on RA at discharge  --discharging patient on Nystatin for mild s/s of thrush d/t breathing treatments    - Hx of HTN; discharging patient to SNF with lisinopril 5mg and metoprolol tartrate 50mg BID   - Chronic back/neck pain/arthritis s/p TLIF of L4-L5 on 2/26/2024: OARRS reviewed; discharged to SNF with printed tramadol Rx   --continued home plaquenil, lyrica, and zanfkex   --NO NSAIDs for 3 months s/p cardiac surgery   ============================  - postop ileus resolved at discharge   - 2v CXR completed on 3/25/2024.  - Postop echo completed 3/27 and showed an EF 60-65%, MVR in place with 18mmHg peak gradient and 7.4mmHg  mean gradient with mod elevated RV systolic pressure 48 mmHg   - Cardiac rehab referral was placed  - PT recs low intensity therapy  - Anticipate discharge to skilled nursing facility for IV antibiotic infusion     On day of discharge, vital signs were stable and no acute distress was noted. All questions were answered. After VS and labs were reviewed it was determined the patient was stable for discharge.     Discharged to SNF on 24  ===========================  Hospital day of discharge management- spent >30 minutes coordinating the discharge and counseling/educating patient and family regarding discharge instructions.     Past Medical History:  - COPD (chronic obstructive pulmonary disease) (CMS/[facility])    - GERD (gastroesophageal reflux disease)    - Hypertension   - HLD   - Arthritis       Past Surgical History:  - CARDIAC CATHETERIZATION: Left Heart Cath;  Surgeon: David Hernandes MD;  Location: [place] [id number] Cardiac Cath Lab (3/14/2024)  - L4 and L5 decompression laminectomy and fusion (3/01/2024)    Prescriptions Prior to Admission  - bisoprilil (zebeta) 10mg daily   - famotidine (pepcid) 40mg daily   - hydroxychloroquine (plaquenil) 200mg daily   - prednisone (deltasone) 5mg daily   - pregabalin (yrica) 75mg daily   - spironolactone (aldactone) 25mg daily   - tizanidine (zanaflex) 4mg PRN TID for back pain  - lisinopril 20mg daily   -albuterol 90mcg/actuation inhaler: 1 puf every 6 hours as needed for wheezing/SOB   - ipratropium (Atrovent) 17mcg/actuation inhaler: 2 puffs BID   - Trelegy Ellipta 100-62.5-25mcg blister with device: 1 puf daily     Allergy: Coconut, Codeine, and Penicillins    Social History  Tobacco Use  · Smoking status: Former      Types: Cigarettes      Quit date:       Years since quittin.2  Substance Use Topics  · Alcohol use: Never  · Drug use: Never     Family History  No family history on file.    Postop Imaging:   Transthoracic Echo (TTE) Complete 3/27/2024  PHYSICIAN  INTERPRETATION:  Left Ventricle: The left ventricular systolic function is normal, with an estimated ejection fraction of 60-65%. The patient is in atrial fibrillation which may influence the estimate of left ventricular function and transvalvular flows. There are no regional wall motion abnormalities. The left ventricular cavity size is normal. Abnormal (paradoxical) septal motion consistent with post-operative status. Left ventricular diastolic filling was indeterminate.  Left Atrium: The left atrium is normal in size.  Right Ventricle: The right ventricle is normal in size. There is normal right ventricular global systolic function.  Right Atrium: The right atrium is normal in size.  Aortic Valve: The aortic valve is trileaflet. There is mild aortic valve cusp calcification. There is evidence of mildly elevated transaortic gradients consistent with sclerosis of the aortic valve.  The peak aortic velocity was obtained from the apical view. There is no evidence of aortic valve regurgitation. The peak instantaneous gradient of the aortic valve is 20.6 mmHg. The mean gradient of the aortic valve is 9.8 mmHg.  Mitral Valve: There is a prosthetic mitral valve present. There is no evidence of mitral valve regurgitation. There is a 31 mm Epic bioprosthetic MVR (3/22/24). The peak gradient is 18 mmHg and mean gradient is 7.4 mmHg at a HR of 83 bpm.  Tricuspid Valve: The tricuspid valve is structurally normal. There is mild tricuspid regurgitation. The Doppler estimated RVSP is moderately elevated at 48.2 mmHg.  Pulmonic Valve: The pulmonic valve is structurally normal. There is mild pulmonic valve regurgitation.  Pericardium: There is no pericardial effusion noted.  Aorta: The aortic root is normal. The aortic root appears normal in size and measures 2.90 cm. There is no dilatation of the ascending aorta.  Systemic Veins: The inferior vena cava appears to be of normal size. There is IVC inspiratory collapse greater than  50%.  In comparison to the previous echocardiogram(s): Compared with study from 3/22/2024, the prior study was an intraoperative CROW and the patient is now s/p MVR.     CONCLUSIONS:   1. Left ventricular systolic function is normal with a 60-65% estimated ejection fraction.   2. Abnormal septal motion consistent with post-operative status.   3. The patient is in atrial fibrillation which may influence the estimate of left ventricular function and transvalvular flows.   4. There is a 31 mm Epic bioprosthetic MVR (3/22/24). The peak gradient is 18 mmHg and mean gradient is 7.4 mmHg at a HR of 83 bpm.   5. Moderately elevated right ventricular systolic pressure.   6. Aortic valve sclerosis.   7. Normal aortic root.    [id number] Wesly Rod MD  Electronically signed on 3/27/2024 at 7:14:23 PM  ** Final **    XR chest 2 Views 3/25/2024  CARDIOMEDIASTINAL SILHOUETTE:  Cardiomediastinal silhouette is stable in size and configuration.   LUNGS:  Bibasilar consolidations with minimal interval clearing. There are  diffuse increased interstitial markings. Blunting of the left  costophrenic angle  ABDOMEN:  No remarkable upper abdominal findings.   BONES:  No acute osseous changes.      IMPRESSION:  1. Mild interstitial edema. Minimal interval clearing bibasilar  consolidation/atelectasis. Small left pleural effusion    Signed by: Sarah Tse 3/25/2024 9:42 AM  Dictation workstation:   [id number]    Pertinent Physical Exam At Time of Discharge  Physical Exam  Please see progress note of 4/1/2024 for physical exam     Home Medications     Medication List      START taking these medications     acetaminophen 325 mg tablet; Commonly known as: Tylenol; Take 2 tablets   (650 mg) by mouth every 6 hours if needed for mild pain (1 - 3).   ampicillin 2 g in sodium chloride 0.9 % 100 mL IV; Infuse 2 g at 200   mL/hr over 30 minutes into a venous catheter every 4 hours.   aspirin 81 mg EC tablet; Take 1 tablet (81 mg) by mouth once  daily. Do   not start before March 29, 2024.   atorvastatin 80 mg tablet; Commonly known as: Lipitor; Take 1 tablet (80   mg) by mouth once daily at bedtime.   cefTRIAXone 2 gram/50 mL IV; Commonly known as: Rocephin; Infuse 50 mL   (2 g) at 100 mL/hr over 30 minutes into a venous catheter every 12 hours.   furosemide 40 mg tablet; Commonly known as: Lasix; Take 1 tablet (40 mg)   by mouth once daily for 5 days.   hydrOXYzine HCL 50 mg tablet; Commonly known as: Atarax; Take 1 tablet   (50 mg) by mouth every 6 hours if needed for anxiety, allergies or   itching.   magnesium oxide 400 mg tablet; Commonly known as: Mag-Ox; Take 1 tablet   (400 mg) by mouth once daily for 5 days. Please take while taking   lasix/furosemide for 5 days and then stop.   melatonin 3 mg tablet; Take 1 tablet (3 mg) by mouth once daily at   bedtime.   metoprolol tartrate 50 mg tablet; Commonly known as: Lopressor; Take 1   tablet by mouth 2 times a day.   nystatin 100,000 unit/mL suspension; Commonly known as: Mycostatin;   Swish and spit 4 mL (400,000 Units) 2 times a day for 4 days.   polyethylene glycol 17 gram packet; Commonly known as: Glycolax,   Miralax; Take 17 g by mouth 2 times a day as needed (constipation).   potassium chloride CR 10 mEq ER tablet; Commonly known as: Klor-Con;   Take 1 tablet (10 mEq) by mouth once daily for 5 days. Do not crush, chew,   or split.  Please take while taking lasix/furosemide and then stop after 5   days.   traMADol 50 mg tablet; Commonly known as: Ultram; Take 1 tablet (50 mg)   by mouth every 6 hours if needed for severe pain (7 - 10) for up to 5   days.     CHANGE how you take these medications     lisinopril 5 mg tablet; Take 1 tablet (5 mg) by mouth once daily.; What   changed: medication strength, how much to take     CONTINUE taking these medications     albuterol 90 mcg/actuation inhaler   famotidine 40 mg tablet; Commonly known as: Pepcid   hydroxychloroquine 200 mg tablet; Commonly known  as: Plaquenil   ipratropium 17 mcg/actuation inhaler; Commonly known as: Atrovent   pregabalin 75 mg capsule; Commonly known as: Lyrica   tiZANidine 4 mg tablet; Commonly known as: Zanaflex   Trelegy Ellipta 100-62.5-25 mcg blister with device; Generic drug:   fluticasone-umeclidin-vilanter     STOP taking these medications     bisoprolol 10 mg tablet; Commonly known as: Zebeta   predniSONE 5 mg tablet; Commonly known as: Deltasone   spironolactone 25 mg tablet; Commonly known as: Aldactone       Outpatient Follow-Up  Future Appointments   Date Time Provider Department Center   4/26/2024  1:00 PM Elias Paredes MD [id number]   5/20/2024  1:45 PM Deangelo Chaudhari MD [id number]       Estela Catherine APRN-CNP

## 2024-04-05 ENCOUNTER — NURSING HOME VISIT (OUTPATIENT)
Dept: POST ACUTE CARE | Facility: EXTERNAL LOCATION | Age: 79
End: 2024-04-05
Payer: MEDICARE

## 2024-04-05 DIAGNOSIS — Z95.2 STATUS POST MITRAL VALVE REPLACEMENT: ICD-10-CM

## 2024-04-05 DIAGNOSIS — E78.5 HYPERLIPIDEMIA, UNSPECIFIED HYPERLIPIDEMIA TYPE: ICD-10-CM

## 2024-04-05 DIAGNOSIS — I33.0 ACUTE BACTERIAL ENDOCARDITIS (HHS-HCC): Primary | ICD-10-CM

## 2024-04-05 DIAGNOSIS — J44.9 CHRONIC OBSTRUCTIVE PULMONARY DISEASE, UNSPECIFIED COPD TYPE (MULTI): ICD-10-CM

## 2024-04-05 DIAGNOSIS — I10 HYPERTENSION, UNSPECIFIED TYPE: ICD-10-CM

## 2024-04-05 DIAGNOSIS — M51.9 LUMBAR DISC DISEASE: ICD-10-CM

## 2024-04-05 PROCEDURE — 99306 1ST NF CARE HIGH MDM 50: CPT | Performed by: STUDENT IN AN ORGANIZED HEALTH CARE EDUCATION/TRAINING PROGRAM

## 2024-04-05 NOTE — LETTER
Patient: Fannie Watson  : 1945    Encounter Date: 2024    Date of Service: 2024      HPI/Subjective  Fannie Watson is a 78 y.o. female  Past medical history significant for hypertension, hyperlipidemia, COPD, spondylolisthesis of the lumbar spine status post L4-L5 decompression laminectomy and fusion on 3/1/2024.    Patient presented to [facility] in the neuro ICU on 3/5/2024 for evaluation management of subarachnoid hemorrhage.  While in the neuro ICU patient went into paroxysmal A-fib with RVR and TTE was done on 3/7/2024 which showed anterior mobile mass on the mitral valve with moderate mitral regurg.  This was followed up with a CROW which confirmed mobile vegetation on the mitral valve.  Blood cultures drawn on 3 7 were also positive for Enterococcus faecium.  On 3/22/2024 patient underwent minimally invasive mitral valve replacement and exploration of the right femoral vein and artery for cannulation for cardiopulmonary bypass.  ID was consulted and patient was started on ampicillin and ceftriaxone.  On 3/14/2024 she had an MRI of the spine which showed enlarging fluid collection which was subsequently drained by IR due to concern for abscess as source of infection versus possible discitis versus osteomyelitis and spine.    Patient was advised to continue ampicillin 2 g IV every 4 and ceftriaxone 2 g IV every 12 until 5/3/2024.  She currently has a right upper extremity PICC line placed for IV antibiotics.    Patient seen and examined at bedside today while eating dinner with her .  Her major complaint is that she does not want to continue IV fluid.  Patient states that she refuses to continue IV antibiotics for more than 2 additional weeks.  Advised patient that she has severe endocardial infection requiring IV antibiotics for specific time period.  Patient understands this but still is refusing to continue IV antibiotics past 2 weeks from today.  Extensive  counseling given to patient and advised her to speak to her infectious disease doctor.  She has a virtual visit scheduled with them next week.  Currently denies any fevers, chills, chest pain, shortness of breath, nausea, vomiting, diarrhea, new or worsening back pain.    Of note patient was discharged on 5 days of Lasix for bilateral lower extremity edema likely secondary to fluid overload.  States that her bilateral lower extremities are currently minimally swollen and have improved significantly since discharge.    Tolerating plan of care appropriately otherwise and doing PT OT as prescribed.  No other complaints or concerns at this time     Denies fevers, chills, weight loss, lightheadedness, dizziness, vision changes, sore throat, runny nose, CP, SOB, cough, palpitations, n/v/d, abd pain, black/bloody stools, arthralgias, or new numbness/weakness/tingling in arms/legs/face.      Other Medical, Surgical, Family, Social Hx, Allergies per chart in PCC.   Medication list reviewed. Please see PCC.     Objective:   Physical Exam     Vital signs reviewed. Please see chart in PCC.     General: NAD. NCAT. AOx3  HEENT: PERRLA. EOMI. MMM. Nares patent bl.  Cardiovascular: RRR.  Status post mitral valve replacement.  Right upper extremity PICC line in place.  Thoracotomy site intact.  Respiratory: CTABL. No acute respiratory distress.   GI: Soft, NT abdomen.   MSK: Generalized weakness, improving  Extremities: Trace bilateral lower extremity pitting edema  Skin: No visible rashes or bruises.   Neuro: Cranial Nerves grossly intact. Motor/sensory wnl.   Psych: Mood wnl.          REVIEW OF SYSTEMS   ROS reviewed within HPI and is otherwise negative       Assessment and Plan  Encounter Diagnoses   Name Primary?   • Acute bacterial endocarditis Yes   • Hypertension, unspecified type    • Hyperlipidemia, unspecified hyperlipidemia type    • Chronic obstructive pulmonary disease, unspecified COPD type (CMS/HCC)    • Lumbar disc  disease    • Status post mitral valve replacement        - Continue ampicillin 2 g IV every 4 hours  - Continue ceftriaxone 2 g IV every 12 hours  - Continue antibiotics until 5/3/2024  - Follow-up with ID virtually next week  - Continue lisinopril and metoprolol titrate  - Can continue home Plaquenil, Lyrica, Zanaflex.  Avoid NSAIDs for 3 months after cardiac surgery  - Monitor vitals and labs  -Pre-Admission hospital notes reviewed  -Pertinent radiology, if any, reviewed   -Current rehab plan reviewed; continue pending any major changes  -Current medication therapy reviewed. Continue and monitor for adverse effects.  -Continue home medications for chronic medical conditions.   -PT/OT as tolerated  -Low carb, Low sodium, Low fat diet advised         Charting was completed using voice recognition technology and may include unintended errors.    Toyin Fierro MD       Electronically Signed By: Toyin Fierro MD   4/6/24 12:49 AM

## 2024-04-06 NOTE — PROGRESS NOTES
Date of Service: 4/5/2024      HPI/Subjective  Fannie Watson is a 78 y.o. female  Past medical history significant for hypertension, hyperlipidemia, COPD, spondylolisthesis of the lumbar spine status post L4-L5 decompression laminectomy and fusion on 3/1/2024.    Patient presented to [facility] in the neuro ICU on 3/5/2024 for evaluation management of subarachnoid hemorrhage.  While in the neuro ICU patient went into paroxysmal A-fib with RVR and TTE was done on 3/7/2024 which showed anterior mobile mass on the mitral valve with moderate mitral regurg.  This was followed up with a CROW which confirmed mobile vegetation on the mitral valve.  Blood cultures drawn on 3 7 were also positive for Enterococcus faecium.  On 3/22/2024 patient underwent minimally invasive mitral valve replacement and exploration of the right femoral vein and artery for cannulation for cardiopulmonary bypass.  ID was consulted and patient was started on ampicillin and ceftriaxone.  On 3/14/2024 she had an MRI of the spine which showed enlarging fluid collection which was subsequently drained by IR due to concern for abscess as source of infection versus possible discitis versus osteomyelitis and spine.    Patient was advised to continue ampicillin 2 g IV every 4 and ceftriaxone 2 g IV every 12 until 5/3/2024.  She currently has a right upper extremity PICC line placed for IV antibiotics.    Patient seen and examined at bedside today while eating dinner with her .  Her major complaint is that she does not want to continue IV fluid.  Patient states that she refuses to continue IV antibiotics for more than 2 additional weeks.  Advised patient that she has severe endocardial infection requiring IV antibiotics for specific time period.  Patient understands this but still is refusing to continue IV antibiotics past 2 weeks from today.  Extensive counseling given to patient and advised her to speak to her infectious disease  doctor.  She has a virtual visit scheduled with them next week.  Currently denies any fevers, chills, chest pain, shortness of breath, nausea, vomiting, diarrhea, new or worsening back pain.    Of note patient was discharged on 5 days of Lasix for bilateral lower extremity edema likely secondary to fluid overload.  States that her bilateral lower extremities are currently minimally swollen and have improved significantly since discharge.    Tolerating plan of care appropriately otherwise and doing PT OT as prescribed.  No other complaints or concerns at this time     Denies fevers, chills, weight loss, lightheadedness, dizziness, vision changes, sore throat, runny nose, CP, SOB, cough, palpitations, n/v/d, abd pain, black/bloody stools, arthralgias, or new numbness/weakness/tingling in arms/legs/face.      Other Medical, Surgical, Family, Social Hx, Allergies per chart in PCC.   Medication list reviewed. Please see PCC.     Objective:   Physical Exam     Vital signs reviewed. Please see chart in PCC.     General: NAD. NCAT. AOx3  HEENT: PERRLA. EOMI. MMM. Nares patent bl.  Cardiovascular: RRR.  Status post mitral valve replacement.  Right upper extremity PICC line in place.  Thoracotomy site intact.  Respiratory: CTABL. No acute respiratory distress.   GI: Soft, NT abdomen.   MSK: Generalized weakness, improving  Extremities: Trace bilateral lower extremity pitting edema  Skin: No visible rashes or bruises.   Neuro: Cranial Nerves grossly intact. Motor/sensory wnl.   Psych: Mood wnl.          REVIEW OF SYSTEMS   ROS reviewed within HPI and is otherwise negative       Assessment and Plan  Encounter Diagnoses   Name Primary?    Acute bacterial endocarditis Yes    Hypertension, unspecified type     Hyperlipidemia, unspecified hyperlipidemia type     Chronic obstructive pulmonary disease, unspecified COPD type (CMS/[facility])     Lumbar disc disease     Status post mitral valve replacement        - Continue ampicillin 2 g IV  every 4 hours  - Continue ceftriaxone 2 g IV every 12 hours  - Continue antibiotics until 5/3/2024  - Follow-up with ID virtually next week  - Continue lisinopril and metoprolol titrate  - Can continue home Plaquenil, Lyrica, Zanaflex.  Avoid NSAIDs for 3 months after cardiac surgery  - Monitor vitals and labs  -Pre-Admission hospital notes reviewed  -Pertinent radiology, if any, reviewed   -Current rehab plan reviewed; continue pending any major changes  -Current medication therapy reviewed. Continue and monitor for adverse effects.  -Continue home medications for chronic medical conditions.   -PT/OT as tolerated  -Low carb, Low sodium, Low fat diet advised         Charting was completed using voice recognition technology and may include unintended errors.    Toyin Fierro MD

## 2024-04-12 LAB
LABORATORY COMMENT REPORT: NORMAL
PATH REPORT.ADDENDUM SPEC: NORMAL
PATH REPORT.FINAL DX SPEC: NORMAL
PATH REPORT.GROSS SPEC: NORMAL
PATH REPORT.RELEVANT HX SPEC: NORMAL
PATH REPORT.TOTAL CANCER: NORMAL

## 2024-04-26 ENCOUNTER — TELEMEDICINE (OUTPATIENT)
Dept: INFECTIOUS DISEASES | Facility: CLINIC | Age: 79
End: 2024-04-26
Payer: MEDICARE

## 2024-04-26 DIAGNOSIS — I05.9 ENDOCARDITIS OF MITRAL VALVE: Primary | ICD-10-CM

## 2024-04-26 NOTE — PROGRESS NOTES
Phone visit with nurse and patient    78 year old woman, had mitral valve replacement on 3/22/24 for ampicillin-sensitive Enterococcus fecium endocarditis after presenting with subarachnoid hemorrhage. Receiving 6 week course of ampicillin/ceftriaxone post surgery. Feels well. No complaints. No fever, rash, diarrhea, chest pain, dyspnea. Exercise tolerance good.    Assessment - Enterococcus fecalis endocarditis - complete 6 week course of ampicillin/ceftriaxone post surgery on 5/3/24. Can remove PICC line then.

## 2024-05-13 DIAGNOSIS — I33.0 ACUTE BACTERIAL ENDOCARDITIS (HHS-HCC): ICD-10-CM

## 2024-05-17 PROBLEM — E66.811 OBESITY, CLASS I, BMI 30-34.9: Status: RESOLVED | Noted: 2019-09-03 | Resolved: 2024-05-17

## 2024-05-17 PROBLEM — M06.4 INFLAMMATORY POLYARTHRITIS (MULTI): Status: RESOLVED | Noted: 2022-08-11 | Resolved: 2024-05-17

## 2024-05-17 PROBLEM — N95.0 POSTMENOPAUSAL BLEEDING: Status: RESOLVED | Noted: 2022-08-11 | Resolved: 2024-05-17

## 2024-05-17 PROBLEM — K58.9 IBS (IRRITABLE BOWEL SYNDROME): Status: RESOLVED | Noted: 2022-10-19 | Resolved: 2024-05-17

## 2024-05-17 PROBLEM — E55.9 VITAMIN D DEFICIENCY: Status: RESOLVED | Noted: 2022-08-11 | Resolved: 2024-05-17

## 2024-05-17 PROBLEM — R00.1 BRADYCARDIA: Status: RESOLVED | Noted: 2019-09-03 | Resolved: 2024-05-17

## 2024-05-17 PROBLEM — R07.9 CHEST PAIN: Status: RESOLVED | Noted: 2019-01-24 | Resolved: 2024-05-17

## 2024-05-17 PROBLEM — I10 HYPERTENSION: Status: ACTIVE | Noted: 2017-03-22

## 2024-05-17 PROBLEM — S22.082G: Status: RESOLVED | Noted: 2024-03-01 | Resolved: 2024-05-17

## 2024-05-17 PROBLEM — M54.50 LOW BACK PAIN: Status: ACTIVE | Noted: 2022-08-11

## 2024-05-17 PROBLEM — M35.3 POLYMYALGIA RHEUMATICA (MULTI): Status: RESOLVED | Noted: 2022-08-11 | Resolved: 2024-05-17

## 2024-05-17 PROBLEM — R10.13 EPIGASTRIC PAIN: Status: RESOLVED | Noted: 2021-01-01 | Resolved: 2024-05-17

## 2024-05-17 PROBLEM — E66.9 OBESITY, CLASS I, BMI 30-34.9: Status: RESOLVED | Noted: 2019-09-03 | Resolved: 2024-05-17

## 2024-05-17 PROBLEM — R07.89 CHEST TIGHTNESS: Status: RESOLVED | Noted: 2023-04-04 | Resolved: 2024-05-17

## 2024-05-17 PROBLEM — E78.5 HYPERLIPIDEMIA: Status: ACTIVE | Noted: 2022-08-11

## 2024-05-17 PROBLEM — R42 DIZZINESS: Status: RESOLVED | Noted: 2019-09-02 | Resolved: 2024-05-17

## 2024-05-17 PROBLEM — I51.81 TAKOTSUBO CARDIOMYOPATHY: Status: RESOLVED | Noted: 2019-01-30 | Resolved: 2024-05-17

## 2024-05-17 PROBLEM — K57.30 DIVERTICULAR DISEASE OF COLON: Status: RESOLVED | Noted: 2022-08-11 | Resolved: 2024-05-17

## 2024-05-17 PROBLEM — I42.9 CARDIOMYOPATHY (MULTI): Status: RESOLVED | Noted: 2018-12-29 | Resolved: 2024-05-17

## 2024-05-17 PROBLEM — R30.0 DYSURIA: Status: RESOLVED | Noted: 2024-01-19 | Resolved: 2024-05-17

## 2024-05-17 PROBLEM — I67.848 VASOSPASM OF CEREBRAL ARTERY: Status: RESOLVED | Noted: 2024-03-05 | Resolved: 2024-05-17

## 2024-05-17 PROBLEM — L40.50 PSORIATIC ARTHRITIS (MULTI): Status: RESOLVED | Noted: 2022-08-11 | Resolved: 2024-05-17

## 2024-05-17 PROBLEM — R06.02 SHORTNESS OF BREATH: Status: RESOLVED | Noted: 2018-12-26 | Resolved: 2024-05-17

## 2024-05-17 PROBLEM — M19.90 OSTEOARTHROSIS: Status: RESOLVED | Noted: 2022-08-11 | Resolved: 2024-05-17

## 2024-05-17 PROBLEM — I34.89 OTHER NONRHEUMATIC MITRAL VALVE DISORDERS: Status: RESOLVED | Noted: 2024-03-06 | Resolved: 2024-05-17

## 2024-05-17 PROBLEM — M54.30 SCIATICA: Status: RESOLVED | Noted: 2022-08-11 | Resolved: 2024-05-17

## 2024-05-17 PROBLEM — E87.6 HYPOKALEMIA: Status: RESOLVED | Noted: 2017-03-23 | Resolved: 2024-05-17

## 2024-05-17 PROBLEM — J44.9 CHRONIC OBSTRUCTIVE PULMONARY DISEASE (MULTI): Status: ACTIVE | Noted: 2019-01-30

## 2024-05-17 PROBLEM — I21.9 MYOCARDIAL INFARCTION (MULTI): Status: ACTIVE | Noted: 2018-12-26

## 2024-05-17 PROBLEM — M51.9 DISORDER OF INTERVERTEBRAL DISC OF LUMBAR SPINE: Status: ACTIVE | Noted: 2024-03-01

## 2024-05-17 PROBLEM — M81.0 OSTEOPOROSIS: Status: RESOLVED | Noted: 2022-08-11 | Resolved: 2024-05-17

## 2024-05-17 RX ORDER — ALENDRONATE SODIUM 70 MG/1
70 TABLET ORAL
COMMUNITY

## 2024-05-17 RX ORDER — ONDANSETRON 4 MG/1
4 TABLET, FILM COATED ORAL EVERY 8 HOURS PRN
COMMUNITY
Start: 2024-05-04

## 2024-05-20 ENCOUNTER — APPOINTMENT (OUTPATIENT)
Dept: CARDIAC SURGERY | Facility: CLINIC | Age: 79
End: 2024-05-20
Payer: MEDICARE

## 2024-05-20 DIAGNOSIS — Z09 POSTOP CHECK: ICD-10-CM

## 2024-05-20 DIAGNOSIS — Z95.2 STATUS POST MITRAL VALVE REPLACEMENT: ICD-10-CM

## 2024-05-31 ENCOUNTER — APPOINTMENT (OUTPATIENT)
Dept: CARDIAC SURGERY | Facility: HOSPITAL | Age: 79
End: 2024-05-31
Payer: MEDICARE

## 2024-06-07 ENCOUNTER — APPOINTMENT (OUTPATIENT)
Dept: CARDIAC SURGERY | Facility: HOSPITAL | Age: 79
End: 2024-06-07
Payer: MEDICARE

## 2024-06-17 ENCOUNTER — OFFICE VISIT (OUTPATIENT)
Dept: CARDIAC SURGERY | Facility: CLINIC | Age: 79
End: 2024-06-17
Payer: MEDICARE

## 2024-06-17 ENCOUNTER — HOSPITAL ENCOUNTER (OUTPATIENT)
Dept: RADIOLOGY | Facility: CLINIC | Age: 79
Discharge: HOME | End: 2024-06-17
Payer: MEDICARE

## 2024-06-17 VITALS
OXYGEN SATURATION: 96 % | HEIGHT: 63 IN | BODY MASS INDEX: 26.59 KG/M2 | SYSTOLIC BLOOD PRESSURE: 100 MMHG | DIASTOLIC BLOOD PRESSURE: 67 MMHG | WEIGHT: 150.1 LBS | HEART RATE: 82 BPM

## 2024-06-17 DIAGNOSIS — I33.0 ACUTE BACTERIAL ENDOCARDITIS (HHS-HCC): ICD-10-CM

## 2024-06-17 DIAGNOSIS — Z95.2 S/P MVR (MITRAL VALVE REPLACEMENT): ICD-10-CM

## 2024-06-17 DIAGNOSIS — I60.9 SUBARACHNOID HEMORRHAGE (MULTI): ICD-10-CM

## 2024-06-17 PROCEDURE — 1036F TOBACCO NON-USER: CPT | Performed by: THORACIC SURGERY (CARDIOTHORACIC VASCULAR SURGERY)

## 2024-06-17 PROCEDURE — 99024 POSTOP FOLLOW-UP VISIT: CPT | Performed by: THORACIC SURGERY (CARDIOTHORACIC VASCULAR SURGERY)

## 2024-06-17 PROCEDURE — 1159F MED LIST DOCD IN RCRD: CPT | Performed by: THORACIC SURGERY (CARDIOTHORACIC VASCULAR SURGERY)

## 2024-06-17 PROCEDURE — 3074F SYST BP LT 130 MM HG: CPT | Performed by: THORACIC SURGERY (CARDIOTHORACIC VASCULAR SURGERY)

## 2024-06-17 PROCEDURE — 71046 X-RAY EXAM CHEST 2 VIEWS: CPT

## 2024-06-17 PROCEDURE — 99213 OFFICE O/P EST LOW 20 MIN: CPT | Mod: 25 | Performed by: THORACIC SURGERY (CARDIOTHORACIC VASCULAR SURGERY)

## 2024-06-17 PROCEDURE — 1125F AMNT PAIN NOTED PAIN PRSNT: CPT | Performed by: THORACIC SURGERY (CARDIOTHORACIC VASCULAR SURGERY)

## 2024-06-17 PROCEDURE — 3078F DIAST BP <80 MM HG: CPT | Performed by: THORACIC SURGERY (CARDIOTHORACIC VASCULAR SURGERY)

## 2024-06-17 RX ORDER — LISINOPRIL 20 MG/1
1 TABLET ORAL
COMMUNITY
Start: 2024-06-02

## 2024-06-17 ASSESSMENT — PATIENT HEALTH QUESTIONNAIRE - PHQ9
1. LITTLE INTEREST OR PLEASURE IN DOING THINGS: NOT AT ALL
2. FEELING DOWN, DEPRESSED OR HOPELESS: NOT AT ALL
SUM OF ALL RESPONSES TO PHQ9 QUESTIONS 1 AND 2: 0

## 2024-06-17 ASSESSMENT — COLUMBIA-SUICIDE SEVERITY RATING SCALE - C-SSRS
2. HAVE YOU ACTUALLY HAD ANY THOUGHTS OF KILLING YOURSELF?: NO
6. HAVE YOU EVER DONE ANYTHING, STARTED TO DO ANYTHING, OR PREPARED TO DO ANYTHING TO END YOUR LIFE?: NO
1. IN THE PAST MONTH, HAVE YOU WISHED YOU WERE DEAD OR WISHED YOU COULD GO TO SLEEP AND NOT WAKE UP?: NO

## 2024-06-17 ASSESSMENT — ENCOUNTER SYMPTOMS
OCCASIONAL FEELINGS OF UNSTEADINESS: 0
LOSS OF SENSATION IN FEET: 0
DEPRESSION: 0

## 2024-06-17 ASSESSMENT — PAIN SCALES - GENERAL: PAINLEVEL: 8

## 2024-06-17 NOTE — PROGRESS NOTES
I reviewed her at the clinic today.  She underwent minimally invasive mitral valve replacement with a 31 mm epic bioprosthesis on March 22 for endocarditis following possibly the decompression laminectomy she has had.  She also had subarachnoid hemorrhage that was managed conservatively.  Overall she made an excellent recovery and has completed her antibiotics.  She continues to get stronger and is slowly returning to full normal activity.    On examination her blood pressure was 100/67.  Auscultation revealed no murmurs and her chest was clear.    Her wound has soundly healed.    Chest x-ray demonstrated clear lung fields.    Impression and plan new  31 mm epic mitral bioprosthesis for infective endocarditis using minimally invasive approach of right minithoracotomy.    Excellent recovery.  No further surgical follow-up is required.  Further follow-up will be under the care of cardiology and neurosurgery or neurology.  She also has follow-up with regards to her chronic back problems.    I am happy to see her again at any point if required    Deangelo Chaudhari MD

## 2024-06-25 ENCOUNTER — OFFICE VISIT (OUTPATIENT)
Dept: NEUROSURGERY | Facility: HOSPITAL | Age: 79
End: 2024-06-25
Payer: MEDICARE

## 2024-06-25 VITALS
SYSTOLIC BLOOD PRESSURE: 114 MMHG | RESPIRATION RATE: 17 BRPM | TEMPERATURE: 97.8 F | DIASTOLIC BLOOD PRESSURE: 71 MMHG | HEART RATE: 79 BPM | BODY MASS INDEX: 26.56 KG/M2 | WEIGHT: 149.91 LBS | HEIGHT: 63 IN

## 2024-06-25 DIAGNOSIS — I60.9 SUBARACHNOID HEMORRHAGE (MULTI): ICD-10-CM

## 2024-06-25 PROCEDURE — 99213 OFFICE O/P EST LOW 20 MIN: CPT | Performed by: NURSE PRACTITIONER

## 2024-06-25 PROCEDURE — 3078F DIAST BP <80 MM HG: CPT | Performed by: NURSE PRACTITIONER

## 2024-06-25 PROCEDURE — 3074F SYST BP LT 130 MM HG: CPT | Performed by: NURSE PRACTITIONER

## 2024-06-25 PROCEDURE — 1125F AMNT PAIN NOTED PAIN PRSNT: CPT | Performed by: NURSE PRACTITIONER

## 2024-06-25 ASSESSMENT — PAIN SCALES - GENERAL: PAINLEVEL: 4

## 2024-06-25 NOTE — PROGRESS NOTES
"[facility]  Neurosurgery    History of Present Illness      Fannie Watson is a 78-year-old female with a PMH significant for HTN, HLD, COPD, spondylolisthesis of lumbar spine s/p L4-5 decompression laminectomy and fusion 03/01/24. She presented to [facility] with AMS with CTH significant for trace R sylvian SAH and transferred to [facility] on 03/05. Hospital course significant for PAF with RVR. TTE 03/07 with anterior mobile mass on mitral valve leaflet with moderate mitral valve regurgitation and vegetation. Blood cultures positive for E. Faecium with ID and cardiac surgery consulted. Patient was taken to IR on 03/14 for enlarging fluid collection drained by IR due to concern for abscess. Patient returned back tot he OR on 03/22/24 for minimally invasive mitral valve replacement (31mm Epic bioprosthesis) and exploration of the R femoral vein and artery for cannulation for cardiopulmonary bypass with Dr. Deangelo Chaudhari. She was treated for endocarditis with ampicillin and ceftriaxone with antibiotics x 6 weeks postoperatively with an end date of 05/03/24. She was discharged to SNF and presents to clinic for FUV.     Regarding SAH CTA negative. MRI with SAH otherwise negative. She did no require acute neurosurgical intervention or neuroimaging follow up. She     Completed IV antibiotics for endocarditis. Denies any headaches, blurred or double vision but prescription feels it is a little off. She does have a follow with ophthalmology. Since her spinal surgery she is getting up and walking without assistive device. Does have to do rocking to standing. Not able to walk for long distances. Right outer thigh numbness without tingling. No recent falls.     Taking ASA 81mg daily.             Objective      Vitals:   /71 (BP Location: Left arm, Patient Position: Sitting, BP Cuff Size: Adult)   Pulse 79   Temp 36.6 °C (97.8 °F)   Resp 17   Ht 1.6 m (5' 3\")   Wt 68 kg (149 lb 14.6 oz)   LMP  (LMP Unknown) "   BMI 26.56 kg/m²         Physical Exam:    A&Ox3   Fluent speech   EOMI; FC x 4   ART: strength 5/5; no drift   SILT  Face and shoulder shrug symmetrical   Gait normal         Relevant Results:    CTH 03/11/24: Right sylvian fissure SAH improved from prior imaging         Assessment & Plan      Diagnosis:  Diagnoses and all orders for this visit:  Subarachnoid hemorrhage (Multi)  -     Referral to Neurology  -     CT head wo IV contrast; Future          Provider Impression:     Patient is a 78-year-old female presenting for hospital follow up after she presented for AMS post lumbar fusion and decompression found to have R sylvian fissure SAH. No acute neurosurgical intervention was required. Clinically patient doing well with no additional episodes of confusion or neurological complaints at this time.     - Repeat CTH to ensure resolution   - Once completed will call patient with results   - OK to continue antiplatelet / anticoagulation therapy that is recommended by cardiology     Plan discussed with patient and  who were in agreement. All questions answered.     Medical History     Past Medical History:   Diagnosis Date    Bradycardia 09/03/2019    Last Assessment & Plan:    Formatting of this note might be different from the original.   Assessment: has had in the past, not a current probelm    Cardiomyopathy (Multi) 12/29/2018    Last Assessment & Plan:    Formatting of this note might be different from the original.   Assessment: monitored per Cardiologist    Chest pain 01/24/2019    Chest tightness 04/04/2023    Formatting of this note might be different from the original.   Most likely related to COPD exacerbation   We will monitor    COPD (chronic obstructive pulmonary disease) (Multi)     Diverticular disease of colon 08/11/2022    Dizziness 09/02/2019    Dysuria 01/19/2024    Epigastric pain 01/01/2021    Fibroid uterus 02/26/2014    GERD (gastroesophageal reflux disease)     Hypertension      Hypokalemia 03/23/2017    IBS (irritable bowel syndrome) 10/19/2022    Last Assessment & Plan:    Formatting of this note might be different from the original.   Assessment: monitored per PCP    Inflammatory polyarthritis (Multi) 08/11/2022    Last Assessment & Plan:    Formatting of this note might be different from the original.   Assessment: monitored per PCP    Obesity, Class I, BMI 30-34.9 09/03/2019    Last Assessment & Plan:    Formatting of this note might be different from the original.   Assessment: BMI 28.5    Osteoarthrosis 08/11/2022    Osteoporosis 08/11/2022    Other nonrheumatic mitral valve disorders 03/06/2024    Polymyalgia rheumatica (Multi) 08/11/2022    Last Assessment & Plan:    Formatting of this note might be different from the original.   Assessment: monitored per PCP    Postmenopausal bleeding 08/11/2022    Psoriatic arthritis (Multi) 08/11/2022    Last Assessment & Plan:    Formatting of this note might be different from the original.   Assessment: monitored per PCP    Sciatica 08/11/2022    Shortness of breath 12/26/2018    Last Assessment & Plan:    Formatting of this note might be different from the original.   Possible undiagnosed underlying heart failure vs angina equivalent symptom   Was given Lasix 20mg IV once   Echo tomorrow    Takotsubo cardiomyopathy 01/30/2019    Last Assessment & Plan:    Formatting of this note might be different from the original.   Assessment: monitored per Cardiollogist    Unstable burst fracture of t11-T12 vertebra, subsequent encounter for fracture with delayed healing 03/01/2024    Vasospasm of cerebral artery 03/05/2024    Vitamin D deficiency 08/11/2022     Past Surgical History:   Procedure Laterality Date    CARDIAC CATHETERIZATION N/A 3/14/2024    Procedure: Left Heart Cath;  Surgeon: David Hernandes MD;  Location: [place] [address] Cardiac Cath Lab;  Service: Cardiovascular;  Laterality: N/A;     Social History     Tobacco Use    Smoking status:  Former     Current packs/day: 0.00     Types: Cigarettes     Quit date:      Years since quittin.5    Smokeless tobacco: Never   Substance Use Topics    Alcohol use: Never    Drug use: Never     No family history on file.  Allergies   Allergen Reactions    Coconut Unknown    Codeine Unknown    Penicillins Rash     Current Outpatient Medications   Medication Instructions    acetaminophen (TYLENOL) 650 mg, oral, Every 6 hours PRN    albuterol 90 mcg/actuation inhaler 1 puff, inhalation, Every 6 hours PRN    alendronate (FOSAMAX) 70 mg, oral, Every 7 days    aspirin 81 mg, oral, Daily    atorvastatin (LIPITOR) 80 mg, oral, Nightly    famotidine (PEPCID) 40 mg, oral, Daily    hydrOXYzine HCL (ATARAX) 50 mg, oral, Every 6 hours PRN    ipratropium (Atrovent) 17 mcg/actuation inhaler 2 puffs, inhalation, 2 times daily RT    lisinopril 20 mg tablet 1 tablet, oral, Daily (0630)    lisinopril 5 mg, oral, Daily    melatonin 3 mg, oral, Nightly    metoprolol tartrate (LOPRESSOR) 50 mg, oral, 2 times daily    ondansetron (ZOFRAN) 4 mg, oral, Every 8 hours PRN    polyethylene glycol (GLYCOLAX, MIRALAX) 17 g, oral, 2 times daily PRN    pregabalin (LYRICA) 75 mg, oral, Daily    tiZANidine (ZANAFLEX) 4 mg, oral, 3 times daily PRN    Trelegy Ellipta 100-62.5-25 mcg blister with device 1 puff, inhalation, Daily

## 2024-07-15 ENCOUNTER — HOSPITAL ENCOUNTER (OUTPATIENT)
Dept: RADIOLOGY | Facility: CLINIC | Age: 79
Discharge: HOME | End: 2024-07-15
Payer: MEDICARE

## 2024-07-15 DIAGNOSIS — I60.9 SUBARACHNOID HEMORRHAGE (MULTI): ICD-10-CM

## 2024-07-15 PROCEDURE — 70450 CT HEAD/BRAIN W/O DYE: CPT | Performed by: RADIOLOGY

## 2024-07-15 PROCEDURE — 70450 CT HEAD/BRAIN W/O DYE: CPT

## 2025-04-11 ENCOUNTER — OFFICE VISIT (OUTPATIENT)
Dept: URGENT CARE | Age: 80
End: 2025-04-11
Payer: MEDICARE

## 2025-04-11 VITALS
HEIGHT: 63 IN | WEIGHT: 162 LBS | OXYGEN SATURATION: 98 % | TEMPERATURE: 98.2 F | HEART RATE: 75 BPM | RESPIRATION RATE: 16 BRPM | BODY MASS INDEX: 28.7 KG/M2

## 2025-04-11 DIAGNOSIS — A09 DIARRHEA OF INFECTIOUS ORIGIN: ICD-10-CM

## 2025-04-11 DIAGNOSIS — A08.4 VIRAL GASTROENTERITIS: Primary | ICD-10-CM

## 2025-04-11 RX ORDER — ONDANSETRON 4 MG/1
4 TABLET, ORALLY DISINTEGRATING ORAL EVERY 8 HOURS PRN
Qty: 9 TABLET | Refills: 0 | Status: SHIPPED | OUTPATIENT
Start: 2025-04-11 | End: 2025-04-14

## 2025-04-11 NOTE — PROGRESS NOTES
Subjective   Patient ID: Fannie Watson is a 79 y.o. female. They present today with a chief complaint of Vomiting, Nausea, and Diarrhea (1 week).    History of Present Illness  Patient reports symptoms present for ~5 days  Endorses diarrhea  Endorses vomiting - resolved  Notes some abdominal discomfort along the upper abdomen  Able to keep fluids down  Notes that overall her symptoms are improving but nausea has persisted  Patient's son requests bacterial testing        Past Medical History  Allergies as of 04/11/2025 - Reviewed 04/11/2025   Allergen Reaction Noted    Coconut Unknown 03/06/2024    Codeine Unknown 03/06/2024    Penicillins Rash 03/09/2024       (Not in a hospital admission)       Past Medical History:   Diagnosis Date    Bradycardia 09/03/2019    Last Assessment & Plan:    Formatting of this note might be different from the original.   Assessment: has had in the past, not a current probelm    Cardiomyopathy (Multi) 12/29/2018    Last Assessment & Plan:    Formatting of this note might be different from the original.   Assessment: monitored per Cardiologist    Chest pain 01/24/2019    Chest tightness 04/04/2023    Formatting of this note might be different from the original.   Most likely related to COPD exacerbation   We will monitor    COPD (chronic obstructive pulmonary disease) (Multi)     Diverticular disease of colon 08/11/2022    Dizziness 09/02/2019    Dysuria 01/19/2024    Epigastric pain 01/01/2021    Fibroid uterus 02/26/2014    GERD (gastroesophageal reflux disease)     Hypertension     Hypokalemia 03/23/2017    IBS (irritable bowel syndrome) 10/19/2022    Last Assessment & Plan:    Formatting of this note might be different from the original.   Assessment: monitored per PCP    Inflammatory polyarthritis (Multi) 08/11/2022    Last Assessment & Plan:    Formatting of this note might be different from the original.   Assessment: monitored per PCP    Obesity, Class I, BMI 30-34.9 09/03/2019  "   Last Assessment & Plan:    Formatting of this note might be different from the original.   Assessment: BMI 28.5    Osteoarthrosis 08/11/2022    Osteoporosis 08/11/2022    Other nonrheumatic mitral valve disorders 03/06/2024    Polymyalgia rheumatica (Multi) 08/11/2022    Last Assessment & Plan:    Formatting of this note might be different from the original.   Assessment: monitored per PCP    Postmenopausal bleeding 08/11/2022    Psoriatic arthritis (Multi) 08/11/2022    Last Assessment & Plan:    Formatting of this note might be different from the original.   Assessment: monitored per PCP    Sciatica 08/11/2022    Shortness of breath 12/26/2018    Last Assessment & Plan:    Formatting of this note might be different from the original.   Possible undiagnosed underlying heart failure vs angina equivalent symptom   Was given Lasix 20mg IV once   Echo tomorrow    Takotsubo cardiomyopathy 01/30/2019    Last Assessment & Plan:    Formatting of this note might be different from the original.   Assessment: monitored per Cardiollogist    Unstable burst fracture of t11-T12 vertebra, subsequent encounter for fracture with delayed healing 03/01/2024    Vasospasm of cerebral artery 03/05/2024    Vitamin D deficiency 08/11/2022       Past Surgical History:   Procedure Laterality Date    CARDIAC CATHETERIZATION N/A 3/14/2024    Procedure: Left Heart Cath;  Surgeon: David Hernandes MD;  Location: [place] [address] Cardiac Cath Lab;  Service: Cardiovascular;  Laterality: N/A;        reports that she quit smoking about 21 years ago. Her smoking use included cigarettes. She has never used smokeless tobacco. She reports that she does not drink alcohol and does not use drugs.                               Objective    Vitals:    04/11/25 1352   Pulse: 75   Resp: 16   Temp: 36.8 °C (98.2 °F)   TempSrc: Oral   SpO2: 98%   Weight: 73.5 kg (162 lb)   Height: 1.6 m (5' 3\")     No LMP recorded (lmp unknown). Patient has had a " hysterectomy.    Physical Exam  Constitutional:       General: She is not in acute distress.     Appearance: Normal appearance. She is not toxic-appearing or diaphoretic.   HENT:      Head: Normocephalic.      Nose: No rhinorrhea.      Mouth/Throat:      Comments: Moist membranes   Eyes:      General: No scleral icterus.        Right eye: No discharge.         Left eye: No discharge.      Extraocular Movements: Extraocular movements intact.   Cardiovascular:      Rate and Rhythm: Normal rate and regular rhythm.   Pulmonary:      Effort: Pulmonary effort is normal. No respiratory distress.      Breath sounds: No wheezing.   Abdominal:      General: There is no distension.      Tenderness: There is no abdominal tenderness. There is no guarding.   Musculoskeletal:      Cervical back: Normal range of motion.   Skin:     Capillary Refill: Capillary refill takes less than 2 seconds.      Comments: Normal skin turgor    Neurological:      Mental Status: She is alert.   Psychiatric:         Mood and Affect: Mood normal.         Behavior: Behavior normal.         Thought Content: Thought content normal.      Comments: Pleasant         Procedures    Point of Care Test & Imaging Results from this visit:      Diagnostic study results (if any) were reviewed by Demarcus Mckeon MD.    Assessment/Plan   Allergies, medications, history, and pertinent labs/EKGs/Imaging reviewed by Demarcus Mckeon MD.     Medical Decision Making:    Patient's acute symptoms are likely 2/2 viral gastroenteritis. AF HDS. Encourage increased fluid intake. May trial BRAT diet and ADAT. Avoid loperamide if able. Supportive care with zofran PRN. Per patient request, will provide stool kit to rule out bacterial/parasitical etiology, however, I suspect that it is indeed viral. ER if symptoms worsen (ex: inability to keep fluid down, worsening abdominal pain)    Orders and Diagnoses  Diagnoses and all orders for this visit:  Viral gastroenteritis  -      ondansetron ODT (Zofran-ODT) 4 mg disintegrating tablet; Dissolve 1 tablet (4 mg) in the mouth every 8 hours if needed for nausea or vomiting for up to 3 days.  Diarrhea of infectious origin  -     Stool Pathogen Panel, PCR; Future      Patient disposition: Home      Medical Admin Record      Follow Up Instructions  No follow-ups on file.    Electronically signed by Demarcus Mckeon MD  2:25 PM

## 2025-04-21 ENCOUNTER — OFFICE VISIT (OUTPATIENT)
Dept: URGENT CARE | Age: 80
End: 2025-04-21
Payer: MEDICARE

## 2025-04-21 VITALS
HEIGHT: 63 IN | BODY MASS INDEX: 29.23 KG/M2 | DIASTOLIC BLOOD PRESSURE: 86 MMHG | SYSTOLIC BLOOD PRESSURE: 155 MMHG | WEIGHT: 165 LBS | RESPIRATION RATE: 16 BRPM | OXYGEN SATURATION: 97 % | TEMPERATURE: 98.3 F | HEART RATE: 67 BPM

## 2025-04-21 DIAGNOSIS — R07.89 ATYPICAL CHEST PAIN: Primary | ICD-10-CM

## 2025-04-21 PROCEDURE — 1125F AMNT PAIN NOTED PAIN PRSNT: CPT | Performed by: PHYSICIAN ASSISTANT

## 2025-04-21 PROCEDURE — 3077F SYST BP >= 140 MM HG: CPT | Performed by: PHYSICIAN ASSISTANT

## 2025-04-21 PROCEDURE — 3079F DIAST BP 80-89 MM HG: CPT | Performed by: PHYSICIAN ASSISTANT

## 2025-04-21 PROCEDURE — 93000 ELECTROCARDIOGRAM COMPLETE: CPT | Performed by: PHYSICIAN ASSISTANT

## 2025-04-21 PROCEDURE — 1036F TOBACCO NON-USER: CPT | Performed by: PHYSICIAN ASSISTANT

## 2025-04-21 PROCEDURE — 99214 OFFICE O/P EST MOD 30 MIN: CPT | Performed by: PHYSICIAN ASSISTANT

## 2025-04-21 RX ORDER — SPIRONOLACTONE 25 MG/1
25 TABLET ORAL EVERY 24 HOURS
COMMUNITY

## 2025-04-21 RX ORDER — BISOPROLOL FUMARATE 10 MG/1
1 TABLET, FILM COATED ORAL
COMMUNITY
Start: 2025-01-28

## 2025-04-21 RX ORDER — MELOXICAM 7.5 MG/1
7.5 TABLET ORAL 2 TIMES DAILY
COMMUNITY

## 2025-04-21 ASSESSMENT — ENCOUNTER SYMPTOMS
VOMITING: 0
NAUSEA: 0
SHORTNESS OF BREATH: 1

## 2025-04-21 ASSESSMENT — PAIN SCALES - GENERAL: PAINLEVEL_OUTOF10: 4

## 2025-04-21 NOTE — PROGRESS NOTES
"Subjective   Patient ID: Fannie Watson is a 79 y.o. female. They present today with a chief complaint of Other (Patient states she has pain under right rib cage, came on suddenly started off as pain level of a 10 and now has settled to a four. ).    History of Present Illness  -c/o acute onset right sided chest/rib pain   -started while sitting at the kitchen table  -severe 10 out of 10 stabbing pain   -lasted about 30 minutes prompting her to come to the ER  -reports she had problems catching her breath   -pain has eased up, now only 4/10   -denies history of blood clots  -she does have history of aortic valve replacement (bovine), COPD  -she has had her gallbladder out           Past Medical History  Allergies as of 04/21/2025 - Reviewed 04/21/2025   Allergen Reaction Noted    Coconut Unknown 03/06/2024    Codeine Unknown 03/06/2024    Levaquin [levofloxacin] Confusion 04/21/2025    Penicillins Rash 03/09/2024       Prescriptions Prior to Admission[1]     Medical History[2]    Surgical History[3]     reports that she quit smoking about 21 years ago. Her smoking use included cigarettes. She has never used smokeless tobacco. She reports that she does not drink alcohol and does not use drugs.    Review of Systems  Review of Systems   Respiratory:  Positive for shortness of breath.    Cardiovascular:  Positive for chest pain.   Gastrointestinal:  Negative for nausea and vomiting.   All other systems reviewed and are negative.      Objective    Vitals:    04/21/25 1336   BP: 155/86   BP Location: Left arm   Patient Position: Sitting   Pulse: 67   Resp: 16   Temp: 36.8 °C (98.3 °F)   TempSrc: Oral   SpO2: 97%   Weight: 74.8 kg (165 lb)   Height: 1.6 m (5' 3\")     No LMP recorded (lmp unknown). Patient has had a hysterectomy.    Physical Exam  Vitals reviewed.   Constitutional:       General: She is not in acute distress.     Appearance: Normal appearance. She is not ill-appearing.   HENT:      Head: Normocephalic and " "atraumatic.      Mouth/Throat:      Mouth: Mucous membranes are moist.      Pharynx: Oropharynx is clear.   Cardiovascular:      Rate and Rhythm: Normal rate and regular rhythm.      Pulses: Normal pulses.      Heart sounds: Murmur heard.   Pulmonary:      Effort: Pulmonary effort is normal. No tachypnea, bradypnea, accessory muscle usage or respiratory distress.      Breath sounds: Decreased air movement present. Wheezing present. No rales.      Comments: -diminished with scattered wheezing   Abdominal:      General: There is no distension.      Tenderness: There is no abdominal tenderness. There is no right CVA tenderness, left CVA tenderness, guarding or rebound.   Musculoskeletal:         General: No swelling.      Cervical back: Normal range of motion and neck supple.      Right lower leg: No edema.      Left lower leg: No edema.   Skin:     General: Skin is warm.   Neurological:      Mental Status: She is alert.       /86 (BP Location: Left arm, Patient Position: Sitting)   Pulse 67   Temp 36.8 °C (98.3 °F) (Oral)   Resp 16   Ht 1.6 m (5' 3\")   Wt 74.8 kg (165 lb)   LMP  (LMP Unknown)   SpO2 97%   BMI 29.23 kg/m²       Procedures    Point of Care Test & Imaging Results from this visit  No results found for this visit on 04/21/25.   Imaging  No results found.    Cardiology, Vascular, and Other Imaging  No other imaging results found for the past 2 days      Diagnostic study results (if any) were reviewed by Amena Elias PA-C.    Assessment/Plan   Allergies, medications, history, and pertinent labs/EKGs/Imaging reviewed by Amena Elias PA-C.     Medical Decision Making  Patient presented with acute onset right sided chest pain under her ribs.  Pain has subsided.  She has no history of DVT/PE.  EKG without acute ischemic changes, but non-specific ST changes.  While her pain has subsided, I have no clear reason for this acute onset of right sided CP.  She has had her gallbladder removed. She " warrants ER evaluation for atypical chest pain with cardiac enzymes and tele.  Discussed with supervising physician who agrees with ER evaluation.  Patient declined EMS transfer, will have her  drive her. She signed AMA papers.     Orders and Diagnoses  Diagnoses and all orders for this visit:  Atypical chest pain  -     ECG 12 lead      Medical Admin Record      Patient disposition: ED    Electronically signed by Amena Elias PA-C  9:15 PM           [1] (Not in a hospital admission)   [2]   Past Medical History:  Diagnosis Date    Bradycardia 09/03/2019    Last Assessment & Plan:    Formatting of this note might be different from the original.   Assessment: has had in the past, not a current probelm    Cardiomyopathy 12/29/2018    Last Assessment & Plan:    Formatting of this note might be different from the original.   Assessment: monitored per Cardiologist    Chest pain 01/24/2019    Chest tightness 04/04/2023    Formatting of this note might be different from the original.   Most likely related to COPD exacerbation   We will monitor    COPD (chronic obstructive pulmonary disease) (Multi)     Diverticular disease of colon 08/11/2022    Dizziness 09/02/2019    Dysuria 01/19/2024    Epigastric pain 01/01/2021    Fibroid uterus 02/26/2014    GERD (gastroesophageal reflux disease)     Hypertension     Hypokalemia 03/23/2017    IBS (irritable bowel syndrome) 10/19/2022    Last Assessment & Plan:    Formatting of this note might be different from the original.   Assessment: monitored per PCP    Inflammatory polyarthritis (Multi) 08/11/2022    Last Assessment & Plan:    Formatting of this note might be different from the original.   Assessment: monitored per PCP    Obesity, Class I, BMI 30-34.9 09/03/2019    Last Assessment & Plan:    Formatting of this note might be different from the original.   Assessment: BMI 28.5    Osteoarthrosis 08/11/2022    Osteoporosis 08/11/2022    Other nonrheumatic mitral valve  disorders 03/06/2024    Polymyalgia rheumatica (Multi) 08/11/2022    Last Assessment & Plan:    Formatting of this note might be different from the original.   Assessment: monitored per PCP    Postmenopausal bleeding 08/11/2022    Psoriatic arthritis (Multi) 08/11/2022    Last Assessment & Plan:    Formatting of this note might be different from the original.   Assessment: monitored per PCP    Sciatica 08/11/2022    Shortness of breath 12/26/2018    Last Assessment & Plan:    Formatting of this note might be different from the original.   Possible undiagnosed underlying heart failure vs angina equivalent symptom   Was given Lasix 20mg IV once   Echo tomorrow    Takotsubo cardiomyopathy 01/30/2019    Last Assessment & Plan:    Formatting of this note might be different from the original.   Assessment: monitored per Cardiollogist    Unstable burst fracture of t11-T12 vertebra, subsequent encounter for fracture with delayed healing 03/01/2024    Vasospasm of cerebral artery 03/05/2024    Vitamin D deficiency 08/11/2022   [3]   Past Surgical History:  Procedure Laterality Date    CARDIAC CATHETERIZATION N/A 3/14/2024    Procedure: Left Heart Cath;  Surgeon: David Hernandes MD;  Location: [place] [address] Cardiac Cath Lab;  Service: Cardiovascular;  Laterality: N/A;

## (undated) DEVICE — DRESSING, MEPILEX, BORDER, HEEL, 8.7 X 9.1 IN

## (undated) DEVICE — SUTURE, SILK, 1, 30 IN, LABYRINTH, BLACK

## (undated) DEVICE — COVER MICSCP W46XL120IN 4 BINOC GLS LENS LEICA

## (undated) DEVICE — TAPE, POLYESTER, BRAIDED, PRE-CUT 2 X 30, WHITE"

## (undated) DEVICE — SYRINGE MED 30ML STD CLR PLAS LUERLOCK TIP N CTRL DISP

## (undated) DEVICE — APPLICATOR, CHLORAPREP, W/ORANGE TINT, 26ML

## (undated) DEVICE — MAYO TRAY, SMALL

## (undated) DEVICE — CONNECTOR, STRAIGHT, 0.375 X 0.375 IN

## (undated) DEVICE — CANNULA, CARDIAC SUMP

## (undated) DEVICE — 3M™ STERI-DRAPE™ INSTRUMENT POUCH 1018: Brand: STERI-DRAPE™

## (undated) DEVICE — DRAPE, FLUID WARMER

## (undated) DEVICE — CATHETER, OPTITORQUE, 5FR, TIG, 1H/100CM

## (undated) DEVICE — SYRINGE, 60 CC, IRRIGATION, BULB, CONTRO-BULB, PAPER POUCH

## (undated) DEVICE — CONTAINER,SPECIMEN,OR STERILE,4OZ: Brand: MEDLINE

## (undated) DEVICE — DRAIN SURG 19FR 0.25IN SIL RND W/ TRCR INDIC DOT RADPQ FULL

## (undated) DEVICE — SURGISLEEVE, WOUND PROTECTOR, SMALL 2.5-6CM

## (undated) DEVICE — GOWN, SURGICAL, SMARTGOWN, XLARGE, STERILE

## (undated) DEVICE — FILTER, IV, BLOOD, MICROAGGREGATE, 40 MIC, RBC TRANSFUSION

## (undated) DEVICE — DRAPE, INSTRUMENT, W/POUCH, STERI DRAPE, 7 X 11 IN, DISPOSABLE, STERILE

## (undated) DEVICE — SPONGE,NEURO,.75"X.75",XR,STRL,LF,10/PK: Brand: MEDLINE

## (undated) DEVICE — CATHETER, DRAINAGE, NASOGASTRIC, DOUBLE LUMEN, FUNNEL END, SUMP, SALEM, 18 FR, 48 IN, PVC, STERILE

## (undated) DEVICE — TUBING PACK, OXYGENATOR, ADULT

## (undated) DEVICE — GLOVE ORANGE PI 7   MSG9070

## (undated) DEVICE — SUTURE, PROLENE 4-0, TAPER POINT, SH-1 BLUE 30 INCH

## (undated) DEVICE — LOADS, COR-KNOT (6PK)

## (undated) DEVICE — C-ARM: Brand: UNBRANDED

## (undated) DEVICE — SUTURE, SURGIPRO II 3-0, 54 BLUE, V-20D/A"

## (undated) DEVICE — MANIFOLD, 4 PORT NEPTUNE STANDARD

## (undated) DEVICE — YANKAUER, RIGID, STRAIGHT, OPEN TIP, NO VENT

## (undated) DEVICE — CATHETER, URETHRAL, ROBNEL, 10 FR,16 IN, LF, RED

## (undated) DEVICE — SHEET,DRAPE,53X77,STERILE: Brand: MEDLINE

## (undated) DEVICE — 4-PORT MANIFOLD: Brand: NEPTUNE 2

## (undated) DEVICE — CATHETER, THORACIC, STRAIGHT, ADULT, 28 FR, PVC

## (undated) DEVICE — SUTURE, PROLENE, 5-0, 36 IN, RB1, DA, BLUE

## (undated) DEVICE — STERILE-Z SURGICAL PATIENT COVERS CLEAR POLYETHYLENE STERILE UNIVERSAL FIT 10 PER CASE: Brand: STERILE-Z

## (undated) DEVICE — CANNULA, MULTIPLE PERFUSION SET, 15"

## (undated) DEVICE — SPONGE,NEURO,1"X3",XR,STRL,LF,10/PK: Brand: MEDLINE

## (undated) DEVICE — SOLUTION IV 1000ML 0.9% SOD CHL PH 5 INJ USP VIAFLX PLAS

## (undated) DEVICE — SUTURE, ETHIBOND, 5, 30 IN, V40, MULTIPACK, GREEN

## (undated) DEVICE — KIT, TOURNIQUET, 7"

## (undated) DEVICE — LEAD, PACING, MYOCARDIAL, BIPOLAR, TEMPORARY

## (undated) DEVICE — STAPLER SKIN H3.9MM WIRE DIA0.58MM CRWN 6.9MM 35 STPL FIX

## (undated) DEVICE — PORT, 5MM THORACOPORT SOFT

## (undated) DEVICE — SUTURE, PROLENE, 3-0, 36 IN, SH, DA, BLUE

## (undated) DEVICE — KIT, CELL SAVER, W/COLLECTION SET, 225ML WASH SET

## (undated) DEVICE — SUTURE VICRYL SZ 3-0 L18IN ABSRB UD L26MM SH 1/2 CIR J864D

## (undated) DEVICE — CANNULA, BIOMEDICUS 23FR, FEMORAL VENOUS

## (undated) DEVICE — SUTURE ETHILON SZ 2-0 L30IN NONABSORBABLE BLK L36MM FSLX 3/8 1674H

## (undated) DEVICE — Device

## (undated) DEVICE — 3M™ TEGADERM™ TRANSPARENT FILM DRESSING FRAME STYLE, 1626W, 4 IN X 4-3/4 IN (10 CM X 12 CM), 50/CT 4CT/CASE: Brand: 3M™ TEGADERM™

## (undated) DEVICE — KIT,ANTI FOG,W/SPONGE & FLUID,SOFT PACK: Brand: MEDLINE

## (undated) DEVICE — TUBING, SUCTION, CONNECTING, NON-CONDUCTIVE, SURE GRIP CONNECTORS, 3/16 X 18 IN, PVC

## (undated) DEVICE — KIT EVAC 400CC PVC RADPQ Y CONN

## (undated) DEVICE — CATHETER, URETHRAL, ROBNEL,  8 FR, 16 IN, LF, RED

## (undated) DEVICE — BLADE ES L6IN ELASTOMERIC COAT INSUL DURABLE BEND UPTO

## (undated) DEVICE — COVER,TABLE,44X90,STERILE: Brand: MEDLINE

## (undated) DEVICE — TUBING, 0.375 X 3/32 IN X 6 FT

## (undated) DEVICE — ADAPTER, CARDIOPLEGIA, VENTING, Y, 19.1 CM

## (undated) DEVICE — KIT COLLCTN L2.5IN OD1.8IN BONE DUST S STL DISP SHEEHY MESH

## (undated) DEVICE — CAUTERY, PENCIL, PUSH BUTTON, SMOKE EVAC, 70MM

## (undated) DEVICE — COVER, CART, 45 X 27 X 48 IN, CLEAR

## (undated) DEVICE — SUTURE, VICRYL 0, TAPER POINT, CT-1 VIOLET 27 INCH

## (undated) DEVICE — TROCAR, KII OPTICAL BLADELESS 5MM Z THREAD 100MM LNGTH

## (undated) DEVICE — WAX, BONE, 2.5 GM

## (undated) DEVICE — DRESSING, MEPILEX, BORDER, SACRUM, 8.7 X 9.8 IN

## (undated) DEVICE — DRESSING, ISLAND, TELFA, 4 X 5 IN

## (undated) DEVICE — FLOSEAL WITH RECOTHROM - 10ML.: Brand: FLOSEAL HEMOSTATIC MATRIX

## (undated) DEVICE — SURGICAL SUCTION CONNECTING TUBE WITH MALE CONNECTOR AND SUCTION CLAMP, 2 FT. LONG (.6 M), 5 MM I.D.: Brand: CONMED

## (undated) DEVICE — SUTURE, SURGICAL STEEL, STERNUM 7, 18 IN, KV40, SINGL-WIRE

## (undated) DEVICE — X-LARGE COTTON GLOVE: Brand: DEROYAL

## (undated) DEVICE — KIT, MIS COR-KNOT COMBO

## (undated) DEVICE — GUIDEWIRE, INQWIRE, 3MM J, .035, 260

## (undated) DEVICE — SHEATH, GLIDESHEATH, SLENDER, 6FR 10CM

## (undated) DEVICE — NEEDLE SPNL 18GA L3.5IN W/ QNCKE SHARPER BVL DURA CLICK

## (undated) DEVICE — TUBING, SUCTION, CONNECTING, STERILE 0.25 X 120 IN., LF

## (undated) DEVICE — INSTRUMENT, DISSECTING, ENDOSCOPIC, PEANUT, 5 MM, STERILE

## (undated) DEVICE — SUTURE, ETHIBOND, XTRA, 30 IN, 0, CT-1, GREEN

## (undated) DEVICE — SUTURE, PROLENE, 4-0, 36 IN, RB1, DA, BLUE

## (undated) DEVICE — CLEANER, ELECTROSURGICAL, TIP, 5 X 5 CM, LF

## (undated) DEVICE — 1010 S-DRAPE TOWEL DRAPE 10/BX: Brand: STERI-DRAPE™

## (undated) DEVICE — TISSUE ADHESIVE, EXOFIN, 1ML

## (undated) DEVICE — CANNULA, AORTIC ROOT, 12G

## (undated) DEVICE — DRAPE, SHEET, CARDIOVASCULAR, ANTIMICROBIAL, W/ANESTHESIA SCREEN, IOBAN 2, STERI DRAPE, 107 X 133 IN, DISPOSABLE, FABRIC, BLUE, STERILE

## (undated) DEVICE — SUTURE, VICRYL, 4-0, 18 IN, PS2, UNDYED

## (undated) DEVICE — LIQUIBAND RAPID ADHESIVE 36/CS 0.8ML: Brand: MEDLINE

## (undated) DEVICE — BLADE ES L4IN INSUL EDGE

## (undated) DEVICE — CARE KIT, LAPAROSCOPIC, ADVANCED

## (undated) DEVICE — GLOVE ORANGE PI 7 1/2   MSG9075

## (undated) DEVICE — TROCAR, THOROACOPORT, 10.5MM

## (undated) DEVICE — ELECTRODE, QUICK-COMBO, EDGE SYSTEM, REDI PACK

## (undated) DEVICE — Device: Brand: CADWELL DISPOSABLE BALL-TIP PROBE

## (undated) DEVICE — TOWEL, SURGICAL, NEURO, O/R, 16 X 26, BLUE, STERILE

## (undated) DEVICE — CLIPPER, SURGICAL BLADE ASSEMBLY, GENERAL PURPOSE, SINGLE USE

## (undated) DEVICE — GAUZE,SPONGE,4"X4",16PLY,XRAY,STRL,LF: Brand: MEDLINE

## (undated) DEVICE — SPONGE, GAUZE, XRAY DECT, 16 PLY, 4 X 4, W/MASTER DMT,STERILE

## (undated) DEVICE — PIN 9733236 150MM STERILE PERC REF

## (undated) DEVICE — DRESSING, ADHESIVE, ISLAND, TELFA, 4 X 14 IN

## (undated) DEVICE — COUNTER, NEEDLE, FOAM BLOCK, POP-N-COUNT, W/BLADEGUARD, W/ADHESIVE 40 COUNT, RED

## (undated) DEVICE — TOOL MR8-14MH30 MR8 14CM MATCH 3MM: Brand: MIDAS REX MR8

## (undated) DEVICE — TOTAL TRAY, DB, 100% SILI FOLEY, 16FR 10: Brand: MEDLINE

## (undated) DEVICE — MICROCOAGULATION TEST, ACT+ TEST CUVETTE

## (undated) DEVICE — NEURO: Brand: MEDLINE INDUSTRIES, INC.

## (undated) DEVICE — SUTURE NONABSORBABLE MONOFILAMENT 3-0 PS-1 18 IN BLK ETHILON 1663H

## (undated) DEVICE — 1.6MM NITINOL K-WIRE, 500MM, BLUNT TIP
Type: IMPLANTABLE DEVICE | Site: SPINE LUMBAR | Status: NON-FUNCTIONAL
Removed: 2024-03-01

## (undated) DEVICE — CONNECTOR, Y, 0.375 X 0.375 X 0.5 IN

## (undated) DEVICE — TTL1LYR 16FR10ML 100%SIL TMPST TR: Brand: MEDLINE

## (undated) DEVICE — PITCHER, GRADUATE, 32 OZ (1200CC), STERILE

## (undated) DEVICE — 3.0MM PRECISION NEURO (MATCH HEAD)

## (undated) DEVICE — DRESSING, ADHESIVE, ISLAND, TELFA, 2 X 3.75 IN, LF

## (undated) DEVICE — PERFUSION PACK, HEMOCONCENTRATOR, 0.25 TUBING 36 IN LONG

## (undated) DEVICE — APPLICATOR MEDICATED 26 CC SOLUTION HI LT ORNG CHLORAPREP

## (undated) DEVICE — CASSETTE, BLOOD, PLEGIC SET

## (undated) DEVICE — NEEDLE BNE MAR ASPIR 11GA L4IN TWO PC HNDL W/ PRB JAMSH

## (undated) DEVICE — SUTURE VICRYL COAT SZ 4-0 L18IN ABSRB UD L19MM PS-2 1/2 CIR J496G

## (undated) DEVICE — SPONGE, HEMOSTATIC, CELLULOSE, SURGICEL, 2 X 14 IN

## (undated) DEVICE — OXYGENATOR FX 25, W/HR, ARTERIAL FILTER

## (undated) DEVICE — SPONGE, LAP, XRAY DECT, 18IN X 18IN, W/MASTER DMT, STERILE

## (undated) DEVICE — TUBE SET, PNEUMOCLEAR, SMOKE EVACU, HIGH-FLOW

## (undated) DEVICE — PACING CABLE, EXTENSION, 12 FT BLUE, DISPOSABLE

## (undated) DEVICE — DRAPE, PAD, INSTRUMENT, MAGNETIC, MEDIUM, 10 X 16 IN, DISPOSABLE

## (undated) DEVICE — TUBING, INSUFFLATION, LAPAROSCOPIC, FILTER, 10 FT

## (undated) DEVICE — TR BAND, RADIAL COMPRESSION, STANDARD, 24CM

## (undated) DEVICE — KIT JACK TBL PT CARE

## (undated) DEVICE — SURGISLEEVE, WOUND PROTECTOR, MEDIUM 5-9CM

## (undated) DEVICE — PRECISION MATCH HEAD

## (undated) DEVICE — SUTURE, P6 STERNUM DOUBLE WIRE, CCS P 2 NEEDLE

## (undated) DEVICE — CANNULA, ARTERIAL, BIO-MEDICUS, 17FR, VENTED

## (undated) DEVICE — SUTURE, SILK, 2-0, 30 IN, SH, CONTROL RELEASE, MULTIPACK, BLACK

## (undated) DEVICE — MARKER, SKIN, DUAL TIP INK W/9 LABEL AND REMOVABLE TIME OUT SLEEVE

## (undated) DEVICE — PACING CABLE, EXTENSION, 12 FT BEIGE, DISPOSABLE

## (undated) DEVICE — 3M™ IOBAN™ 2 ANTIMICROBIAL INCISE DRAPE 6651EZ: Brand: IOBAN™ 2

## (undated) DEVICE — SUTURE, NUROLON, 0, 18 IN, CT1, DETACHABLE, MULTIPACK, BLACK

## (undated) DEVICE — SUTURE, VICRYL, 2-0, 27 IN, CT-1, VIOLET

## (undated) DEVICE — CANNULA, AORTIC ROOT, LONG

## (undated) DEVICE — MARKER SURG PASS SPHR NDI

## (undated) DEVICE — Z DISCONTINUED USE 2220281 SUTURE VICRYL SZ 2-0 L18IN ABSRB UD L26MM CP-2 1/2 CIR REV J762D

## (undated) DEVICE — GAUZE,SPONGE,2"X2",8PLY,STERILE,LF,2'S: Brand: MEDLINE

## (undated) DEVICE — LABEL MED MINI W/ MARKER